# Patient Record
Sex: FEMALE | Race: WHITE | NOT HISPANIC OR LATINO | Employment: OTHER | URBAN - METROPOLITAN AREA
[De-identification: names, ages, dates, MRNs, and addresses within clinical notes are randomized per-mention and may not be internally consistent; named-entity substitution may affect disease eponyms.]

---

## 2018-03-29 ENCOUNTER — APPOINTMENT (OUTPATIENT)
Dept: GENERAL RADIOLOGY | Facility: HOSPITAL | Age: 83
End: 2018-03-29

## 2018-03-29 ENCOUNTER — HOSPITAL ENCOUNTER (INPATIENT)
Facility: HOSPITAL | Age: 83
LOS: 5 days | Discharge: NURSING, REHAB OR LONG-TERM CARE | End: 2018-04-03
Attending: EMERGENCY MEDICINE | Admitting: HOSPITALIST

## 2018-03-29 DIAGNOSIS — Z74.09 IMPAIRED FUNCTIONAL MOBILITY, BALANCE, GAIT, AND ENDURANCE: ICD-10-CM

## 2018-03-29 DIAGNOSIS — J18.9 PNEUMONIA OF LEFT LOWER LOBE DUE TO INFECTIOUS ORGANISM: Primary | ICD-10-CM

## 2018-03-29 DIAGNOSIS — E87.6 HYPOKALEMIA: ICD-10-CM

## 2018-03-29 DIAGNOSIS — J96.01 ACUTE RESPIRATORY FAILURE WITH HYPOXIA (HCC): ICD-10-CM

## 2018-03-29 PROBLEM — D64.9 NORMOCYTIC ANEMIA: Status: ACTIVE | Noted: 2018-03-29

## 2018-03-29 PROBLEM — K21.9 GERD (GASTROESOPHAGEAL REFLUX DISEASE): Status: ACTIVE | Noted: 2018-03-29

## 2018-03-29 PROBLEM — F03.90 DEMENTIA (HCC): Status: ACTIVE | Noted: 2018-03-29

## 2018-03-29 PROBLEM — I10 HYPERTENSION: Status: ACTIVE | Noted: 2018-03-29

## 2018-03-29 LAB
ALBUMIN SERPL-MCNC: 3.8 G/DL (ref 3.2–4.8)
ALBUMIN/GLOB SERPL: 1.4 G/DL (ref 1.5–2.5)
ALP SERPL-CCNC: 109 U/L (ref 25–100)
ALT SERPL W P-5'-P-CCNC: 22 U/L (ref 7–40)
ANION GAP SERPL CALCULATED.3IONS-SCNC: 8 MMOL/L (ref 3–11)
ARTERIAL PATENCY WRIST A: ABNORMAL
AST SERPL-CCNC: 42 U/L (ref 0–33)
ATMOSPHERIC PRESS: ABNORMAL MMHG
BACTERIA UR QL AUTO: ABNORMAL /HPF
BASE EXCESS BLDA CALC-SCNC: 8.1 MMOL/L (ref 0–2)
BASOPHILS # BLD AUTO: 0.02 10*3/MM3 (ref 0–0.2)
BASOPHILS NFR BLD AUTO: 0.3 % (ref 0–1)
BDY SITE: ABNORMAL
BILIRUB SERPL-MCNC: 0.3 MG/DL (ref 0.3–1.2)
BILIRUB UR QL STRIP: NEGATIVE
BNP SERPL-MCNC: 17 PG/ML (ref 0–100)
BUN BLD-MCNC: 13 MG/DL (ref 9–23)
BUN/CREAT SERPL: 16.3 (ref 7–25)
CALCIUM SPEC-SCNC: 8.9 MG/DL (ref 8.7–10.4)
CHLORIDE SERPL-SCNC: 97 MMOL/L (ref 99–109)
CLARITY UR: CLEAR
CO2 BLDA-SCNC: 34.3 MMOL/L (ref 22–33)
CO2 SERPL-SCNC: 32 MMOL/L (ref 20–31)
COHGB MFR BLD: 1.3 % (ref 0–2)
COLOR UR: YELLOW
CREAT BLD-MCNC: 0.8 MG/DL (ref 0.6–1.3)
DEPRECATED RDW RBC AUTO: 47.3 FL (ref 37–54)
EOSINOPHIL # BLD AUTO: 0.01 10*3/MM3 (ref 0–0.3)
EOSINOPHIL NFR BLD AUTO: 0.1 % (ref 0–3)
ERYTHROCYTE [DISTWIDTH] IN BLOOD BY AUTOMATED COUNT: 15.4 % (ref 11.3–14.5)
GFR SERPL CREATININE-BSD FRML MDRD: 68 ML/MIN/1.73
GLOBULIN UR ELPH-MCNC: 2.7 GM/DL
GLUCOSE BLD-MCNC: 118 MG/DL (ref 70–100)
GLUCOSE UR STRIP-MCNC: NEGATIVE MG/DL
HCO3 BLDA-SCNC: 32.9 MMOL/L (ref 20–26)
HCT VFR BLD AUTO: 32 % (ref 34.5–44)
HCT VFR BLD CALC: 28.4 %
HGB BLD-MCNC: 9.7 G/DL (ref 11.5–15.5)
HGB BLDA-MCNC: 9.3 G/DL (ref 14–18)
HGB UR QL STRIP.AUTO: NEGATIVE
HOLD SPECIMEN: NORMAL
HOLD SPECIMEN: NORMAL
HOROWITZ INDEX BLD+IHG-RTO: 28 %
HYALINE CASTS UR QL AUTO: ABNORMAL /LPF
IMM GRANULOCYTES # BLD: 0.01 10*3/MM3 (ref 0–0.03)
IMM GRANULOCYTES NFR BLD: 0.1 % (ref 0–0.6)
KETONES UR QL STRIP: NEGATIVE
LEUKOCYTE ESTERASE UR QL STRIP.AUTO: NEGATIVE
LYMPHOCYTES # BLD AUTO: 1.06 10*3/MM3 (ref 0.6–4.8)
LYMPHOCYTES NFR BLD AUTO: 15.1 % (ref 24–44)
MCH RBC QN AUTO: 25.3 PG (ref 27–31)
MCHC RBC AUTO-ENTMCNC: 30.3 G/DL (ref 32–36)
MCV RBC AUTO: 83.6 FL (ref 80–99)
METHGB BLD QL: 1.1 % (ref 0–1.5)
MODALITY: ABNORMAL
MONOCYTES # BLD AUTO: 0.49 10*3/MM3 (ref 0–1)
MONOCYTES NFR BLD AUTO: 7 % (ref 0–12)
NEUTROPHILS # BLD AUTO: 5.42 10*3/MM3 (ref 1.5–8.3)
NEUTROPHILS NFR BLD AUTO: 77.4 % (ref 41–71)
NITRITE UR QL STRIP: NEGATIVE
NRBC BLD MANUAL-RTO: 0 /100 WBC (ref 0–0)
OXYHGB MFR BLDV: 95.1 % (ref 94–99)
PCO2 BLDA: 46.8 MM HG (ref 35–45)
PH BLDA: 7.46 PH UNITS (ref 7.35–7.45)
PH UR STRIP.AUTO: 6 [PH] (ref 5–8)
PLATELET # BLD AUTO: 276 10*3/MM3 (ref 150–450)
PMV BLD AUTO: 9.1 FL (ref 6–12)
PO2 BLDA: 85.6 MM HG (ref 83–108)
POTASSIUM BLD-SCNC: 2.9 MMOL/L (ref 3.5–5.5)
PROT SERPL-MCNC: 6.5 G/DL (ref 5.7–8.2)
PROT UR QL STRIP: ABNORMAL
RBC # BLD AUTO: 3.83 10*6/MM3 (ref 3.89–5.14)
RBC # UR: ABNORMAL /HPF
REF LAB TEST METHOD: ABNORMAL
SODIUM BLD-SCNC: 137 MMOL/L (ref 132–146)
SP GR UR STRIP: 1.02 (ref 1–1.03)
SQUAMOUS #/AREA URNS HPF: ABNORMAL /HPF
TROPONIN I SERPL-MCNC: 0.05 NG/ML (ref 0–0.07)
UROBILINOGEN UR QL STRIP: ABNORMAL
WBC NRBC COR # BLD: 7.01 10*3/MM3 (ref 3.5–10.8)
WBC UR QL AUTO: ABNORMAL /HPF
WHOLE BLOOD HOLD SPECIMEN: NORMAL
WHOLE BLOOD HOLD SPECIMEN: NORMAL

## 2018-03-29 PROCEDURE — 84484 ASSAY OF TROPONIN QUANT: CPT

## 2018-03-29 PROCEDURE — 99223 1ST HOSP IP/OBS HIGH 75: CPT | Performed by: INTERNAL MEDICINE

## 2018-03-29 PROCEDURE — 36600 WITHDRAWAL OF ARTERIAL BLOOD: CPT | Performed by: EMERGENCY MEDICINE

## 2018-03-29 PROCEDURE — 25010000002 PIPERACILLIN SOD-TAZOBACTAM PER 1 G: Performed by: EMERGENCY MEDICINE

## 2018-03-29 PROCEDURE — 71045 X-RAY EXAM CHEST 1 VIEW: CPT

## 2018-03-29 PROCEDURE — 83880 ASSAY OF NATRIURETIC PEPTIDE: CPT | Performed by: EMERGENCY MEDICINE

## 2018-03-29 PROCEDURE — 81001 URINALYSIS AUTO W/SCOPE: CPT | Performed by: EMERGENCY MEDICINE

## 2018-03-29 PROCEDURE — 25010000003 POTASSIUM CHLORIDE 10 MEQ/100ML SOLUTION: Performed by: EMERGENCY MEDICINE

## 2018-03-29 PROCEDURE — 82805 BLOOD GASES W/O2 SATURATION: CPT | Performed by: EMERGENCY MEDICINE

## 2018-03-29 PROCEDURE — 85025 COMPLETE CBC W/AUTO DIFF WBC: CPT | Performed by: EMERGENCY MEDICINE

## 2018-03-29 PROCEDURE — 93005 ELECTROCARDIOGRAM TRACING: CPT | Performed by: EMERGENCY MEDICINE

## 2018-03-29 PROCEDURE — 25010000002 VANCOMYCIN 10 G RECONSTITUTED SOLUTION: Performed by: EMERGENCY MEDICINE

## 2018-03-29 PROCEDURE — 87040 BLOOD CULTURE FOR BACTERIA: CPT | Performed by: EMERGENCY MEDICINE

## 2018-03-29 PROCEDURE — 99285 EMERGENCY DEPT VISIT HI MDM: CPT

## 2018-03-29 PROCEDURE — 80053 COMPREHEN METABOLIC PANEL: CPT | Performed by: EMERGENCY MEDICINE

## 2018-03-29 PROCEDURE — P9612 CATHETERIZE FOR URINE SPEC: HCPCS

## 2018-03-29 RX ORDER — DONEPEZIL HYDROCHLORIDE 10 MG/1
10 TABLET, FILM COATED ORAL NIGHTLY
Status: DISCONTINUED | OUTPATIENT
Start: 2018-03-29 | End: 2018-04-03 | Stop reason: HOSPADM

## 2018-03-29 RX ORDER — SUCRALFATE 1 G/1
1 TABLET ORAL
COMMUNITY

## 2018-03-29 RX ORDER — TRAMADOL HYDROCHLORIDE 50 MG/1
50 TABLET ORAL 2 TIMES DAILY
Status: ON HOLD | COMMUNITY
End: 2018-05-18

## 2018-03-29 RX ORDER — DONEPEZIL HYDROCHLORIDE 10 MG/1
10 TABLET, FILM COATED ORAL NIGHTLY
COMMUNITY

## 2018-03-29 RX ORDER — ALPRAZOLAM 0.25 MG/1
0.25 TABLET ORAL 2 TIMES DAILY PRN
Status: ON HOLD | COMMUNITY
End: 2018-05-18

## 2018-03-29 RX ORDER — FLUOCINONIDE TOPICAL SOLUTION USP, 0.05% 0.5 MG/ML
SOLUTION TOPICAL 2 TIMES DAILY
COMMUNITY
End: 2018-04-03 | Stop reason: HOSPADM

## 2018-03-29 RX ORDER — HYDROCODONE BITARTRATE AND ACETAMINOPHEN 5; 325 MG/1; MG/1
1 TABLET ORAL EVERY 4 HOURS PRN
Status: DISCONTINUED | OUTPATIENT
Start: 2018-03-29 | End: 2018-04-01

## 2018-03-29 RX ORDER — POTASSIUM CHLORIDE 7.45 MG/ML
10 INJECTION INTRAVENOUS ONCE
Status: COMPLETED | OUTPATIENT
Start: 2018-03-29 | End: 2018-03-29

## 2018-03-29 RX ORDER — TRAMADOL HYDROCHLORIDE 50 MG/1
50 TABLET ORAL 2 TIMES DAILY
Status: DISCONTINUED | OUTPATIENT
Start: 2018-03-29 | End: 2018-04-03 | Stop reason: HOSPADM

## 2018-03-29 RX ORDER — ACETAMINOPHEN 325 MG/1
650 TABLET ORAL EVERY 6 HOURS PRN
COMMUNITY

## 2018-03-29 RX ORDER — BISACODYL 5 MG/1
5 TABLET, DELAYED RELEASE ORAL DAILY PRN
Status: DISCONTINUED | OUTPATIENT
Start: 2018-03-29 | End: 2018-04-03 | Stop reason: HOSPADM

## 2018-03-29 RX ORDER — HEPARIN SODIUM 5000 [USP'U]/ML
5000 INJECTION, SOLUTION INTRAVENOUS; SUBCUTANEOUS EVERY 8 HOURS SCHEDULED
Status: DISCONTINUED | OUTPATIENT
Start: 2018-03-29 | End: 2018-04-03 | Stop reason: HOSPADM

## 2018-03-29 RX ORDER — SUCRALFATE 1 G/1
1 TABLET ORAL
Status: DISCONTINUED | OUTPATIENT
Start: 2018-03-29 | End: 2018-04-03 | Stop reason: HOSPADM

## 2018-03-29 RX ORDER — VANCOMYCIN HYDROCHLORIDE 1 G/200ML
15 INJECTION, SOLUTION INTRAVENOUS EVERY 24 HOURS
Status: DISCONTINUED | OUTPATIENT
Start: 2018-03-30 | End: 2018-03-31

## 2018-03-29 RX ORDER — ONDANSETRON 2 MG/ML
4 INJECTION INTRAMUSCULAR; INTRAVENOUS EVERY 6 HOURS PRN
Status: DISCONTINUED | OUTPATIENT
Start: 2018-03-29 | End: 2018-04-03 | Stop reason: HOSPADM

## 2018-03-29 RX ORDER — LISINOPRIL 20 MG/1
20 TABLET ORAL DAILY
COMMUNITY
End: 2018-04-03 | Stop reason: HOSPADM

## 2018-03-29 RX ORDER — KETOCONAZOLE 20 MG/ML
SHAMPOO TOPICAL DAILY
COMMUNITY
End: 2018-04-03 | Stop reason: HOSPADM

## 2018-03-29 RX ORDER — SODIUM CHLORIDE 0.9 % (FLUSH) 0.9 %
1-10 SYRINGE (ML) INJECTION AS NEEDED
Status: DISCONTINUED | OUTPATIENT
Start: 2018-03-29 | End: 2018-04-03 | Stop reason: HOSPADM

## 2018-03-29 RX ORDER — MEMANTINE HYDROCHLORIDE 10 MG/1
10 TABLET ORAL EVERY 12 HOURS SCHEDULED
Status: DISCONTINUED | OUTPATIENT
Start: 2018-03-29 | End: 2018-04-03 | Stop reason: HOSPADM

## 2018-03-29 RX ORDER — PILOCARPINE HYDROCHLORIDE 5 MG/1
5 TABLET, FILM COATED ORAL 4 TIMES DAILY
Status: DISCONTINUED | OUTPATIENT
Start: 2018-03-29 | End: 2018-04-03 | Stop reason: HOSPADM

## 2018-03-29 RX ORDER — LORAZEPAM 2 MG/ML
0.5 INJECTION INTRAMUSCULAR EVERY 6 HOURS PRN
Status: DISCONTINUED | OUTPATIENT
Start: 2018-03-29 | End: 2018-04-02

## 2018-03-29 RX ORDER — ONDANSETRON 4 MG/1
4 TABLET, FILM COATED ORAL EVERY 6 HOURS PRN
Status: DISCONTINUED | OUTPATIENT
Start: 2018-03-29 | End: 2018-04-03 | Stop reason: HOSPADM

## 2018-03-29 RX ORDER — CLOPIDOGREL BISULFATE 75 MG/1
75 TABLET ORAL DAILY
Status: DISCONTINUED | OUTPATIENT
Start: 2018-03-30 | End: 2018-04-03 | Stop reason: HOSPADM

## 2018-03-29 RX ORDER — TRAZODONE HYDROCHLORIDE 50 MG/1
50 TABLET ORAL NIGHTLY
Status: DISCONTINUED | OUTPATIENT
Start: 2018-03-29 | End: 2018-04-01

## 2018-03-29 RX ORDER — PILOCARPINE HYDROCHLORIDE 5 MG/1
5 TABLET, FILM COATED ORAL 4 TIMES DAILY
Status: ON HOLD | COMMUNITY
End: 2018-05-16

## 2018-03-29 RX ORDER — SODIUM CHLORIDE 0.9 % (FLUSH) 0.9 %
10 SYRINGE (ML) INJECTION AS NEEDED
Status: DISCONTINUED | OUTPATIENT
Start: 2018-03-29 | End: 2018-04-03 | Stop reason: HOSPADM

## 2018-03-29 RX ORDER — AMLODIPINE BESYLATE 10 MG/1
10 TABLET ORAL NIGHTLY
COMMUNITY

## 2018-03-29 RX ORDER — CEFUROXIME AXETIL 500 MG/1
500 TABLET ORAL 2 TIMES DAILY
COMMUNITY
Start: 2018-03-28 | End: 2018-04-03 | Stop reason: HOSPADM

## 2018-03-29 RX ORDER — MEMANTINE HYDROCHLORIDE 28 MG/1
28 CAPSULE, EXTENDED RELEASE ORAL DAILY
COMMUNITY

## 2018-03-29 RX ORDER — SODIUM CHLORIDE 9 MG/ML
100 INJECTION, SOLUTION INTRAVENOUS CONTINUOUS
Status: DISCONTINUED | OUTPATIENT
Start: 2018-03-29 | End: 2018-04-01

## 2018-03-29 RX ORDER — OMEPRAZOLE 40 MG/1
40 CAPSULE, DELAYED RELEASE ORAL DAILY
COMMUNITY
End: 2018-05-14

## 2018-03-29 RX ORDER — TRAZODONE HYDROCHLORIDE 50 MG/1
50 TABLET ORAL NIGHTLY
COMMUNITY
End: 2018-05-14

## 2018-03-29 RX ORDER — ACETAMINOPHEN 325 MG/1
650 TABLET ORAL EVERY 4 HOURS PRN
Status: DISCONTINUED | OUTPATIENT
Start: 2018-03-29 | End: 2018-03-29 | Stop reason: SDUPTHER

## 2018-03-29 RX ORDER — ALPRAZOLAM 0.25 MG/1
0.25 TABLET ORAL 2 TIMES DAILY PRN
Status: DISCONTINUED | OUTPATIENT
Start: 2018-03-29 | End: 2018-04-03 | Stop reason: HOSPADM

## 2018-03-29 RX ORDER — CLOPIDOGREL BISULFATE 75 MG/1
75 TABLET ORAL DAILY
COMMUNITY

## 2018-03-29 RX ORDER — IPRATROPIUM BROMIDE AND ALBUTEROL SULFATE 2.5; .5 MG/3ML; MG/3ML
3 SOLUTION RESPIRATORY (INHALATION) EVERY 4 HOURS PRN
Status: DISCONTINUED | OUTPATIENT
Start: 2018-03-29 | End: 2018-04-03 | Stop reason: HOSPADM

## 2018-03-29 RX ORDER — PANTOPRAZOLE SODIUM 40 MG/1
40 TABLET, DELAYED RELEASE ORAL
Status: DISCONTINUED | OUTPATIENT
Start: 2018-03-30 | End: 2018-04-03 | Stop reason: HOSPADM

## 2018-03-29 RX ORDER — ACETAMINOPHEN 325 MG/1
650 TABLET ORAL EVERY 6 HOURS PRN
Status: DISCONTINUED | OUTPATIENT
Start: 2018-03-29 | End: 2018-04-03 | Stop reason: HOSPADM

## 2018-03-29 RX ORDER — POLYETHYLENE GLYCOL 3350 17 G/17G
17 POWDER, FOR SOLUTION ORAL DAILY PRN
COMMUNITY
End: 2020-02-05

## 2018-03-29 RX ORDER — CLONIDINE HYDROCHLORIDE 0.1 MG/1
0.1 TABLET ORAL 2 TIMES DAILY PRN
COMMUNITY
End: 2018-04-03 | Stop reason: HOSPADM

## 2018-03-29 RX ORDER — DOCUSATE SODIUM 100 MG/1
100 CAPSULE, LIQUID FILLED ORAL 2 TIMES DAILY PRN
Status: DISCONTINUED | OUTPATIENT
Start: 2018-03-29 | End: 2018-04-03 | Stop reason: HOSPADM

## 2018-03-29 RX ORDER — DOCUSATE SODIUM 100 MG/1
200 CAPSULE, LIQUID FILLED ORAL 2 TIMES DAILY
COMMUNITY

## 2018-03-29 RX ORDER — IPRATROPIUM BROMIDE AND ALBUTEROL SULFATE 2.5; .5 MG/3ML; MG/3ML
3 SOLUTION RESPIRATORY (INHALATION) EVERY 4 HOURS PRN
COMMUNITY
End: 2019-02-06

## 2018-03-29 RX ADMIN — TAZOBACTAM SODIUM AND PIPERACILLIN SODIUM 4.5 G: 500; 4 INJECTION, SOLUTION INTRAVENOUS at 17:22

## 2018-03-29 RX ADMIN — POTASSIUM CHLORIDE 10 MEQ: 7.46 INJECTION, SOLUTION INTRAVENOUS at 17:35

## 2018-03-29 RX ADMIN — VANCOMYCIN HYDROCHLORIDE 1250 MG: 10 INJECTION, POWDER, LYOPHILIZED, FOR SOLUTION INTRAVENOUS at 17:57

## 2018-03-30 LAB
ANION GAP SERPL CALCULATED.3IONS-SCNC: 14 MMOL/L (ref 3–11)
BUN BLD-MCNC: 9 MG/DL (ref 9–23)
BUN/CREAT SERPL: 18 (ref 7–25)
CALCIUM SPEC-SCNC: 8.8 MG/DL (ref 8.7–10.4)
CHLORIDE SERPL-SCNC: 98 MMOL/L (ref 99–109)
CO2 SERPL-SCNC: 30 MMOL/L (ref 20–31)
CREAT BLD-MCNC: 0.5 MG/DL (ref 0.6–1.3)
DEPRECATED RDW RBC AUTO: 45.9 FL (ref 37–54)
ERYTHROCYTE [DISTWIDTH] IN BLOOD BY AUTOMATED COUNT: 15.1 % (ref 11.3–14.5)
FERRITIN SERPL-MCNC: 18 NG/ML (ref 10–291)
FLUAV SUBTYP SPEC NAA+PROBE: NOT DETECTED
FLUBV RNA ISLT QL NAA+PROBE: NOT DETECTED
FOLATE SERPL-MCNC: 18.69 NG/ML (ref 3.2–20)
GFR SERPL CREATININE-BSD FRML MDRD: 117 ML/MIN/1.73
GLUCOSE BLD-MCNC: 92 MG/DL (ref 70–100)
HCT VFR BLD AUTO: 35.7 % (ref 34.5–44)
HGB BLD-MCNC: 10.6 G/DL (ref 11.5–15.5)
IRON 24H UR-MRATE: 22 MCG/DL (ref 50–175)
IRON SATN MFR SERPL: 6 % (ref 15–50)
MAGNESIUM SERPL-MCNC: 2 MG/DL (ref 1.3–2.7)
MAGNESIUM SERPL-MCNC: 2 MG/DL (ref 1.3–2.7)
MCH RBC QN AUTO: 24.9 PG (ref 27–31)
MCHC RBC AUTO-ENTMCNC: 29.7 G/DL (ref 32–36)
MCV RBC AUTO: 83.8 FL (ref 80–99)
PLATELET # BLD AUTO: 276 10*3/MM3 (ref 150–450)
PMV BLD AUTO: 9.2 FL (ref 6–12)
POTASSIUM BLD-SCNC: 2.8 MMOL/L (ref 3.5–5.5)
POTASSIUM BLD-SCNC: 3.7 MMOL/L (ref 3.5–5.5)
RBC # BLD AUTO: 4.26 10*6/MM3 (ref 3.89–5.14)
SODIUM BLD-SCNC: 142 MMOL/L (ref 132–146)
TIBC SERPL-MCNC: 375 MCG/DL (ref 250–450)
VIT B12 BLD-MCNC: 499 PG/ML (ref 211–911)
WBC NRBC COR # BLD: 6.23 10*3/MM3 (ref 3.5–10.8)

## 2018-03-30 PROCEDURE — 25010000002 VANCOMYCIN PER 500 MG

## 2018-03-30 PROCEDURE — 85027 COMPLETE CBC AUTOMATED: CPT | Performed by: INTERNAL MEDICINE

## 2018-03-30 PROCEDURE — 63710000001 ALPRAZOLAM 0.25 MG TABLET: Performed by: INTERNAL MEDICINE

## 2018-03-30 PROCEDURE — 83550 IRON BINDING TEST: CPT | Performed by: INTERNAL MEDICINE

## 2018-03-30 PROCEDURE — A9270 NON-COVERED ITEM OR SERVICE: HCPCS | Performed by: HOSPITALIST

## 2018-03-30 PROCEDURE — 82607 VITAMIN B-12: CPT | Performed by: INTERNAL MEDICINE

## 2018-03-30 PROCEDURE — 99233 SBSQ HOSP IP/OBS HIGH 50: CPT | Performed by: HOSPITALIST

## 2018-03-30 PROCEDURE — 25010000002 PIPERACILLIN SOD-TAZOBACTAM PER 1 G: Performed by: INTERNAL MEDICINE

## 2018-03-30 PROCEDURE — 82728 ASSAY OF FERRITIN: CPT | Performed by: INTERNAL MEDICINE

## 2018-03-30 PROCEDURE — 87502 INFLUENZA DNA AMP PROBE: CPT | Performed by: INTERNAL MEDICINE

## 2018-03-30 PROCEDURE — 63710000001 DONEPEZIL 10 MG TABLET: Performed by: INTERNAL MEDICINE

## 2018-03-30 PROCEDURE — 83735 ASSAY OF MAGNESIUM: CPT | Performed by: INTERNAL MEDICINE

## 2018-03-30 PROCEDURE — 63710000001 PANTOPRAZOLE 40 MG TABLET DELAYED-RELEASE: Performed by: INTERNAL MEDICINE

## 2018-03-30 PROCEDURE — 63710000001 AMLODIPINE 5 MG TABLET: Performed by: HOSPITALIST

## 2018-03-30 PROCEDURE — 94640 AIRWAY INHALATION TREATMENT: CPT

## 2018-03-30 PROCEDURE — 63710000001 CLOPIDOGREL 75 MG TABLET: Performed by: INTERNAL MEDICINE

## 2018-03-30 PROCEDURE — A9270 NON-COVERED ITEM OR SERVICE: HCPCS | Performed by: INTERNAL MEDICINE

## 2018-03-30 PROCEDURE — 25010000002 HEPARIN (PORCINE) PER 1000 UNITS: Performed by: INTERNAL MEDICINE

## 2018-03-30 PROCEDURE — 63710000001 MEMANTINE 10 MG TABLET: Performed by: INTERNAL MEDICINE

## 2018-03-30 PROCEDURE — 87899 AGENT NOS ASSAY W/OPTIC: CPT | Performed by: INTERNAL MEDICINE

## 2018-03-30 PROCEDURE — 63710000001 POTASSIUM CHLORIDE 10 MEQ CAPSULE CONTROLLED-RELEASE: Performed by: HOSPITALIST

## 2018-03-30 PROCEDURE — 94799 UNLISTED PULMONARY SVC/PX: CPT

## 2018-03-30 PROCEDURE — 82746 ASSAY OF FOLIC ACID SERUM: CPT | Performed by: INTERNAL MEDICINE

## 2018-03-30 PROCEDURE — 63710000001 TRAZODONE 50 MG TABLET: Performed by: INTERNAL MEDICINE

## 2018-03-30 PROCEDURE — 83540 ASSAY OF IRON: CPT | Performed by: INTERNAL MEDICINE

## 2018-03-30 PROCEDURE — 84132 ASSAY OF SERUM POTASSIUM: CPT | Performed by: HOSPITALIST

## 2018-03-30 PROCEDURE — 63710000001 SUCRALFATE 1 G TABLET: Performed by: INTERNAL MEDICINE

## 2018-03-30 PROCEDURE — 80048 BASIC METABOLIC PNL TOTAL CA: CPT | Performed by: INTERNAL MEDICINE

## 2018-03-30 PROCEDURE — 83735 ASSAY OF MAGNESIUM: CPT | Performed by: HOSPITALIST

## 2018-03-30 PROCEDURE — 87449 NOS EACH ORGANISM AG IA: CPT | Performed by: INTERNAL MEDICINE

## 2018-03-30 PROCEDURE — 63710000001 LISINOPRIL 10 MG TABLET: Performed by: HOSPITALIST

## 2018-03-30 PROCEDURE — 63710000001 PILOCARPINE 5 MG TABLET: Performed by: INTERNAL MEDICINE

## 2018-03-30 PROCEDURE — 63710000001 TRAMADOL 50 MG TABLET: Performed by: INTERNAL MEDICINE

## 2018-03-30 PROCEDURE — 94760 N-INVAS EAR/PLS OXIMETRY 1: CPT

## 2018-03-30 RX ORDER — IPRATROPIUM BROMIDE AND ALBUTEROL SULFATE 2.5; .5 MG/3ML; MG/3ML
3 SOLUTION RESPIRATORY (INHALATION)
Status: DISCONTINUED | OUTPATIENT
Start: 2018-03-30 | End: 2018-04-02

## 2018-03-30 RX ORDER — ISOSORBIDE MONONITRATE 30 MG/1
30 TABLET, EXTENDED RELEASE ORAL
Status: DISCONTINUED | OUTPATIENT
Start: 2018-03-30 | End: 2018-04-03 | Stop reason: HOSPADM

## 2018-03-30 RX ORDER — POTASSIUM CHLORIDE 7.45 MG/ML
10 INJECTION INTRAVENOUS
Status: DISCONTINUED | OUTPATIENT
Start: 2018-03-30 | End: 2018-04-03 | Stop reason: HOSPADM

## 2018-03-30 RX ORDER — FERROUS SULFATE 325(65) MG
325 TABLET ORAL
Status: DISCONTINUED | OUTPATIENT
Start: 2018-03-31 | End: 2018-04-03 | Stop reason: HOSPADM

## 2018-03-30 RX ORDER — LISINOPRIL 10 MG/1
10 TABLET ORAL
Status: DISCONTINUED | OUTPATIENT
Start: 2018-03-30 | End: 2018-04-01

## 2018-03-30 RX ORDER — ASCORBIC ACID 500 MG
500 TABLET ORAL DAILY
Status: DISCONTINUED | OUTPATIENT
Start: 2018-03-31 | End: 2018-04-03 | Stop reason: HOSPADM

## 2018-03-30 RX ORDER — POTASSIUM CHLORIDE 1.5 G/1.77G
40 POWDER, FOR SOLUTION ORAL AS NEEDED
Status: DISCONTINUED | OUTPATIENT
Start: 2018-03-30 | End: 2018-04-03 | Stop reason: HOSPADM

## 2018-03-30 RX ORDER — SACCHAROMYCES BOULARDII 250 MG
250 CAPSULE ORAL 2 TIMES DAILY
Status: DISCONTINUED | OUTPATIENT
Start: 2018-03-30 | End: 2018-04-03 | Stop reason: HOSPADM

## 2018-03-30 RX ORDER — POTASSIUM CHLORIDE 750 MG/1
40 CAPSULE, EXTENDED RELEASE ORAL AS NEEDED
Status: DISCONTINUED | OUTPATIENT
Start: 2018-03-30 | End: 2018-04-03 | Stop reason: HOSPADM

## 2018-03-30 RX ORDER — AMLODIPINE BESYLATE 5 MG/1
5 TABLET ORAL
Status: DISCONTINUED | OUTPATIENT
Start: 2018-03-30 | End: 2018-04-01

## 2018-03-30 RX ADMIN — VANCOMYCIN HYDROCHLORIDE 1000 MG: 1 INJECTION, SOLUTION INTRAVENOUS at 20:25

## 2018-03-30 RX ADMIN — TRAZODONE HYDROCHLORIDE 50 MG: 50 TABLET ORAL at 01:00

## 2018-03-30 RX ADMIN — SUCRALFATE 1 G: 1 TABLET ORAL at 18:15

## 2018-03-30 RX ADMIN — POTASSIUM CHLORIDE 40 MEQ: 750 CAPSULE, EXTENDED RELEASE ORAL at 18:19

## 2018-03-30 RX ADMIN — ALPRAZOLAM 0.25 MG: 0.25 TABLET ORAL at 05:51

## 2018-03-30 RX ADMIN — SODIUM CHLORIDE 100 ML/HR: 9 INJECTION, SOLUTION INTRAVENOUS at 01:00

## 2018-03-30 RX ADMIN — AMLODIPINE BESYLATE 5 MG: 5 TABLET ORAL at 10:34

## 2018-03-30 RX ADMIN — IPRATROPIUM BROMIDE AND ALBUTEROL SULFATE 3 ML: 2.5; .5 SOLUTION RESPIRATORY (INHALATION) at 19:02

## 2018-03-30 RX ADMIN — MEMANTINE 10 MG: 10 TABLET ORAL at 20:25

## 2018-03-30 RX ADMIN — SUCRALFATE 1 G: 1 TABLET ORAL at 12:48

## 2018-03-30 RX ADMIN — HEPARIN SODIUM 5000 UNITS: 5000 INJECTION, SOLUTION INTRAVENOUS; SUBCUTANEOUS at 15:15

## 2018-03-30 RX ADMIN — ISOSORBIDE MONONITRATE 30 MG: 30 TABLET, EXTENDED RELEASE ORAL at 20:25

## 2018-03-30 RX ADMIN — SUCRALFATE 1 G: 1 TABLET ORAL at 20:25

## 2018-03-30 RX ADMIN — MEMANTINE 10 MG: 10 TABLET ORAL at 08:19

## 2018-03-30 RX ADMIN — LISINOPRIL 10 MG: 10 TABLET ORAL at 08:19

## 2018-03-30 RX ADMIN — PILOCARPINE HYDROCHLORIDE 5 MG: 5 TABLET, FILM COATED ORAL at 12:48

## 2018-03-30 RX ADMIN — DONEPEZIL HYDROCHLORIDE 10 MG: 10 TABLET, FILM COATED ORAL at 00:59

## 2018-03-30 RX ADMIN — TAZOBACTAM SODIUM AND PIPERACILLIN SODIUM 3.38 G: 375; 3 INJECTION, SOLUTION INTRAVENOUS at 15:56

## 2018-03-30 RX ADMIN — PANTOPRAZOLE SODIUM 40 MG: 40 TABLET, DELAYED RELEASE ORAL at 05:51

## 2018-03-30 RX ADMIN — TRAZODONE HYDROCHLORIDE 50 MG: 50 TABLET ORAL at 20:25

## 2018-03-30 RX ADMIN — MEMANTINE 10 MG: 10 TABLET ORAL at 01:00

## 2018-03-30 RX ADMIN — HEPARIN SODIUM 5000 UNITS: 5000 INJECTION, SOLUTION INTRAVENOUS; SUBCUTANEOUS at 20:25

## 2018-03-30 RX ADMIN — PILOCARPINE HYDROCHLORIDE 5 MG: 5 TABLET, FILM COATED ORAL at 08:19

## 2018-03-30 RX ADMIN — TRAMADOL HYDROCHLORIDE 50 MG: 50 TABLET, COATED ORAL at 08:20

## 2018-03-30 RX ADMIN — CLOPIDOGREL BISULFATE 75 MG: 75 TABLET ORAL at 08:19

## 2018-03-30 RX ADMIN — HEPARIN SODIUM 5000 UNITS: 5000 INJECTION, SOLUTION INTRAVENOUS; SUBCUTANEOUS at 05:51

## 2018-03-30 RX ADMIN — POTASSIUM CHLORIDE 40 MEQ: 750 CAPSULE, EXTENDED RELEASE ORAL at 15:15

## 2018-03-30 RX ADMIN — TAZOBACTAM SODIUM AND PIPERACILLIN SODIUM 3.38 G: 375; 3 INJECTION, SOLUTION INTRAVENOUS at 01:00

## 2018-03-30 RX ADMIN — POTASSIUM CHLORIDE 40 MEQ: 750 CAPSULE, EXTENDED RELEASE ORAL at 10:29

## 2018-03-30 RX ADMIN — TRAMADOL HYDROCHLORIDE 50 MG: 50 TABLET, COATED ORAL at 01:00

## 2018-03-30 RX ADMIN — TAZOBACTAM SODIUM AND PIPERACILLIN SODIUM 3.38 G: 375; 3 INJECTION, SOLUTION INTRAVENOUS at 08:20

## 2018-03-30 RX ADMIN — POTASSIUM CHLORIDE 40 MEQ: 750 CAPSULE, EXTENDED RELEASE ORAL at 10:34

## 2018-03-30 RX ADMIN — SODIUM CHLORIDE 100 ML/HR: 9 INJECTION, SOLUTION INTRAVENOUS at 20:25

## 2018-03-30 RX ADMIN — PILOCARPINE HYDROCHLORIDE 5 MG: 5 TABLET, FILM COATED ORAL at 20:30

## 2018-03-30 RX ADMIN — SODIUM CHLORIDE 100 ML/HR: 9 INJECTION, SOLUTION INTRAVENOUS at 10:29

## 2018-03-30 RX ADMIN — DONEPEZIL HYDROCHLORIDE 10 MG: 10 TABLET, FILM COATED ORAL at 20:25

## 2018-03-30 RX ADMIN — PILOCARPINE HYDROCHLORIDE 5 MG: 5 TABLET, FILM COATED ORAL at 18:15

## 2018-03-30 RX ADMIN — PILOCARPINE HYDROCHLORIDE 5 MG: 5 TABLET, FILM COATED ORAL at 00:59

## 2018-03-30 RX ADMIN — TRAMADOL HYDROCHLORIDE 50 MG: 50 TABLET, COATED ORAL at 20:25

## 2018-03-30 RX ADMIN — IPRATROPIUM BROMIDE AND ALBUTEROL SULFATE 3 ML: 2.5; .5 SOLUTION RESPIRATORY (INHALATION) at 14:35

## 2018-03-30 RX ADMIN — SUCRALFATE 1 G: 1 TABLET ORAL at 08:19

## 2018-03-30 RX ADMIN — ALPRAZOLAM 0.25 MG: 0.25 TABLET ORAL at 18:15

## 2018-03-31 LAB
ANION GAP SERPL CALCULATED.3IONS-SCNC: 5 MMOL/L (ref 3–11)
BUN BLD-MCNC: 8 MG/DL (ref 9–23)
BUN/CREAT SERPL: 13.3 (ref 7–25)
CALCIUM SPEC-SCNC: 8.6 MG/DL (ref 8.7–10.4)
CHLORIDE SERPL-SCNC: 104 MMOL/L (ref 99–109)
CO2 SERPL-SCNC: 32 MMOL/L (ref 20–31)
CREAT BLD-MCNC: 0.6 MG/DL (ref 0.6–1.3)
DEPRECATED RDW RBC AUTO: 46.7 FL (ref 37–54)
ERYTHROCYTE [DISTWIDTH] IN BLOOD BY AUTOMATED COUNT: 15 % (ref 11.3–14.5)
GFR SERPL CREATININE-BSD FRML MDRD: 95 ML/MIN/1.73
GLUCOSE BLD-MCNC: 91 MG/DL (ref 70–100)
HCT VFR BLD AUTO: 31.3 % (ref 34.5–44)
HGB BLD-MCNC: 9.4 G/DL (ref 11.5–15.5)
INR PPP: 0.97 (ref 0.91–1.09)
MCH RBC QN AUTO: 25.4 PG (ref 27–31)
MCHC RBC AUTO-ENTMCNC: 30 G/DL (ref 32–36)
MCV RBC AUTO: 84.6 FL (ref 80–99)
PHOSPHATE SERPL-MCNC: 2.8 MG/DL (ref 2.4–5.1)
PLATELET # BLD AUTO: 270 10*3/MM3 (ref 150–450)
PMV BLD AUTO: 8.9 FL (ref 6–12)
POTASSIUM BLD-SCNC: 3.4 MMOL/L (ref 3.5–5.5)
POTASSIUM BLD-SCNC: 4.2 MMOL/L (ref 3.5–5.5)
PROTHROMBIN TIME: 10.2 SECONDS (ref 9.6–11.5)
RBC # BLD AUTO: 3.7 10*6/MM3 (ref 3.89–5.14)
SODIUM BLD-SCNC: 141 MMOL/L (ref 132–146)
VANCOMYCIN TROUGH SERPL-MCNC: 6.6 MCG/ML (ref 10–20)
WBC NRBC COR # BLD: 6.88 10*3/MM3 (ref 3.5–10.8)

## 2018-03-31 PROCEDURE — 84100 ASSAY OF PHOSPHORUS: CPT | Performed by: HOSPITALIST

## 2018-03-31 PROCEDURE — 84132 ASSAY OF SERUM POTASSIUM: CPT | Performed by: HOSPITALIST

## 2018-03-31 PROCEDURE — 80202 ASSAY OF VANCOMYCIN: CPT

## 2018-03-31 PROCEDURE — 80048 BASIC METABOLIC PNL TOTAL CA: CPT | Performed by: HOSPITALIST

## 2018-03-31 PROCEDURE — 25010000002 VANCOMYCIN PER 500 MG

## 2018-03-31 PROCEDURE — 94640 AIRWAY INHALATION TREATMENT: CPT

## 2018-03-31 PROCEDURE — 25010000002 HEPARIN (PORCINE) PER 1000 UNITS: Performed by: INTERNAL MEDICINE

## 2018-03-31 PROCEDURE — 99233 SBSQ HOSP IP/OBS HIGH 50: CPT | Performed by: NURSE PRACTITIONER

## 2018-03-31 PROCEDURE — 94799 UNLISTED PULMONARY SVC/PX: CPT

## 2018-03-31 PROCEDURE — 85027 COMPLETE CBC AUTOMATED: CPT | Performed by: HOSPITALIST

## 2018-03-31 PROCEDURE — 97610 LOW FREQUENCY NON-THERMAL US: CPT

## 2018-03-31 PROCEDURE — 97162 PT EVAL MOD COMPLEX 30 MIN: CPT

## 2018-03-31 PROCEDURE — 25010000002 PIPERACILLIN SOD-TAZOBACTAM PER 1 G: Performed by: INTERNAL MEDICINE

## 2018-03-31 PROCEDURE — 94760 N-INVAS EAR/PLS OXIMETRY 1: CPT

## 2018-03-31 PROCEDURE — 85610 PROTHROMBIN TIME: CPT | Performed by: HOSPITALIST

## 2018-03-31 RX ORDER — VANCOMYCIN HYDROCHLORIDE 1 G/200ML
15 INJECTION, SOLUTION INTRAVENOUS
Status: DISCONTINUED | OUTPATIENT
Start: 2018-04-01 | End: 2018-04-02

## 2018-03-31 RX ADMIN — IPRATROPIUM BROMIDE AND ALBUTEROL SULFATE 3 ML: 2.5; .5 SOLUTION RESPIRATORY (INHALATION) at 11:50

## 2018-03-31 RX ADMIN — HEPARIN SODIUM 5000 UNITS: 5000 INJECTION, SOLUTION INTRAVENOUS; SUBCUTANEOUS at 22:19

## 2018-03-31 RX ADMIN — PILOCARPINE HYDROCHLORIDE 5 MG: 5 TABLET, FILM COATED ORAL at 22:18

## 2018-03-31 RX ADMIN — HEPARIN SODIUM 5000 UNITS: 5000 INJECTION, SOLUTION INTRAVENOUS; SUBCUTANEOUS at 14:48

## 2018-03-31 RX ADMIN — TAZOBACTAM SODIUM AND PIPERACILLIN SODIUM 3.38 G: 375; 3 INJECTION, SOLUTION INTRAVENOUS at 23:18

## 2018-03-31 RX ADMIN — DONEPEZIL HYDROCHLORIDE 10 MG: 10 TABLET, FILM COATED ORAL at 22:18

## 2018-03-31 RX ADMIN — TRAMADOL HYDROCHLORIDE 50 MG: 50 TABLET, COATED ORAL at 22:19

## 2018-03-31 RX ADMIN — MEMANTINE 10 MG: 10 TABLET ORAL at 22:19

## 2018-03-31 RX ADMIN — POTASSIUM CHLORIDE 40 MEQ: 750 CAPSULE, EXTENDED RELEASE ORAL at 08:18

## 2018-03-31 RX ADMIN — SODIUM CHLORIDE 100 ML/HR: 9 INJECTION, SOLUTION INTRAVENOUS at 22:57

## 2018-03-31 RX ADMIN — CLOPIDOGREL BISULFATE 75 MG: 75 TABLET ORAL at 08:18

## 2018-03-31 RX ADMIN — Medication 250 MG: at 08:18

## 2018-03-31 RX ADMIN — PILOCARPINE HYDROCHLORIDE 5 MG: 5 TABLET, FILM COATED ORAL at 11:34

## 2018-03-31 RX ADMIN — Medication 250 MG: at 22:17

## 2018-03-31 RX ADMIN — SUCRALFATE 1 G: 1 TABLET ORAL at 18:28

## 2018-03-31 RX ADMIN — SODIUM CHLORIDE 100 ML/HR: 9 INJECTION, SOLUTION INTRAVENOUS at 05:56

## 2018-03-31 RX ADMIN — TRAZODONE HYDROCHLORIDE 50 MG: 50 TABLET ORAL at 22:20

## 2018-03-31 RX ADMIN — FERROUS SULFATE TAB 325 MG (65 MG ELEMENTAL FE) 325 MG: 325 (65 FE) TAB at 08:18

## 2018-03-31 RX ADMIN — PILOCARPINE HYDROCHLORIDE 5 MG: 5 TABLET, FILM COATED ORAL at 08:18

## 2018-03-31 RX ADMIN — IPRATROPIUM BROMIDE AND ALBUTEROL SULFATE 3 ML: 2.5; .5 SOLUTION RESPIRATORY (INHALATION) at 15:45

## 2018-03-31 RX ADMIN — Medication 250 MG: at 01:05

## 2018-03-31 RX ADMIN — POTASSIUM CHLORIDE 40 MEQ: 750 CAPSULE, EXTENDED RELEASE ORAL at 12:16

## 2018-03-31 RX ADMIN — IPRATROPIUM BROMIDE AND ALBUTEROL SULFATE 3 ML: 2.5; .5 SOLUTION RESPIRATORY (INHALATION) at 19:25

## 2018-03-31 RX ADMIN — ISOSORBIDE MONONITRATE 30 MG: 30 TABLET, EXTENDED RELEASE ORAL at 11:34

## 2018-03-31 RX ADMIN — SUCRALFATE 1 G: 1 TABLET ORAL at 22:17

## 2018-03-31 RX ADMIN — HEPARIN SODIUM 5000 UNITS: 5000 INJECTION, SOLUTION INTRAVENOUS; SUBCUTANEOUS at 05:55

## 2018-03-31 RX ADMIN — ALPRAZOLAM 0.25 MG: 0.25 TABLET ORAL at 08:45

## 2018-03-31 RX ADMIN — TAZOBACTAM SODIUM AND PIPERACILLIN SODIUM 3.38 G: 375; 3 INJECTION, SOLUTION INTRAVENOUS at 08:18

## 2018-03-31 RX ADMIN — AMLODIPINE BESYLATE 5 MG: 5 TABLET ORAL at 08:18

## 2018-03-31 RX ADMIN — SUCRALFATE 1 G: 1 TABLET ORAL at 11:34

## 2018-03-31 RX ADMIN — TAZOBACTAM SODIUM AND PIPERACILLIN SODIUM 3.38 G: 375; 3 INJECTION, SOLUTION INTRAVENOUS at 01:05

## 2018-03-31 RX ADMIN — LISINOPRIL 10 MG: 10 TABLET ORAL at 08:18

## 2018-03-31 RX ADMIN — ACETAMINOPHEN 650 MG: 325 TABLET ORAL at 08:45

## 2018-03-31 RX ADMIN — IPRATROPIUM BROMIDE AND ALBUTEROL SULFATE 3 ML: 2.5; .5 SOLUTION RESPIRATORY (INHALATION) at 06:57

## 2018-03-31 RX ADMIN — SUCRALFATE 1 G: 1 TABLET ORAL at 05:55

## 2018-03-31 RX ADMIN — MEMANTINE 10 MG: 10 TABLET ORAL at 08:18

## 2018-03-31 RX ADMIN — PANTOPRAZOLE SODIUM 40 MG: 40 TABLET, DELAYED RELEASE ORAL at 05:55

## 2018-03-31 RX ADMIN — TAZOBACTAM SODIUM AND PIPERACILLIN SODIUM 3.38 G: 375; 3 INJECTION, SOLUTION INTRAVENOUS at 16:33

## 2018-03-31 RX ADMIN — TRAMADOL HYDROCHLORIDE 50 MG: 50 TABLET, COATED ORAL at 08:18

## 2018-03-31 RX ADMIN — OXYCODONE HYDROCHLORIDE AND ACETAMINOPHEN 500 MG: 500 TABLET ORAL at 08:17

## 2018-03-31 RX ADMIN — VANCOMYCIN HYDROCHLORIDE 1000 MG: 1 INJECTION, SOLUTION INTRAVENOUS at 18:29

## 2018-04-01 LAB
ANION GAP SERPL CALCULATED.3IONS-SCNC: 5 MMOL/L (ref 3–11)
BUN BLD-MCNC: 9 MG/DL (ref 9–23)
BUN/CREAT SERPL: 15 (ref 7–25)
CALCIUM SPEC-SCNC: 8.4 MG/DL (ref 8.7–10.4)
CHLORIDE SERPL-SCNC: 104 MMOL/L (ref 99–109)
CO2 SERPL-SCNC: 29 MMOL/L (ref 20–31)
CREAT BLD-MCNC: 0.6 MG/DL (ref 0.6–1.3)
DEPRECATED RDW RBC AUTO: 46.4 FL (ref 37–54)
ERYTHROCYTE [DISTWIDTH] IN BLOOD BY AUTOMATED COUNT: 15.1 % (ref 11.3–14.5)
GFR SERPL CREATININE-BSD FRML MDRD: 95 ML/MIN/1.73
GLUCOSE BLD-MCNC: 89 MG/DL (ref 70–100)
HCT VFR BLD AUTO: 28.9 % (ref 34.5–44)
HGB BLD-MCNC: 8.8 G/DL (ref 11.5–15.5)
MCH RBC QN AUTO: 25.6 PG (ref 27–31)
MCHC RBC AUTO-ENTMCNC: 30.4 G/DL (ref 32–36)
MCV RBC AUTO: 84 FL (ref 80–99)
PLATELET # BLD AUTO: 265 10*3/MM3 (ref 150–450)
PMV BLD AUTO: 9.1 FL (ref 6–12)
POTASSIUM BLD-SCNC: 4.4 MMOL/L (ref 3.5–5.5)
RBC # BLD AUTO: 3.44 10*6/MM3 (ref 3.89–5.14)
SODIUM BLD-SCNC: 138 MMOL/L (ref 132–146)
WBC NRBC COR # BLD: 7.54 10*3/MM3 (ref 3.5–10.8)

## 2018-04-01 PROCEDURE — 94640 AIRWAY INHALATION TREATMENT: CPT

## 2018-04-01 PROCEDURE — 94799 UNLISTED PULMONARY SVC/PX: CPT

## 2018-04-01 PROCEDURE — 97610 LOW FREQUENCY NON-THERMAL US: CPT

## 2018-04-01 PROCEDURE — 25010000002 VANCOMYCIN PER 500 MG: Performed by: HOSPITALIST

## 2018-04-01 PROCEDURE — 94760 N-INVAS EAR/PLS OXIMETRY 1: CPT

## 2018-04-01 PROCEDURE — 97110 THERAPEUTIC EXERCISES: CPT

## 2018-04-01 PROCEDURE — 80048 BASIC METABOLIC PNL TOTAL CA: CPT | Performed by: NURSE PRACTITIONER

## 2018-04-01 PROCEDURE — 99233 SBSQ HOSP IP/OBS HIGH 50: CPT | Performed by: HOSPITALIST

## 2018-04-01 PROCEDURE — 85027 COMPLETE CBC AUTOMATED: CPT | Performed by: NURSE PRACTITIONER

## 2018-04-01 PROCEDURE — 25010000002 PIPERACILLIN SOD-TAZOBACTAM PER 1 G: Performed by: INTERNAL MEDICINE

## 2018-04-01 PROCEDURE — 97163 PT EVAL HIGH COMPLEX 45 MIN: CPT

## 2018-04-01 PROCEDURE — 25010000002 HEPARIN (PORCINE) PER 1000 UNITS: Performed by: INTERNAL MEDICINE

## 2018-04-01 RX ORDER — AMLODIPINE BESYLATE 5 MG/1
10 TABLET ORAL EVERY EVENING
Status: DISCONTINUED | OUTPATIENT
Start: 2018-04-01 | End: 2018-04-03 | Stop reason: HOSPADM

## 2018-04-01 RX ORDER — AMLODIPINE BESYLATE 5 MG/1
5 TABLET ORAL ONCE
Status: DISCONTINUED | OUTPATIENT
Start: 2018-04-01 | End: 2018-04-01 | Stop reason: SDUPTHER

## 2018-04-01 RX ORDER — LISINOPRIL 10 MG/1
10 TABLET ORAL EVERY 12 HOURS SCHEDULED
Status: DISCONTINUED | OUTPATIENT
Start: 2018-04-01 | End: 2018-04-03 | Stop reason: HOSPADM

## 2018-04-01 RX ORDER — ALPRAZOLAM 0.25 MG/1
0.25 TABLET ORAL NIGHTLY
Status: DISCONTINUED | OUTPATIENT
Start: 2018-04-01 | End: 2018-04-03 | Stop reason: HOSPADM

## 2018-04-01 RX ORDER — TRAZODONE HYDROCHLORIDE 50 MG/1
25 TABLET ORAL NIGHTLY
Status: DISCONTINUED | OUTPATIENT
Start: 2018-04-01 | End: 2018-04-03 | Stop reason: HOSPADM

## 2018-04-01 RX ORDER — AMLODIPINE BESYLATE 5 MG/1
5 TABLET ORAL ONCE
Status: COMPLETED | OUTPATIENT
Start: 2018-04-01 | End: 2018-04-01

## 2018-04-01 RX ORDER — AMLODIPINE BESYLATE 5 MG/1
10 TABLET ORAL
Status: DISCONTINUED | OUTPATIENT
Start: 2018-04-02 | End: 2018-04-01

## 2018-04-01 RX ORDER — TERAZOSIN 1 MG/1
1 CAPSULE ORAL EVERY 12 HOURS SCHEDULED
Status: DISCONTINUED | OUTPATIENT
Start: 2018-04-01 | End: 2018-04-02

## 2018-04-01 RX ADMIN — TAZOBACTAM SODIUM AND PIPERACILLIN SODIUM 3.38 G: 375; 3 INJECTION, SOLUTION INTRAVENOUS at 14:45

## 2018-04-01 RX ADMIN — AMLODIPINE BESYLATE 5 MG: 5 TABLET ORAL at 07:59

## 2018-04-01 RX ADMIN — MEMANTINE 10 MG: 10 TABLET ORAL at 07:58

## 2018-04-01 RX ADMIN — LISINOPRIL 10 MG: 10 TABLET ORAL at 22:20

## 2018-04-01 RX ADMIN — Medication 5 MG: at 22:24

## 2018-04-01 RX ADMIN — SUCRALFATE 1 G: 1 TABLET ORAL at 06:44

## 2018-04-01 RX ADMIN — OXYCODONE HYDROCHLORIDE AND ACETAMINOPHEN 500 MG: 500 TABLET ORAL at 07:58

## 2018-04-01 RX ADMIN — AMLODIPINE BESYLATE 10 MG: 5 TABLET ORAL at 16:17

## 2018-04-01 RX ADMIN — HEPARIN SODIUM 5000 UNITS: 5000 INJECTION, SOLUTION INTRAVENOUS; SUBCUTANEOUS at 14:44

## 2018-04-01 RX ADMIN — VANCOMYCIN HYDROCHLORIDE 1000 MG: 1 INJECTION, SOLUTION INTRAVENOUS at 11:45

## 2018-04-01 RX ADMIN — PANTOPRAZOLE SODIUM 40 MG: 40 TABLET, DELAYED RELEASE ORAL at 06:44

## 2018-04-01 RX ADMIN — ALPRAZOLAM 0.25 MG: 0.25 TABLET ORAL at 22:24

## 2018-04-01 RX ADMIN — TRAZODONE HYDROCHLORIDE 25 MG: 50 TABLET ORAL at 22:29

## 2018-04-01 RX ADMIN — PILOCARPINE HYDROCHLORIDE 5 MG: 5 TABLET, FILM COATED ORAL at 07:58

## 2018-04-01 RX ADMIN — FERROUS SULFATE TAB 325 MG (65 MG ELEMENTAL FE) 325 MG: 325 (65 FE) TAB at 07:59

## 2018-04-01 RX ADMIN — Medication 250 MG: at 07:59

## 2018-04-01 RX ADMIN — DONEPEZIL HYDROCHLORIDE 10 MG: 10 TABLET, FILM COATED ORAL at 22:23

## 2018-04-01 RX ADMIN — MEMANTINE 10 MG: 10 TABLET ORAL at 22:20

## 2018-04-01 RX ADMIN — SODIUM CHLORIDE 100 ML/HR: 9 INJECTION, SOLUTION INTRAVENOUS at 11:48

## 2018-04-01 RX ADMIN — Medication 250 MG: at 22:20

## 2018-04-01 RX ADMIN — ISOSORBIDE MONONITRATE 30 MG: 30 TABLET, EXTENDED RELEASE ORAL at 11:54

## 2018-04-01 RX ADMIN — ALPRAZOLAM 0.25 MG: 0.25 TABLET ORAL at 09:07

## 2018-04-01 RX ADMIN — PILOCARPINE HYDROCHLORIDE 5 MG: 5 TABLET, FILM COATED ORAL at 16:18

## 2018-04-01 RX ADMIN — IPRATROPIUM BROMIDE AND ALBUTEROL SULFATE 3 ML: 2.5; .5 SOLUTION RESPIRATORY (INHALATION) at 11:58

## 2018-04-01 RX ADMIN — SUCRALFATE 1 G: 1 TABLET ORAL at 22:22

## 2018-04-01 RX ADMIN — IPRATROPIUM BROMIDE AND ALBUTEROL SULFATE 3 ML: 2.5; .5 SOLUTION RESPIRATORY (INHALATION) at 15:51

## 2018-04-01 RX ADMIN — HEPARIN SODIUM 5000 UNITS: 5000 INJECTION, SOLUTION INTRAVENOUS; SUBCUTANEOUS at 06:44

## 2018-04-01 RX ADMIN — PILOCARPINE HYDROCHLORIDE 5 MG: 5 TABLET, FILM COATED ORAL at 22:23

## 2018-04-01 RX ADMIN — IPRATROPIUM BROMIDE AND ALBUTEROL SULFATE 3 ML: 2.5; .5 SOLUTION RESPIRATORY (INHALATION) at 19:36

## 2018-04-01 RX ADMIN — SUCRALFATE 1 G: 1 TABLET ORAL at 16:17

## 2018-04-01 RX ADMIN — TRAMADOL HYDROCHLORIDE 50 MG: 50 TABLET, COATED ORAL at 07:59

## 2018-04-01 RX ADMIN — IPRATROPIUM BROMIDE AND ALBUTEROL SULFATE 3 ML: 2.5; .5 SOLUTION RESPIRATORY (INHALATION) at 08:11

## 2018-04-01 RX ADMIN — TAZOBACTAM SODIUM AND PIPERACILLIN SODIUM 3.38 G: 375; 3 INJECTION, SOLUTION INTRAVENOUS at 23:55

## 2018-04-01 RX ADMIN — LISINOPRIL 10 MG: 10 TABLET ORAL at 07:59

## 2018-04-01 RX ADMIN — SUCRALFATE 1 G: 1 TABLET ORAL at 11:46

## 2018-04-01 RX ADMIN — CLOPIDOGREL BISULFATE 75 MG: 75 TABLET ORAL at 08:00

## 2018-04-01 RX ADMIN — HEPARIN SODIUM 5000 UNITS: 5000 INJECTION, SOLUTION INTRAVENOUS; SUBCUTANEOUS at 22:19

## 2018-04-01 RX ADMIN — TRAMADOL HYDROCHLORIDE 50 MG: 50 TABLET, COATED ORAL at 22:24

## 2018-04-01 RX ADMIN — TERAZOSIN HYDROCHLORIDE 1 MG: 1 CAPSULE ORAL at 22:25

## 2018-04-01 RX ADMIN — PILOCARPINE HYDROCHLORIDE 5 MG: 5 TABLET, FILM COATED ORAL at 11:46

## 2018-04-01 RX ADMIN — TAZOBACTAM SODIUM AND PIPERACILLIN SODIUM 3.38 G: 375; 3 INJECTION, SOLUTION INTRAVENOUS at 07:58

## 2018-04-01 RX ADMIN — AMLODIPINE BESYLATE 5 MG: 5 TABLET ORAL at 08:13

## 2018-04-01 NOTE — THERAPY WOUND CARE TREATMENT
Acute Care - Wound/Debridement Treatment Note  [facility]     Patient Name: Poonam Mata  : 1931  MRN: [id number]  Today's Date: 2018  Onset of Illness/Injury or Date of Surgery: 18   Date of Referral to PT: 18   Referring Physician: UMAIR Briscoe       Admit Date: 3/29/2018    Visit Dx:    ICD-10-CM ICD-9-CM   1. Pneumonia of left lower lobe due to infectious organism J18.1 486   2. Acute respiratory failure with hypoxia J96.01 518.81   3. Hypokalemia E87.6 276.8   4. Impaired functional mobility, balance, gait, and endurance Z74.09 V49.89       Patient Active Problem List   Diagnosis   • Pneumonia of left lower lobe due to infectious organism   • Hypokalemia   • Dementia   • Hypertension   • GERD (gastroesophageal reflux disease)   • Normocytic anemia               WOUND DEBRIDEMENT               Therapy Treatment    Therapy Treatment / Health Promotion    Treatment Time/Intention  Discipline: physical therapist (18 1330 : Rolf Mclean PT)  Document Type: therapy note (daily note), wound care (18 1330 : Rolf Mclean PT)  Subjective Information: complains of, pain (180 : Rolf Mclean PT)  Mode of Treatment: physical therapy (18 1330 : Rolf Mclean PT)  Patient/Family Observations: daughter present (18 : Vanessa Barraza PT)  Therapy Frequency (PT Clinical Impression): daily (18 : Vanessa Barraza PT)  Patient Effort: good (18 : Vanessa Barraza PT)  Existing Precautions/Restrictions: fall (18 : Vanessa Barraza PT)  Coping  Observed Emotional State: calm, cooperative (18 : Vanessa Barraza PT)  Verbalized Emotional State: acceptance (18 : Vanessa Barraza PT)  Plan of Care Review  Plan of Care Reviewed With: patient (180 : Rolf Mclean PT)    Vitals/Pain/Safety  Vital Signs  Pre Systolic BP Rehab: 179 (18 : Vanessa Barraza PT)  Pre Treatment  "Diastolic BP: 79 (04/01/18 0920 : Vanessa Barraza PT)  Post Systolic BP Rehab: 199 (RN aware) (04/01/18 0920 : Vanessa Barraza PT)  Post Treatment Diastolic BP: 91 (04/01/18 0920 : Vanessa Barraza PT)  Pain Scale: Word Pre/Post-Treatment  Pain: Word Scale, Pretreatment: 2 - mild pain (generalized pain \"from arthritis\") (04/01/18 1330 : Rolf Mclean PT)  Pain: Word Scale, Post-Treatment: 2 - mild pain (04/01/18 1330 : Rolf Mclean PT)  Safety Issues, Functional Mobility  Impairments Affecting Function (Mobility): balance, strength, endurance/activity tolerance (04/01/18 0920 : Vanessa Barraza PT)  Positioning and Restraints  Pre-Treatment Position: in bed (04/01/18 1330 : Rolf Mclean PT)  Post Treatment Position: bed (04/01/18 1330 : Rolf Mclean PT)  In Bed: supine, call light within reach, with family/caregiver (04/01/18 1330 : Rolf Mclean PT)    Mobility,ADL,Motor, Modality  Bed Mobility Assessment/Treatment  Bed Mobility Assessment/Treatment: rolling right, supine-sit, sit-supine, scooting/bridging (04/01/18 0920 : Vanessa Barraza PT)  Rolling Right [place] (Bed Mobility): maximum assist (25% patient effort), verbal cues (04/01/18 0920 : Vanessa Barraza PT)  Scooting/Bridging [place] (Bed Mobility): dependent (less than 25% patient effort), 2 person assist (04/01/18 0920 : Vanessa Barraza PT)  Supine-Sit [place] (Bed Mobility): maximum assist (25% patient effort), 2 person assist (04/01/18 0920 : Vanessa Barraza PT)  Sit-Supine [place] (Bed Mobility): maximum assist (25% patient effort), 2 person assist (04/01/18 0920 : Vanessa Barraza PT)  Bed Mobility, Safety Issues: decreased use of arms for pushing/pulling, decreased use of legs for bridging/pushing, impaired trunk control for bed mobility (04/01/18 0920 : Vanessa Barraza, PT)  Assistive Device (Bed Mobility): bed rails, draw sheet, head of bed elevated (04/01/18 0920 : Vanessa Barraza, PT)  Comment (Bed " Mobility): cues for sequencing (04/01/18 0920 : Vanessa Barraza PT)  Transfer Assessment/Treatment  Comment (Transfers): not tested (04/01/18 0920 : Vanessa Barraza PT)  Gait/Stairs Assessment/Training  [place] Level (Gait): not tested (04/01/18 0920 : Vanessa Barraza PT)        Balance  Balance: static sitting balance (04/01/18 0920 : Vanessa Barraza PT)  Static Sitting Balance  Level of [place] (Unsupported Sitting, Static Balance): moderate assist, 50 to 74% patient effort (04/01/18 0920 : Vanessa Barraza PT)  Sitting Position (Unsupported Sitting, Static Balance): sitting on edge of bed (04/01/18 0920 : Vanessa Barraza PT)  Time Able to Maintain Position (Unsupported Sitting, Static Balance): 4 to 5 minutes (04/01/18 0920 : Vanessa Barraza PT)  Comment (Unsupported Sitting, Static Balance): challenging surface d/t air mattress with bolsters on edges, PT requested that pt be transfered to lift room (04/01/18 0920 : Vanessa Barraza PT)        ROM/MMT  General ROM  GENERAL ROM COMMENTS: BLE AROM limited 10% knee extension (04/01/18 0920 : Vanessa Barraza PT)  General Assessment (Manual Muscle Testing)  General Manual Muscle Testing (MMT) Assessment: lower extremity strength deficits identified (04/01/18 0920 : Vanessa Barraza PT)  Comment, General Manual Muscle Testing (MMT) Assessment: BLE generally 3-/5 (04/01/18 0920 : Vanessa Barraza PT)       Sensory, Edema, Orthotics  Sensory Assessment/Intervention  Sensory General Assessment: no sensation deficits identified (04/01/18 0920 : Vanessa Barraza PT)       Cognition, Communication, Swallow  Cognitive Assessment/Intervention- PT/OT  Orientation Status (Cognition): oriented x 3 (04/01/18 0920 : Vanessa Barraza PT)  Follows Commands (Cognition): WFL, repetition of directions required (04/01/18 0920 : Vanessa Barraza PT)  Safety Deficit (Cognitive): insight into deficits/self awareness, awareness of need for assistance (04/01/18 0920 : Vanessa Barraza,  PT)    Outcome Summary  Outcome Summary/Treatment Plan (PT)  Anticipated Discharge Disposition (PT): skilled nursing facility (SNF) (04/01/18 0920 : Vanessa Barraza, PT)          PT Rehab Goals     Row Name 04/01/18 0920             Bed Mobility Goal 1 (PT)    Activity/Assistive Device (Bed Mobility Goal 1, PT) sit to supine/supine to sit  -SR      [place] Level/Cues Needed (Bed Mobility Goal 1, PT) moderate assist (50-74% patient effort)  -SR      Time Frame (Bed Mobility Goal 1, PT) long term goal (LTG);by discharge  -SR      Progress/Outcomes (Bed Mobility Goal 1, PT) goal ongoing  -SR         Transfer Goal 1 (PT)    Activity/Assistive Device (Transfer Goal 1, PT) sit-to-stand/stand-to-sit;bed-to-chair/chair-to-bed  -SR      [place] Level/Cues Needed (Transfer Goal 1, PT) moderate assist (50-74% patient effort)  -SR      Time Frame (Transfer Goal 1, PT) long term goal (LTG);by discharge  -SR      Progress/Outcome (Transfer Goal 1, PT) goal ongoing  -SR        User Key  (r) = Recorded By, (t) = Taken By, (c) = Cosigned By    Initials Name Provider Type    SR Vanessa Barraza, PT Physical Therapist          Physical Therapy Education     Title: PT OT SLP Therapies (Active)     Topic: Physical Therapy (Active)     Point: Mobility training (Active)    Learning Progress Summary     Learner Status Readiness Method Response Comment Documented by    Patient Active Acceptance E NR PT encourages pt to cont with BLE ther ex to improve strength and prevent DVT. SR 04/01/18 1209    Family Active Acceptance E NR PT encourages pt to cont with BLE ther ex to improve strength and prevent DVT. SR 04/01/18 1209          Point: Home exercise program (Active)    Learning Progress Summary     Learner Status Readiness Method Response Comment Documented by    Patient Active Acceptance E NR PT encourages pt to cont with BLE ther ex to improve strength and prevent DVT. SR 04/01/18 1209    Family Active Acceptance E NR PT encourages  pt to cont with BLE ther ex to improve strength and prevent DVT. SR 04/01/18 1209          Point: Body mechanics (Active)    Learning Progress Summary     Learner Status Readiness Method Response Comment Documented by    Patient Active Acceptance E NR PT encourages pt to cont with BLE ther ex to improve strength and prevent DVT. SR 04/01/18 1209    Family Active Acceptance E NR PT encourages pt to cont with BLE ther ex to improve strength and prevent DVT. SR 04/01/18 1209          Point: Precautions (Active)    Learning Progress Summary     Learner Status Readiness Method Response Comment Documented by    Patient Active Acceptance E NR PT encourages pt to cont with BLE ther ex to improve strength and prevent DVT. SR 04/01/18 1209    Family Active Acceptance E NR PT encourages pt to cont with BLE ther ex to improve strength and prevent DVT. SR 04/01/18 1209                      User Key     Initials Effective Dates Name Provider Type Discipline     06/19/15 -  Vanessa Barraza, PT Physical Therapist PT                   PT ASSESSMENT (last 72 hours)      Physical Therapy Evaluation     Row Name 04/01/18 0920 03/31/18 1000       PT Evaluation Time/Intention    Subjective Information complains of;weakness;fatigue  -SR complains of;weakness;fatigue  -    Document Type evaluation  -SR evaluation;therapy note (daily note);wound care  -MF    Mode of Treatment physical therapy  -SR physical therapy  -MF    Patient Effort good  -SR  --    Symptoms Noted During/After Treatment fatigue;significant change in vital signs  -SR  --    Row Name 04/01/18 0920 03/31/18 1000       General Information    Patient Profile Reviewed? yes  -SR yes  -MF    Onset of Illness/Injury or Date of Surgery 03/29/18  -SR 03/29/18  -    Referring Physician UMAIR Briscoe  -SR Dr Askew  -    Patient Observations cooperative;agree to therapy  -SR  --    Patient/Family Observations daughter present  -SR  --    General Observations of Patient Pt  is supine in bed, [place] cath, tele, IV.  -SR  --    Prior Level of Function dependent:;transfer;bed mobility;w/c or scooter   daughter states that pt was able to transfer herself to   -SR  --    Equipment Currently Used at Home wheelchair   two months ago, but is now dependent  -SR  --    Pertinent History of Current Functional Problem Pt admitted with cough, lethary, fever past 3-4 days.   -SR Pt presents with weakness and limited mobility, now with POA DTPI  -    Existing Precautions/Restrictions fall  -SR  --    Risks Reviewed patient and family:;LOB;increased discomfort;change in vital signs;lines disloged  -SR patient:;increased discomfort  -MF    Benefits Reviewed patient:;improve function;increase independence;increase strength;increase balance;increase knowledge;decrease risk of DVT  -SR patient:;improve skin integrity  -    Barriers to Rehab medically complex;previous functional deficit   pt recently lost  (last weekend)  -SR medically complex;previous functional deficit;physical barrier  -    Row Name 04/01/18 0920          Relationship/Environment    Lives With facility resident  -SR     Family Caregiver if Needed child(maría), adult  -SR     Row Name 04/01/18 0920          Resource/Environmental Concerns    Current Living Arrangements residential facility  -SR     Row Name 04/01/18 0920          Cognitive Assessment/Intervention- PT/OT    Orientation Status (Cognition) oriented x 3  -SR     Follows Commands (Cognition) WFL;repetition of directions required  -SR     Safety Deficit (Cognitive) insight into deficits/self awareness;awareness of need for assistance  -SR     Row Name 04/01/18 0920          Safety Issues, Functional Mobility    Impairments Affecting Function (Mobility) balance;strength;endurance/activity tolerance  -SR     Row Name 04/01/18 0920          Bed Mobility Assessment/Treatment    Bed Mobility Assessment/Treatment rolling right;supine-sit;sit-supine;scooting/bridging   -SR     Rolling Right [place] (Bed Mobility) maximum assist (25% patient effort);verbal cues  -SR     Scooting/Bridging [place] (Bed Mobility) dependent (less than 25% patient effort);2 person assist  -SR     Supine-Sit [place] (Bed Mobility) maximum assist (25% patient effort);2 person assist  -SR     Sit-Supine [place] (Bed Mobility) maximum assist (25% patient effort);2 person assist  -SR     Bed Mobility, Safety Issues decreased use of arms for pushing/pulling;decreased use of legs for bridging/pushing;impaired trunk control for bed mobility  -SR     Assistive Device (Bed Mobility) bed rails;draw sheet;head of bed elevated  -SR     Comment (Bed Mobility) cues for sequencing  -SR     Row Name 04/01/18 0920          Transfer Assessment/Treatment    Comment (Transfers) not tested  -SR     Row Name 04/01/18 0920          Gait/Stairs Assessment/Training    [place] Level (Gait) not tested  -SR     Row Name 04/01/18 0920          General ROM    GENERAL ROM COMMENTS BLE AROM limited 10% knee extension  -SR     Row Name 04/01/18 0920          General Assessment (Manual Muscle Testing)    General Manual Muscle Testing (MMT) Assessment lower extremity strength deficits identified  -SR     Comment, General Manual Muscle Testing (MMT) Assessment BLE generally 3-/5  -SR     Row Name 04/01/18 0920          Motor Assessment/Intervention    Additional Documentation Balance (Group)  -SR     Row Name 04/01/18 0920          Balance    Balance static sitting balance  -SR     Row Name 04/01/18 0920          Static Sitting Balance    Level of [place] (Unsupported Sitting, Static Balance) moderate assist, 50 to 74% patient effort  -SR     Sitting Position (Unsupported Sitting, Static Balance) sitting on edge of bed  -SR     Time Able to Maintain Position (Unsupported Sitting, Static Balance) 4 to 5 minutes  -SR     Comment (Unsupported Sitting, Static Balance) challenging surface d/t air mattress with  bolsters on edges, PT requested that pt be transfered to lift room  -SR     Row Name 04/01/18 0920          Sensory Assessment/Intervention    Sensory General Assessment no sensation deficits identified  -SR     Row Name 04/01/18 0920 03/31/18 1000       Pain Assessment    Additional Documentation Pain Scale: Word Pre/Post-Treatment (Group)  -SR Pain Scale: FACES Pre/Post-Treatment (Group)  -MF    Row Name 04/01/18 0920          Pain Scale: Word Pre/Post-Treatment    Pain: Word Scale, Pretreatment 0 - no pain  -SR     Pain: Word Scale, Post-Treatment 0 - no pain  -SR     Row Name 03/31/18 1000          Pain Scale: FACES Pre/Post-Treatment    Pain: FACES Scale, Pretreatment 0-->no hurt  -MF     Pain: FACES Scale, Post-Treatment 0-->no hurt  -MF     Row Name             [REMOVED] Wound 03/29/18 2200 medial    Wound - Properties Group Date first assessed: 03/29/18  -AH Time first assessed: 2200  -AH Present On Admission : yes  -AH Orientation: medial  -AH Stage, Pressure Injury: Stage 1  -AH Resolution Date: 03/30/18  -AS Resolution Time: 1045  -AS    Row Name             Wound 03/29/18 2200 Right distal other (see notes) other (see comments)    Wound - Properties Group Date first assessed: 03/29/18  -AH Time first assessed: 2200  -AH Side: Right  -AH Orientation: distal  -AH Location: other (see notes)  -AH, 2nd toe  Type: other (see comments)  -AH, callous     Row Name 03/31/18 1000          Wound 03/30/18 1045 Left upper;posterior;medial thigh pressure injury    Wound - Properties Group Date first assessed: 03/30/18  -AS Time first assessed: 1045  -AS Present On Admission : yes;picture taken  -AS Side: Left  -AS Orientation: upper;posterior;medial  -AS Location: thigh  -AS Type: pressure injury  -AS Stage, Pressure Injury: deep tissue injury  -AS    Dressing Appearance open to air  -     Base maroon/purple  -     Periwound intact;dry;redness;blanchable  -MF     Wound length (cm) 0.9 cm  -MF     Wound width (cm)  9.5 cm  -MF     Wound depth (cm) 0 cm  -MF     Drainage Amount none  -MF     Care, Wound ultrasound therapy, non contact low frequency   6 min  -MF     Row Name 04/01/18 0920          Coping    Observed Emotional State calm;cooperative  -SR     Verbalized Emotional State acceptance  -SR     Row Name 04/01/18 0920 03/31/18 1000       Plan of Care Review    Plan of Care Reviewed With patient;daughter  -SR patient  -MF    Row Name 04/01/18 0920 03/31/18 1000       Physical Therapy Clinical Impression    Date of Referral to PT 03/31/18  -SR 03/30/18  -MF    PT Diagnosis (PT Clinical Impression) impaired functional mobility  -SR DTPI to L gluteal cleft  -MF    Patient/Family Goals Statement (PT Clinical Impression) improve independence with tranfers  -SR wound healing  -MF    Criteria for Skilled Interventions Met (PT Clinical Impression) yes;treatment indicated  -SR yes;treatment indicated  -MF    Rehab Potential (PT Clinical Summary) good, to achieve stated therapy goals  -SR  --    Care Plan Review (PT) evaluation/treatment results reviewed;care plan/treatment goals reviewed;risks/benefits reviewed;current/potential barriers reviewed;patient/other agree to care plan  -SR evaluation/treatment results reviewed;care plan/treatment goals reviewed;risks/benefits reviewed;current/potential barriers reviewed;patient/other agree to care plan  -MF    Care Plan Review, Other Participant (PT Clinical Impression) daughter  -SR  --    Row Name 04/01/18 0920          Vital Signs    Pre Systolic BP Rehab 179  -SR     Pre Treatment Diastolic BP 79  -SR     Post Systolic BP Rehab 199   RN aware  -SR     Post Treatment Diastolic BP 91  -SR     Row Name 04/01/18 0920 03/31/18 1000       Physical Therapy Goals    Bed Mobility Goal Selection (PT) bed mobility, PT goal 1  -SR  --    Transfer Goal Selection (PT) transfer, PT goal 1  -SR  --    Balance Goal Selection (PT) balance, PT goal 1  -SR  --    Wound Care Goal Selection (PT)  --  wound care, PT goal 1  -MF    Additional Documentation Balance Goal Selection (PT) (Row)  -SR Wound Care Goal Selection (PT) (Row)  -    Row Name 04/01/18 0920          Bed Mobility Goal 1 (PT)    Activity/Assistive Device (Bed Mobility Goal 1, PT) sit to supine/supine to sit  -SR     [place] Level/Cues Needed (Bed Mobility Goal 1, PT) moderate assist (50-74% patient effort)  -SR     Time Frame (Bed Mobility Goal 1, PT) long term goal (LTG);by discharge  -SR     Progress/Outcomes (Bed Mobility Goal 1, PT) goal ongoing  -SR     Row Name 04/01/18 0920          Transfer Goal 1 (PT)    Activity/Assistive Device (Transfer Goal 1, PT) sit-to-stand/stand-to-sit;bed-to-chair/chair-to-bed  -SR     [place] Level/Cues Needed (Transfer Goal 1, PT) moderate assist (50-74% patient effort)  -SR     Time Frame (Transfer Goal 1, PT) long term goal (LTG);by discharge  -SR     Progress/Outcome (Transfer Goal 1, PT) goal ongoing  -SR     Row Name 04/01/18 0920          Balance Goal 1 (PT)    Activity/Assistive Device (Balance Goal 1, PT) sitting, static  -SR     [place] Level/Cues Needed (Balance Goal 1, PT) minimum assist (75% or more patient effort)  -SR     Time Frame (Balance Goal 1, PT) short term goal (STG);5 days  -SR     Progress/Outcomes (Balance Goal 1, PT) goal ongoing  -SR     Row Name 03/31/18 1000          Wound Care Goal 1 (PT)    Wound Care Goal 1 (PT) Decrease size of wound by 50% as evidence of wound healing / improved skin integrity.   -     Row Name 04/01/18 0920 03/31/18 1000       Positioning and Restraints    Pre-Treatment Position in bed  -SR in bed  -MF    Post Treatment Position bed  -SR bed  -MF    In Bed notified nsg;side lying left;call light within reach;encouraged to call for assist;with family/caregiver;SCD pump applied;heels elevated  -SR supine;call light within reach;with family/caregiver  -      User Key  (r) = Recorded By, (t) = Taken By, (c) = Cosigned By    Initials Name  Provider Type     Rolf Mclean, PT Physical Therapist    SR Vanessa Barraza, PT Physical Therapist     Lucita Orozco, RN Registered Nurse    AS Tanner Buckley RN Registered Nurse            PT Recommendation and Plan  Anticipated Discharge Disposition (PT): skilled nursing facility (SNF)  Planned Therapy Interventions (PT Eval): balance training, bed mobility training, home exercise program, transfer training, strengthening, postural re-education  Therapy Frequency (PT Clinical Impression): daily               Outcome Summary: DTPI appears lighter today with no purple discoloration noted. PT will cont with mist therapy daily for 2-3 more days then decrease freq of wound cont to progress well.   Plan of Care Reviewed With: patient          Outcome Measures     Row Name 04/01/18 0920             How much help from another person do you currently need...    Turning from your back to your side while in flat bed without using bedrails? 2  -SR      Moving from lying on back to sitting on the side of a flat bed without bedrails? 1  -SR      Moving to and from a bed to a chair (including a wheelchair)? 1  -SR      Standing up from a chair using your arms (e.g., wheelchair, bedside chair)? 1  -SR      Climbing 3-5 steps with a railing? 1  -SR      To walk in hospital room? 1  -SR      AM-PAC 6 Clicks Score 7  -SR         Functional Assessment    Outcome Measure Options AM-PAC 6 Clicks Basic Mobility (PT)  -SR        User Key  (r) = Recorded By, (t) = Taken By, (c) = Cosigned By    Initials Name Provider Type     Vanessa Barraza, PT Physical Therapist              Time Calculation        PT Charges     Row Name 04/01/18 1330 04/01/18 1213          Time Calculation    Start Time 1330  - 0920  -SR     PT Received On  -- 04/01/18  -SR     PT Goal Re-Cert Due Date 04/11/18  -MF 04/11/18  -SR        Time Calculation- PT    Total Timed Code Minutes- PT 25 minute(s)  -MF 10 minute(s)  -SR       User Key  (r) = Recorded By,  (t) = Taken By, (c) = Cosigned By    Initials Name Provider Type     Rolf Mclean, PT Physical Therapist    SR Vanessa Barraza, PT Physical Therapist             Therapy Charges for Today     Code Description Service Date Service Provider Modifiers Qty    84929803464 HC PT EVAL MOD COMPLEXITY 4 3/31/2018 Rolf Mclean, PT GP 1    65893690067 HC PT NLFU MIST 3/31/2018 Rolf Mclean, PT GP 1    55072144852 HC PT NLFU MIST 4/1/2018 Rolf Mclean, PT GP 1            PT G-Codes  Outcome Measure Options: AM-PAC 6 Clicks Basic Mobility (PT)        Rolf Mclean, PT  4/1/2018

## 2018-04-01 NOTE — PLAN OF CARE
Problem: Patient Care Overview  Goal: Plan of Care Review  Outcome: Ongoing (interventions implemented as appropriate)   04/01/18 6027   Coping/Psychosocial   Plan of Care Reviewed With patient   OTHER   Outcome Summary DTPI appears lighter today with no purple discoloration noted. PT will cont with mist therapy daily for 2-3 more days then decrease freq of wound cont to progress well.

## 2018-04-01 NOTE — PROGRESS NOTES
[facility] Medicine Services  PROGRESS NOTE    Patient Name: Poonam Mata  : 1931  MRN: [id number]    Date of Admission: 3/29/2018  Length of Stay: 3  Primary Care Physician: Leti Munoz MD    Subjective   Subjective     CC:  Follow up fatigue and shortness of breath    HPI:  Seems flat and weak.  Daughter in room today.  Daughter asking to Schedule Xanax.  Blood pressure issues most of day today    Review of Systems  Gen- No fevers, chills  CV- No chest pain, palpitations  Resp- + cough, +soa  GI- No N/V/D, abd pain    Otherwise ROS is negative except as mentioned in the HPI.    Objective   Objective     Vital Signs:   Temp:  [98.2 °F (36.8 °C)-98.8 °F (37.1 °C)] 98.8 °F (37.1 °C)  Heart Rate:  [69-88] 86  Resp:  [16-18] 18  BP: (153-221)/() 204/89        Physical Exam:  Constitutional: No acute distress, looks weak and tired  HENT: NCAT, mucous membranes moist  Respiratory:  Poor inspiratory effort, clear, no wheezes  Cardiovascular: RRR, no murmurs, rubs, or gallops, palpable pedal pulses bilaterally  Gastrointestinal: Positive bowel sounds, soft, nontender, nondistended  Musculoskeletal: No bilateral ankle edema  Psychiatric: Appropriate affect, cooperative  Neurologic: Oriented x 3, strength symmetric in all extremities, Cranial Nerves grossly intact to confrontation, speech clear  Skin: No rashes    Results Reviewed:  I have personally reviewed current lab, radiology, and data and agree.      Results from last 7 days  Lab Units 18  0343 03/31/18  0555 18  0620   WBC 10*3/mm3 7.54 6.88 6.23   HEMOGLOBIN g/dL 8.8* 9.4* 10.6*   HEMATOCRIT % 28.9* 31.3* 35.7   PLATELETS 10*3/mm3 265 270 276   INR   --  0.97  --        Results from last 7 days  Lab Units 18  0343 18  1642 18  0555  18  0620 18  1426   SODIUM mmol/L 138  --  141  --  142 137   POTASSIUM mmol/L 4.4 4.2 3.4*  < > 2.8* 2.9*   CHLORIDE mmol/L 104  --  104   --  98* 97*   CO2 mmol/L 29.0  --  32.0*  --  30.0 32.0*   BUN mg/dL 9  --  8*  --  9 13   CREATININE mg/dL 0.60  --  0.60  --  0.50* 0.80   GLUCOSE mg/dL 89  --  91  --  92 118*   CALCIUM mg/dL 8.4*  --  8.6*  --  8.8 8.9   ALT (SGPT) U/L  --   --   --   --   --  22   AST (SGOT) U/L  --   --   --   --   --  42*   < > = values in this interval not displayed.  Estimated Creatinine Clearance: 41.8 mL/min (by C-G formula based on SCr of 0.6 mg/dL).  BNP   Date Value Ref Range Status   03/29/2018 17.0 0.0 - 100.0 pg/mL Final     Comment:     Results may be falsely decreased if patient taking Biotin.     pH, Arterial   Date Value Ref Range Status   03/29/2018 7.456 (H) 7.350 - 7.450 pH units Final       Microbiology Results Abnormal     Procedure Component Value - Date/Time    Blood Culture - Blood, [[id number]]  (Normal) Collected:  03/29/18 1708    Lab Status:  Preliminary result Specimen:  Blood from Arm, Left Updated:  03/31/18 1746     Blood Culture No growth at 2 days    Blood Culture - Blood, [[id number]]  (Normal) Collected:  03/29/18 1645    Lab Status:  Preliminary result Specimen:  Blood from Arm, Right Updated:  03/31/18 1746     Blood Culture No growth at 2 days    Influenza A & B, RT PCR - Swab, Nasopharynx [[id number]]  (Normal) Collected:  03/30/18 0706    Lab Status:  Final result Specimen:  Swab from Nasopharynx Updated:  03/30/18 0803     Influenza A PCR Not Detected     Influenza B PCR Not Detected          Imaging Results (last 24 hours)     ** No results found for the last 24 hours. **             I have reviewed the medications.    Assessment/Plan   Assessment / Plan     Hospital Problem List     Pneumonia of left lower lobe due to infectious organism    Hypokalemia    Dementia    Hypertension    GERD (gastroesophageal reflux disease)    Normocytic anemia             Brief Hospital Course to date:  Poonam Mata is a 87 y.o. female w/ PMH of HTN, dementia, GERD who presents with LLL PNA/HCAP  (resides in NH).      Assessment & Plan:    LLL PNA  - at risk for PNA due to immobility and poor inspiratory capacity  - Zosyn/Vanc  - encouraged incentive spirometer exercises    Hypertensive Crisis  - reportedly gets clonidine as needed at NH  - will give medications with emphasis on permissive hypertension based on discussion in room yesterday per Dr. Askew and family (daughter did not seem to like NH use of clonidine).    Hypokalemia  - supplemental potassium  - check magnesium  - monitor    Iron Deficiency Anemia  - oral iron     Out patient sleep study recommended.     Start terazosin today  Daughter asking to put Norvasc at night, which I did today  Daughter asking to schedule Xanax at night, which I did today     DVT Prophylaxis:  [facility]    CODE STATUS: Full Code    Disposition: I expect the patient to be discharged to long term care facility, D.      Electronically signed by Tanner Askew MD, 04/01/18, 2:21 PM.

## 2018-04-01 NOTE — THERAPY EVALUATION
Acute Care - Physical Therapy Initial Evaluation  [facility]     Patient Name: Poonam Mata  : 1931  MRN: [id number]  Today's Date: 2018   Onset of Illness/Injury or Date of Surgery: 18  Date of Referral to PT: 18  Referring Physician: UMAIR Briscoe      Admit Date: 3/29/2018    Visit Dx:     ICD-10-CM ICD-9-CM   1. Pneumonia of left lower lobe due to infectious organism J18.1 486   2. Acute respiratory failure with hypoxia J96.01 518.81   3. Hypokalemia E87.6 276.8   4. Impaired functional mobility, balance, gait, and endurance Z74.09 V49.89     Patient Active Problem List   Diagnosis   • Pneumonia of left lower lobe due to infectious organism   • Hypokalemia   • Dementia   • Hypertension   • GERD (gastroesophageal reflux disease)   • Normocytic anemia     Past Medical History:   Diagnosis Date   • Anxiety    • Arthropathy    • Constipation    • Dementia    • Essential hypertension    • GERD (gastroesophageal reflux disease)    • Hyperlipidemia    • Insomnia    • Spinal stenosis    • Syncope    • URI (upper respiratory infection)      Past Surgical History:   Procedure Laterality Date   • BLADDER SUSPENSION     • CHOLECYSTECTOMY     • HIP SURGERY     • HYSTERECTOMY     • TONSILLECTOMY          PT ASSESSMENT (last 72 hours)      Physical Therapy Evaluation     Row Name 18 0920 18 1000       PT Evaluation Time/Intention    Subjective Information complains of;weakness;fatigue  -SR complains of;weakness;fatigue  -MF    Document Type evaluation  -SR evaluation;therapy note (daily note);wound care  -MF    Mode of Treatment physical therapy  -SR physical therapy  -MF    Patient Effort good  -SR  --    Symptoms Noted During/After Treatment fatigue;significant change in vital signs  -SR  --    Row Name 18 0920 18 1000       General Information    Patient Profile Reviewed? yes  -SR yes  -MF    Onset of Illness/Injury or Date of Surgery 18  -SR 18  -MF     Referring Physician UMAIR Briscoe  - Dr Askew  -    Patient Observations cooperative;agree to therapy  -SR  --    Patient/Family Observations daughter present  -SR  --    General Observations of Patient Pt is supine in bed, [place] cath, tele, IV.  -SR  --    Prior Level of Function dependent:;transfer;bed mobility;w/c or scooter   daughter states that pt was able to transfer herself to   -SR  --    Equipment Currently Used at Home wheelchair   two months ago, but is now dependent  -SR  --    Pertinent History of Current Functional Problem Pt admitted with cough, lethary, fever past 3-4 days.   -SR Pt presents with weakness and limited mobility, now with POA DTPI  -    Existing Precautions/Restrictions fall  -SR  --    Risks Reviewed patient and family:;LOB;increased discomfort;change in vital signs;lines disloged  -SR patient:;increased discomfort  -    Benefits Reviewed patient:;improve function;increase independence;increase strength;increase balance;increase knowledge;decrease risk of DVT  -SR patient:;improve skin integrity  -    Barriers to Rehab medically complex;previous functional deficit   pt recently lost  (last weekend)  -SR medically complex;previous functional deficit;physical barrier  -    Row Name 04/01/18 0920          Relationship/Environment    Lives With facility resident  -SR     Family Caregiver if Needed child(maría), adult  -SR     Row Name 04/01/18 0920          Resource/Environmental Concerns    Current Living Arrangements residential facility  -SR     Row Name 04/01/18 0920          Cognitive Assessment/Intervention- PT/OT    Orientation Status (Cognition) oriented x 3  -SR     Follows Commands (Cognition) WFL;repetition of directions required  -SR     Safety Deficit (Cognitive) insight into deficits/self awareness;awareness of need for assistance  -SR     Row Name 04/01/18 0920          Safety Issues, Functional Mobility    Impairments Affecting Function (Mobility)  balance;strength;endurance/activity tolerance  -SR     Row Name 04/01/18 0920          Bed Mobility Assessment/Treatment    Bed Mobility Assessment/Treatment rolling right;supine-sit;sit-supine;scooting/bridging  -SR     Rolling Right [place] (Bed Mobility) maximum assist (25% patient effort);verbal cues  -SR     Scooting/Bridging [place] (Bed Mobility) dependent (less than 25% patient effort);2 person assist  -SR     Supine-Sit [place] (Bed Mobility) maximum assist (25% patient effort);2 person assist  -SR     Sit-Supine [place] (Bed Mobility) maximum assist (25% patient effort);2 person assist  -SR     Bed Mobility, Safety Issues decreased use of arms for pushing/pulling;decreased use of legs for bridging/pushing;impaired trunk control for bed mobility  -SR     Assistive Device (Bed Mobility) bed rails;draw sheet;head of bed elevated  -SR     Comment (Bed Mobility) cues for sequencing  -SR     Row Name 04/01/18 0920          Transfer Assessment/Treatment    Comment (Transfers) not tested  -SR     Row Name 04/01/18 0920          Gait/Stairs Assessment/Training    [place] Level (Gait) not tested  -SR     Row Name 04/01/18 0920          General ROM    GENERAL ROM COMMENTS BLE AROM limited 10% knee extension  -SR     Row Name 04/01/18 0920          General Assessment (Manual Muscle Testing)    General Manual Muscle Testing (MMT) Assessment lower extremity strength deficits identified  -SR     Comment, General Manual Muscle Testing (MMT) Assessment BLE generally 3-/5  -SR     Row Name 04/01/18 0920          Motor Assessment/Intervention    Additional Documentation Balance (Group)  -SR     Row Name 04/01/18 0920          Balance    Balance static sitting balance  -SR     Row Name 04/01/18 0920          Static Sitting Balance    Level of [place] (Unsupported Sitting, Static Balance) moderate assist, 50 to 74% patient effort  -SR     Sitting Position (Unsupported Sitting, Static Balance)  sitting on edge of bed  -SR     Time Able to Maintain Position (Unsupported Sitting, Static Balance) 4 to 5 minutes  -SR     Comment (Unsupported Sitting, Static Balance) challenging surface d/t air mattress with bolsters on edges, PT requested that pt be transfered to lift room  -SR     Row Name 04/01/18 0920          Sensory Assessment/Intervention    Sensory General Assessment no sensation deficits identified  -SR     Row Name 04/01/18 0920 03/31/18 1000       Pain Assessment    Additional Documentation Pain Scale: Word Pre/Post-Treatment (Group)  -SR Pain Scale: FACES Pre/Post-Treatment (Group)  -MF    Row Name 04/01/18 0920          Pain Scale: Word Pre/Post-Treatment    Pain: Word Scale, Pretreatment 0 - no pain  -SR     Pain: Word Scale, Post-Treatment 0 - no pain  -SR     Row Name 03/31/18 1000          Pain Scale: FACES Pre/Post-Treatment    Pain: FACES Scale, Pretreatment 0-->no hurt  -MF     Pain: FACES Scale, Post-Treatment 0-->no hurt  -MF     Row Name             [REMOVED] Wound 03/29/18 2200 medial    Wound - Properties Group Date first assessed: 03/29/18  -AH Time first assessed: 2200  -AH Present On Admission : yes  -AH Orientation: medial  -AH Stage, Pressure Injury: Stage 1  -AH Resolution Date: 03/30/18  -AS Resolution Time: 1045  -AS    Row Name             Wound 03/29/18 2200 Right distal other (see notes) other (see comments)    Wound - Properties Group Date first assessed: 03/29/18  -AH Time first assessed: 2200  -AH Side: Right  -AH Orientation: distal  -AH Location: other (see notes)  -AH, 2nd toe  Type: other (see comments)  -AH, callous     Row Name 03/31/18 1000          Wound 03/30/18 1045 Left upper;posterior;medial thigh pressure injury    Wound - Properties Group Date first assessed: 03/30/18  -AS Time first assessed: 1045  -AS Present On Admission : yes;picture taken  -AS Side: Left  -AS Orientation: upper;posterior;medial  -AS Location: thigh  -AS Type: pressure injury  -AS Stage,  Pressure Injury: deep tissue injury  -AS    Dressing Appearance open to air  -     Base maroon/purple  -MF     Periwound intact;dry;redness;blanchable  -MF     Wound length (cm) 0.9 cm  -MF     Wound width (cm) 9.5 cm  -MF     Wound depth (cm) 0 cm  -MF     Drainage Amount none  -MF     Care, Wound ultrasound therapy, non contact low frequency   6 min  -MF     Row Name 04/01/18 0920          Coping    Observed Emotional State calm;cooperative  -SR     Verbalized Emotional State acceptance  -SR     Row Name 04/01/18 0920 03/31/18 1000       Plan of Care Review    Plan of Care Reviewed With patient;daughter  -SR patient  -MF    Row Name 04/01/18 0920 03/31/18 1000       Physical Therapy Clinical Impression    Date of Referral to PT 03/31/18  -SR 03/30/18  -MF    PT Diagnosis (PT Clinical Impression) impaired functional mobility  -SR DTPI to L gluteal cleft  -    Patient/Family Goals Statement (PT Clinical Impression) improve independence with tranfers  -SR wound healing  -MF    Criteria for Skilled Interventions Met (PT Clinical Impression) yes;treatment indicated  -SR yes;treatment indicated  -MF    Rehab Potential (PT Clinical Summary) good, to achieve stated therapy goals  -SR  --    Care Plan Review (PT) evaluation/treatment results reviewed;care plan/treatment goals reviewed;risks/benefits reviewed;current/potential barriers reviewed;patient/other agree to care plan  -SR evaluation/treatment results reviewed;care plan/treatment goals reviewed;risks/benefits reviewed;current/potential barriers reviewed;patient/other agree to care plan  -    Care Plan Review, Other Participant (PT Clinical Impression) daughter  -SR  --    Row Name 04/01/18 0920          Vital Signs    Pre Systolic BP Rehab 179  -SR     Pre Treatment Diastolic BP 79  -SR     Post Systolic BP Rehab 199   RN aware  -SR     Post Treatment Diastolic BP 91  -SR     Row Name 04/01/18 0920 03/31/18 1000       Physical Therapy Goals    Bed Mobility  Goal Selection (PT) bed mobility, PT goal 1  -SR  --    Transfer Goal Selection (PT) transfer, PT goal 1  -SR  --    Balance Goal Selection (PT) balance, PT goal 1  -SR  --    Wound Care Goal Selection (PT)  -- wound care, PT goal 1  -MF    Additional Documentation Balance Goal Selection (PT) (Row)  -SR Wound Care Goal Selection (PT) (Row)  -    Row Name 04/01/18 0920          Bed Mobility Goal 1 (PT)    Activity/Assistive Device (Bed Mobility Goal 1, PT) sit to supine/supine to sit  -SR     [place] Level/Cues Needed (Bed Mobility Goal 1, PT) moderate assist (50-74% patient effort)  -SR     Time Frame (Bed Mobility Goal 1, PT) long term goal (LTG);by discharge  -SR     Progress/Outcomes (Bed Mobility Goal 1, PT) goal ongoing  -SR     Row Name 04/01/18 0920          Transfer Goal 1 (PT)    Activity/Assistive Device (Transfer Goal 1, PT) sit-to-stand/stand-to-sit;bed-to-chair/chair-to-bed  -SR     [place] Level/Cues Needed (Transfer Goal 1, PT) moderate assist (50-74% patient effort)  -SR     Time Frame (Transfer Goal 1, PT) long term goal (LTG);by discharge  -SR     Progress/Outcome (Transfer Goal 1, PT) goal ongoing  -SR     Row Name 04/01/18 0920          Balance Goal 1 (PT)    Activity/Assistive Device (Balance Goal 1, PT) sitting, static  -SR     [place] Level/Cues Needed (Balance Goal 1, PT) minimum assist (75% or more patient effort)  -SR     Time Frame (Balance Goal 1, PT) short term goal (STG);5 days  -SR     Progress/Outcomes (Balance Goal 1, PT) goal ongoing  -     Row Name 03/31/18 1000          Wound Care Goal 1 (PT)    Wound Care Goal 1 (PT) Decrease size of wound by 50% as evidence of wound healing / improved skin integrity.   -     Row Name 04/01/18 0920 03/31/18 1000       Positioning and Restraints    Pre-Treatment Position in bed  -SR in bed  -MF    Post Treatment Position bed  -SR bed  -MF    In Bed notified nsg;side lying left;call light within reach;encouraged to call for  assist;with family/caregiver;SCD pump applied;heels elevated  -SR supine;call light within reach;with family/caregiver  -      User Key  (r) = Recorded By, (t) = Taken By, (c) = Cosigned By    Initials Name Provider Type    LISETH Mclean, PT Physical Therapist    SR Vanessa Barraza, PT Physical Therapist     Lucita Orozco, RN Registered Nurse    AS Tanner Buckley, RN Registered Nurse          Physical Therapy Education     Title: PT OT SLP Therapies (Active)     Topic: Physical Therapy (Active)     Point: Mobility training (Active)    Learning Progress Summary     Learner Status Readiness Method Response Comment Documented by    Patient Active Acceptance E NR PT encourages pt to cont with BLE ther ex to improve strength and prevent DVT.  04/01/18 1209    Family Active Acceptance E NR PT encourages pt to cont with BLE ther ex to improve strength and prevent DVT.  04/01/18 1209          Point: Home exercise program (Active)    Learning Progress Summary     Learner Status Readiness Method Response Comment Documented by    Patient Active Acceptance E NR PT encourages pt to cont with BLE ther ex to improve strength and prevent DVT.  04/01/18 1209    Family Active Acceptance E NR PT encourages pt to cont with BLE ther ex to improve strength and prevent DVT.  04/01/18 1209          Point: Body mechanics (Active)    Learning Progress Summary     Learner Status Readiness Method Response Comment Documented by    Patient Active Acceptance E NR PT encourages pt to cont with BLE ther ex to improve strength and prevent DVT.  04/01/18 1209    Family Active Acceptance E NR PT encourages pt to cont with BLE ther ex to improve strength and prevent DVT.  04/01/18 1209          Point: Precautions (Active)    Learning Progress Summary     Learner Status Readiness Method Response Comment Documented by    Patient Active Acceptance E NR PT encourages pt to cont with BLE ther ex to improve strength and prevent DVT.   04/01/18 1209    Family Active Acceptance E NR PT encourages pt to cont with BLE ther ex to improve strength and prevent DVT. SR 04/01/18 1209                      User Key     Initials Effective Dates Name Provider Type Discipline    SR 06/19/15 -  Vanessa Barraza, PT Physical Therapist PT                PT Recommendation and Plan  Anticipated Discharge Disposition (PT): skilled nursing facility (SNF)  Planned Therapy Interventions (PT Eval): balance training, bed mobility training, home exercise program, transfer training, strengthening, postural re-education  Therapy Frequency (PT Clinical Impression): daily  Outcome Summary/Treatment Plan (PT)  Anticipated Discharge Disposition (PT): skilled nursing facility (SNF)  Plan of Care Reviewed With: patient, daughter  Progress: no change  Outcome Summary: PT initial eval completed. Pt is somewhat hypertensive pre and post PT eval, RN aware. PT requested pt be transfered to lift room d/t pt's level of weakness and difficult transfers from air mattress. Pt requires max A and dependent A with all bed mobility and static sitting balance. She is unable to attempt standing d/t weakness. Cont with IPPT to improve strength and independence with transfers, consider dc to SNF.          Outcome Measures     Row Name 04/01/18 0920             How much help from another person do you currently need...    Turning from your back to your side while in flat bed without using bedrails? 2  -SR      Moving from lying on back to sitting on the side of a flat bed without bedrails? 1  -SR      Moving to and from a bed to a chair (including a wheelchair)? 1  -SR      Standing up from a chair using your arms (e.g., wheelchair, bedside chair)? 1  -SR      Climbing 3-5 steps with a railing? 1  -SR      To walk in hospital room? 1  -SR      AM-PAC 6 Clicks Score 7  -SR         Functional Assessment    Outcome Measure Options AM-PAC 6 Clicks Basic Mobility (PT)  -SR        User Key  (r) = Recorded  By, (t) = Taken By, (c) = Cosigned By    Initials Name Provider Type    SR Vanessa Barraza, PT Physical Therapist           Time Calculation:         PT Charges     Row Name 04/01/18 1213             Time Calculation    Start Time 0920  -SR      PT Received On 04/01/18  -SR      PT Goal Re-Cert Due Date 04/11/18  -SR         Time Calculation- PT    Total Timed Code Minutes- PT 10 minute(s)  -SR        User Key  (r) = Recorded By, (t) = Taken By, (c) = Cosigned By    Initials Name Provider Type    SR Vanessa Barraza, PT Physical Therapist          Therapy Charges for Today     Code Description Service Date Service Provider Modifiers Qty    78822738457 HC PT EVAL HIGH COMPLEXITY 3 4/1/2018 Vanessa Barraza, PT GP 1    34178089900 HC PT THER PROC EA 15 MIN 4/1/2018 Vanessa Barraza, PT GP 1    54096638560 HC PT THER SUPP EA 15 MIN 4/1/2018 Vanessa Barraza, PT GP 4          PT G-Codes  Outcome Measure Options: AM-PAC 6 Clicks Basic Mobility (PT)      Vanessa Barraza PT  4/1/2018

## 2018-04-01 NOTE — PLAN OF CARE
Problem: Patient Care Overview  Goal: Plan of Care Review  Outcome: Ongoing (interventions implemented as appropriate)   04/01/18 1210   Coping/Psychosocial   Plan of Care Reviewed With patient;daughter   Plan of Care Review   Progress no change   OTHER   Outcome Summary PT initial eval completed. Pt is somewhat hypertensive pre and post PT eval, RN aware. PT requested pt be transfered to lift room d/t pt's level of weakness and difficult transfers from air mattress. Pt requires max A and dependent A with all bed mobility and static sitting balance. She is unable to attempt standing d/t weakness. Cont with IPPT to improve strength and independence with transfers, consider dc to SNF.

## 2018-04-01 NOTE — PLAN OF CARE
Problem: Patient Care Overview  Goal: Plan of Care Review  Outcome: Ongoing (interventions implemented as appropriate)   04/01/18 0556   Coping/Psychosocial   Plan of Care Reviewed With patient   Plan of Care Review   Progress improving   OTHER   Outcome Summary Pt. slept most of the night, VSS, no c/o nausea, c/o knee pain treated see MAR, turned pt. as well as used wedge, changed pure wick, DTI is now dark red sign of improvement, no c/o SOB, changed IV, Abx provided, and family is hoping to get pt. up to chair today.        Problem: Pneumonia (Adult)  Goal: Signs and Symptoms of Listed Potential Problems Will be Absent, Minimized or Managed (Pneumonia)  Outcome: Ongoing (interventions implemented as appropriate)   04/01/18 0556   Goal/Outcome Evaluation   Problems Assessed (Pneumonia) all   Problems Present (Pneumonia) fluid/electrolyte imbalance;respiratory compromise       Problem: Fall Risk (Adult)  Goal: Absence of Fall  Outcome: Ongoing (interventions implemented as appropriate)   04/01/18 0556   Fall Risk (Adult)   Absence of Fall making progress toward outcome       Problem: Skin Injury Risk (Adult)  Goal: Skin Health and Integrity  Outcome: Ongoing (interventions implemented as appropriate)   04/01/18 0556   Skin Injury Risk (Adult)   Skin Health and Integrity making progress toward outcome

## 2018-04-02 LAB
BACTERIA FLD CULT: NORMAL
L PNEUMO1 AG UR QL IA: NEGATIVE
Lab: NORMAL
ORGANISM ID: NORMAL
S PNEUM AG SPEC QL LA: NEGATIVE
SPECIMEN SOURCE: NORMAL
VANCOMYCIN TROUGH SERPL-MCNC: 10.9 MCG/ML (ref 10–20)

## 2018-04-02 PROCEDURE — 99233 SBSQ HOSP IP/OBS HIGH 50: CPT | Performed by: HOSPITALIST

## 2018-04-02 PROCEDURE — 97110 THERAPEUTIC EXERCISES: CPT

## 2018-04-02 PROCEDURE — 94799 UNLISTED PULMONARY SVC/PX: CPT

## 2018-04-02 PROCEDURE — 25010000002 HEPARIN (PORCINE) PER 1000 UNITS: Performed by: INTERNAL MEDICINE

## 2018-04-02 PROCEDURE — 94760 N-INVAS EAR/PLS OXIMETRY 1: CPT

## 2018-04-02 PROCEDURE — 25010000002 VANCOMYCIN PER 500 MG: Performed by: HOSPITALIST

## 2018-04-02 PROCEDURE — 25010000002 PIPERACILLIN SOD-TAZOBACTAM PER 1 G: Performed by: INTERNAL MEDICINE

## 2018-04-02 PROCEDURE — 80202 ASSAY OF VANCOMYCIN: CPT

## 2018-04-02 RX ORDER — CEFUROXIME AXETIL 250 MG/1
500 TABLET ORAL EVERY 12 HOURS SCHEDULED
Status: DISCONTINUED | OUTPATIENT
Start: 2018-04-02 | End: 2018-04-03 | Stop reason: HOSPADM

## 2018-04-02 RX ORDER — DOXYCYCLINE 100 MG/1
100 CAPSULE ORAL EVERY 12 HOURS SCHEDULED
Status: DISCONTINUED | OUTPATIENT
Start: 2018-04-02 | End: 2018-04-03 | Stop reason: HOSPADM

## 2018-04-02 RX ORDER — TERAZOSIN 2 MG/1
2 CAPSULE ORAL EVERY 12 HOURS SCHEDULED
Status: DISCONTINUED | OUTPATIENT
Start: 2018-04-03 | End: 2018-04-03 | Stop reason: HOSPADM

## 2018-04-02 RX ORDER — DOXYCYCLINE 100 MG/1
100 CAPSULE ORAL EVERY 12 HOURS SCHEDULED
Status: DISCONTINUED | OUTPATIENT
Start: 2018-04-02 | End: 2018-04-02

## 2018-04-02 RX ADMIN — TAZOBACTAM SODIUM AND PIPERACILLIN SODIUM 3.38 G: 375; 3 INJECTION, SOLUTION INTRAVENOUS at 08:07

## 2018-04-02 RX ADMIN — CLOPIDOGREL BISULFATE 75 MG: 75 TABLET ORAL at 08:46

## 2018-04-02 RX ADMIN — AMLODIPINE BESYLATE 10 MG: 5 TABLET ORAL at 18:14

## 2018-04-02 RX ADMIN — VANCOMYCIN HYDROCHLORIDE 1000 MG: 1 INJECTION, SOLUTION INTRAVENOUS at 09:44

## 2018-04-02 RX ADMIN — ALPRAZOLAM 0.25 MG: 0.25 TABLET ORAL at 20:04

## 2018-04-02 RX ADMIN — DOXYCYCLINE 100 MG: 100 CAPSULE ORAL at 20:04

## 2018-04-02 RX ADMIN — PILOCARPINE HYDROCHLORIDE 5 MG: 5 TABLET, FILM COATED ORAL at 20:03

## 2018-04-02 RX ADMIN — LISINOPRIL 10 MG: 10 TABLET ORAL at 20:04

## 2018-04-02 RX ADMIN — PILOCARPINE HYDROCHLORIDE 5 MG: 5 TABLET, FILM COATED ORAL at 08:47

## 2018-04-02 RX ADMIN — OXYCODONE HYDROCHLORIDE AND ACETAMINOPHEN 500 MG: 500 TABLET ORAL at 08:53

## 2018-04-02 RX ADMIN — PANTOPRAZOLE SODIUM 40 MG: 40 TABLET, DELAYED RELEASE ORAL at 05:22

## 2018-04-02 RX ADMIN — ACETAMINOPHEN 650 MG: 325 TABLET ORAL at 14:48

## 2018-04-02 RX ADMIN — TERAZOSIN HYDROCHLORIDE 1 MG: 1 CAPSULE ORAL at 08:51

## 2018-04-02 RX ADMIN — MEMANTINE 10 MG: 10 TABLET ORAL at 08:50

## 2018-04-02 RX ADMIN — IPRATROPIUM BROMIDE AND ALBUTEROL SULFATE 3 ML: 2.5; .5 SOLUTION RESPIRATORY (INHALATION) at 07:39

## 2018-04-02 RX ADMIN — SUCRALFATE 1 G: 1 TABLET ORAL at 07:09

## 2018-04-02 RX ADMIN — SUCRALFATE 1 G: 1 TABLET ORAL at 20:03

## 2018-04-02 RX ADMIN — TERAZOSIN HYDROCHLORIDE 1 MG: 1 CAPSULE ORAL at 20:04

## 2018-04-02 RX ADMIN — PILOCARPINE HYDROCHLORIDE 5 MG: 5 TABLET, FILM COATED ORAL at 18:14

## 2018-04-02 RX ADMIN — TRAZODONE HYDROCHLORIDE 25 MG: 50 TABLET ORAL at 20:04

## 2018-04-02 RX ADMIN — SUCRALFATE 1 G: 1 TABLET ORAL at 14:09

## 2018-04-02 RX ADMIN — IPRATROPIUM BROMIDE AND ALBUTEROL SULFATE 3 ML: 2.5; .5 SOLUTION RESPIRATORY (INHALATION) at 12:57

## 2018-04-02 RX ADMIN — FERROUS SULFATE TAB 325 MG (65 MG ELEMENTAL FE) 325 MG: 325 (65 FE) TAB at 08:53

## 2018-04-02 RX ADMIN — TRAMADOL HYDROCHLORIDE 50 MG: 50 TABLET, COATED ORAL at 20:04

## 2018-04-02 RX ADMIN — IPRATROPIUM BROMIDE AND ALBUTEROL SULFATE 3 ML: 2.5; .5 SOLUTION RESPIRATORY (INHALATION) at 16:17

## 2018-04-02 RX ADMIN — CEFUROXIME AXETIL 500 MG: 250 TABLET ORAL at 20:03

## 2018-04-02 RX ADMIN — HEPARIN SODIUM 5000 UNITS: 5000 INJECTION, SOLUTION INTRAVENOUS; SUBCUTANEOUS at 05:22

## 2018-04-02 RX ADMIN — MEMANTINE 10 MG: 10 TABLET ORAL at 20:04

## 2018-04-02 RX ADMIN — LISINOPRIL 10 MG: 10 TABLET ORAL at 08:51

## 2018-04-02 RX ADMIN — Medication 250 MG: at 20:03

## 2018-04-02 RX ADMIN — TRAMADOL HYDROCHLORIDE 50 MG: 50 TABLET, COATED ORAL at 08:48

## 2018-04-02 RX ADMIN — HEPARIN SODIUM 5000 UNITS: 5000 INJECTION, SOLUTION INTRAVENOUS; SUBCUTANEOUS at 14:09

## 2018-04-02 RX ADMIN — ISOSORBIDE MONONITRATE 30 MG: 30 TABLET, EXTENDED RELEASE ORAL at 14:09

## 2018-04-02 RX ADMIN — PILOCARPINE HYDROCHLORIDE 5 MG: 5 TABLET, FILM COATED ORAL at 14:09

## 2018-04-02 RX ADMIN — ACETAMINOPHEN 650 MG: 325 TABLET ORAL at 23:46

## 2018-04-02 RX ADMIN — Medication 250 MG: at 08:53

## 2018-04-02 RX ADMIN — DONEPEZIL HYDROCHLORIDE 10 MG: 10 TABLET, FILM COATED ORAL at 20:03

## 2018-04-02 RX ADMIN — SUCRALFATE 1 G: 1 TABLET ORAL at 18:14

## 2018-04-02 RX ADMIN — Medication 5 MG: at 20:04

## 2018-04-02 RX ADMIN — HEPARIN SODIUM 5000 UNITS: 5000 INJECTION, SOLUTION INTRAVENOUS; SUBCUTANEOUS at 20:04

## 2018-04-02 NOTE — PLAN OF CARE
Problem: Patient Care Overview  Goal: Plan of Care Review  Outcome: Ongoing (interventions implemented as appropriate)   04/02/18 0600   Coping/Psychosocial   Plan of Care Reviewed With patient   Plan of Care Review   Progress improving   OTHER   Outcome Summary Pt. slept most of the night, BP checked q1hr see chart, no c/o pain/nausea, & Abx given.

## 2018-04-02 NOTE — DISCHARGE PLACEMENT REQUEST
"Cinthia Mata (87 y.o. Female)     To [facility]  From [facility] [phone]        Date of Birth Social Security Number Address Home Phone MRN    01/01/1931  [id number] [facility] [address] [phone] [id number]    Roman Catholic Marital Status          Jehovah's witness        Admission Date Admission Type Admitting Provider Attending Provider Department, Room/Bed    3/29/18 Emergency Tanner Askew MD Schnell, Aaron, MD [address], S[address]/1    Discharge Date Discharge Disposition Discharge Destination                       Attending Provider:  Tanner Askew MD    Allergies:  Sulfa Antibiotics, Cortisone, Ativan [Lorazepam]    Isolation:  None   Infection:  None   Code Status:  FULL    Ht:  149.9 cm (59\")   Wt:  65.3 kg (143 lb 14.4 oz)    Admission Cmt:  None   Principal Problem:  None                Active Insurance as of 3/29/2018     Primary Coverage     Payor Plan Insurance Group Employer/Plan Group    MEDICARE MEDICARE A & B      Payor Plan Address Payor Plan Phone Number Effective From Effective To    [address] [phone] 12/1/1995     [place] [address]       Subscriber Name Subscriber Birth Date Member ID       CINTHIA MATA 1/1/1931 [id number]           Secondary Coverage     Payor Plan Insurance Group Employer/Plan Group    AARP MED SUPP AARP HEALTH CARE OPTIONS      Payor Plan Address Payor Plan Phone Number Effective From Effective To    [facility] [phone] 1/1/2018     [address]       [place] [address]       Subscriber Name Subscriber Birth Date Member ID       CINTHIA MATA 1/1/1931 [id number]                 Emergency Contacts      (Rel.) Home Phone Work Phone Mobile Phone    Vilma Slade (Power of ) [phone] -- --               History & Physical      Zayra Tobias MD at 3/29/2018  8:03 PM              [facility] Medicine Services  HISTORY AND PHYSICAL    Patient Name: " Poonam Mata  : 1931  MRN: [id number]  Primary Care Physician: Leti Munoz MD    Subjective   Subjective     Chief Complaint:  SOA, cough    HPI:  Poonam Mata is a 87 y.o. female with PMH of HTN, dementia, OA and GERD who presents with complaints of nonproductive cough, lethargy and subjective fever for the last 3-4 days.  Pt was seen by her PCP earlier this week and started on Ceftin but has failed to improve despite three days of ABX.  Pt's daughter reports that pt's  was just here for similar symptoms and actually passed away this past weekend.      Review of Systems   General- as above  HEENT- no blurry vision, no nasal congestion, no hearing loss, no sore throat, no dysphagia, no oral ulcers, no dental caries, no bleeding gums  CVS- no chest pain, no palpitations  Pulm- as above  GI- no diarrhea, no constipation, no BRBPR, no nausea, no vomiting  MSK- no joint pain, no swelling, no erythema  - no dysuria, no hematuria  Neuro- no headaches, no focal motor deficits  Psych- no SI/ HI, no depression/anxiety  Otherwise 10-system ROS reviewed and is negative except as mentioned in the HPI.    Personal History     Past Medical History:   Diagnosis Date   • Anxiety    • Arthropathy    • Constipation    • Dementia    • Essential hypertension    • GERD (gastroesophageal reflux disease)    • Hyperlipidemia    • Insomnia    • Spinal stenosis    • Syncope    • URI (upper respiratory infection)        Past Surgical History:   Procedure Laterality Date   • BLADDER SUSPENSION     • CHOLECYSTECTOMY     • HIP SURGERY     • HYSTERECTOMY     • TONSILLECTOMY         Family History: Reviewed and noncontributory.     Social History:  reports that she has never smoked. She does not have any smokeless tobacco history on file. She reports that she does not drink alcohol or use drugs.  Social History     Social History Narrative   • No narrative on file       Medications:  No medication comments  found.    Allergies   Allergen Reactions   • Ativan [Lorazepam] Unknown (See Comments)     UNKNOWN     • Cortisone Unknown (See Comments)     UNKNOWN     • Sulfa Antibiotics Unknown (See Comments)     UNKNOWN         Objective   Objective     Vital Signs:   Temp:  [98.8 °F (37.1 °C)] 98.8 °F (37.1 °C)  Heart Rate:  [70-79] 70  Resp:  [16] 16  BP: (117-163)/(55-74) 121/62        Physical Exam   Constitutional: No acute distress, lethargic and does not open eyes during my exam but does answer questions appropriately.  Lets her daughter do most of the talking.   Eyes: PERRLA, sclerae anicteric, no conjunctival injection  HENT: NCAT, mucous membranes moist  Neck: Supple, no thyromegaly, no lymphadenopathy, trachea midline  Respiratory: diffuse rhonchi left side, right side clear  Cardiovascular: RRR, no murmurs, rubs, or gallops, palpable pedal pulses bilaterally  Gastrointestinal: Positive bowel sounds, soft, nontender, nondistended  Musculoskeletal: bilateral nonpitting edema, RLE with more edema than LLE due to ankle injury a few months ago, no clubbing or cyanosis to extremities  Psychiatric: flat affect, cooperative  Neurologic: Oriented x 3, strength symmetric in all extremities, Cranial Nerves grossly intact to confrontation, speech clear  Skin: No rashes    Results Reviewed:  I have personally reviewed current lab, radiology, and data and agree.      Results from last 7 days  Lab Units 03/29/18  1426   WBC 10*3/mm3 7.01   HEMOGLOBIN g/dL 9.7*   HEMATOCRIT % 32.0*   PLATELETS 10*3/mm3 276       Results from last 7 days  Lab Units 03/29/18  1426   SODIUM mmol/L 137   POTASSIUM mmol/L 2.9*   CHLORIDE mmol/L 97*   CO2 mmol/L 32.0*   BUN mg/dL 13   CREATININE mg/dL 0.80   GLUCOSE mg/dL 118*   CALCIUM mg/dL 8.9   ALT (SGPT) U/L 22   AST (SGOT) U/L 42*     Estimated Creatinine Clearance: 42.3 mL/min (by C-G formula based on SCr of 0.8 mg/dL).  Brief Urine Lab Results  (Last result in the past 365 days)      Color    Clarity   Blood   Leuk Est   Nitrite   Protein   CREAT   Urine HCG        03/29/18 1629 Yellow Clear Negative Negative Negative 30 mg/dL (1+)(A)             BNP   Date Value Ref Range Status   03/29/2018 17.0 0.0 - 100.0 pg/mL Final     Comment:     Results may be falsely decreased if patient taking Biotin.     pH, Arterial   Date Value Ref Range Status   03/29/2018 7.456 (H) 7.350 - 7.450 pH units Final     Imaging Results (last 24 hours)     Procedure Component Value Units Date/Time    XR Chest 1 View [[id number]] Collected:  03/29/18 1545     Updated:  03/29/18 1600    Narrative:          EXAMINATION: XR CHEST 1 VW - 03/29/2018     INDICATION: Shortness of air.      COMPARISON: None.     FINDINGS:   1. There is mild inhomogeneous airspace opacity left lower lobe  retrocardiac region, new from 2015.     2. Remainder of the chest is negative for active disease and reveals  only mild diffuse fibrosis. There is dextroscoliosis of the dorsal  spine.           Impression:          Chronic senile changes diffusely with interstitial scarring.     Mild nonconsolidated airspace multidirectional opacities left lower  lobe, new from previous studies and likely indicative of an inflammatory  process or significant atelectasis.     DICTATED:     03/29/2018  EDITED/ls :     03/29/2018      This report was finalized on 3/29/2018 3:58 PM by Dr. Rajat Butts MD.                Assessment/Plan   Assessment / Plan     Hospital Problem List     Pneumonia of left lower lobe due to infectious organism    Hypokalemia    Dementia    Hypertension    GERD (gastroesophageal reflux disease)    Normocytic anemia            Assessment & Plan:  Ms. Mata is an 88 yo WF w/ PMH of HTN, dementia, GERD who presents with LLL PNA/HCAP (resides in NH ).  Pt also found to be hypokalemic on admission.     Plan:  --continue Vanc/Zosyn (received doses in ED ). Send urine strep and Legionella Ags. Blood Cx PENDING.  Will also send flu pcr.    --replace  K+, check Mg2+ in am as well as repeat BMP  --hold antihypertensives for now.  IVFs for 24 hours.    --send anemia workup, transfuse PRN HgB<7  --continue formulary equivalent of PPI, continue carafate      DVT prophylaxis:ARACELI    CODE STATUS:  Full Code  Admission Status:  I believe this patient meets INPATIENT status due to the need for care which can only be reasonably provided in an hospital setting such as aggressive/expedited ancillary services and/or consultation services, the necessity for IV medications, close physician monitoring and/or the possible need for procedures.  In such, I feel patient’s risk for adverse outcomes and need for care warrant INPATIENT evaluation and predict the patient’s care encounter to likely last beyond 2 midnights.      Electronically signed by Zayra Tobias MD, 03/29/18, 8:04 PM.        Electronically signed by Zayra Tobias MD at 3/29/2018  8:13 PM       Hospital Medications (active)       Dose Frequency Start End     Vancomycin trough level prior to 0600 dose 4/2 . Please do not administer dose until level is evaluated for potential dose adjustments  Once 4/2/2018     Sig - Route: 1 (One) Time. - Does not apply    acetaminophen (TYLENOL) tablet 650 mg 650 mg Every 6 Hours PRN 3/29/2018     Sig - Route: Take 2 tablets by mouth Every 6 (Six) Hours As Needed for Mild Pain . - Oral    ALPRAZolam (XANAX) tablet 0.25 mg 0.25 mg 2 Times Daily PRN 3/29/2018     Sig - Route: Take 1 tablet by mouth 2 (Two) Times a Day As Needed for Anxiety. - Oral    ALPRAZolam (XANAX) tablet 0.25 mg 0.25 mg Nightly 4/1/2018 4/11/2018    Sig - Route: Take 1 tablet by mouth Every Night. - Oral    Notes to Pharmacy: Daughter requested scheduled at night    amLODIPine (NORVASC) tablet 10 mg 10 mg Every Evening 4/1/2018     Sig - Route: Take 2 tablets by mouth Every Evening. - Oral    bisacodyl (DULCOLAX) EC tablet 5 mg 5 mg Daily PRN 3/29/2018     Sig - Route: Take 1 tablet by mouth Daily  As Needed for Constipation. - Oral    clopidogrel (PLAVIX) tablet 75 mg 75 mg Daily 3/30/2018     Sig - Route: Take 1 tablet by mouth Daily. - Oral    docusate sodium (COLACE) capsule 100 mg 100 mg 2 Times Daily PRN 3/29/2018     Sig - Route: Take 1 capsule by mouth 2 (Two) Times a Day As Needed for Constipation. - Oral    donepezil (ARICEPT) tablet 10 mg 10 mg Nightly 3/29/2018     Sig - Route: Take 1 tablet by mouth Every Night. - Oral    ferrous sulfate tablet 325 mg 325 mg Daily With Breakfast 3/31/2018     Sig - Route: Take 1 tablet by mouth Daily With Breakfast. - Oral    heparin (porcine) 5000 UNIT/ML injection 5,000 Units 5,000 Units Every 8 Hours Scheduled 3/29/2018     Sig - Route: Inject 1 mL under the skin Every 8 (Eight) Hours. - Subcutaneous    ipratropium-albuterol (DUO-NEB) nebulizer solution 3 mL 3 mL Every 4 Hours PRN 3/29/2018     Sig - Route: Take 3 mL by nebulization Every 4 (Four) Hours As Needed for Wheezing. - Nebulization    ipratropium-albuterol (DUO-NEB) nebulizer solution 3 mL 3 mL 4 Times Daily - RT 3/30/2018     Sig - Route: Take 3 mL by nebulization 4 (Four) Times a Day. - Nebulization    isosorbide mononitrate (IMDUR) 24 hr tablet 30 mg 30 mg Every 24 Hours Scheduled 3/30/2018     Sig - Route: Take 1 tablet by mouth Daily. - Oral    lisinopril (PRINIVIL,ZESTRIL) tablet 10 mg 10 mg Every 12 Hours Scheduled 4/1/2018     Sig - Route: Take 1 tablet by mouth Every 12 (Twelve) Hours. - Oral    LORazepam (ATIVAN) injection 0.5 mg 0.5 mg Every 6 Hours PRN 3/29/2018 4/8/2018    Sig - Route: Infuse 0.25 mL into a venous catheter Every 6 (Six) Hours As Needed for Anxiety. - Intravenous    melatonin sublingual tablet 5 mg 5 mg Nightly 4/1/2018     Sig - Route: Place 1 tablet under the tongue Every Night. - Sublingual    memantine (NAMENDA) tablet 10 mg 10 mg Every 12 Hours Scheduled 3/29/2018     Sig - Route: Take 1 tablet by mouth Every 12 (Twelve) Hours. - Oral    ondansetron (ZOFRAN) injection  "4 mg 4 mg Every 6 Hours PRN 3/29/2018     Sig - Route: Infuse 2 mL into a venous catheter Every 6 (Six) Hours As Needed for Nausea or Vomiting. - Intravenous    Linked Group 1:  \"Or\" Linked Group Details        ondansetron (ZOFRAN) tablet 4 mg 4 mg Every 6 Hours PRN 3/29/2018     Sig - Route: Take 1 tablet by mouth Every 6 (Six) Hours As Needed for Nausea or Vomiting. - Oral    Linked Group 1:  \"Or\" Linked Group Details        pantoprazole (PROTONIX) EC tablet 40 mg 40 mg Every Early Morning 3/30/2018     Sig - Route: Take 1 tablet by mouth Every Morning. - Oral    Pharmacy to dose vancomycin  Continuous PRN 3/30/2018 4/5/2018    Sig - Route: Continuous As Needed for Consult. - Does not apply    pilocarpine (SALAGEN) tablet 5 mg 5 mg 4 Times Daily 3/29/2018     Sig - Route: Take 1 tablet by mouth 4 (Four) Times a Day. - Oral    piperacillin-tazobactam (ZOSYN) 3.375 g in iso-osmotic dextrose 50 ml (premix) 3.375 g Every 8 Hours 3/30/2018 4/5/2018    Sig - Route: Infuse 50 mL into a venous catheter Every 8 (Eight) Hours. - Intravenous    polyethylene glycol 3350 powder (packet) 17 g Daily PRN 3/29/2018     Sig - Route: Take 17 g by mouth Daily As Needed for Constipation. - Oral    potassium chloride (KLOR-CON) packet 40 mEq 40 mEq As Needed 3/30/2018     Sig - Route: Take 40 mEq by mouth As Needed (potassium replacement, see admin instructions). - Oral    Linked Group 2:  \"Or\" Linked Group Details        potassium chloride (MICRO-K) CR capsule 40 mEq 40 mEq As Needed 3/30/2018     Sig - Route: Take 4 capsules by mouth As Needed (potassium replacement.  see admin instructions). - Oral    Linked Group 2:  \"Or\" Linked Group Details        potassium chloride 10 mEq in 100 mL IVPB 10 mEq Every 1 Hour PRN 3/30/2018     Sig - Route: Infuse 100 mL into a venous catheter Every 1 (One) Hour As Needed (potassium protocol PERIPHERAL - see admin instructions). - Intravenous    Linked Group 2:  \"Or\" Linked Group Details        " saccharomyces boulardii (FLORASTOR) capsule 250 mg 250 mg 2 Times Daily 3/30/2018     Sig - Route: Take 1 capsule by mouth 2 (Two) Times a Day. - Oral    sodium chloride 0.9 % flush 1-10 mL 1-10 mL As Needed 3/29/2018     Sig - Route: Infuse 1-10 mL into a venous catheter As Needed for Line Care. - Intravenous    sodium chloride 0.9 % flush 10 mL 10 mL As Needed 3/29/2018     Sig - Route: Infuse 10 mL into a venous catheter As Needed for Line Care. - Intravenous    Cosign for Ordering: Accepted by Robbi Valadez MD on 3/29/2018  8:07 PM    sucralfate (CARAFATE) tablet 1 g 1 g 4 Times Daily Before Meals & Nightly 3/29/2018     Sig - Route: Take 1 tablet by mouth 4 (Four) Times a Day Before Meals & at Bedtime. - Oral    terazosin (HYTRIN) capsule 1 mg 1 mg Every 12 Hours Scheduled 4/1/2018     Sig - Route: Take 1 capsule by mouth Every 12 (Twelve) Hours. - Oral    traMADol (ULTRAM) tablet 50 mg 50 mg 2 Times Daily 3/29/2018     Sig - Route: Take 1 tablet by mouth 2 (Two) Times a Day. - Oral    traZODone (DESYREL) tablet 25 mg 25 mg Nightly 4/1/2018     Sig - Route: Take 0.5 tablets by mouth Every Night. - Oral    vancomycin (VANCOCIN) in iso-osmotic dextrose IVPB 1 g (premix) 200 mL 15 mg/kg × 67.1 kg Every 18 Hours 4/1/2018 4/6/2018    Sig - Route: Infuse 200 mL into a venous catheter Every 18 (Eighteen) Hours. - Intravenous    vitamin C (ASCORBIC ACID) tablet 500 mg 500 mg Daily 3/31/2018     Sig - Route: Take 1 tablet by mouth Daily. - Oral    HYDROcodone-acetaminophen (NORCO) 5-325 MG per tablet 1 tablet (Discontinued) 1 tablet Every 4 Hours PRN 3/29/2018 4/1/2018    Sig - Route: Take 1 tablet by mouth Every 4 (Four) Hours As Needed for Moderate Pain . - Oral    sodium chloride 0.9 % infusion (Discontinued) 100 mL/hr Continuous 3/29/2018 4/1/2018    Sig - Route: Infuse 100 mL/hr into a venous catheter Continuous. - Intravenous    traZODone (DESYREL) tablet 50 mg (Discontinued) 50 mg Nightly 3/29/2018 4/1/2018     Sig - Route: Take 1 tablet by mouth Every Night. - Oral             Physician Progress Notes (last 24 hours) (Notes from 2018 11:08 AM through 2018 11:08 AM)      Tanner Askew MD at 2018  2:21 PM              [facility] Medicine Services  PROGRESS NOTE    Patient Name: Poonma Mata  : 1931  MRN: [id number]    Date of Admission: 3/29/2018  Length of Stay: 3  Primary Care Physician: Leti Munoz MD    Subjective   Subjective     CC:  Follow up fatigue and shortness of breath    HPI:  Seems flat and weak.  Daughter in room today.  Daughter asking to Schedule Xanax.  Blood pressure issues most of day today    Review of Systems  Gen- No fevers, chills  CV- No chest pain, palpitations  Resp- + cough, +soa  GI- No N/V/D, abd pain    Otherwise ROS is negative except as mentioned in the HPI.    Objective   Objective     Vital Signs:   Temp:  [98.2 °F (36.8 °C)-98.8 °F (37.1 °C)] 98.8 °F (37.1 °C)  Heart Rate:  [69-88] 86  Resp:  [16-18] 18  BP: (153-221)/() 204/89        Physical Exam:  Constitutional: No acute distress, looks weak and tired  HENT: NCAT, mucous membranes moist  Respiratory:  Poor inspiratory effort, clear, no wheezes  Cardiovascular: RRR, no murmurs, rubs, or gallops, palpable pedal pulses bilaterally  Gastrointestinal: Positive bowel sounds, soft, nontender, nondistended  Musculoskeletal: No bilateral ankle edema  Psychiatric: Appropriate affect, cooperative  Neurologic: Oriented x 3, strength symmetric in all extremities, Cranial Nerves grossly intact to confrontation, speech clear  Skin: No rashes    Results Reviewed:  I have personally reviewed current lab, radiology, and data and agree.      Results from last 7 days  Lab Units 18  0343 18  0555 18  0620   WBC 10*3/mm3 7.54 6.88 6.23   HEMOGLOBIN g/dL 8.8* 9.4* 10.6*   HEMATOCRIT % 28.9* 31.3* 35.7   PLATELETS 10*3/mm3 265 270 276   INR   --  0.97  --        Results from  last 7 days  Lab Units 04/01/18  0343 03/31/18  1642 03/31/18  0555  03/30/18  0620 03/29/18  1426   SODIUM mmol/L 138  --  141  --  142 137   POTASSIUM mmol/L 4.4 4.2 3.4*  < > 2.8* 2.9*   CHLORIDE mmol/L 104  --  104  --  98* 97*   CO2 mmol/L 29.0  --  32.0*  --  30.0 32.0*   BUN mg/dL 9  --  8*  --  9 13   CREATININE mg/dL 0.60  --  0.60  --  0.50* 0.80   GLUCOSE mg/dL 89  --  91  --  92 118*   CALCIUM mg/dL 8.4*  --  8.6*  --  8.8 8.9   ALT (SGPT) U/L  --   --   --   --   --  22   AST (SGOT) U/L  --   --   --   --   --  42*   < > = values in this interval not displayed.  Estimated Creatinine Clearance: 41.8 mL/min (by C-G formula based on SCr of 0.6 mg/dL).  BNP   Date Value Ref Range Status   03/29/2018 17.0 0.0 - 100.0 pg/mL Final     Comment:     Results may be falsely decreased if patient taking Biotin.     pH, Arterial   Date Value Ref Range Status   03/29/2018 7.456 (H) 7.350 - 7.450 pH units Final       Microbiology Results Abnormal     Procedure Component Value - Date/Time    Blood Culture - Blood, [[id number]]  (Normal) Collected:  03/29/18 1708    Lab Status:  Preliminary result Specimen:  Blood from Arm, Left Updated:  03/31/18 1746     Blood Culture No growth at 2 days    Blood Culture - Blood, [[id number]]  (Normal) Collected:  03/29/18 1645    Lab Status:  Preliminary result Specimen:  Blood from Arm, Right Updated:  03/31/18 1746     Blood Culture No growth at 2 days    Influenza A & B, RT PCR - Swab, Nasopharynx [[id number]]  (Normal) Collected:  03/30/18 0706    Lab Status:  Final result Specimen:  Swab from Nasopharynx Updated:  03/30/18 0803     Influenza A PCR Not Detected     Influenza B PCR Not Detected          Imaging Results (last 24 hours)     ** No results found for the last 24 hours. **             I have reviewed the medications.    Assessment/Plan   Assessment / Plan     Hospital Problem List     Pneumonia of left lower lobe due to infectious organism    Hypokalemia    Dementia     Hypertension    GERD (gastroesophageal reflux disease)    Normocytic anemia             Brief Hospital Course to date:  Poonam Mata is a 87 y.o. female w/ PMH of HTN, dementia, GERD who presents with LLL PNA/HCAP (resides in NH).      Assessment & Plan:    LLL PNA  - at risk for PNA due to immobility and poor inspiratory capacity  - Zosyn/Vanc  - encouraged incentive spirometer exercises    Hypertensive Crisis  - reportedly gets clonidine as needed at NH  - will give medications with emphasis on permissive hypertension based on discussion in room yesterday per Dr. Askew and family (daughter did not seem to like NH use of clonidine).    Hypokalemia  - supplemental potassium  - check magnesium  - monitor    Iron Deficiency Anemia  - oral iron     Out patient sleep study recommended.     Start terazosin today  Daughter asking to put Norvasc at night, which I did today  Daughter asking to schedule Xanax at night, which I did today     DVT Prophylaxis:  [facility]    CODE STATUS: Full Code    Disposition: I expect the patient to be discharged to long term care facility, D.      Electronically signed by Tanner Askew MD, 04/01/18, 2:21 PM.      Electronically signed by Tanner Askew MD at 4/1/2018  6:22 PM          Physical Therapy Notes (last 24 hours) (Notes from 4/1/2018 11:08 AM through 4/2/2018 11:08 AM)      Vanessa Barraza, PT at 4/1/2018 12:13 PM  Version 1 of 1         Problem: Patient Care Overview  Goal: Plan of Care Review  Outcome: Ongoing (interventions implemented as appropriate)   04/01/18 1210   Coping/Psychosocial   Plan of Care Reviewed With patient;daughter   Plan of Care Review   Progress no change   OTHER   Outcome Summary PT initial eval completed. Pt is somewhat hypertensive pre and post PT eval, RN aware. PT requested pt be transfered to lift room d/t pt's level of weakness and difficult transfers from air mattress. Pt requires max A and dependent A with all bed mobility and static sitting  balance. She is unable to attempt standing d/t weakness. Cont with IPPT to improve strength and independence with transfers, consider dc to SNF.           Electronically signed by Vanessa Barraza PT at 2018 12:13 PM     Vanessa Barraza PT at 2018 12:14 PM  Version 1 of 1         Acute Care - Physical Therapy Initial Evaluation   [place]     Patient Name: Poonam Mata  : 1931  MRN: [id number]  Today's Date: 2018   Onset of Illness/Injury or Date of Surgery: 18  Date of Referral to PT: 18  Referring Physician: UMAIR Briscoe      Admit Date: 3/29/2018    Visit Dx:     ICD-10-CM ICD-9-CM   1. Pneumonia of left lower lobe due to infectious organism J18.1 486   2. Acute respiratory failure with hypoxia J96.01 518.81   3. Hypokalemia E87.6 276.8   4. Impaired functional mobility, balance, gait, and endurance Z74.09 V49.89     Patient Active Problem List   Diagnosis   • Pneumonia of left lower lobe due to infectious organism   • Hypokalemia   • Dementia   • Hypertension   • GERD (gastroesophageal reflux disease)   • Normocytic anemia     Past Medical History:   Diagnosis Date   • Anxiety    • Arthropathy    • Constipation    • Dementia    • Essential hypertension    • GERD (gastroesophageal reflux disease)    • Hyperlipidemia    • Insomnia    • Spinal stenosis    • Syncope    • URI (upper respiratory infection)      Past Surgical History:   Procedure Laterality Date   • BLADDER SUSPENSION     • CHOLECYSTECTOMY     • HIP SURGERY     • HYSTERECTOMY     • TONSILLECTOMY          PT ASSESSMENT (last 72 hours)      Physical Therapy Evaluation     Row Name 18 0920 18 1000       PT Evaluation Time/Intention    Subjective Information complains of;weakness;fatigue  -SR complains of;weakness;fatigue  -MF    Document Type evaluation  -SR evaluation;therapy note (daily note);wound care  -MF    Mode of Treatment physical therapy  -SR physical therapy  -MF    Patient Effort good  -SR   --    Symptoms Noted During/After Treatment fatigue;significant change in vital signs  -SR  --    Row Name 04/01/18 0920 03/31/18 1000       General Information    Patient Profile Reviewed? yes  -SR yes  -MF    Onset of Illness/Injury or Date of Surgery 03/29/18  -SR 03/29/18  -MF    Referring Physician UMAIR Briscoe  -SR Dr Askew  -    Patient Observations cooperative;agree to therapy  -SR  --    Patient/Family Observations daughter present  -SR  --    General Observations of Patient Pt is supine in bed, [place] cath, tele, IV.  -SR  --    Prior Level of Function dependent:;transfer;bed mobility;w/c or scooter   daughter states that pt was able to transfer herself to   -SR  --    Equipment Currently Used at Home wheelchair   two months ago, but is now dependent  -SR  --    Pertinent History of Current Functional Problem Pt admitted with cough, lethary, fever past 3-4 days.   -SR Pt presents with weakness and limited mobility, now with POA DTPI  -MF    Existing Precautions/Restrictions fall  -SR  --    Risks Reviewed patient and family:;LOB;increased discomfort;change in vital signs;lines disloged  -SR patient:;increased discomfort  -MF    Benefits Reviewed patient:;improve function;increase independence;increase strength;increase balance;increase knowledge;decrease risk of DVT  -SR patient:;improve skin integrity  -MF    Barriers to Rehab medically complex;previous functional deficit   pt recently lost  (last weekend)  -SR medically complex;previous functional deficit;physical barrier  -MF    Row Name 04/01/18 0920          Relationship/Environment    Lives With facility resident  -SR     Family Caregiver if Needed child(maría), adult  -SR     Row Name 04/01/18 0920          Resource/Environmental Concerns    Current Living Arrangements residential facility  -SR     Row Name 04/01/18 0920          Cognitive Assessment/Intervention- PT/OT    Orientation Status (Cognition) oriented x 3  -SR     Follows  Commands (Cognition) WFL;repetition of directions required  -SR     Safety Deficit (Cognitive) insight into deficits/self awareness;awareness of need for assistance  -SR     Row Name 04/01/18 0920          Safety Issues, Functional Mobility    Impairments Affecting Function (Mobility) balance;strength;endurance/activity tolerance  -SR     Row Name 04/01/18 0920          Bed Mobility Assessment/Treatment    Bed Mobility Assessment/Treatment rolling right;supine-sit;sit-supine;scooting/bridging  -SR     Rolling Right [place] (Bed Mobility) maximum assist (25% patient effort);verbal cues  -SR     Scooting/Bridging [place] (Bed Mobility) dependent (less than 25% patient effort);2 person assist  -SR     Supine-Sit [place] (Bed Mobility) maximum assist (25% patient effort);2 person assist  -SR     Sit-Supine [place] (Bed Mobility) maximum assist (25% patient effort);2 person assist  -SR     Bed Mobility, Safety Issues decreased use of arms for pushing/pulling;decreased use of legs for bridging/pushing;impaired trunk control for bed mobility  -SR     Assistive Device (Bed Mobility) bed rails;draw sheet;head of bed elevated  -SR     Comment (Bed Mobility) cues for sequencing  -SR     Row Name 04/01/18 0920          Transfer Assessment/Treatment    Comment (Transfers) not tested  -SR     Row Name 04/01/18 0920          Gait/Stairs Assessment/Training    [place] Level (Gait) not tested  -SR     Row Name 04/01/18 0920          General ROM    GENERAL ROM COMMENTS BLE AROM limited 10% knee extension  -SR     Row Name 04/01/18 0920          General Assessment (Manual Muscle Testing)    General Manual Muscle Testing (MMT) Assessment lower extremity strength deficits identified  -SR     Comment, General Manual Muscle Testing (MMT) Assessment BLE generally 3-/5  -SR     Row Name 04/01/18 0920          Motor Assessment/Intervention    Additional Documentation Balance (Group)  -SR     Row Name 04/01/18 0920           Balance    Balance static sitting balance  -SR     Row Name 04/01/18 0920          Static Sitting Balance    Level of [place] (Unsupported Sitting, Static Balance) moderate assist, 50 to 74% patient effort  -SR     Sitting Position (Unsupported Sitting, Static Balance) sitting on edge of bed  -SR     Time Able to Maintain Position (Unsupported Sitting, Static Balance) 4 to 5 minutes  -SR     Comment (Unsupported Sitting, Static Balance) challenging surface d/t air mattress with bolsters on edges, PT requested that pt be transfered to lift room  -SR     Row Name 04/01/18 0920          Sensory Assessment/Intervention    Sensory General Assessment no sensation deficits identified  -SR     Row Name 04/01/18 0920 03/31/18 1000       Pain Assessment    Additional Documentation Pain Scale: Word Pre/Post-Treatment (Group)  -SR Pain Scale: FACES Pre/Post-Treatment (Group)  -MF    Row Name 04/01/18 0920          Pain Scale: Word Pre/Post-Treatment    Pain: Word Scale, Pretreatment 0 - no pain  -SR     Pain: Word Scale, Post-Treatment 0 - no pain  -SR     Row Name 03/31/18 1000          Pain Scale: FACES Pre/Post-Treatment    Pain: FACES Scale, Pretreatment 0-->no hurt  -MF     Pain: FACES Scale, Post-Treatment 0-->no hurt  -MF     Row Name             [REMOVED] Wound 03/29/18 2200 medial    Wound - Properties Group Date first assessed: 03/29/18  -AH Time first assessed: 2200  -AH Present On Admission : yes  -AH Orientation: medial  -AH Stage, Pressure Injury: Stage 1  -AH Resolution Date: 03/30/18  -AS Resolution Time: 1045  -AS    Row Name             Wound 03/29/18 2200 Right distal other (see notes) other (see comments)    Wound - Properties Group Date first assessed: 03/29/18  -AH Time first assessed: 2200  -AH Side: Right  -AH Orientation: distal  -AH Location: other (see notes)  -AH, 2nd toe  Type: other (see comments)  -AH, callous     Row Name 03/31/18 1000          Wound 03/30/18 1045 Left  upper;posterior;medial thigh pressure injury    Wound - Properties Group Date first assessed: 03/30/18  -AS Time first assessed: 1045  -AS Present On Admission : yes;picture taken  -AS Side: Left  -AS Orientation: upper;posterior;medial  -AS Location: thigh  -AS Type: pressure injury  -AS Stage, Pressure Injury: deep tissue injury  -AS    Dressing Appearance open to air  -MF     Base maroon/purple  -MF     Periwound intact;dry;redness;blanchable  -MF     Wound length (cm) 0.9 cm  -MF     Wound width (cm) 9.5 cm  -MF     Wound depth (cm) 0 cm  -MF     Drainage Amount none  -MF     Care, Wound ultrasound therapy, non contact low frequency   6 min  -MF     Row Name 04/01/18 0920          Coping    Observed Emotional State calm;cooperative  -SR     Verbalized Emotional State acceptance  -SR     Row Name 04/01/18 0920 03/31/18 1000       Plan of Care Review    Plan of Care Reviewed With patient;daughter  -SR patient  -MF    Row Name 04/01/18 0920 03/31/18 1000       Physical Therapy Clinical Impression    Date of Referral to PT 03/31/18  -SR 03/30/18  -MF    PT Diagnosis (PT Clinical Impression) impaired functional mobility  -SR DTPI to L gluteal cleft  -MF    Patient/Family Goals Statement (PT Clinical Impression) improve independence with tranfers  -SR wound healing  -MF    Criteria for Skilled Interventions Met (PT Clinical Impression) yes;treatment indicated  -SR yes;treatment indicated  -MF    Rehab Potential (PT Clinical Summary) good, to achieve stated therapy goals  -SR  --    Care Plan Review (PT) evaluation/treatment results reviewed;care plan/treatment goals reviewed;risks/benefits reviewed;current/potential barriers reviewed;patient/other agree to care plan  -SR evaluation/treatment results reviewed;care plan/treatment goals reviewed;risks/benefits reviewed;current/potential barriers reviewed;patient/other agree to care plan  -MF    Care Plan Review, Other Participant (PT Clinical Impression) daughter  -SR   --    Row Name 04/01/18 0920          Vital Signs    Pre Systolic BP Rehab 179  -SR     Pre Treatment Diastolic BP 79  -SR     Post Systolic BP Rehab 199   RN aware  -SR     Post Treatment Diastolic BP 91  -SR     Row Name 04/01/18 0920 03/31/18 1000       Physical Therapy Goals    Bed Mobility Goal Selection (PT) bed mobility, PT goal 1  -SR  --    Transfer Goal Selection (PT) transfer, PT goal 1  -SR  --    Balance Goal Selection (PT) balance, PT goal 1  -SR  --    Wound Care Goal Selection (PT)  -- wound care, PT goal 1  -    Additional Documentation Balance Goal Selection (PT) (Row)  -SR Wound Care Goal Selection (PT) (Row)  -    Row Name 04/01/18 0920          Bed Mobility Goal 1 (PT)    Activity/Assistive Device (Bed Mobility Goal 1, PT) sit to supine/supine to sit  -SR     [place] Level/Cues Needed (Bed Mobility Goal 1, PT) moderate assist (50-74% patient effort)  -SR     Time Frame (Bed Mobility Goal 1, PT) long term goal (LTG);by discharge  -SR     Progress/Outcomes (Bed Mobility Goal 1, PT) goal ongoing  -SR     Row Name 04/01/18 0920          Transfer Goal 1 (PT)    Activity/Assistive Device (Transfer Goal 1, PT) sit-to-stand/stand-to-sit;bed-to-chair/chair-to-bed  -SR     [place] Level/Cues Needed (Transfer Goal 1, PT) moderate assist (50-74% patient effort)  -SR     Time Frame (Transfer Goal 1, PT) long term goal (LTG);by discharge  -SR     Progress/Outcome (Transfer Goal 1, PT) goal ongoing  -SR     Row Name 04/01/18 0920          Balance Goal 1 (PT)    Activity/Assistive Device (Balance Goal 1, PT) sitting, static  -SR     [place] Level/Cues Needed (Balance Goal 1, PT) minimum assist (75% or more patient effort)  -SR     Time Frame (Balance Goal 1, PT) short term goal (STG);5 days  -SR     Progress/Outcomes (Balance Goal 1, PT) goal ongoing  -SR     Row Name 03/31/18 1000          Wound Care Goal 1 (PT)    Wound Care Goal 1 (PT) Decrease size of wound by 50% as evidence of wound  healing / improved skin integrity.   -     Row Name 04/01/18 0920 03/31/18 1000       Positioning and Restraints    Pre-Treatment Position in bed  -SR in bed  -    Post Treatment Position bed  -SR bed  -MF    In Bed notified nsg;side lying left;call light within reach;encouraged to call for assist;with family/caregiver;SCD pump applied;heels elevated  -SR supine;call light within reach;with family/caregiver  -      User Key  (r) = Recorded By, (t) = Taken By, (c) = Cosigned By    Initials Name Provider Type    MF Rolf Mclean, PT Physical Therapist    SR Vanessa Barraza, PT Physical Therapist     Lucita Orozco, RN Registered Nurse    AS Tanner Buckley RN Registered Nurse          Physical Therapy Education     Title: PT OT SLP Therapies (Active)     Topic: Physical Therapy (Active)     Point: Mobility training (Active)    Learning Progress Summary     Learner Status Readiness Method Response Comment Documented by    Patient Active Acceptance E NR PT encourages pt to cont with BLE ther ex to improve strength and prevent DVT. SR 04/01/18 1209    Family Active Acceptance E NR PT encourages pt to cont with BLE ther ex to improve strength and prevent DVT.  04/01/18 1209          Point: Home exercise program (Active)    Learning Progress Summary     Learner Status Readiness Method Response Comment Documented by    Patient Active Acceptance E NR PT encourages pt to cont with BLE ther ex to improve strength and prevent DVT. SR 04/01/18 1209    Family Active Acceptance E NR PT encourages pt to cont with BLE ther ex to improve strength and prevent DVT.  04/01/18 1209          Point: Body mechanics (Active)    Learning Progress Summary     Learner Status Readiness Method Response Comment Documented by    Patient Active Acceptance E NR PT encourages pt to cont with BLE ther ex to improve strength and prevent DVT. SR 04/01/18 1209    Family Active Acceptance E NR PT encourages pt to cont with BLE ther ex to improve  strength and prevent DVT.  04/01/18 1209          Point: Precautions (Active)    Learning Progress Summary     Learner Status Readiness Method Response Comment Documented by    Patient Active Acceptance E NR PT encourages pt to cont with BLE ther ex to improve strength and prevent DVT.  04/01/18 1209    Family Active Acceptance E NR PT encourages pt to cont with BLE ther ex to improve strength and prevent DVT.  04/01/18 1209                      User Key     Initials Effective Dates Name Provider Type Discipline     06/19/15 -  Vanessa Barraza, PT Physical Therapist PT                PT Recommendation and Plan  Anticipated Discharge Disposition (PT): skilled nursing facility (SNF)  Planned Therapy Interventions (PT Eval): balance training, bed mobility training, home exercise program, transfer training, strengthening, postural re-education  Therapy Frequency (PT Clinical Impression): daily  Outcome Summary/Treatment Plan (PT)  Anticipated Discharge Disposition (PT): skilled nursing facility (SNF)  Plan of Care Reviewed With: patient, daughter  Progress: no change  Outcome Summary: PT initial eval completed. Pt is somewhat hypertensive pre and post PT eval, RN aware. PT requested pt be transfered to lift room d/t pt's level of weakness and difficult transfers from air mattress. Pt requires max A and dependent A with all bed mobility and static sitting balance. She is unable to attempt standing d/t weakness. Cont with IPPT to improve strength and independence with transfers, consider dc to SNF.          Outcome Measures     Row Name 04/01/18 0949             How much help from another person do you currently need...    Turning from your back to your side while in flat bed without using bedrails? 2  -SR      Moving from lying on back to sitting on the side of a flat bed without bedrails? 1  -SR      Moving to and from a bed to a chair (including a wheelchair)? 1  -SR      Standing up from a chair using your arms  (e.g., wheelchair, bedside chair)? 1  -SR      Climbing 3-5 steps with a railing? 1  -SR      To walk in hospital room? 1  -SR      AM-PAC 6 Clicks Score 7  -SR         Functional Assessment    Outcome Measure Options AM-PAC 6 Clicks Basic Mobility (PT)  -SR        User Key  (r) = Recorded By, (t) = Taken By, (c) = Cosigned By    Initials Name Provider Type    SR Vanessa Barraza PT Physical Therapist           Time Calculation:         PT Charges     Row Name 04/01/18 1213             Time Calculation    Start Time 0920  -SR      PT Received On 04/01/18  -SR      PT Goal Re-Cert Due Date 04/11/18  -SR         Time Calculation- PT    Total Timed Code Minutes- PT 10 minute(s)  -SR        User Key  (r) = Recorded By, (t) = Taken By, (c) = Cosigned By    Initials Name Provider Type    SR Vanessa Barraza PT Physical Therapist          Therapy Charges for Today     Code Description Service Date Service Provider Modifiers Qty    33993365476 HC PT EVAL HIGH COMPLEXITY 3 4/1/2018 Vanessa Barraza PT GP 1    95093992003 HC PT THER PROC EA 15 MIN 4/1/2018 Vanesas Barraza PT GP 1    12192477918 HC PT THER SUPP EA 15 MIN 4/1/2018 Vanessa Barraza, PT GP 4          PT G-Codes  Outcome Measure Options: AM-PAC 6 Clicks Basic Mobility (PT)      Vanessa Barraza PT  4/1/2018         Electronically signed by Vanessa Barraza PT at 4/1/2018 12:15 PM     Rolf Mclean, PT at 4/1/2018  4:04 PM  Version 1 of 1         Problem: Patient Care Overview  Goal: Plan of Care Review  Outcome: Ongoing (interventions implemented as appropriate)   04/01/18 1330   Coping/Psychosocial   Plan of Care Reviewed With patient   OTHER   Outcome Summary DTPI appears lighter today with no purple discoloration noted. PT will cont with mist therapy daily for 2-3 more days then decrease freq of wound cont to progress well.            Electronically signed by Rolf Mclean PT at 4/1/2018  4:04 PM     Rolf Mclean, PT at 4/1/2018  4:10 PM  Version 1 of 1          Acute Care - Wound/Debridement Treatment Note  [facility]     Patient Name: Poonam Mata  : 1931  MRN: [id number]  Today's Date: 2018  Onset of Illness/Injury or Date of Surgery: 18   Date of Referral to PT: 18   Referring Physician: UMAIR Briscoe       Admit Date: 3/29/2018    Visit Dx:    ICD-10-CM ICD-9-CM   1. Pneumonia of left lower lobe due to infectious organism J18.1 486   2. Acute respiratory failure with hypoxia J96.01 518.81   3. Hypokalemia E87.6 276.8   4. Impaired functional mobility, balance, gait, and endurance Z74.09 V49.89       Patient Active Problem List   Diagnosis   • Pneumonia of left lower lobe due to infectious organism   • Hypokalemia   • Dementia   • Hypertension   • GERD (gastroesophageal reflux disease)   • Normocytic anemia               WOUND DEBRIDEMENT               Therapy Treatment    Therapy Treatment / Health Promotion    Treatment Time/Intention  Discipline: physical therapist (18 1330 : Rolf Mclean PT)  Document Type: therapy note (daily note), wound care (18 1330 : Rolf Mclean PT)  Subjective Information: complains of, pain (180 : Rolf Mclean PT)  Mode of Treatment: physical therapy (18 1330 : Rolf Mclean PT)  Patient/Family Observations: daughter present (18 : Vanessa Barraza PT)  Therapy Frequency (PT Clinical Impression): daily (18 : Vanessa Barraza PT)  Patient Effort: good (18 : Vanessa Barraza PT)  Existing Precautions/Restrictions: fall (18 : Vanessa Barraza PT)  Coping  Observed Emotional State: calm, cooperative (18 : Vanessa Barraza PT)  Verbalized Emotional State: acceptance (18 : Vanessa Barraza PT)  Plan of Care Review  Plan of Care Reviewed With: patient (180 : Rolf Mclean PT)    Vitals/Pain/Safety  Vital Signs  Pre Systolic BP Rehab: 179 (18 : Vanessa Barraza, PT)  Pre  "Treatment Diastolic BP: 79 (04/01/18 0920 : Vanessa Barraza PT)  Post Systolic BP Rehab: 199 (RN aware) (04/01/18 0920 : Vanessa Barraza PT)  Post Treatment Diastolic BP: 91 (04/01/18 0920 : Vanessa Barraza PT)  Pain Scale: Word Pre/Post-Treatment  Pain: Word Scale, Pretreatment: 2 - mild pain (generalized pain \"from arthritis\") (04/01/18 1330 : Rolf Mclean PT)  Pain: Word Scale, Post-Treatment: 2 - mild pain (04/01/18 1330 : Rolf Mclean PT)  Safety Issues, Functional Mobility  Impairments Affecting Function (Mobility): balance, strength, endurance/activity tolerance (04/01/18 0920 : Vanessa Barraza PT)  Positioning and Restraints  Pre-Treatment Position: in bed (04/01/18 1330 : Rolf Mclean PT)  Post Treatment Position: bed (04/01/18 1330 : Rolf Mclean PT)  In Bed: supine, call light within reach, with family/caregiver (04/01/18 1330 : Rolf Mclean PT)    Mobility,ADL,Motor, Modality  Bed Mobility Assessment/Treatment  Bed Mobility Assessment/Treatment: rolling right, supine-sit, sit-supine, scooting/bridging (04/01/18 0920 : Vanessa Barraza PT)  Rolling Right [place] (Bed Mobility): maximum assist (25% patient effort), verbal cues (04/01/18 0920 : Vanessa Barraza PT)  Scooting/Bridging [place] (Bed Mobility): dependent (less than 25% patient effort), 2 person assist (04/01/18 0920 : Vanessa Barraza PT)  Supine-Sit [place] (Bed Mobility): maximum assist (25% patient effort), 2 person assist (04/01/18 0920 : Vanessa Barraza PT)  Sit-Supine [place] (Bed Mobility): maximum assist (25% patient effort), 2 person assist (04/01/18 0920 : Vanessa Barraza PT)  Bed Mobility, Safety Issues: decreased use of arms for pushing/pulling, decreased use of legs for bridging/pushing, impaired trunk control for bed mobility (04/01/18 0920 : Vanessa Barraza, PT)  Assistive Device (Bed Mobility): bed rails, draw sheet, head of bed elevated (04/01/18 0920 : Vanessa Barraza, PT)  Comment " (Bed Mobility): cues for sequencing (04/01/18 0920 : Vanessa Barraza PT)  Transfer Assessment/Treatment  Comment (Transfers): not tested (04/01/18 0920 : Vanessa Barraza PT)  Gait/Stairs Assessment/Training  [place] Level (Gait): not tested (04/01/18 0920 : Vanessa Barraza PT)        Balance  Balance: static sitting balance (04/01/18 0920 : Vanessa Barraza PT)  Static Sitting Balance  Level of [place] (Unsupported Sitting, Static Balance): moderate assist, 50 to 74% patient effort (04/01/18 0920 : Vanessa Barraza PT)  Sitting Position (Unsupported Sitting, Static Balance): sitting on edge of bed (04/01/18 0920 : Vanessa Barraza PT)  Time Able to Maintain Position (Unsupported Sitting, Static Balance): 4 to 5 minutes (04/01/18 0920 : Vanessa Barraza PT)  Comment (Unsupported Sitting, Static Balance): challenging surface d/t air mattress with bolsters on edges, PT requested that pt be transfered to lift room (04/01/18 0920 : Vanessa Barraza PT)        ROM/MMT  General ROM  GENERAL ROM COMMENTS: BLE AROM limited 10% knee extension (04/01/18 0920 : Vanessa Barraza PT)  General Assessment (Manual Muscle Testing)  General Manual Muscle Testing (MMT) Assessment: lower extremity strength deficits identified (04/01/18 0920 : Vanessa Barraza PT)  Comment, General Manual Muscle Testing (MMT) Assessment: BLE generally 3-/5 (04/01/18 0920 : Vanessa Barraza PT)       Sensory, Edema, Orthotics  Sensory Assessment/Intervention  Sensory General Assessment: no sensation deficits identified (04/01/18 0920 : Vanessa Barraza PT)       Cognition, Communication, Swallow  Cognitive Assessment/Intervention- PT/OT  Orientation Status (Cognition): oriented x 3 (04/01/18 0920 : Vanessa Barraza PT)  Follows Commands (Cognition): WFL, repetition of directions required (04/01/18 0920 : Vanessa Barraza PT)  Safety Deficit (Cognitive): insight into deficits/self awareness, awareness of need for assistance (04/01/18 0920 : Vanessa Barraza,  PT)    Outcome Summary  Outcome Summary/Treatment Plan (PT)  Anticipated Discharge Disposition (PT): skilled nursing facility (SNF) (04/01/18 0920 : Vanessa Barraza, PT)          PT Rehab Goals     Row Name 04/01/18 0920             Bed Mobility Goal 1 (PT)    Activity/Assistive Device (Bed Mobility Goal 1, PT) sit to supine/supine to sit  -SR      [place] Level/Cues Needed (Bed Mobility Goal 1, PT) moderate assist (50-74% patient effort)  -SR      Time Frame (Bed Mobility Goal 1, PT) long term goal (LTG);by discharge  -SR      Progress/Outcomes (Bed Mobility Goal 1, PT) goal ongoing  -SR         Transfer Goal 1 (PT)    Activity/Assistive Device (Transfer Goal 1, PT) sit-to-stand/stand-to-sit;bed-to-chair/chair-to-bed  -SR      [place] Level/Cues Needed (Transfer Goal 1, PT) moderate assist (50-74% patient effort)  -SR      Time Frame (Transfer Goal 1, PT) long term goal (LTG);by discharge  -SR      Progress/Outcome (Transfer Goal 1, PT) goal ongoing  -SR        User Key  (r) = Recorded By, (t) = Taken By, (c) = Cosigned By    Initials Name Provider Type    SR Vanessa Barraza, PT Physical Therapist          Physical Therapy Education     Title: PT OT SLP Therapies (Active)     Topic: Physical Therapy (Active)     Point: Mobility training (Active)    Learning Progress Summary     Learner Status Readiness Method Response Comment Documented by    Patient Active Acceptance E NR PT encourages pt to cont with BLE ther ex to improve strength and prevent DVT. SR 04/01/18 1209    Family Active Acceptance E NR PT encourages pt to cont with BLE ther ex to improve strength and prevent DVT. SR 04/01/18 1209          Point: Home exercise program (Active)    Learning Progress Summary     Learner Status Readiness Method Response Comment Documented by    Patient Active Acceptance E NR PT encourages pt to cont with BLE ther ex to improve strength and prevent DVT. SR 04/01/18 1209    Family Active Acceptance E NR PT encourages  pt to cont with BLE ther ex to improve strength and prevent DVT. SR 04/01/18 1209          Point: Body mechanics (Active)    Learning Progress Summary     Learner Status Readiness Method Response Comment Documented by    Patient Active Acceptance E NR PT encourages pt to cont with BLE ther ex to improve strength and prevent DVT. SR 04/01/18 1209    Family Active Acceptance E NR PT encourages pt to cont with BLE ther ex to improve strength and prevent DVT. SR 04/01/18 1209          Point: Precautions (Active)    Learning Progress Summary     Learner Status Readiness Method Response Comment Documented by    Patient Active Acceptance E NR PT encourages pt to cont with BLE ther ex to improve strength and prevent DVT. SR 04/01/18 1209    Family Active Acceptance E NR PT encourages pt to cont with BLE ther ex to improve strength and prevent DVT. SR 04/01/18 1209                      User Key     Initials Effective Dates Name Provider Type Discipline     06/19/15 -  Vanessa Barraza, PT Physical Therapist PT                   PT ASSESSMENT (last 72 hours)      Physical Therapy Evaluation     Row Name 04/01/18 0920 03/31/18 1000       PT Evaluation Time/Intention    Subjective Information complains of;weakness;fatigue  -SR complains of;weakness;fatigue  -    Document Type evaluation  -SR evaluation;therapy note (daily note);wound care  -MF    Mode of Treatment physical therapy  -SR physical therapy  -MF    Patient Effort good  -SR  --    Symptoms Noted During/After Treatment fatigue;significant change in vital signs  -SR  --    Row Name 04/01/18 0920 03/31/18 1000       General Information    Patient Profile Reviewed? yes  -SR yes  -MF    Onset of Illness/Injury or Date of Surgery 03/29/18  -SR 03/29/18  -    Referring Physician UMAIR Briscoe  -SR Dr Askew  -    Patient Observations cooperative;agree to therapy  -SR  --    Patient/Family Observations daughter present  -SR  --    General Observations of Patient Pt  is supine in bed, [place] cath, tele, IV.  -SR  --    Prior Level of Function dependent:;transfer;bed mobility;w/c or scooter   daughter states that pt was able to transfer herself to   -SR  --    Equipment Currently Used at Home wheelchair   two months ago, but is now dependent  -SR  --    Pertinent History of Current Functional Problem Pt admitted with cough, lethary, fever past 3-4 days.   -SR Pt presents with weakness and limited mobility, now with POA DTPI  -    Existing Precautions/Restrictions fall  -SR  --    Risks Reviewed patient and family:;LOB;increased discomfort;change in vital signs;lines disloged  -SR patient:;increased discomfort  -MF    Benefits Reviewed patient:;improve function;increase independence;increase strength;increase balance;increase knowledge;decrease risk of DVT  -SR patient:;improve skin integrity  -    Barriers to Rehab medically complex;previous functional deficit   pt recently lost  (last weekend)  -SR medically complex;previous functional deficit;physical barrier  -    Row Name 04/01/18 0920          Relationship/Environment    Lives With facility resident  -SR     Family Caregiver if Needed child(maría), adult  -SR     Row Name 04/01/18 0920          Resource/Environmental Concerns    Current Living Arrangements residential facility  -SR     Row Name 04/01/18 0920          Cognitive Assessment/Intervention- PT/OT    Orientation Status (Cognition) oriented x 3  -SR     Follows Commands (Cognition) WFL;repetition of directions required  -SR     Safety Deficit (Cognitive) insight into deficits/self awareness;awareness of need for assistance  -SR     Row Name 04/01/18 0920          Safety Issues, Functional Mobility    Impairments Affecting Function (Mobility) balance;strength;endurance/activity tolerance  -SR     Row Name 04/01/18 0920          Bed Mobility Assessment/Treatment    Bed Mobility Assessment/Treatment rolling right;supine-sit;sit-supine;scooting/bridging   -SR     Rolling Right [place] (Bed Mobility) maximum assist (25% patient effort);verbal cues  -SR     Scooting/Bridging [place] (Bed Mobility) dependent (less than 25% patient effort);2 person assist  -SR     Supine-Sit [place] (Bed Mobility) maximum assist (25% patient effort);2 person assist  -SR     Sit-Supine [place] (Bed Mobility) maximum assist (25% patient effort);2 person assist  -SR     Bed Mobility, Safety Issues decreased use of arms for pushing/pulling;decreased use of legs for bridging/pushing;impaired trunk control for bed mobility  -SR     Assistive Device (Bed Mobility) bed rails;draw sheet;head of bed elevated  -SR     Comment (Bed Mobility) cues for sequencing  -SR     Row Name 04/01/18 0920          Transfer Assessment/Treatment    Comment (Transfers) not tested  -SR     Row Name 04/01/18 0920          Gait/Stairs Assessment/Training    [place] Level (Gait) not tested  -SR     Row Name 04/01/18 0920          General ROM    GENERAL ROM COMMENTS BLE AROM limited 10% knee extension  -SR     Row Name 04/01/18 0920          General Assessment (Manual Muscle Testing)    General Manual Muscle Testing (MMT) Assessment lower extremity strength deficits identified  -SR     Comment, General Manual Muscle Testing (MMT) Assessment BLE generally 3-/5  -SR     Row Name 04/01/18 0920          Motor Assessment/Intervention    Additional Documentation Balance (Group)  -SR     Row Name 04/01/18 0920          Balance    Balance static sitting balance  -SR     Row Name 04/01/18 0920          Static Sitting Balance    Level of [place] (Unsupported Sitting, Static Balance) moderate assist, 50 to 74% patient effort  -SR     Sitting Position (Unsupported Sitting, Static Balance) sitting on edge of bed  -SR     Time Able to Maintain Position (Unsupported Sitting, Static Balance) 4 to 5 minutes  -SR     Comment (Unsupported Sitting, Static Balance) challenging surface d/t air mattress with  bolsters on edges, PT requested that pt be transfered to lift room  -SR     Row Name 04/01/18 0920          Sensory Assessment/Intervention    Sensory General Assessment no sensation deficits identified  -SR     Row Name 04/01/18 0920 03/31/18 1000       Pain Assessment    Additional Documentation Pain Scale: Word Pre/Post-Treatment (Group)  -SR Pain Scale: FACES Pre/Post-Treatment (Group)  -MF    Row Name 04/01/18 0920          Pain Scale: Word Pre/Post-Treatment    Pain: Word Scale, Pretreatment 0 - no pain  -SR     Pain: Word Scale, Post-Treatment 0 - no pain  -SR     Row Name 03/31/18 1000          Pain Scale: FACES Pre/Post-Treatment    Pain: FACES Scale, Pretreatment 0-->no hurt  -MF     Pain: FACES Scale, Post-Treatment 0-->no hurt  -MF     Row Name             [REMOVED] Wound 03/29/18 2200 medial    Wound - Properties Group Date first assessed: 03/29/18  -AH Time first assessed: 2200  -AH Present On Admission : yes  -AH Orientation: medial  -AH Stage, Pressure Injury: Stage 1  -AH Resolution Date: 03/30/18  -AS Resolution Time: 1045  -AS    Row Name             Wound 03/29/18 2200 Right distal other (see notes) other (see comments)    Wound - Properties Group Date first assessed: 03/29/18  -AH Time first assessed: 2200  -AH Side: Right  -AH Orientation: distal  -AH Location: other (see notes)  -AH, 2nd toe  Type: other (see comments)  -AH, callous     Row Name 03/31/18 1000          Wound 03/30/18 1045 Left upper;posterior;medial thigh pressure injury    Wound - Properties Group Date first assessed: 03/30/18  -AS Time first assessed: 1045  -AS Present On Admission : yes;picture taken  -AS Side: Left  -AS Orientation: upper;posterior;medial  -AS Location: thigh  -AS Type: pressure injury  -AS Stage, Pressure Injury: deep tissue injury  -AS    Dressing Appearance open to air  -     Base maroon/purple  -     Periwound intact;dry;redness;blanchable  -MF     Wound length (cm) 0.9 cm  -MF     Wound width (cm)  9.5 cm  -MF     Wound depth (cm) 0 cm  -MF     Drainage Amount none  -MF     Care, Wound ultrasound therapy, non contact low frequency   6 min  -MF     Row Name 04/01/18 0920          Coping    Observed Emotional State calm;cooperative  -SR     Verbalized Emotional State acceptance  -SR     Row Name 04/01/18 0920 03/31/18 1000       Plan of Care Review    Plan of Care Reviewed With patient;daughter  -SR patient  -MF    Row Name 04/01/18 0920 03/31/18 1000       Physical Therapy Clinical Impression    Date of Referral to PT 03/31/18  -SR 03/30/18  -MF    PT Diagnosis (PT Clinical Impression) impaired functional mobility  -SR DTPI to L gluteal cleft  -MF    Patient/Family Goals Statement (PT Clinical Impression) improve independence with tranfers  -SR wound healing  -MF    Criteria for Skilled Interventions Met (PT Clinical Impression) yes;treatment indicated  -SR yes;treatment indicated  -MF    Rehab Potential (PT Clinical Summary) good, to achieve stated therapy goals  -SR  --    Care Plan Review (PT) evaluation/treatment results reviewed;care plan/treatment goals reviewed;risks/benefits reviewed;current/potential barriers reviewed;patient/other agree to care plan  -SR evaluation/treatment results reviewed;care plan/treatment goals reviewed;risks/benefits reviewed;current/potential barriers reviewed;patient/other agree to care plan  -MF    Care Plan Review, Other Participant (PT Clinical Impression) daughter  -SR  --    Row Name 04/01/18 0920          Vital Signs    Pre Systolic BP Rehab 179  -SR     Pre Treatment Diastolic BP 79  -SR     Post Systolic BP Rehab 199   RN aware  -SR     Post Treatment Diastolic BP 91  -SR     Row Name 04/01/18 0920 03/31/18 1000       Physical Therapy Goals    Bed Mobility Goal Selection (PT) bed mobility, PT goal 1  -SR  --    Transfer Goal Selection (PT) transfer, PT goal 1  -SR  --    Balance Goal Selection (PT) balance, PT goal 1  -SR  --    Wound Care Goal Selection (PT)  --  wound care, PT goal 1  -MF    Additional Documentation Balance Goal Selection (PT) (Row)  -SR Wound Care Goal Selection (PT) (Row)  -    Row Name 04/01/18 0920          Bed Mobility Goal 1 (PT)    Activity/Assistive Device (Bed Mobility Goal 1, PT) sit to supine/supine to sit  -SR     [place] Level/Cues Needed (Bed Mobility Goal 1, PT) moderate assist (50-74% patient effort)  -SR     Time Frame (Bed Mobility Goal 1, PT) long term goal (LTG);by discharge  -SR     Progress/Outcomes (Bed Mobility Goal 1, PT) goal ongoing  -SR     Row Name 04/01/18 0920          Transfer Goal 1 (PT)    Activity/Assistive Device (Transfer Goal 1, PT) sit-to-stand/stand-to-sit;bed-to-chair/chair-to-bed  -SR     [place] Level/Cues Needed (Transfer Goal 1, PT) moderate assist (50-74% patient effort)  -SR     Time Frame (Transfer Goal 1, PT) long term goal (LTG);by discharge  -SR     Progress/Outcome (Transfer Goal 1, PT) goal ongoing  -SR     Row Name 04/01/18 0920          Balance Goal 1 (PT)    Activity/Assistive Device (Balance Goal 1, PT) sitting, static  -SR     [place] Level/Cues Needed (Balance Goal 1, PT) minimum assist (75% or more patient effort)  -SR     Time Frame (Balance Goal 1, PT) short term goal (STG);5 days  -SR     Progress/Outcomes (Balance Goal 1, PT) goal ongoing  -SR     Row Name 03/31/18 1000          Wound Care Goal 1 (PT)    Wound Care Goal 1 (PT) Decrease size of wound by 50% as evidence of wound healing / improved skin integrity.   -     Row Name 04/01/18 0920 03/31/18 1000       Positioning and Restraints    Pre-Treatment Position in bed  -SR in bed  -MF    Post Treatment Position bed  -SR bed  -MF    In Bed notified nsg;side lying left;call light within reach;encouraged to call for assist;with family/caregiver;SCD pump applied;heels elevated  -SR supine;call light within reach;with family/caregiver  -      User Key  (r) = Recorded By, (t) = Taken By, (c) = Cosigned By    Initials Name  Provider Type     Rolf Mclean, PT Physical Therapist    SR Vanessa Barraza, PT Physical Therapist     Lucita Orozco, RN Registered Nurse    AS Tanner Buckley RN Registered Nurse            PT Recommendation and Plan  Anticipated Discharge Disposition (PT): skilled nursing facility (SNF)  Planned Therapy Interventions (PT Eval): balance training, bed mobility training, home exercise program, transfer training, strengthening, postural re-education  Therapy Frequency (PT Clinical Impression): daily               Outcome Summary: DTPI appears lighter today with no purple discoloration noted. PT will cont with mist therapy daily for 2-3 more days then decrease freq of wound cont to progress well.   Plan of Care Reviewed With: patient          Outcome Measures     Row Name 04/01/18 0920             How much help from another person do you currently need...    Turning from your back to your side while in flat bed without using bedrails? 2  -SR      Moving from lying on back to sitting on the side of a flat bed without bedrails? 1  -SR      Moving to and from a bed to a chair (including a wheelchair)? 1  -SR      Standing up from a chair using your arms (e.g., wheelchair, bedside chair)? 1  -SR      Climbing 3-5 steps with a railing? 1  -SR      To walk in hospital room? 1  -SR      AM-PAC 6 Clicks Score 7  -SR         Functional Assessment    Outcome Measure Options AM-PAC 6 Clicks Basic Mobility (PT)  -SR        User Key  (r) = Recorded By, (t) = Taken By, (c) = Cosigned By    Initials Name Provider Type     Vanessa Barraza, PT Physical Therapist              Time Calculation        PT Charges     Row Name 04/01/18 1330 04/01/18 1213          Time Calculation    Start Time 1330  - 0920  -SR     PT Received On  -- 04/01/18  -SR     PT Goal Re-Cert Due Date 04/11/18  -MF 04/11/18  -SR        Time Calculation- PT    Total Timed Code Minutes- PT 25 minute(s)  -MF 10 minute(s)  -SR       User Key  (r) = Recorded By,  (t) = Taken By, (c) = Cosigned By    Initials Name Provider Type     Rolf Mclean, PT Physical Therapist    SR Vanessa Barraza, PT Physical Therapist             Therapy Charges for Today     Code Description Service Date Service Provider Modifiers Qty    62857472753 HC PT EVAL MOD COMPLEXITY 4 3/31/2018 Rolf Mclean, PT GP 1    36969653635 HC PT NLFU MIST 3/31/2018 Rolf Mclean, PT GP 1    60371338767 HC PT NLFU MIST 4/1/2018 Rolf Mclean, PT GP 1            PT G-Codes  Outcome Measure Options: AM-PAC 6 Clicks Basic Mobility (PT)        Rolf Mclean, PT  4/1/2018          Electronically signed by Rolf Mclean PT at 4/1/2018  4:10 PM       Occupational Therapy Notes (last 24 hours) (Notes from 4/1/2018 11:08 AM through 4/2/2018 11:08 AM)     No notes of this type exist for this encounter.

## 2018-04-02 NOTE — THERAPY TREATMENT NOTE
Acute Care - Physical Therapy Treatment Note  [facility]     Patient Name: Poonam Mata  : 1931  MRN: [id number]  Today's Date: 2018  Onset of Illness/Injury or Date of Surgery: 18  Date of Referral to PT: 18  Referring Physician: UMAIR Briscoe    Admit Date: 3/29/2018    Visit Dx:    ICD-10-CM ICD-9-CM   1. Pneumonia of left lower lobe due to infectious organism J18.1 486   2. Acute respiratory failure with hypoxia J96.01 518.81   3. Hypokalemia E87.6 276.8   4. Impaired functional mobility, balance, gait, and endurance Z74.09 V49.89     Patient Active Problem List   Diagnosis   • Pneumonia of left lower lobe due to infectious organism   • Hypokalemia   • Dementia   • Hypertension   • GERD (gastroesophageal reflux disease)   • Normocytic anemia       Therapy Treatment    Therapy Treatment / Health Promotion    Treatment Time/Intention  Discipline: physical therapy assistant (18 1040 : Lachelle Tristan PTA)  Document Type: therapy note (daily note) (18 1040 : Lachelle Tristan PTA)  Subjective Information: complains of, weakness, fatigue, pain (18 1040 : Lachelle Tristan PTA)  Mode of Treatment: physical therapy (18 1040 : Lachelle Tristan PTA)  Patient Effort: good (18 1040 : Lachelle Tristan PTA)  Plan of Care Review  Plan of Care Reviewed With: patient (18 1113 : Lachelle Tristan PTA)    Vitals/Pain/Safety  Pain Scale: FACES Pre/Post-Treatment  Pain: FACES Scale, Pretreatment: 4-->hurts little more (18 1040 : Lachelle Tristan PTA)  Pain: FACES Scale, Post-Treatment: 2-->hurts little bit (left knee pain greater than right) (18 1040 : Lachelle Tristan PTA)  Positioning and Restraints  Pre-Treatment Position: in bed (18 1040 : Lachelle Tristan PTA)  Post Treatment Position: bed (18 1040 : Lachelle Tristan PTA)  In Bed: supine, call light within reach, encouraged to call for assist, exit alarm on,  with family/caregiver, with other staff, waffle boots/both (04/02/18 1040 : Lachelle Tristan PTA)    Mobility,ADL,Motor, Modality  Bed Mobility Assessment/Treatment  Supine-Sit [place] (Bed Mobility): verbal cues, maximum assist (25% patient effort), 2 person assist (04/02/18 1040 : Lachelle Tristan PTA)  Sit-Supine [place] (Bed Mobility): verbal cues, maximum assist (25% patient effort), 2 person assist (04/02/18 1040 : Lachelle Tristan PTA)  Bed Mobility, Safety Issues: decreased use of arms for pushing/pulling, decreased use of legs for bridging/pushing (04/02/18 1040 : Lachelle Tristan PTA)  Assistive Device (Bed Mobility): draw sheet, head of bed elevated (04/02/18 1040 : Lachelle Tristan PTA)  Comment (Bed Mobility): verbal cues for sequencing, patient reports pain in left knee with limited ROM (04/02/18 1040 : Lachelle Tristan PTA)  Gait/Stairs Assessment/Training  Gait/Stairs Assessment/Training: other (see comments) (patient is non-ambulatory at baseline) (04/02/18 1040 : Lachelle Tristan PTA)        Static Sitting Balance  Level of [place] (Unsupported Sitting, Static Balance): minimal assist, 75% patient effort (progressed to CGA) (04/02/18 1040 : Lachelle Tristan PTA)  Sitting Position (Unsupported Sitting, Static Balance): sitting on edge of bed (04/02/18 1040 : Lachelle Tristan PTA)  Time Able to Maintain Position (Unsupported Sitting, Static Balance): 4 to 5 minutes (04/02/18 1040 : Lachelle Tristan PTA)  Comment (Unsupported Sitting, Static Balance): patient progressed to CGA at EOB depsite air mattress increasing difficulty. PT requested lift room for transfer to recliner (04/02/18 1040 : Lachelle Tristan PTA)        ROM/MMT             Sensory, Edema, Orthotics          Cognition, Communication, Swallow  Cognitive Assessment/Intervention- PT/OT  Affect/Mental Status (Cognitive): WNL (04/02/18 1040 : Lachelle Tristan PTA)  Orientation Status  (Cognition): oriented to, person, place (04/02/18 1040 : Lachelle Tristan PTA)  Safety Deficit (Cognitive): mild deficit (04/02/18 1040 : Lachelle Tristan PTA)  Personal Safety Interventions: fall prevention program maintained, gait belt, nonskid shoes/slippers when out of bed, other (see comments) (exit alarm) (04/02/18 1040 : Lachelle Tristan PTA)    Outcome Summary               PT Rehab Goals     Row Name 04/01/18 0920             Bed Mobility Goal 1 (PT)    Activity/Assistive Device (Bed Mobility Goal 1, PT) sit to supine/supine to sit  -SR      [place] Level/Cues Needed (Bed Mobility Goal 1, PT) moderate assist (50-74% patient effort)  -SR      Time Frame (Bed Mobility Goal 1, PT) long term goal (LTG);by discharge  -SR      Progress/Outcomes (Bed Mobility Goal 1, PT) goal ongoing  -SR         Transfer Goal 1 (PT)    Activity/Assistive Device (Transfer Goal 1, PT) sit-to-stand/stand-to-sit;bed-to-chair/chair-to-bed  -SR      [place] Level/Cues Needed (Transfer Goal 1, PT) moderate assist (50-74% patient effort)  -SR      Time Frame (Transfer Goal 1, PT) long term goal (LTG);by discharge  -SR      Progress/Outcome (Transfer Goal 1, PT) goal ongoing  -SR        User Key  (r) = Recorded By, (t) = Taken By, (c) = Cosigned By    Initials Name Provider Type    SR Vanessa Barraza, PT Physical Therapist          Physical Therapy Education     Title: PT OT SLP Therapies (Active)     Topic: Physical Therapy (Active)     Point: Mobility training (Active)    Learning Progress Summary     Learner Status Readiness Method Response Comment Documented by    Patient Active Acceptance E NR  AS 04/02/18 1113     Active Acceptance E NR PT encourages pt to cont with BLE ther ex to improve strength and prevent DVT. SR 04/01/18 1209    Family Active Acceptance E NR PT encourages pt to cont with BLE ther ex to improve strength and prevent DVT. SR 04/01/18 1209          Point: Home exercise program (Active)    Learning  Progress Summary     Learner Status Readiness Method Response Comment Documented by    Patient Active Acceptance E NR  AS 04/02/18 1113     Active Acceptance E NR PT encourages pt to cont with BLE ther ex to improve strength and prevent DVT. SR 04/01/18 1209    Family Active Acceptance E NR PT encourages pt to cont with BLE ther ex to improve strength and prevent DVT. SR 04/01/18 1209          Point: Body mechanics (Active)    Learning Progress Summary     Learner Status Readiness Method Response Comment Documented by    Patient Active Acceptance E NR  AS 04/02/18 1113     Active Acceptance E NR PT encourages pt to cont with BLE ther ex to improve strength and prevent DVT. SR 04/01/18 1209    Family Active Acceptance E NR PT encourages pt to cont with BLE ther ex to improve strength and prevent DVT. SR 04/01/18 1209          Point: Precautions (Active)    Learning Progress Summary     Learner Status Readiness Method Response Comment Documented by    Patient Active Acceptance E NR  AS 04/02/18 1113     Active Acceptance E NR PT encourages pt to cont with BLE ther ex to improve strength and prevent DVT. SR 04/01/18 1209    Family Active Acceptance E NR PT encourages pt to cont with BLE ther ex to improve strength and prevent DVT. SR 04/01/18 1209                      User Key     Initials Effective Dates Name Provider Type Discipline    SR 06/19/15 -  Vanessa Barraza, PT Physical Therapist PT    AS 06/22/15 -  Lachelle Tristan, PTA Physical Therapy Assistant PT                    PT Recommendation and Plan     Plan of Care Reviewed With: patient  Progress: improving  Outcome Summary: sitting balance improved with tactile and verbal cueing at EOB, increased left knee pain with ROM. Patient is non-ambulatory at baseline per son-in law, uses W/C for mobility.          Outcome Measures     Row Name 04/02/18 1040 04/01/18 0920          How much help from another person do you currently need...    Turning from your back to  your side while in flat bed without using bedrails? 2  -AS 2  -SR     Moving from lying on back to sitting on the side of a flat bed without bedrails? 2  -AS 1  -SR     Moving to and from a bed to a chair (including a wheelchair)? 1  -AS 1  -SR     Standing up from a chair using your arms (e.g., wheelchair, bedside chair)? 1  -AS 1  -SR     Climbing 3-5 steps with a railing? 1  -AS 1  -SR     To walk in hospital room? 1  -AS 1  -SR     AM-PAC 6 Clicks Score 8  -AS 7  -SR        Functional Assessment    Outcome Measure Options AM-PAC 6 Clicks Basic Mobility (PT)  -AS AM-PAC 6 Clicks Basic Mobility (PT)  -SR       User Key  (r) = Recorded By, (t) = Taken By, (c) = Cosigned By    Initials Name Provider Type    SR Vanessa Barraza, PT Physical Therapist    AS Lachelle Tristan PTA Physical Therapy Assistant           Time Calculation:         PT Charges     Row Name 04/02/18 1115             Time Calculation    Start Time 1040  -AS      PT Received On 04/02/18  -AS      PT Goal Re-Cert Due Date 04/11/18  -AS         Time Calculation- PT    Total Timed Code Minutes- PT 23 minute(s)  -AS        User Key  (r) = Recorded By, (t) = Taken By, (c) = Cosigned By    Initials Name Provider Type    AS Lachelle Tristan PTA Physical Therapy Assistant          Therapy Charges for Today     Code Description Service Date Service Provider Modifiers Qty    22896185859 HC PT THER PROC EA 15 MIN 4/2/2018 Lachelle Tristan PTA GP 2    03361906744 HC PT THER SUPP EA 15 MIN 4/2/2018 Lachelle Tristan PTA GP 2          PT G-Codes  Outcome Measure Options: AM-PAC 6 Clicks Basic Mobility (PT)    Lachelle Tristan PTA  4/2/2018

## 2018-04-02 NOTE — PROGRESS NOTES
Pharmacy Consult-Vancomycin Dosing    Poonam Mata is a  87 y.o. female w/ PMH of HTN, dementia, GERD who was admitted 3/29/18 with LLL PNA/HCAP (resides in NH). Pharmacy was consulted to dose and monitor vancomycin therapy as a component of her treatment.    Indication: LLL mPNA  Consulting Provider: Dr. Tobias  ID Consult: no    Goal Trough: 15-20 mcg/ml    Current Antimicrobial Therapy  Anti-Infectives       Ordered     Dose/Rate Route Frequency Start Stop    03/31/18 1852  vancomycin (VANCOCIN) in iso-osmotic dextrose IVPB 1 g (premix) 200 mL     Ordering Provider:  Tanner Askew MD    15 mg/kg × 67.1 kg  over 60 Minutes Intravenous Every 18 Hours 04/01/18 1200 04/06/18 1759    03/29/18 2143  Pharmacy to dose vancomycin     Ordering Provider:  Zayra Tobias MD     Does not apply Continuous PRN 03/30/18 0000 04/05/18 2359    03/29/18 2143  piperacillin-tazobactam (ZOSYN) 3.375 g in iso-osmotic dextrose 50 ml (premix)     Ordering Provider:  Zayra Tobias MD    3.375 g  over 4 Hours Intravenous Every 8 Hours 03/30/18 0000 04/05/18 2359    03/29/18 1647  vancomycin 1250 mg/250 mL 0.9% NS IVPB ([facility])     Ordering Provider:  Robbi Valadez MD    20 mg/kg × 67.1 kg Intravenous Once 03/29/18 1800 03/29/18 1930    03/29/18 1647  piperacillin-tazobactam (ZOSYN) 4.5 g in iso-osmotic dextrose 100 mL IVPB (premix)     Ordering Provider:  Robbi Valadez MD    4.5 g Intravenous Once 03/29/18 1800 03/29/18 1756        Allergies  Allergies as of 03/29/2018 - Reviewed 03/29/2018   Allergen Reaction Noted   • Sulfa antibiotics Rash 03/29/2018   • Cortisone Other (See Comments) 03/29/2018   • Ativan [lorazepam] Anxiety 03/29/2018     Labs      Results from last 7 days     Lab Units 04/01/18  0343 03/31/18  0555 03/30/18  0620   BUN mg/dL 9 8* 9   CREATININE mg/dL 0.60 0.60 0.50*       Results from last 7 days     Lab Units 04/01/18  0343 03/31/18  0555 03/30/18  0620   WBC 10*3/mm3 7.54 6.88 6.23  "    Evaluation of Dosing     Ht - 149.9 cm (59\")  Wt - 65.3 kg (143 lb 14.4 oz)    Estimated Creatinine Clearance: 41.8 mL/min (by C-G formula based on SCr of 0.6 mg/dL).    Intake & Output (last 3 days)         03/30 0701 - 03/31 0700 03/31 0701 - 04/01 0700 04/01 0701 - 04/02 0700 04/02 0701 - 04/03 0700    P.O. 120 390      I.V. (mL/kg) 950 (14.5) 600 (9.2) 900 (13.8)     IV Piggyback 300 350      Total Intake(mL/kg) 1370 (21) 1340 (20.5) 900 (13.8)     Urine (mL/kg/hr) 1300 (0.8) 500 (0.3) 2700 (1.7)     Stool 0 (0)  0 (0)     Total Output 1935 851 0606      Net +70 +840 -1800              Unmeasured Urine Occurrence 7 x 3 x 4 x     Unmeasured Stool Occurrence 2 x  1 x           Microbiology and Radiology  Microbiology Results (last 10 days)       Procedure Component Value - Date/Time    Influenza A & B, RT PCR - Swab, Nasopharynx [[id number]]  (Normal) Collected:  03/30/18 0706    Lab Status:  Final result Specimen:  Swab from Nasopharynx Updated:  03/30/18 0803     Influenza A PCR Not Detected     Influenza B PCR Not Detected    Blood Culture - Blood, [[id number]]  (Normal) Collected:  03/29/18 1708    Lab Status:  Preliminary result Specimen:  Blood from Arm, Left Updated:  04/01/18 1746     Blood Culture No growth at 3 days    Blood Culture - Blood, [[id number]]  (Normal) Collected:  03/29/18 1645    Lab Status:  Preliminary result Specimen:  Blood from Arm, Right Updated:  04/01/18 1746     Blood Culture No growth at 3 days          Evaluation of Level    Lab Results   Component Value Date    [facility] 10.90 04/02/2018       Assessment/Plan:     Patient improving.  Per MD note, patient makes poor inspiratory effort but lungs sound clear, no wheezes.  Renal function is stable.  Vancomycin trough below goal, but it is possible that further accumulation will occur with this dosing schedule.  Will continue 1 gm IV q18h and possibly recheck trough level in 72 hours.  Pharmacy Service will continue to follow the " patient's clinical progress and monitor for evidence of vancomycin-induced adverse effects.    Angelia Howard, PharmD, BCPS

## 2018-04-02 NOTE — PLAN OF CARE
Problem: Patient Care Overview  Goal: Individualization and Mutuality  Outcome: Ongoing (interventions implemented as appropriate)    Goal: Discharge Needs Assessment  Outcome: Ongoing (interventions implemented as appropriate)   03/30/18 1338   Discharge Needs Assessment   Readmission Within the Last 30 Days no previous admission in last 30 days   Concerns to be Addressed discharge planning   Patient/Family Anticipates Transition to long term care facility   Patient/Family Anticipated Services at Transition other (see comments)  ([facility])   Transportation Anticipated family or friend will provide;other (see comments)  (Daughter states that depending on how much she improves, either she will take her by car or she may need an ambulance. Dtr works for emergency services in [facility], so she will be able to easily arrange an ambulance if needed.)   Anticipated Changes Related to Illness none   Equipment Needed After Discharge none   Outpatient/Agency/Support Group Needs skilled nursing facility   Current Discharge Risk chronically ill;physical impairment   Disability   Equipment Currently Used at Home wheelchair;hospital bed     Goal: Interprofessional Rounds/Family Conf  Outcome: Ongoing (interventions implemented as appropriate)   04/02/18 0986   Interdisciplinary Rounds/Family Conf   Participants nursing

## 2018-04-02 NOTE — PROGRESS NOTES
Continued Stay Note  [facility]     Patient Name: Poonam Mata  MRN: [id number]  Today's Date: 4/2/2018    Admit Date: 3/29/2018          Discharge Plan     Row Name 04/02/18 1109       Plan    Plan discharge plan    Plan Comments Spoke with Chayo(admission coordinator at [facility]-[phone]) to see pt able to return to [facility] when medically ready for discharge. Updated clinical information faxed to [facility] at [phone] per request.  CM will wait to receive a call back on bed.  Spoke with son in law on nsg unit. and daughter will arrange transportation either with [facility] or private vehicle. CM will cont to follow.              Discharge Codes    No documentation.       Expected Discharge Date and Time     Expected Discharge Date Expected Discharge Time    Apr 3, 2018             Digna Paz RN

## 2018-04-02 NOTE — PROGRESS NOTES
Continued Stay Note  [facility]     Patient Name: Poonam Mata  MRN: [id number]  Today's Date: 4/2/2018    Admit Date: 3/29/2018          Discharge Plan     Row Name 04/02/18 1404       Plan    Plan discharge plan    Patient/Family in Agreement with Plan yes    Plan Comments Plan is to [facility] tomorrow. Spoke with Chayo and confirmed that they can accept pt tomorrow with peripheral IV and IV antibiotics. Confirmed with Chayo that pt can stay in her LT bed and receive PT per patient/daughter request.  Pt daughter Vilma works with [employer] and will arrange transportation. CM will follow up tomorrow.    Row Name 04/02/18 1115       Plan    Final Discharge Disposition Code 04 - [facility] care facility    Row Name 04/02/18 1109       Plan    Plan discharge plan    Plan Comments Spoke with Chayo(admission coordinator at [facility]-[phone]) to see pt able to return to [facility] when medically ready for discharge. Updated clinical information faxed to [facility] at [phone] per request.  CM will wait to receive a call back on bed.  Spoke with son in law on nsg unit. and daughter will arrange transportation either with [facility] or private vehicle. CM will cont to follow.              Discharge Codes    No documentation.       Expected Discharge Date and Time     Expected Discharge Date Expected Discharge Time    Apr 3, 2018             Digna Paz RN

## 2018-04-02 NOTE — PLAN OF CARE
Problem: Patient Care Overview  Goal: Plan of Care Review  Outcome: Ongoing (interventions implemented as appropriate)   04/02/18 1113   Coping/Psychosocial   Plan of Care Reviewed With patient   Plan of Care Review   Progress improving   OTHER   Outcome Summary sitting balance improved with tactile and verbal cueing at EOB, increased left knee pain with ROM. Patient is non-ambulatory at baseline per son-in law, uses W/C for mobility.

## 2018-04-03 VITALS
OXYGEN SATURATION: 96 % | SYSTOLIC BLOOD PRESSURE: 133 MMHG | RESPIRATION RATE: 18 BRPM | TEMPERATURE: 98.4 F | HEIGHT: 59 IN | DIASTOLIC BLOOD PRESSURE: 58 MMHG | BODY MASS INDEX: 29.01 KG/M2 | HEART RATE: 84 BPM | WEIGHT: 143.9 LBS

## 2018-04-03 LAB
BACTERIA SPEC AEROBE CULT: NORMAL
BACTERIA SPEC AEROBE CULT: NORMAL

## 2018-04-03 PROCEDURE — 99239 HOSP IP/OBS DSCHRG MGMT >30: CPT | Performed by: HOSPITALIST

## 2018-04-03 PROCEDURE — 25010000002 HEPARIN (PORCINE) PER 1000 UNITS: Performed by: INTERNAL MEDICINE

## 2018-04-03 RX ORDER — DOXYCYCLINE 100 MG/1
100 CAPSULE ORAL EVERY 12 HOURS SCHEDULED
Qty: 4 CAPSULE | Refills: 0 | Status: SHIPPED | OUTPATIENT
Start: 2018-04-03 | End: 2018-04-05

## 2018-04-03 RX ORDER — ASCORBIC ACID 500 MG
500 TABLET ORAL DAILY
Qty: 30 TABLET | Refills: 0 | Status: SHIPPED | OUTPATIENT
Start: 2018-04-04 | End: 2018-05-04

## 2018-04-03 RX ORDER — FERROUS SULFATE 325(65) MG
325 TABLET ORAL
Qty: 30 TABLET | Refills: 0 | Status: SHIPPED | OUTPATIENT
Start: 2018-04-04 | End: 2018-05-04

## 2018-04-03 RX ORDER — CEFUROXIME AXETIL 500 MG/1
500 TABLET ORAL EVERY 12 HOURS SCHEDULED
Qty: 4 TABLET | Refills: 0 | Status: SHIPPED | OUTPATIENT
Start: 2018-04-03 | End: 2018-04-05

## 2018-04-03 RX ORDER — TERAZOSIN 2 MG/1
2 CAPSULE ORAL EVERY 12 HOURS SCHEDULED
Qty: 60 CAPSULE | Refills: 0 | Status: SHIPPED | OUTPATIENT
Start: 2018-04-03 | End: 2018-05-03

## 2018-04-03 RX ORDER — SACCHAROMYCES BOULARDII 250 MG
250 CAPSULE ORAL 2 TIMES DAILY
Qty: 60 CAPSULE | Refills: 0 | Status: SHIPPED | OUTPATIENT
Start: 2018-04-03 | End: 2018-05-03

## 2018-04-03 RX ORDER — LISINOPRIL 10 MG/1
10 TABLET ORAL EVERY 12 HOURS SCHEDULED
Qty: 60 TABLET | Refills: 0 | Status: SHIPPED | OUTPATIENT
Start: 2018-04-03 | End: 2018-05-03

## 2018-04-03 RX ADMIN — LISINOPRIL 10 MG: 10 TABLET ORAL at 09:27

## 2018-04-03 RX ADMIN — FERROUS SULFATE TAB 325 MG (65 MG ELEMENTAL FE) 325 MG: 325 (65 FE) TAB at 09:25

## 2018-04-03 RX ADMIN — OXYCODONE HYDROCHLORIDE AND ACETAMINOPHEN 500 MG: 500 TABLET ORAL at 09:25

## 2018-04-03 RX ADMIN — PANTOPRAZOLE SODIUM 40 MG: 40 TABLET, DELAYED RELEASE ORAL at 06:12

## 2018-04-03 RX ADMIN — CLOPIDOGREL BISULFATE 75 MG: 75 TABLET ORAL at 09:26

## 2018-04-03 RX ADMIN — HEPARIN SODIUM 5000 UNITS: 5000 INJECTION, SOLUTION INTRAVENOUS; SUBCUTANEOUS at 06:12

## 2018-04-03 RX ADMIN — Medication 250 MG: at 09:27

## 2018-04-03 RX ADMIN — CEFUROXIME AXETIL 500 MG: 250 TABLET ORAL at 09:25

## 2018-04-03 RX ADMIN — PILOCARPINE HYDROCHLORIDE 5 MG: 5 TABLET, FILM COATED ORAL at 09:25

## 2018-04-03 RX ADMIN — DOXYCYCLINE 100 MG: 100 CAPSULE ORAL at 09:25

## 2018-04-03 RX ADMIN — MEMANTINE 10 MG: 10 TABLET ORAL at 09:27

## 2018-04-03 RX ADMIN — TRAMADOL HYDROCHLORIDE 50 MG: 50 TABLET, COATED ORAL at 09:27

## 2018-04-03 RX ADMIN — SUCRALFATE 1 G: 1 TABLET ORAL at 06:12

## 2018-04-03 RX ADMIN — TERAZOSIN HYDROCHLORIDE ANHYDROUS 2 MG: 2 CAPSULE ORAL at 09:27

## 2018-04-03 NOTE — DISCHARGE PLACEMENT REQUEST
"Cinthia Mata (87 y.o. Female)     To [facility]  From [facility] at [facility]() [phone]    Date of Birth Social Security Number Address Home Phone MRN    01/01/1931  [id number] [phone] [id number]    Congregational Marital Status          [employer]        Admission Date Admission Type Admitting Provider Attending Provider Department, Room/Bed    3/29/18 Emergency Tanner Askew MD Schnell, Aaron, MD [address], [address]    Discharge Date Discharge Disposition Discharge Destination         Skilled Nursing Facility (DC - External)              Attending Provider:  Tanner Askew MD    Allergies:  Sulfa Antibiotics, Cortisone, Ativan [Lorazepam]    Isolation:  None   Infection:  None   Code Status:  FULL    Ht:  149.9 cm (59\")   Wt:  65.3 kg (143 lb 14.4 oz)    Admission Cmt:  None   Principal Problem:  None                Active Insurance as of 3/29/2018     Primary Coverage     Payor Plan Insurance Group Employer/Plan Group    MEDICARE MEDICARE A & B      Payor Plan Address Payor Plan Phone Number Effective From Effective To    [address] [phone] 12/1/1995     [place] [address]       Subscriber Name Subscriber Birth Date Member ID       CINTHIA MATA 1/1/1931 [id number]           Secondary Coverage     Payor Plan Insurance Group Employer/Plan Group    AARP MED SUPP AAR HEALTH CARE OPTIONS      Payor Plan Address Payor Plan Phone Number Effective From Effective To    [facility] [phone] 1/1/2018     [address]       [place] [address]       Subscriber Name Subscriber Birth Date Member ID       CINTHIA MATA 1/1/1931 [id number]                 Emergency Contacts      (Rel.) Home Phone Work Phone Mobile Phone    Vilma Slade (Power of ) [phone] -- --               Discharge Summary      Tanner Askew MD at 4/3/2018 11:06 AM              [facility]  DISCHARGE " SUMMARY    Patient Name: Poonam Mata  : 1931  MRN: [id number]    Date of Admission: 3/29/2018  Date of Discharge:  4/3/2018 (Tuesday)  Primary Care Physician: Leti Munoz MD    Consults     No orders found from 2018 to 3/30/2018.        Hospital Course     Presenting Problem:   Pneumonia of left lower lobe due to infectious organism [J18.1]    Active Hospital Problems (** Indicates Principal Problem)    Diagnosis Date Noted   • Pneumonia of left lower lobe due to infectious organism [J18.1] 2018   • Hypokalemia [E87.6] 2018   • Dementia [F03.90] 2018   • Hypertension [I10] 2018   • GERD (gastroesophageal reflux disease) [K21.9] 2018   • Normocytic anemia [D64.9] 2018      Resolved Hospital Problems    Diagnosis Date Noted Date Resolved   No resolved problems to display.      Generalized Weakness  Poor Inspiratory Capacity / Hypoventilation  Anxiety  Immobility  Iron Def Anemia  Musculoskeletal Pain  Hypertensive Crisis    Hospital Course:  Poonam Mata is a 87 y.o. female with fatigue and shortness of breath.  She was treated in the hospital with IV abx for presumed pneumonia.  She had questionable CXR.  No leukocytosis and no fevers.  She has very poor inspiratory capacity and is essentially immobile.  She also received Ceftin prior to admission here based on admission records.  She isn't walking and doesn't ventilate her lungs well.  She was found to have a deep tissue pressure injury present on admission due to lack of mobility.  She also doesn't seem to move much due to generalized musculoskeletal pains.  She may benefit from outpatient sleep study and should probably not take clonidine PRN for blood pressure control due to potential for rebound hypertension.        Day of Discharge     HPI:  Not eating well.  Not drinking well.  Hypothymic affect.  Not walking.  Having musculoskeletal pains    Review of Systems  Gen- No fevers, chills  CV-  No chest pain, palpitations  Resp- No cough, dyspnea  GI- No N/V/D, abd pain    Otherwise ROS is negative except as mentioned in the HPI.    Vital Signs:   Temp:  [98.4 °F (36.9 °C)] 98.4 °F (36.9 °C)  Heart Rate:  [84-92] 84  Resp:  [18] 18  BP: (133-154)/(53-76) 133/58     Physical Exam:  Gen:  NAD  HEENT:  No JVD, dry tongue  CVS:  RRR, s1 and s2  Lungs:  Clear  Abdomen:  Soft, bs+, nt, nd  Ext:  + pitting edema    Pertinent  and/or Most Recent Results       Results from last 7 days  Lab Units 04/01/18  0343 03/31/18  1642 03/31/18  0555 03/30/18  2208 03/30/18  0620 03/29/18  1426   WBC 10*3/mm3 7.54  --  6.88  --  6.23 7.01   HEMOGLOBIN g/dL 8.8*  --  9.4*  --  10.6* 9.7*   HEMATOCRIT % 28.9*  --  31.3*  --  35.7 32.0*   PLATELETS 10*3/mm3 265  --  270  --  276 276   SODIUM mmol/L 138  --  141  --  142 137   POTASSIUM mmol/L 4.4 4.2 3.4* 3.7 2.8* 2.9*   CHLORIDE mmol/L 104  --  104  --  98* 97*   CO2 mmol/L 29.0  --  32.0*  --  30.0 32.0*   BUN mg/dL 9  --  8*  --  9 13   CREATININE mg/dL 0.60  --  0.60  --  0.50* 0.80   GLUCOSE mg/dL 89  --  91  --  92 118*   CALCIUM mg/dL 8.4*  --  8.6*  --  8.8 8.9             Invalid input(s): TG, LDLCALC, LDLREALC    Results from last 7 days  Lab Units 03/29/18  1426   BNP pg/mL 17.0     Brief Urine Lab Results  (Last result in the past 365 days)      Color   Clarity   Blood   Leuk Est   Nitrite   Protein   CREAT   Urine HCG        03/29/18 1629 Yellow Clear Negative Negative Negative 30 mg/dL (1+)(A)               Microbiology Results Abnormal     Procedure Component Value - Date/Time    Blood Culture - Blood, [[id number]]  (Normal) Collected:  03/29/18 1708    Lab Status:  Preliminary result Specimen:  Blood from Arm, Left Updated:  04/02/18 1746     Blood Culture No growth at 4 days    Blood Culture - Blood, [[id number]]  (Normal) Collected:  03/29/18 1645    Lab Status:  Preliminary result Specimen:  Blood from Arm, Right Updated:  04/02/18 1746     Blood Culture No  growth at 4 days    S. Pneumo Ag Urine or CSF - Urine, Urine, Clean Catch [[id number]] Collected:  03/30/18 0735    Lab Status:  Final result Specimen:  Urine from Urine, Clean Catch Updated:  04/02/18 1512     Specimen Source Urine     STREP PNEUMONIAE ANTIGEN Negative     Body Fluid Culture, Sterile Not Indicated     Organism ID Not indicated.     Please note Comment     Comment: College of American Pathologists standards require a culture to be  performed on CSF specimens submitted for bacterial antigen testing.  (CAP [id number].70187) Urine specimens will not be cultured.       Narrative:       Performed at:  [address] [place] [address]  [id number]  : Louie Helton MD, Phone:  [phone]    Legionella Antigen, Urine - Urine, Urine, Clean Catch [[id number]] Collected:  03/30/18 0735    Lab Status:  Final result Specimen:  Urine from Urine, Clean Catch Updated:  04/02/18 1512     L. pneumophila Serogp 1 Ur Ag Negative     Comment: Presumptive negative for L. pneumophila serogroup 1 antigen in urine,  suggesting no recent or current infection. Legionnaires' disease  cannot be ruled out since other serogroups and species may also cause  disease.       Narrative:       Performed at:  [address] [place] [address]  [id number]  : Louie Helton MD, Phone:  [phone]    Influenza A & B, RT PCR - Swab, Nasopharynx [[id number]]  (Normal) Collected:  03/30/18 0706    Lab Status:  Final result Specimen:  Swab from Nasopharynx Updated:  03/30/18 0803     Influenza A PCR Not Detected     Influenza B PCR Not Detected          Imaging Results (all)     Procedure Component Value Units Date/Time    XR Chest 1 View [[id number]] Collected:  03/29/18 1545     Updated:  03/29/18 1600    Narrative:          EXAMINATION: XR CHEST 1 VW - 03/29/2018     INDICATION: Shortness of air.      COMPARISON: None.     FINDINGS:   1. There is mild inhomogeneous airspace  opacity left lower lobe  retrocardiac region, new from 2015.     2. Remainder of the chest is negative for active disease and reveals  only mild diffuse fibrosis. There is dextroscoliosis of the dorsal  spine.           Impression:          Chronic senile changes diffusely with interstitial scarring.     Mild nonconsolidated airspace multidirectional opacities left lower  lobe, new from previous studies and likely indicative of an inflammatory  process or significant atelectasis.     DICTATED:     03/29/2018  EDITED/ls :     03/29/2018      This report was finalized on 3/29/2018 3:58 PM by Dr. Rajat Butts MD.                 Order Current Status    Blood Culture - Blood, Preliminary result    Blood Culture - Blood, Preliminary result        Discharge Details      Poonam Mata   Home Medication Instructions ELMA:[id number]    Printed on:04/03/18 1109   Medication Information                      acetaminophen (TYLENOL) 325 MG tablet  Take 650 mg by mouth Every 6 (Six) Hours As Needed for Mild Pain .             ALPRAZolam (XANAX) 0.25 MG tablet  Take 0.25 mg by mouth 2 (Two) Times a Day As Needed for Anxiety.             amLODIPine (NORVASC) 10 MG tablet  Take 10 mg by mouth Every Night.             cefuroxime (CEFTIN) 500 MG tablet  Take 1 tablet by mouth Every 12 (Twelve) Hours for 4 doses.             clopidogrel (PLAVIX) 75 MG tablet  Take 75 mg by mouth Daily.             docusate sodium (COLACE) 100 MG capsule  Take 100 mg by mouth 2 (Two) Times a Day.             donepezil (ARICEPT) 10 MG tablet  Take 10 mg by mouth Every Night.             doxycycline (MONODOX) 100 MG capsule  Take 1 capsule by mouth Every 12 (Twelve) Hours for 4 doses.             ferrous sulfate 325 (65 FE) MG tablet  Take 1 tablet by mouth Daily With Breakfast for 30 days.             ipratropium-albuterol (DUO-NEB) 0.5-2.5 mg/mL nebulizer  Take 3 mL by nebulization Every 4 (Four) Hours As Needed for Wheezing.              linaclotide (LINZESS) 290 MCG capsule capsule  Take 290 mcg by mouth Every Morning Before Breakfast.             lisinopril (PRINIVIL,ZESTRIL) 10 MG tablet  Take 1 tablet by mouth Every 12 (Twelve) Hours for 30 days.             memantine (NAMENDA XR) 28 MG capsule sustained-release 24 hr extended release capsule  Take 28 mg by mouth Daily.             Multiple Vitamins-Minerals (MULTIVITAMIN PO)  Take 1 tablet by mouth Daily.             omeprazole (priLOSEC) 40 MG capsule  Take 40 mg by mouth Daily.             pilocarpine (SALAGEN) 5 MG tablet  Take 5 mg by mouth 4 (Four) Times a Day.             polyethylene glycol (MIRALAX) packet  Take 17 g by mouth Daily As Needed.             saccharomyces boulardii (FLORASTOR) 250 MG capsule  Take 1 capsule by mouth 2 (Two) Times a Day for 30 days.             sucralfate (CARAFATE) 1 g tablet  Take 1 g by mouth 4 (Four) Times a Day.             terazosin (HYTRIN) 2 MG capsule  Take 1 capsule by mouth Every 12 (Twelve) Hours for 30 days.             traMADol (ULTRAM) 50 MG tablet  Take 50 mg by mouth 2 (Two) Times a Day.             traZODone (DESYREL) 50 MG tablet  Take 50 mg by mouth Every Night.             vitamin C (VITAMIN C) 500 MG tablet  Take 1 tablet by mouth Daily for 30 days.               Discharge Disposition:  Skilled Nursing Facility (DC - External)    Discharge Diet:  As tolerated    Discharge Activity:  As tolerated    Special Instructions:  Increase oral intake of fluid.  Increase oral intake of food.  Do breathing exercises as currently poor insp capacity.    No future appointments.    Additional Instructions for the Follow-ups that You Need to Schedule     Discharge Follow-up with PCP    As directed      Follow Up Details:  Primary Care Doctor - Leti Munoz - 1 week             She will get a few more days of oral antibiotics to complete a full course of abx.  Please realize she had ceftin prior to admission too per admission records.    She would  benefit from an outpatient sleep study.    I recommend discontinuing using clonidine as an as needed medication as it can cause severe rebound hypertension.    She has poor oral intake and is not drinking much fluid.  I advised her and her daughter to take incentive spirometer with her and do breathing exercises after discharge.    Time Spent on Discharge:  40 minutes    Electronically signed by Tanner Askew MD, 04/03/18, 11:06 AM.      Electronically signed by Tanner Askew MD at 4/3/2018 11:32 AM

## 2018-04-03 NOTE — DISCHARGE SUMMARY
[facility] Medicine Services  DISCHARGE SUMMARY    Patient Name: Poonam Mata  : 1931  MRN: [id number]    Date of Admission: 3/29/2018  Date of Discharge:  4/3/2018 (Tuesday)  Primary Care Physician: Leti Munoz MD    Consults     No orders found from 2018 to 3/30/2018.        Hospital Course     Presenting Problem:   Pneumonia of left lower lobe due to infectious organism [J18.1]    Active Hospital Problems (** Indicates Principal Problem)    Diagnosis Date Noted   • Pneumonia of left lower lobe due to infectious organism [J18.1] 2018   • Hypokalemia [E87.6] 2018   • Dementia [F03.90] 2018   • Hypertension [I10] 2018   • GERD (gastroesophageal reflux disease) [K21.9] 2018   • Normocytic anemia [D64.9] 2018      Resolved Hospital Problems    Diagnosis Date Noted Date Resolved   No resolved problems to display.      Generalized Weakness  Poor Inspiratory Capacity / Hypoventilation  Anxiety  Immobility  Iron Def Anemia  Musculoskeletal Pain  Hypertensive Crisis    Hospital Course:  Poonam Mata is a 87 y.o. female with fatigue and shortness of breath.  She was treated in the hospital with IV abx for presumed pneumonia.  She had questionable CXR.  No leukocytosis and no fevers.  She has very poor inspiratory capacity and is essentially immobile.  She also received Ceftin prior to admission here based on admission records.  She isn't walking and doesn't ventilate her lungs well.  She was found to have a deep tissue pressure injury present on admission due to lack of mobility.  She also doesn't seem to move much due to generalized musculoskeletal pains.  She may benefit from outpatient sleep study and should probably not take clonidine PRN for blood pressure control due to potential for rebound hypertension.        Day of Discharge     HPI:  Not eating well.  Not drinking well.  Hypothymic affect.  Not walking.  Having  musculoskeletal pains    Review of Systems  Gen- No fevers, chills  CV- No chest pain, palpitations  Resp- No cough, dyspnea  GI- No N/V/D, abd pain    Otherwise ROS is negative except as mentioned in the HPI.    Vital Signs:   Temp:  [98.4 °F (36.9 °C)] 98.4 °F (36.9 °C)  Heart Rate:  [84-92] 84  Resp:  [18] 18  BP: (133-154)/(53-76) 133/58     Physical Exam:  Gen:  NAD  HEENT:  No JVD, dry tongue  CVS:  RRR, s1 and s2  Lungs:  Clear  Abdomen:  Soft, bs+, nt, nd  Ext:  + pitting edema    Pertinent  and/or Most Recent Results       Results from last 7 days  Lab Units 04/01/18  0343 03/31/18  1642 03/31/18  0555 03/30/18  2208 03/30/18  0620 03/29/18  1426   WBC 10*3/mm3 7.54  --  6.88  --  6.23 7.01   HEMOGLOBIN g/dL 8.8*  --  9.4*  --  10.6* 9.7*   HEMATOCRIT % 28.9*  --  31.3*  --  35.7 32.0*   PLATELETS 10*3/mm3 265  --  270  --  276 276   SODIUM mmol/L 138  --  141  --  142 137   POTASSIUM mmol/L 4.4 4.2 3.4* 3.7 2.8* 2.9*   CHLORIDE mmol/L 104  --  104  --  98* 97*   CO2 mmol/L 29.0  --  32.0*  --  30.0 32.0*   BUN mg/dL 9  --  8*  --  9 13   CREATININE mg/dL 0.60  --  0.60  --  0.50* 0.80   GLUCOSE mg/dL 89  --  91  --  92 118*   CALCIUM mg/dL 8.4*  --  8.6*  --  8.8 8.9             Invalid input(s): TG, LDLCALC, LDLREALC    Results from last 7 days  Lab Units 03/29/18  1426   BNP pg/mL 17.0     Brief Urine Lab Results  (Last result in the past 365 days)      Color   Clarity   Blood   Leuk Est   Nitrite   Protein   CREAT   Urine HCG        03/29/18 1629 Yellow Clear Negative Negative Negative 30 mg/dL (1+)(A)               Microbiology Results Abnormal     Procedure Component Value - Date/Time    Blood Culture - Blood, [[id number]]  (Normal) Collected:  03/29/18 1708    Lab Status:  Preliminary result Specimen:  Blood from Arm, Left Updated:  04/02/18 1746     Blood Culture No growth at 4 days    Blood Culture - Blood, [[id number]]  (Normal) Collected:  03/29/18 1645    Lab Status:  Preliminary result Specimen:   Blood from Arm, Right Updated:  04/02/18 1746     Blood Culture No growth at 4 days    S. Pneumo Ag Urine or CSF - Urine, Urine, Clean Catch [[id number]] Collected:  03/30/18 0735    Lab Status:  Final result Specimen:  Urine from Urine, Clean Catch Updated:  04/02/18 1512     Specimen Source Urine     STREP PNEUMONIAE ANTIGEN Negative     Body Fluid Culture, Sterile Not Indicated     Organism ID Not indicated.     Please note Comment     Comment: College of American Pathologists standards require a culture to be  performed on CSF specimens submitted for bacterial antigen testing.  (CAP CELESTE.72372) Urine specimens will not be cultured.       Narrative:       Performed at:  [address] [place] [address]  [id number]  : Louie Helton MD, Phone:  [phone]    Legionella Antigen, Urine - Urine, Urine, Clean Catch [[id number]] Collected:  03/30/18 0735    Lab Status:  Final result Specimen:  Urine from Urine, Clean Catch Updated:  04/02/18 1512     L. pneumophila Serogp 1 Ur Ag Negative     Comment: Presumptive negative for L. pneumophila serogroup 1 antigen in urine,  suggesting no recent or current infection. Legionnaires' disease  cannot be ruled out since other serogroups and species may also cause  disease.       Narrative:       Performed at:  [address] [place] [address]  [id number]  : Louie Helton MD, Phone:  [phone]    Influenza A & B, RT PCR - Swab, Nasopharynx [[id number]]  (Normal) Collected:  03/30/18 0706    Lab Status:  Final result Specimen:  Swab from Nasopharynx Updated:  03/30/18 0803     Influenza A PCR Not Detected     Influenza B PCR Not Detected          Imaging Results (all)     Procedure Component Value Units Date/Time    XR Chest 1 View [[id number]] Collected:  03/29/18 1545     Updated:  03/29/18 1600    Narrative:          EXAMINATION: XR CHEST 1 VW - 03/29/2018     INDICATION: Shortness of air.       COMPARISON: None.     FINDINGS:   1. There is mild inhomogeneous airspace opacity left lower lobe  retrocardiac region, new from 2015.     2. Remainder of the chest is negative for active disease and reveals  only mild diffuse fibrosis. There is dextroscoliosis of the dorsal  spine.           Impression:          Chronic senile changes diffusely with interstitial scarring.     Mild nonconsolidated airspace multidirectional opacities left lower  lobe, new from previous studies and likely indicative of an inflammatory  process or significant atelectasis.     DICTATED:     03/29/2018  EDITED/ls :     03/29/2018      This report was finalized on 3/29/2018 3:58 PM by Dr. Rajat Butts MD.                 Order Current Status    Blood Culture - Blood, Preliminary result    Blood Culture - Blood, Preliminary result        Discharge Details      Poonam Mata   Home Medication Instructions ELMA:[id number]    Printed on:04/03/18 1106   Medication Information                      acetaminophen (TYLENOL) 325 MG tablet  Take 650 mg by mouth Every 6 (Six) Hours As Needed for Mild Pain .             ALPRAZolam (XANAX) 0.25 MG tablet  Take 0.25 mg by mouth 2 (Two) Times a Day As Needed for Anxiety.             amLODIPine (NORVASC) 10 MG tablet  Take 10 mg by mouth Every Night.             cefuroxime (CEFTIN) 500 MG tablet  Take 1 tablet by mouth Every 12 (Twelve) Hours for 4 doses.             clopidogrel (PLAVIX) 75 MG tablet  Take 75 mg by mouth Daily.             docusate sodium (COLACE) 100 MG capsule  Take 100 mg by mouth 2 (Two) Times a Day.             donepezil (ARICEPT) 10 MG tablet  Take 10 mg by mouth Every Night.             doxycycline (MONODOX) 100 MG capsule  Take 1 capsule by mouth Every 12 (Twelve) Hours for 4 doses.             ferrous sulfate 325 (65 FE) MG tablet  Take 1 tablet by mouth Daily With Breakfast for 30 days.             ipratropium-albuterol (DUO-NEB) 0.5-2.5 mg/mL nebulizer  Take 3 mL by  nebulization Every 4 (Four) Hours As Needed for Wheezing.             linaclotide (LINZESS) 290 MCG capsule capsule  Take 290 mcg by mouth Every Morning Before Breakfast.             lisinopril (PRINIVIL,ZESTRIL) 10 MG tablet  Take 1 tablet by mouth Every 12 (Twelve) Hours for 30 days.             memantine (NAMENDA XR) 28 MG capsule sustained-release 24 hr extended release capsule  Take 28 mg by mouth Daily.             Multiple Vitamins-Minerals (MULTIVITAMIN PO)  Take 1 tablet by mouth Daily.             omeprazole (priLOSEC) 40 MG capsule  Take 40 mg by mouth Daily.             pilocarpine (SALAGEN) 5 MG tablet  Take 5 mg by mouth 4 (Four) Times a Day.             polyethylene glycol (MIRALAX) packet  Take 17 g by mouth Daily As Needed.             saccharomyces boulardii (FLORASTOR) 250 MG capsule  Take 1 capsule by mouth 2 (Two) Times a Day for 30 days.             sucralfate (CARAFATE) 1 g tablet  Take 1 g by mouth 4 (Four) Times a Day.             terazosin (HYTRIN) 2 MG capsule  Take 1 capsule by mouth Every 12 (Twelve) Hours for 30 days.             traMADol (ULTRAM) 50 MG tablet  Take 50 mg by mouth 2 (Two) Times a Day.             traZODone (DESYREL) 50 MG tablet  Take 50 mg by mouth Every Night.             vitamin C (VITAMIN C) 500 MG tablet  Take 1 tablet by mouth Daily for 30 days.               Discharge Disposition:  Skilled Nursing Facility (DC - External)    Discharge Diet:  As tolerated    Discharge Activity:  As tolerated    Special Instructions:  Increase oral intake of fluid.  Increase oral intake of food.  Do breathing exercises as currently poor insp capacity.    No future appointments.    Additional Instructions for the Follow-ups that You Need to Schedule     Discharge Follow-up with PCP    As directed      Follow Up Details:  Primary Care Doctor - Leti Munoz - 1 week             She will get a few more days of oral antibiotics to complete a full course of abx.  Please realize she had  ceftin prior to admission too per admission records.    She would benefit from an outpatient sleep study.    I recommend discontinuing using clonidine as an as needed medication as it can cause severe rebound hypertension.    She has poor oral intake and is not drinking much fluid.  I advised her and her daughter to take incentive spirometer with her and do breathing exercises after discharge.    Time Spent on Discharge:  40 minutes    Electronically signed by Tanner Askew MD, 04/03/18, 11:06 AM.

## 2018-04-03 NOTE — PROGRESS NOTES
[facility] Medicine Services  PROGRESS NOTE    Patient Name: Poonam Mata  : 1931  MRN: [id number]    Date of Admission: 3/29/2018  Length of Stay: 4  Primary Care Physician: Leti Munoz MD    Subjective   Subjective     CC:  Follow up fatigue and shortness of breath    HPI:  Discussed discharge today.  Daughter a little nervous.  No chest pain.  No shortness of breath.  Saw son in law earlier, daughter later - several visits to room today.    Review of Systems  Gen- No fevers, chills  CV- No chest pain, palpitations  Resp- + cough, +soa  GI- No N/V/D, abd pain    Otherwise ROS is negative except as mentioned in the HPI.    Objective   Objective     Vital Signs:   Temp:  [97.9 °F (36.6 °C)] 97.9 °F (36.6 °C)  Heart Rate:  [70-92] 92  Resp:  [16-18] 18  BP: (107-167)/() 154/69        Physical Exam:  Constitutional: No acute distress, looks weak and tired  HENT: NCAT, mucous membranes moist  Respiratory:  Poor inspiratory effort, clear, no wheezes  Cardiovascular: RRR, no murmurs, rubs, or gallops, palpable pedal pulses bilaterally  Gastrointestinal: Positive bowel sounds, soft, nontender, nondistended  Musculoskeletal: No bilateral ankle edema  Psychiatric: Appropriate affect, cooperative  Neurologic: Oriented x 3, strength symmetric in all extremities, Cranial Nerves grossly intact to confrontation, speech clear  Skin: No rashes    Results Reviewed:  I have personally reviewed current lab, radiology, and data and agree.      Results from last 7 days  Lab Units 18  0343 03/31/18  0555 18  0620   WBC 10*3/mm3 7.54 6.88 6.23   HEMOGLOBIN g/dL 8.8* 9.4* 10.6*   HEMATOCRIT % 28.9* 31.3* 35.7   PLATELETS 10*3/mm3 265 270 276   INR   --  0.97  --        Results from last 7 days  Lab Units 18  0343 18  1642 18  0555  18  0620 18  1426   SODIUM mmol/L 138  --  141  --  142 137   POTASSIUM mmol/L 4.4 4.2 3.4*  < > 2.8* 2.9*    CHLORIDE mmol/L 104  --  104  --  98* 97*   CO2 mmol/L 29.0  --  32.0*  --  30.0 32.0*   BUN mg/dL 9  --  8*  --  9 13   CREATININE mg/dL 0.60  --  0.60  --  0.50* 0.80   GLUCOSE mg/dL 89  --  91  --  92 118*   CALCIUM mg/dL 8.4*  --  8.6*  --  8.8 8.9   ALT (SGPT) U/L  --   --   --   --   --  22   AST (SGOT) U/L  --   --   --   --   --  42*   < > = values in this interval not displayed.  Estimated Creatinine Clearance: 41.8 mL/min (by C-G formula based on SCr of 0.6 mg/dL).  No results found for: BNP  No results found for: PHART    Microbiology Results Abnormal     Procedure Component Value - Date/Time    Blood Culture - Blood, [[id number]]  (Normal) Collected:  03/29/18 1708    Lab Status:  Preliminary result Specimen:  Blood from Arm, Left Updated:  04/02/18 1746     Blood Culture No growth at 4 days    Blood Culture - Blood, [[id number]]  (Normal) Collected:  03/29/18 1645    Lab Status:  Preliminary result Specimen:  Blood from Arm, Right Updated:  04/02/18 1746     Blood Culture No growth at 4 days    S. Pneumo Ag Urine or CSF - Urine, Urine, Clean Catch [[id number]] Collected:  03/30/18 0735    Lab Status:  Final result Specimen:  Urine from Urine, Clean Catch Updated:  04/02/18 1512     Specimen Source Urine     STREP PNEUMONIAE ANTIGEN Negative     Body Fluid Culture, Sterile Not Indicated     Organism ID Not indicated.     Please note Comment     Comment: College of American Pathologists standards require a culture to be  performed on CSF specimens submitted for bacterial antigen testing.  (CAP CELESTE.77522) Urine specimens will not be cultured.       Narrative:       Performed at:  [address] [place] [address]  [id number]  : Louie Helton MD, Phone:  [phone]    Legionella Antigen, Urine - Urine, Urine, Clean Catch [[id number]] Collected:  03/30/18 0735    Lab Status:  Final result Specimen:  Urine from Urine, Clean Catch Updated:  04/02/18 1512     L. pneumophila  Serogp 1 Ur Ag Negative     Comment: Presumptive negative for L. pneumophila serogroup 1 antigen in urine,  suggesting no recent or current infection. Legionnaires' disease  cannot be ruled out since other serogroups and species may also cause  disease.       Narrative:       Performed at:  [address] [facility], [place], [place]  [id number]  : Louie Helton MD, Phone:  [phone]    Influenza A & B, RT PCR - Swab, Nasopharynx [[id number]]  (Normal) Collected:  03/30/18 0706    Lab Status:  Final result Specimen:  Swab from Nasopharynx Updated:  03/30/18 0803     Influenza A PCR Not Detected     Influenza B PCR Not Detected          Imaging Results (last 24 hours)     ** No results found for the last 24 hours. **             I have reviewed the medications.    Assessment/Plan   Assessment / Plan     Hospital Problem List     Pneumonia of left lower lobe due to infectious organism    Hypokalemia    Dementia    Hypertension    GERD (gastroesophageal reflux disease)    Normocytic anemia             Brief Hospital Course to date:  Poonam Mata is a 87 y.o. female w/ PMH of HTN, dementia, GERD who presents with LLL PNA/HCAP (resides in NH).      Assessment & Plan:    LLL PNA  - at risk for PNA due to immobility and poor inspiratory capacity  - encouraged incentive spirometer exercises  - de-escalate abx to oral today and observe    Hypertensive Crisis  - reportedly gets clonidine as needed at NH  - will give medications with emphasis on permissive hypertension based on discussion in room yesterday per Dr. Askew and family (daughter did not seem to like NH use of clonidine).    Hypokalemia  - supplemental potassium  - check magnesium  - monitor    Iron Deficiency Anemia  - oral iron     Out patient sleep study recommended.     Started terazosin yesterday  Daughter asking to put Norvasc at night, which I did today  Daughter asking to schedule Xanax at night, which I did and patient  says she slept well     DVT Prophylaxis:  [facility]    CODE STATUS: Full Code    Disposition: I expect the patient to be discharged to long term care facility, tomorrow      Electronically signed by Tanner Askew MD, 04/02/18, 8:05 PM.

## 2018-04-03 NOTE — PROGRESS NOTES
Continued Stay Note  [facility]     Patient Name: Poonam Mata  MRN: [id number]  Today's Date: 4/3/2018    Admit Date: 3/29/2018          Discharge Plan     Row Name 04/03/18 1109       Plan    Plan discharge plan    Patient/Family in Agreement with Plan yes    Plan Comments Per provider note, pt medically ready for discharge today. Spoke with Chayo at [facility]( [phone]) and they can accept pt today. Daughter has arranged transportation through [employer] as she works for the company. CM will fax discharge summary to [facility] at [phone] when available. Nurse can call report to [facility] at [phone] and send a copy of discharge summary with pt along with any scripts.     Final Discharge Disposition Code 04 - intermediate care facility              Discharge Codes    No documentation.       Expected Discharge Date and Time     Expected Discharge Date Expected Discharge Time    Apr 3, 2018             Digna Paz RN

## 2018-04-03 NOTE — PROGRESS NOTES
Continued Stay Note  [facility]     Patient Name: Poonam Mata  MRN: [id number]  Today's Date: 4/3/2018    Admit Date: 3/29/2018          Discharge Plan     Row Name 04/03/18 1142       Plan    Plan discharge summary    Patient/Family in Agreement with Plan yes    Plan Comments Discharge summary faxed to [facility] at [phone].    Final Discharge Disposition Code  - [facility]    Row Name 04/03/18 1109       Plan    Plan discharge plan    Patient/Family in Agreement with Plan yes    Plan Comments Per provider note, pt medically ready for discharge today. Spoke with Chayo at [facility]( [phone]) and they can accept pt today. Daughter has arranged transportation through [employer] [facility] as she works for the company. CM will fax discharge summary to [facility] at [phone] when available. Nurse can call report to [facility] at [phone] and send a copy of discharge summary with pt along with any scripts.     Final Discharge Disposition Code [address] [place] [address]              Discharge Codes    No documentation.       Expected Discharge Date and Time     Expected Discharge Date Expected Discharge Time    Apr 3, 2018             Digna Paz RN

## 2018-04-03 NOTE — PLAN OF CARE
Problem: Patient Care Overview  Goal: Plan of Care Review  Outcome: Ongoing (interventions implemented as appropriate)   04/03/18 0438   Coping/Psychosocial   Plan of Care Reviewed With patient   Plan of Care Review   Progress no change   OTHER   Outcome Summary VSS. Pt c/o arthritic pain in L hip at beginning of shift. PRN pain meds given, APRN ordered K pad. Pt has been resting comforttably throughout night. Pt D/C to [facility] in late AM. Will continue to monitor.        Problem: Pneumonia (Adult)  Goal: Signs and Symptoms of Listed Potential Problems Will be Absent, Minimized or Managed (Pneumonia)  Outcome: Ongoing (interventions implemented as appropriate)   04/03/18 0438   Goal/Outcome Evaluation   Problems Assessed (Pneumonia) all   Problems Present (Pneumonia) none       Problem: Fall Risk (Adult)  Goal: Identify Related Risk Factors and Signs and Symptoms  Outcome: Ongoing (interventions implemented as appropriate)   03/31/18 0412   Fall Risk (Adult)   Related Risk Factors (Fall Risk) age-related changes;gait/mobility problems;confusion/agitation;sleep pattern alteration;environment unfamiliar   Signs and Symptoms (Fall Risk) presence of risk factors     Goal: Absence of Fall  Outcome: Ongoing (interventions implemented as appropriate)   04/03/18 0438   Fall Risk (Adult)   Absence of Fall making progress toward outcome       Problem: Skin Injury Risk (Adult)  Goal: Identify Related Risk Factors and Signs and Symptoms  Outcome: Ongoing (interventions implemented as appropriate)   03/31/18 0412   Skin Injury Risk (Adult)   Related Risk Factors (Skin Injury Risk) advanced age;mobility impaired;moisture

## 2018-05-14 ENCOUNTER — APPOINTMENT (OUTPATIENT)
Dept: GENERAL RADIOLOGY | Facility: HOSPITAL | Age: 83
End: 2018-05-14

## 2018-05-14 ENCOUNTER — HOSPITAL ENCOUNTER (INPATIENT)
Facility: HOSPITAL | Age: 83
LOS: 4 days | Discharge: NURSING, REHAB OR LONG-TERM CARE | End: 2018-05-18
Attending: EMERGENCY MEDICINE | Admitting: FAMILY MEDICINE

## 2018-05-14 ENCOUNTER — APPOINTMENT (OUTPATIENT)
Dept: CARDIOLOGY | Facility: HOSPITAL | Age: 83
End: 2018-05-14

## 2018-05-14 DIAGNOSIS — R50.9 FEVER OF UNKNOWN ORIGIN: ICD-10-CM

## 2018-05-14 DIAGNOSIS — A41.9 SEPSIS, DUE TO UNSPECIFIED ORGANISM: Primary | ICD-10-CM

## 2018-05-14 DIAGNOSIS — R41.0 CONFUSION: ICD-10-CM

## 2018-05-14 DIAGNOSIS — R50.9 ACUTE FEBRILE ILLNESS: ICD-10-CM

## 2018-05-14 DIAGNOSIS — R09.02 HYPOXEMIA: ICD-10-CM

## 2018-05-14 PROBLEM — R74.8 ELEVATED ALKALINE PHOSPHATASE LEVEL: Status: ACTIVE | Noted: 2018-05-14

## 2018-05-14 PROBLEM — R55 VASOVAGAL EPISODE: Status: ACTIVE | Noted: 2018-05-14

## 2018-05-14 PROBLEM — I35.8 AORTIC HEART MURMUR: Status: ACTIVE | Noted: 2018-05-14

## 2018-05-14 PROBLEM — E87.6 HYPOKALEMIA: Status: ACTIVE | Noted: 2018-05-14

## 2018-05-14 PROBLEM — J96.01 ACUTE HYPOXEMIC RESPIRATORY FAILURE (HCC): Status: ACTIVE | Noted: 2018-05-14

## 2018-05-14 PROBLEM — K59.00 CONSTIPATION: Status: ACTIVE | Noted: 2018-05-14

## 2018-05-14 LAB
ALBUMIN SERPL-MCNC: 4 G/DL (ref 3.2–4.8)
ALBUMIN/GLOB SERPL: 1.4 G/DL (ref 1.5–2.5)
ALP SERPL-CCNC: 142 U/L (ref 25–100)
ALT SERPL W P-5'-P-CCNC: 20 U/L (ref 7–40)
ANION GAP SERPL CALCULATED.3IONS-SCNC: 7 MMOL/L (ref 3–11)
AST SERPL-CCNC: 35 U/L (ref 0–33)
BASOPHILS # BLD AUTO: 0.02 10*3/MM3 (ref 0–0.2)
BASOPHILS NFR BLD AUTO: 0.5 % (ref 0–1)
BH CV ECHO MEAS - AI DEC SLOPE: 98.8 CM/SEC^2
BH CV ECHO MEAS - AI MAX PG: 15.1 MMHG
BH CV ECHO MEAS - AI MAX VEL: 194.1 CM/SEC
BH CV ECHO MEAS - AI P1/2T: 575.2 MSEC
BH CV ECHO MEAS - AO ROOT AREA (BSA CORRECTED): 1.7
BH CV ECHO MEAS - AO ROOT AREA: 5.7 CM^2
BH CV ECHO MEAS - AO ROOT DIAM: 2.7 CM
BH CV ECHO MEAS - BSA(HAYCOCK): 1.6 M^2
BH CV ECHO MEAS - BSA: 1.6 M^2
BH CV ECHO MEAS - BZI_BMI: 28.3 KILOGRAMS/M^2
BH CV ECHO MEAS - BZI_METRIC_HEIGHT: 149.9 CM
BH CV ECHO MEAS - BZI_METRIC_WEIGHT: 63.5 KG
BH CV ECHO MEAS - CONTRAST EF (2CH): 70 ML/M^2
BH CV ECHO MEAS - CONTRAST EF 4CH: 67.3 ML/M^2
BH CV ECHO MEAS - EDV(CUBED): 97.4 ML
BH CV ECHO MEAS - EDV(MOD-SP2): 70 ML
BH CV ECHO MEAS - EDV(MOD-SP4): 55 ML
BH CV ECHO MEAS - EDV(TEICH): 97.4 ML
BH CV ECHO MEAS - EF(CUBED): 65.8 %
BH CV ECHO MEAS - EF(MOD-SP2): 70 %
BH CV ECHO MEAS - EF(MOD-SP4): 67.3 %
BH CV ECHO MEAS - EF(TEICH): 57.3 %
BH CV ECHO MEAS - ESV(CUBED): 33.4 ML
BH CV ECHO MEAS - ESV(MOD-SP2): 21 ML
BH CV ECHO MEAS - ESV(MOD-SP4): 18 ML
BH CV ECHO MEAS - ESV(TEICH): 41.6 ML
BH CV ECHO MEAS - FS: 30 %
BH CV ECHO MEAS - IVS/LVPW: 1.1
BH CV ECHO MEAS - IVSD: 0.9 CM
BH CV ECHO MEAS - LA DIMENSION: 3.3 CM
BH CV ECHO MEAS - LA/AO: 1.2
BH CV ECHO MEAS - LAT PEAK E' VEL: 6.4 CM/SEC
BH CV ECHO MEAS - LV DIASTOLIC VOL/BSA (35-75): 34.7 ML/M^2
BH CV ECHO MEAS - LV MASS(C)D: 132.8 GRAMS
BH CV ECHO MEAS - LV MASS(C)DI: 83.8 GRAMS/M^2
BH CV ECHO MEAS - LV SYSTOLIC VOL/BSA (12-30): 11.4 ML/M^2
BH CV ECHO MEAS - LVIDD: 4.6 CM
BH CV ECHO MEAS - LVIDS: 3.2 CM
BH CV ECHO MEAS - LVLD AP2: 7.8 CM
BH CV ECHO MEAS - LVLD AP4: 6.9 CM
BH CV ECHO MEAS - LVLS AP2: 5.3 CM
BH CV ECHO MEAS - LVLS AP4: 5.2 CM
BH CV ECHO MEAS - LVPWD: 0.9 CM
BH CV ECHO MEAS - MED PEAK E' VEL: 6.9 CM/SEC
BH CV ECHO MEAS - MV A MAX VEL: 91.3 CM/SEC
BH CV ECHO MEAS - MV E MAX VEL: 70.1 CM/SEC
BH CV ECHO MEAS - MV E/A: 0.77
BH CV ECHO MEAS - PA ACC SLOPE: 489 CM/SEC^2
BH CV ECHO MEAS - PA ACC TIME: 0.15 SEC
BH CV ECHO MEAS - PA PR(ACCEL): 10.9 MMHG
BH CV ECHO MEAS - RAP SYSTOLE: 8 MMHG
BH CV ECHO MEAS - RVSP: 24 MMHG
BH CV ECHO MEAS - SI(CUBED): 40.4 ML/M^2
BH CV ECHO MEAS - SI(MOD-SP2): 30.9 ML/M^2
BH CV ECHO MEAS - SI(MOD-SP4): 23.3 ML/M^2
BH CV ECHO MEAS - SI(TEICH): 35.2 ML/M^2
BH CV ECHO MEAS - SV(CUBED): 64.1 ML
BH CV ECHO MEAS - SV(MOD-SP2): 49 ML
BH CV ECHO MEAS - SV(MOD-SP4): 37 ML
BH CV ECHO MEAS - SV(TEICH): 55.8 ML
BH CV ECHO MEAS - TAPSE (>1.6): 1.9 CM2
BH CV ECHO MEAS - TR MAX V: 16 MMHG
BH CV ECHO MEAS - TR MAX VEL: 202 CM/SEC
BH CV ECHO MEASUREMENTS AVERAGE E/E' RATIO: 10.54
BH CV VAS BP RIGHT ARM: NORMAL MMHG
BH CV XLRA - RV BASE: 3.1 CM
BH CV XLRA - RV LENGTH: 6.8 CM
BH CV XLRA - RV MID: 2.5 CM
BH CV XLRA - TDI S': 13.3 CM/SEC
BILIRUB SERPL-MCNC: 0.3 MG/DL (ref 0.3–1.2)
BILIRUB UR QL STRIP: NEGATIVE
BNP SERPL-MCNC: 66 PG/ML (ref 0–100)
BUN BLD-MCNC: 11 MG/DL (ref 9–23)
BUN/CREAT SERPL: 18.3 (ref 7–25)
CALCIUM SPEC-SCNC: 8.8 MG/DL (ref 8.7–10.4)
CHLORIDE SERPL-SCNC: 105 MMOL/L (ref 99–109)
CLARITY UR: CLEAR
CO2 SERPL-SCNC: 30 MMOL/L (ref 20–31)
COLOR UR: YELLOW
CREAT BLD-MCNC: 0.6 MG/DL (ref 0.6–1.3)
D-LACTATE SERPL-SCNC: 0.8 MMOL/L (ref 0.5–2)
DEPRECATED RDW RBC AUTO: 52.7 FL (ref 37–54)
EOSINOPHIL # BLD AUTO: 0.04 10*3/MM3 (ref 0–0.3)
EOSINOPHIL NFR BLD AUTO: 1 % (ref 0–3)
ERYTHROCYTE [DISTWIDTH] IN BLOOD BY AUTOMATED COUNT: 16.7 % (ref 11.3–14.5)
FLUAV SUBTYP SPEC NAA+PROBE: NOT DETECTED
FLUBV RNA ISLT QL NAA+PROBE: NOT DETECTED
GFR SERPL CREATININE-BSD FRML MDRD: 95 ML/MIN/1.73
GGT SERPL-CCNC: 25 U/L (ref 0–37)
GLOBULIN UR ELPH-MCNC: 2.8 GM/DL
GLUCOSE BLD-MCNC: 90 MG/DL (ref 70–100)
GLUCOSE UR STRIP-MCNC: NEGATIVE MG/DL
HCT VFR BLD AUTO: 37.5 % (ref 34.5–44)
HGB BLD-MCNC: 11.6 G/DL (ref 11.5–15.5)
HGB UR QL STRIP.AUTO: NEGATIVE
HOLD SPECIMEN: NORMAL
HOLD SPECIMEN: NORMAL
IMM GRANULOCYTES # BLD: 0.01 10*3/MM3 (ref 0–0.03)
IMM GRANULOCYTES NFR BLD: 0.3 % (ref 0–0.6)
KETONES UR QL STRIP: NEGATIVE
LEUKOCYTE ESTERASE UR QL STRIP.AUTO: NEGATIVE
LIPASE SERPL-CCNC: 27 U/L (ref 6–51)
LV EF 2D ECHO EST: 60 %
LYMPHOCYTES # BLD AUTO: 1.26 10*3/MM3 (ref 0.6–4.8)
LYMPHOCYTES NFR BLD AUTO: 31.7 % (ref 24–44)
MAXIMAL PREDICTED HEART RATE: 133 BPM
MCH RBC QN AUTO: 26.7 PG (ref 27–31)
MCHC RBC AUTO-ENTMCNC: 30.9 G/DL (ref 32–36)
MCV RBC AUTO: 86.4 FL (ref 80–99)
MONOCYTES # BLD AUTO: 0.33 10*3/MM3 (ref 0–1)
MONOCYTES NFR BLD AUTO: 8.3 % (ref 0–12)
NEUTROPHILS # BLD AUTO: 2.33 10*3/MM3 (ref 1.5–8.3)
NEUTROPHILS NFR BLD AUTO: 58.5 % (ref 41–71)
NITRITE UR QL STRIP: NEGATIVE
PH UR STRIP.AUTO: 8 [PH] (ref 5–8)
PLATELET # BLD AUTO: 252 10*3/MM3 (ref 150–450)
PMV BLD AUTO: 8.8 FL (ref 6–12)
POTASSIUM BLD-SCNC: 3.1 MMOL/L (ref 3.5–5.5)
PROT SERPL-MCNC: 6.8 G/DL (ref 5.7–8.2)
PROT UR QL STRIP: NEGATIVE
RBC # BLD AUTO: 4.34 10*6/MM3 (ref 3.89–5.14)
SODIUM BLD-SCNC: 142 MMOL/L (ref 132–146)
SP GR UR STRIP: 1.01 (ref 1–1.03)
STRESS TARGET HR: 113 BPM
TROPONIN I SERPL-MCNC: 0.02 NG/ML (ref 0–0.07)
TROPONIN I SERPL-MCNC: 0.02 NG/ML (ref 0–0.07)
UROBILINOGEN UR QL STRIP: NORMAL
WBC NRBC COR # BLD: 3.98 10*3/MM3 (ref 3.5–10.8)
WHOLE BLOOD HOLD SPECIMEN: NORMAL
WHOLE BLOOD HOLD SPECIMEN: NORMAL

## 2018-05-14 PROCEDURE — 99285 EMERGENCY DEPT VISIT HI MDM: CPT

## 2018-05-14 PROCEDURE — 83690 ASSAY OF LIPASE: CPT | Performed by: EMERGENCY MEDICINE

## 2018-05-14 PROCEDURE — 83605 ASSAY OF LACTIC ACID: CPT | Performed by: EMERGENCY MEDICINE

## 2018-05-14 PROCEDURE — 82977 ASSAY OF GGT: CPT | Performed by: NURSE PRACTITIONER

## 2018-05-14 PROCEDURE — 94760 N-INVAS EAR/PLS OXIMETRY 1: CPT

## 2018-05-14 PROCEDURE — 94799 UNLISTED PULMONARY SVC/PX: CPT

## 2018-05-14 PROCEDURE — 87633 RESP VIRUS 12-25 TARGETS: CPT | Performed by: NURSE PRACTITIONER

## 2018-05-14 PROCEDURE — 93306 TTE W/DOPPLER COMPLETE: CPT

## 2018-05-14 PROCEDURE — 71045 X-RAY EXAM CHEST 1 VIEW: CPT

## 2018-05-14 PROCEDURE — 87502 INFLUENZA DNA AMP PROBE: CPT | Performed by: EMERGENCY MEDICINE

## 2018-05-14 PROCEDURE — 99223 1ST HOSP IP/OBS HIGH 75: CPT | Performed by: FAMILY MEDICINE

## 2018-05-14 PROCEDURE — 25010000002 PIPERACILLIN SOD-TAZOBACTAM PER 1 G: Performed by: NURSE PRACTITIONER

## 2018-05-14 PROCEDURE — 25010000002 VANCOMYCIN 10 G RECONSTITUTED SOLUTION: Performed by: EMERGENCY MEDICINE

## 2018-05-14 PROCEDURE — 25010000002 ENOXAPARIN PER 10 MG

## 2018-05-14 PROCEDURE — 87040 BLOOD CULTURE FOR BACTERIA: CPT | Performed by: EMERGENCY MEDICINE

## 2018-05-14 PROCEDURE — 87899 AGENT NOS ASSAY W/OPTIC: CPT | Performed by: FAMILY MEDICINE

## 2018-05-14 PROCEDURE — 85025 COMPLETE CBC W/AUTO DIFF WBC: CPT | Performed by: EMERGENCY MEDICINE

## 2018-05-14 PROCEDURE — P9612 CATHETERIZE FOR URINE SPEC: HCPCS

## 2018-05-14 PROCEDURE — 83880 ASSAY OF NATRIURETIC PEPTIDE: CPT | Performed by: EMERGENCY MEDICINE

## 2018-05-14 PROCEDURE — 93306 TTE W/DOPPLER COMPLETE: CPT | Performed by: INTERNAL MEDICINE

## 2018-05-14 PROCEDURE — 93005 ELECTROCARDIOGRAM TRACING: CPT | Performed by: EMERGENCY MEDICINE

## 2018-05-14 PROCEDURE — 94640 AIRWAY INHALATION TREATMENT: CPT

## 2018-05-14 PROCEDURE — 80053 COMPREHEN METABOLIC PANEL: CPT | Performed by: EMERGENCY MEDICINE

## 2018-05-14 PROCEDURE — 25010000002 PIPERACILLIN SOD-TAZOBACTAM PER 1 G: Performed by: EMERGENCY MEDICINE

## 2018-05-14 PROCEDURE — 81003 URINALYSIS AUTO W/O SCOPE: CPT | Performed by: EMERGENCY MEDICINE

## 2018-05-14 PROCEDURE — 87449 NOS EACH ORGANISM AG IA: CPT | Performed by: FAMILY MEDICINE

## 2018-05-14 PROCEDURE — 84484 ASSAY OF TROPONIN QUANT: CPT

## 2018-05-14 RX ORDER — AMLODIPINE BESYLATE 10 MG/1
10 TABLET ORAL NIGHTLY
Status: DISCONTINUED | OUTPATIENT
Start: 2018-05-14 | End: 2018-05-18 | Stop reason: HOSPADM

## 2018-05-14 RX ORDER — MAGNESIUM SULFATE HEPTAHYDRATE 40 MG/ML
4 INJECTION, SOLUTION INTRAVENOUS AS NEEDED
Status: DISCONTINUED | OUTPATIENT
Start: 2018-05-14 | End: 2018-05-18 | Stop reason: HOSPADM

## 2018-05-14 RX ORDER — MAGNESIUM SULFATE HEPTAHYDRATE 40 MG/ML
2 INJECTION, SOLUTION INTRAVENOUS AS NEEDED
Status: DISCONTINUED | OUTPATIENT
Start: 2018-05-14 | End: 2018-05-18 | Stop reason: HOSPADM

## 2018-05-14 RX ORDER — MAGNESIUM CARB/ALUMINUM HYDROX 105-160MG
296 TABLET,CHEWABLE ORAL ONCE
Status: ON HOLD | COMMUNITY
Start: 2018-05-14 | End: 2018-05-15

## 2018-05-14 RX ORDER — SODIUM CHLORIDE 0.9 % (FLUSH) 0.9 %
10 SYRINGE (ML) INJECTION AS NEEDED
Status: DISCONTINUED | OUTPATIENT
Start: 2018-05-14 | End: 2018-05-18 | Stop reason: HOSPADM

## 2018-05-14 RX ORDER — MELOXICAM 7.5 MG/1
7.5 TABLET ORAL NIGHTLY
COMMUNITY

## 2018-05-14 RX ORDER — BISACODYL 10 MG
10 SUPPOSITORY, RECTAL RECTAL DAILY
Status: DISCONTINUED | OUTPATIENT
Start: 2018-05-15 | End: 2018-05-18 | Stop reason: HOSPADM

## 2018-05-14 RX ORDER — SODIUM CHLORIDE 0.9 % (FLUSH) 0.9 %
1-10 SYRINGE (ML) INJECTION AS NEEDED
Status: DISCONTINUED | OUTPATIENT
Start: 2018-05-14 | End: 2018-05-18 | Stop reason: HOSPADM

## 2018-05-14 RX ORDER — DONEPEZIL HYDROCHLORIDE 10 MG/1
10 TABLET, FILM COATED ORAL NIGHTLY
Status: DISCONTINUED | OUTPATIENT
Start: 2018-05-14 | End: 2018-05-18 | Stop reason: HOSPADM

## 2018-05-14 RX ORDER — IBUPROFEN 200 MG
200 TABLET ORAL EVERY 6 HOURS PRN
COMMUNITY
End: 2018-05-18 | Stop reason: HOSPADM

## 2018-05-14 RX ORDER — ACETAMINOPHEN 325 MG/1
650 TABLET ORAL EVERY 4 HOURS PRN
Status: DISCONTINUED | OUTPATIENT
Start: 2018-05-14 | End: 2018-05-18 | Stop reason: HOSPADM

## 2018-05-14 RX ORDER — KETOCONAZOLE 20 MG/ML
1 SHAMPOO TOPICAL 2 TIMES WEEKLY
COMMUNITY
End: 2018-05-18 | Stop reason: HOSPADM

## 2018-05-14 RX ORDER — SACCHAROMYCES BOULARDII 250 MG
250 CAPSULE ORAL 2 TIMES DAILY
COMMUNITY
End: 2020-02-05

## 2018-05-14 RX ORDER — IPRATROPIUM BROMIDE AND ALBUTEROL SULFATE 2.5; .5 MG/3ML; MG/3ML
3 SOLUTION RESPIRATORY (INHALATION) EVERY 4 HOURS PRN
Status: DISCONTINUED | OUTPATIENT
Start: 2018-05-14 | End: 2018-05-15

## 2018-05-14 RX ORDER — LORATADINE 10 MG/1
10 TABLET ORAL DAILY
COMMUNITY
End: 2018-05-18 | Stop reason: HOSPADM

## 2018-05-14 RX ORDER — VANCOMYCIN HYDROCHLORIDE 1 G/200ML
1000 INJECTION, SOLUTION INTRAVENOUS
Status: DISCONTINUED | OUTPATIENT
Start: 2018-05-15 | End: 2018-05-17

## 2018-05-14 RX ORDER — CLOPIDOGREL BISULFATE 75 MG/1
75 TABLET ORAL DAILY
Status: DISCONTINUED | OUTPATIENT
Start: 2018-05-15 | End: 2018-05-18 | Stop reason: HOSPADM

## 2018-05-14 RX ORDER — POTASSIUM CHLORIDE 750 MG/1
10 TABLET, EXTENDED RELEASE ORAL 2 TIMES DAILY
COMMUNITY
End: 2018-05-18 | Stop reason: HOSPADM

## 2018-05-14 RX ORDER — MULTIPLE VITAMINS W/ MINERALS TAB 9MG-400MCG
1 TAB ORAL DAILY
Status: DISCONTINUED | OUTPATIENT
Start: 2018-05-15 | End: 2018-05-18 | Stop reason: HOSPADM

## 2018-05-14 RX ORDER — MIRTAZAPINE 15 MG/1
30 TABLET, FILM COATED ORAL NIGHTLY
Status: DISCONTINUED | OUTPATIENT
Start: 2018-05-14 | End: 2018-05-18 | Stop reason: HOSPADM

## 2018-05-14 RX ORDER — IPRATROPIUM BROMIDE AND ALBUTEROL SULFATE 2.5; .5 MG/3ML; MG/3ML
3 SOLUTION RESPIRATORY (INHALATION)
Status: DISCONTINUED | OUTPATIENT
Start: 2018-05-14 | End: 2018-05-15 | Stop reason: SDUPTHER

## 2018-05-14 RX ORDER — SENNA AND DOCUSATE SODIUM 50; 8.6 MG/1; MG/1
2 TABLET, FILM COATED ORAL 2 TIMES DAILY
Status: DISCONTINUED | OUTPATIENT
Start: 2018-05-14 | End: 2018-05-18 | Stop reason: HOSPADM

## 2018-05-14 RX ORDER — TERAZOSIN 2 MG/1
2 CAPSULE ORAL NIGHTLY
Status: DISCONTINUED | OUTPATIENT
Start: 2018-05-14 | End: 2018-05-18 | Stop reason: HOSPADM

## 2018-05-14 RX ORDER — BISACODYL 10 MG
10 SUPPOSITORY, RECTAL RECTAL AS NEEDED
COMMUNITY
End: 2019-02-13 | Stop reason: HOSPADM

## 2018-05-14 RX ORDER — PANTOPRAZOLE SODIUM 40 MG/1
40 TABLET, DELAYED RELEASE ORAL DAILY
Status: DISCONTINUED | OUTPATIENT
Start: 2018-05-15 | End: 2018-05-18 | Stop reason: HOSPADM

## 2018-05-14 RX ORDER — BISACODYL 5 MG/1
5 TABLET, DELAYED RELEASE ORAL DAILY PRN
COMMUNITY

## 2018-05-14 RX ORDER — ASCORBIC ACID 500 MG
500 TABLET ORAL DAILY
COMMUNITY
End: 2020-02-05

## 2018-05-14 RX ORDER — POTASSIUM CHLORIDE 750 MG/1
40 CAPSULE, EXTENDED RELEASE ORAL AS NEEDED
Status: DISCONTINUED | OUTPATIENT
Start: 2018-05-14 | End: 2018-05-18 | Stop reason: HOSPADM

## 2018-05-14 RX ORDER — FERROUS SULFATE 325(65) MG
325 TABLET ORAL
COMMUNITY

## 2018-05-14 RX ORDER — LISINOPRIL 10 MG/1
10 TABLET ORAL DAILY
Status: ON HOLD | COMMUNITY
End: 2018-05-16

## 2018-05-14 RX ORDER — POTASSIUM CHLORIDE 1.5 G/1.77G
40 POWDER, FOR SOLUTION ORAL AS NEEDED
Status: DISCONTINUED | OUTPATIENT
Start: 2018-05-14 | End: 2018-05-18 | Stop reason: HOSPADM

## 2018-05-14 RX ORDER — TRAZODONE HYDROCHLORIDE 50 MG/1
12.5 TABLET ORAL NIGHTLY
COMMUNITY
End: 2019-02-06

## 2018-05-14 RX ORDER — ISOSORBIDE MONONITRATE 30 MG/1
30 TABLET, EXTENDED RELEASE ORAL DAILY
COMMUNITY

## 2018-05-14 RX ORDER — ACETAMINOPHEN 500 MG
1000 TABLET ORAL ONCE
Status: COMPLETED | OUTPATIENT
Start: 2018-05-14 | End: 2018-05-14

## 2018-05-14 RX ORDER — FERROUS SULFATE 325(65) MG
325 TABLET ORAL
Status: DISCONTINUED | OUTPATIENT
Start: 2018-05-15 | End: 2018-05-18 | Stop reason: HOSPADM

## 2018-05-14 RX ORDER — ISOSORBIDE MONONITRATE 30 MG/1
30 TABLET, EXTENDED RELEASE ORAL DAILY
Status: DISCONTINUED | OUTPATIENT
Start: 2018-05-15 | End: 2018-05-18 | Stop reason: HOSPADM

## 2018-05-14 RX ORDER — TERAZOSIN 2 MG/1
2 CAPSULE ORAL NIGHTLY
COMMUNITY

## 2018-05-14 RX ORDER — MEMANTINE HYDROCHLORIDE 10 MG/1
10 TABLET ORAL EVERY 12 HOURS SCHEDULED
Status: DISCONTINUED | OUTPATIENT
Start: 2018-05-14 | End: 2018-05-18 | Stop reason: HOSPADM

## 2018-05-14 RX ORDER — MIRTAZAPINE 30 MG/1
30 TABLET, FILM COATED ORAL NIGHTLY
COMMUNITY
End: 2019-02-06

## 2018-05-14 RX ORDER — PANTOPRAZOLE SODIUM 40 MG/1
40 TABLET, DELAYED RELEASE ORAL DAILY
COMMUNITY
End: 2019-02-13 | Stop reason: HOSPADM

## 2018-05-14 RX ORDER — CLONIDINE HYDROCHLORIDE 0.1 MG/1
0.1 TABLET ORAL 2 TIMES DAILY
COMMUNITY
End: 2018-05-18 | Stop reason: HOSPADM

## 2018-05-14 RX ORDER — ALPRAZOLAM 0.25 MG/1
0.25 TABLET ORAL 2 TIMES DAILY PRN
Status: DISCONTINUED | OUTPATIENT
Start: 2018-05-14 | End: 2018-05-18 | Stop reason: HOSPADM

## 2018-05-14 RX ORDER — SACCHAROMYCES BOULARDII 250 MG
250 CAPSULE ORAL 2 TIMES DAILY
Status: DISCONTINUED | OUTPATIENT
Start: 2018-05-14 | End: 2018-05-18 | Stop reason: HOSPADM

## 2018-05-14 RX ORDER — ONDANSETRON 2 MG/ML
4 INJECTION INTRAMUSCULAR; INTRAVENOUS EVERY 6 HOURS PRN
Status: DISCONTINUED | OUTPATIENT
Start: 2018-05-14 | End: 2018-05-18 | Stop reason: HOSPADM

## 2018-05-14 RX ORDER — CHOLECALCIFEROL (VITAMIN D3) 125 MCG
5 CAPSULE ORAL NIGHTLY
COMMUNITY
End: 2019-02-06

## 2018-05-14 RX ADMIN — VANCOMYCIN HYDROCHLORIDE 1250 MG: 10 INJECTION, POWDER, LYOPHILIZED, FOR SOLUTION INTRAVENOUS at 12:18

## 2018-05-14 RX ADMIN — NITROGLYCERIN 1 INCH: 20 OINTMENT TOPICAL at 11:41

## 2018-05-14 RX ADMIN — IPRATROPIUM BROMIDE AND ALBUTEROL SULFATE 3 ML: 2.5; .5 SOLUTION RESPIRATORY (INHALATION) at 19:48

## 2018-05-14 RX ADMIN — TAZOBACTAM SODIUM AND PIPERACILLIN SODIUM 4.5 G: 500; 4 INJECTION, SOLUTION INTRAVENOUS at 22:02

## 2018-05-14 RX ADMIN — IPRATROPIUM BROMIDE AND ALBUTEROL SULFATE 3 ML: 2.5; .5 SOLUTION RESPIRATORY (INHALATION) at 15:33

## 2018-05-14 RX ADMIN — ENOXAPARIN SODIUM 30 MG: 30 INJECTION SUBCUTANEOUS at 22:02

## 2018-05-14 RX ADMIN — Medication 2 TABLET: at 22:13

## 2018-05-14 RX ADMIN — TAZOBACTAM SODIUM AND PIPERACILLIN SODIUM 4.5 G: 500; 4 INJECTION, SOLUTION INTRAVENOUS at 11:41

## 2018-05-14 RX ADMIN — ACETAMINOPHEN 650 MG: 325 TABLET, FILM COATED ORAL at 22:13

## 2018-05-14 RX ADMIN — ACETAMINOPHEN 1000 MG: 500 TABLET, FILM COATED ORAL at 11:40

## 2018-05-15 ENCOUNTER — APPOINTMENT (OUTPATIENT)
Dept: GENERAL RADIOLOGY | Facility: HOSPITAL | Age: 83
End: 2018-05-15

## 2018-05-15 LAB
ALBUMIN SERPL-MCNC: 3.3 G/DL (ref 3.2–4.8)
ALBUMIN/GLOB SERPL: 1.4 G/DL (ref 1.5–2.5)
ALP SERPL-CCNC: 111 U/L (ref 25–100)
ALT SERPL W P-5'-P-CCNC: 17 U/L (ref 7–40)
ANION GAP SERPL CALCULATED.3IONS-SCNC: 7 MMOL/L (ref 3–11)
AST SERPL-CCNC: 26 U/L (ref 0–33)
BASOPHILS # BLD AUTO: 0.01 10*3/MM3 (ref 0–0.2)
BASOPHILS NFR BLD AUTO: 0.3 % (ref 0–1)
BILIRUB SERPL-MCNC: 0.2 MG/DL (ref 0.3–1.2)
BUN BLD-MCNC: 13 MG/DL (ref 9–23)
BUN/CREAT SERPL: 18.6 (ref 7–25)
CALCIUM SPEC-SCNC: 8.1 MG/DL (ref 8.7–10.4)
CHLORIDE SERPL-SCNC: 106 MMOL/L (ref 99–109)
CO2 SERPL-SCNC: 30 MMOL/L (ref 20–31)
CREAT BLD-MCNC: 0.7 MG/DL (ref 0.6–1.3)
DEPRECATED RDW RBC AUTO: 53.2 FL (ref 37–54)
EOSINOPHIL # BLD AUTO: 0.18 10*3/MM3 (ref 0–0.3)
EOSINOPHIL NFR BLD AUTO: 4.6 % (ref 0–3)
ERYTHROCYTE [DISTWIDTH] IN BLOOD BY AUTOMATED COUNT: 16.6 % (ref 11.3–14.5)
GFR SERPL CREATININE-BSD FRML MDRD: 79 ML/MIN/1.73
GLOBULIN UR ELPH-MCNC: 2.3 GM/DL
GLUCOSE BLD-MCNC: 89 MG/DL (ref 70–100)
HCT VFR BLD AUTO: 33.3 % (ref 34.5–44)
HGB BLD-MCNC: 10.2 G/DL (ref 11.5–15.5)
IMM GRANULOCYTES # BLD: 0 10*3/MM3 (ref 0–0.03)
IMM GRANULOCYTES NFR BLD: 0 % (ref 0–0.6)
LYMPHOCYTES # BLD AUTO: 1.64 10*3/MM3 (ref 0.6–4.8)
LYMPHOCYTES NFR BLD AUTO: 41.7 % (ref 24–44)
MCH RBC QN AUTO: 26.5 PG (ref 27–31)
MCHC RBC AUTO-ENTMCNC: 30.6 G/DL (ref 32–36)
MCV RBC AUTO: 86.5 FL (ref 80–99)
MONOCYTES # BLD AUTO: 0.5 10*3/MM3 (ref 0–1)
MONOCYTES NFR BLD AUTO: 12.7 % (ref 0–12)
NEUTROPHILS # BLD AUTO: 1.6 10*3/MM3 (ref 1.5–8.3)
NEUTROPHILS NFR BLD AUTO: 40.7 % (ref 41–71)
PLATELET # BLD AUTO: 229 10*3/MM3 (ref 150–450)
PMV BLD AUTO: 8.7 FL (ref 6–12)
POTASSIUM BLD-SCNC: 2.9 MMOL/L (ref 3.5–5.5)
POTASSIUM BLD-SCNC: 4.3 MMOL/L (ref 3.5–5.5)
PROT SERPL-MCNC: 5.6 G/DL (ref 5.7–8.2)
RBC # BLD AUTO: 3.85 10*6/MM3 (ref 3.89–5.14)
SODIUM BLD-SCNC: 143 MMOL/L (ref 132–146)
WBC NRBC COR # BLD: 3.93 10*3/MM3 (ref 3.5–10.8)

## 2018-05-15 PROCEDURE — 94799 UNLISTED PULMONARY SVC/PX: CPT

## 2018-05-15 PROCEDURE — 80053 COMPREHEN METABOLIC PANEL: CPT | Performed by: NURSE PRACTITIONER

## 2018-05-15 PROCEDURE — 25010000002 VANCOMYCIN PER 500 MG

## 2018-05-15 PROCEDURE — 25010000002 PIPERACILLIN SOD-TAZOBACTAM PER 1 G: Performed by: NURSE PRACTITIONER

## 2018-05-15 PROCEDURE — 94640 AIRWAY INHALATION TREATMENT: CPT

## 2018-05-15 PROCEDURE — 99233 SBSQ HOSP IP/OBS HIGH 50: CPT | Performed by: FAMILY MEDICINE

## 2018-05-15 PROCEDURE — 71045 X-RAY EXAM CHEST 1 VIEW: CPT

## 2018-05-15 PROCEDURE — 25010000002 ENOXAPARIN PER 10 MG: Performed by: FAMILY MEDICINE

## 2018-05-15 PROCEDURE — 85025 COMPLETE CBC W/AUTO DIFF WBC: CPT | Performed by: NURSE PRACTITIONER

## 2018-05-15 PROCEDURE — 84132 ASSAY OF SERUM POTASSIUM: CPT | Performed by: FAMILY MEDICINE

## 2018-05-15 RX ORDER — CETIRIZINE HYDROCHLORIDE, PSEUDOEPHEDRINE HYDROCHLORIDE 5; 120 MG/1; MG/1
1 TABLET, FILM COATED, EXTENDED RELEASE ORAL 2 TIMES DAILY
Status: DISCONTINUED | OUTPATIENT
Start: 2018-05-15 | End: 2018-05-18 | Stop reason: HOSPADM

## 2018-05-15 RX ORDER — IPRATROPIUM BROMIDE AND ALBUTEROL SULFATE 2.5; .5 MG/3ML; MG/3ML
3 SOLUTION RESPIRATORY (INHALATION)
Status: DISCONTINUED | OUTPATIENT
Start: 2018-05-15 | End: 2018-05-18 | Stop reason: HOSPADM

## 2018-05-15 RX ADMIN — VANCOMYCIN HYDROCHLORIDE 1000 MG: 1 INJECTION, SOLUTION INTRAVENOUS at 05:00

## 2018-05-15 RX ADMIN — CLOPIDOGREL BISULFATE 75 MG: 75 TABLET ORAL at 09:54

## 2018-05-15 RX ADMIN — MULTIPLE VITAMINS W/ MINERALS TAB 1 TABLET: TAB ORAL at 09:52

## 2018-05-15 RX ADMIN — AMLODIPINE BESYLATE 10 MG: 10 TABLET ORAL at 21:43

## 2018-05-15 RX ADMIN — MIRTAZAPINE 30 MG: 15 TABLET, FILM COATED ORAL at 21:43

## 2018-05-15 RX ADMIN — TERAZOSIN HYDROCHLORIDE ANHYDROUS 2 MG: 2 CAPSULE ORAL at 21:43

## 2018-05-15 RX ADMIN — MEMANTINE 10 MG: 10 TABLET ORAL at 09:53

## 2018-05-15 RX ADMIN — TAZOBACTAM SODIUM AND PIPERACILLIN SODIUM 4.5 G: 500; 4 INJECTION, SOLUTION INTRAVENOUS at 05:00

## 2018-05-15 RX ADMIN — DONEPEZIL HYDROCHLORIDE 10 MG: 10 TABLET, FILM COATED ORAL at 00:41

## 2018-05-15 RX ADMIN — BISACODYL 10 MG: 10 SUPPOSITORY RECTAL at 09:54

## 2018-05-15 RX ADMIN — Medication 2 TABLET: at 21:44

## 2018-05-15 RX ADMIN — AMLODIPINE BESYLATE 10 MG: 10 TABLET ORAL at 00:41

## 2018-05-15 RX ADMIN — Medication 250 MG: at 09:54

## 2018-05-15 RX ADMIN — ISOSORBIDE MONONITRATE 30 MG: 30 TABLET, EXTENDED RELEASE ORAL at 09:53

## 2018-05-15 RX ADMIN — TERAZOSIN HYDROCHLORIDE ANHYDROUS 2 MG: 2 CAPSULE ORAL at 00:40

## 2018-05-15 RX ADMIN — TAZOBACTAM SODIUM AND PIPERACILLIN SODIUM 4.5 G: 500; 4 INJECTION, SOLUTION INTRAVENOUS at 13:21

## 2018-05-15 RX ADMIN — POTASSIUM CHLORIDE 40 MEQ: 750 CAPSULE, EXTENDED RELEASE ORAL at 18:32

## 2018-05-15 RX ADMIN — Medication 2 TABLET: at 09:54

## 2018-05-15 RX ADMIN — MEMANTINE 10 MG: 10 TABLET ORAL at 00:41

## 2018-05-15 RX ADMIN — MEMANTINE 10 MG: 10 TABLET ORAL at 21:44

## 2018-05-15 RX ADMIN — Medication 250 MG: at 21:43

## 2018-05-15 RX ADMIN — POTASSIUM CHLORIDE 40 MEQ: 750 CAPSULE, EXTENDED RELEASE ORAL at 09:54

## 2018-05-15 RX ADMIN — MIRTAZAPINE 30 MG: 15 TABLET, FILM COATED ORAL at 00:48

## 2018-05-15 RX ADMIN — IPRATROPIUM BROMIDE AND ALBUTEROL SULFATE 3 ML: 2.5; .5 SOLUTION RESPIRATORY (INHALATION) at 07:52

## 2018-05-15 RX ADMIN — PANTOPRAZOLE SODIUM 40 MG: 40 TABLET, DELAYED RELEASE ORAL at 05:04

## 2018-05-15 RX ADMIN — CETIRIZINE HYDROCHLORIDE AND PSEUDOEPHEDRINE HYDROCHLORIDE 1 TABLET: 5; 120 TABLET, FILM COATED, EXTENDED RELEASE ORAL at 18:32

## 2018-05-15 RX ADMIN — TAZOBACTAM SODIUM AND PIPERACILLIN SODIUM 4.5 G: 500; 4 INJECTION, SOLUTION INTRAVENOUS at 21:42

## 2018-05-15 RX ADMIN — IPRATROPIUM BROMIDE AND ALBUTEROL SULFATE 3 ML: 2.5; .5 SOLUTION RESPIRATORY (INHALATION) at 18:58

## 2018-05-15 RX ADMIN — IPRATROPIUM BROMIDE AND ALBUTEROL SULFATE 3 ML: 2.5; .5 SOLUTION RESPIRATORY (INHALATION) at 12:54

## 2018-05-15 RX ADMIN — FERROUS SULFATE TAB 325 MG (65 MG ELEMENTAL FE) 325 MG: 325 (65 FE) TAB at 09:53

## 2018-05-15 RX ADMIN — Medication 250 MG: at 00:40

## 2018-05-15 RX ADMIN — ENOXAPARIN SODIUM 40 MG: 40 INJECTION SUBCUTANEOUS at 21:43

## 2018-05-15 RX ADMIN — POTASSIUM CHLORIDE 40 MEQ: 750 CAPSULE, EXTENDED RELEASE ORAL at 13:21

## 2018-05-15 RX ADMIN — DONEPEZIL HYDROCHLORIDE 10 MG: 10 TABLET, FILM COATED ORAL at 21:43

## 2018-05-16 LAB
ANION GAP SERPL CALCULATED.3IONS-SCNC: 7 MMOL/L (ref 3–11)
BACTERIA FLD CULT: NORMAL
BUN BLD-MCNC: 11 MG/DL (ref 9–23)
BUN/CREAT SERPL: 18.3 (ref 7–25)
CALCIUM SPEC-SCNC: 8.4 MG/DL (ref 8.7–10.4)
CHLORIDE SERPL-SCNC: 106 MMOL/L (ref 99–109)
CO2 SERPL-SCNC: 27 MMOL/L (ref 20–31)
CREAT BLD-MCNC: 0.6 MG/DL (ref 0.6–1.3)
GFR SERPL CREATININE-BSD FRML MDRD: 95 ML/MIN/1.73
GLUCOSE BLD-MCNC: 143 MG/DL (ref 70–100)
L PNEUMO1 AG UR QL IA: NEGATIVE
Lab: NORMAL
ORGANISM ID: NORMAL
POTASSIUM BLD-SCNC: 3.8 MMOL/L (ref 3.5–5.5)
S PNEUM AG SPEC QL LA: NEGATIVE
SODIUM BLD-SCNC: 140 MMOL/L (ref 132–146)
SPECIMEN SOURCE: NORMAL
VANCOMYCIN TROUGH SERPL-MCNC: 10.2 MCG/ML (ref 10–20)

## 2018-05-16 PROCEDURE — 25010000002 ENOXAPARIN PER 10 MG: Performed by: FAMILY MEDICINE

## 2018-05-16 PROCEDURE — 80202 ASSAY OF VANCOMYCIN: CPT

## 2018-05-16 PROCEDURE — 94640 AIRWAY INHALATION TREATMENT: CPT

## 2018-05-16 PROCEDURE — 25010000002 VANCOMYCIN PER 500 MG

## 2018-05-16 PROCEDURE — 94799 UNLISTED PULMONARY SVC/PX: CPT

## 2018-05-16 PROCEDURE — 80048 BASIC METABOLIC PNL TOTAL CA: CPT

## 2018-05-16 PROCEDURE — 99232 SBSQ HOSP IP/OBS MODERATE 35: CPT | Performed by: FAMILY MEDICINE

## 2018-05-16 PROCEDURE — 25010000002 PIPERACILLIN SOD-TAZOBACTAM PER 1 G: Performed by: NURSE PRACTITIONER

## 2018-05-16 RX ORDER — MELOXICAM 7.5 MG/1
7.5 TABLET ORAL NIGHTLY
Status: DISCONTINUED | OUTPATIENT
Start: 2018-05-16 | End: 2018-05-18 | Stop reason: HOSPADM

## 2018-05-16 RX ORDER — TRAMADOL HYDROCHLORIDE 50 MG/1
50 TABLET ORAL 2 TIMES DAILY
Status: DISCONTINUED | OUTPATIENT
Start: 2018-05-16 | End: 2018-05-18 | Stop reason: HOSPADM

## 2018-05-16 RX ORDER — GABAPENTIN 100 MG/1
100 CAPSULE ORAL 3 TIMES DAILY
Status: DISCONTINUED | OUTPATIENT
Start: 2018-05-16 | End: 2018-05-18 | Stop reason: HOSPADM

## 2018-05-16 RX ORDER — ACETAMINOPHEN 325 MG/1
650 TABLET ORAL EVERY 6 HOURS PRN
Status: DISCONTINUED | OUTPATIENT
Start: 2018-05-16 | End: 2018-05-18 | Stop reason: HOSPADM

## 2018-05-16 RX ORDER — SUCRALFATE 1 G/1
1 TABLET ORAL 4 TIMES DAILY
Status: DISCONTINUED | OUTPATIENT
Start: 2018-05-16 | End: 2018-05-18 | Stop reason: HOSPADM

## 2018-05-16 RX ORDER — TRAZODONE HYDROCHLORIDE 50 MG/1
25 TABLET ORAL NIGHTLY
Status: DISCONTINUED | OUTPATIENT
Start: 2018-05-16 | End: 2018-05-18 | Stop reason: HOSPADM

## 2018-05-16 RX ADMIN — MEMANTINE 10 MG: 10 TABLET ORAL at 22:00

## 2018-05-16 RX ADMIN — BISACODYL 10 MG: 10 SUPPOSITORY RECTAL at 08:59

## 2018-05-16 RX ADMIN — TRAZODONE HYDROCHLORIDE 25 MG: 50 TABLET, FILM COATED ORAL at 22:00

## 2018-05-16 RX ADMIN — ISOSORBIDE MONONITRATE 30 MG: 30 TABLET, EXTENDED RELEASE ORAL at 08:59

## 2018-05-16 RX ADMIN — Medication 250 MG: at 09:00

## 2018-05-16 RX ADMIN — MEMANTINE 10 MG: 10 TABLET ORAL at 09:00

## 2018-05-16 RX ADMIN — GABAPENTIN 100 MG: 100 CAPSULE ORAL at 11:53

## 2018-05-16 RX ADMIN — IPRATROPIUM BROMIDE AND ALBUTEROL SULFATE 3 ML: 2.5; .5 SOLUTION RESPIRATORY (INHALATION) at 19:36

## 2018-05-16 RX ADMIN — MIRTAZAPINE 30 MG: 15 TABLET, FILM COATED ORAL at 22:00

## 2018-05-16 RX ADMIN — IPRATROPIUM BROMIDE AND ALBUTEROL SULFATE 3 ML: 2.5; .5 SOLUTION RESPIRATORY (INHALATION) at 13:21

## 2018-05-16 RX ADMIN — VANCOMYCIN HYDROCHLORIDE 1000 MG: 1 INJECTION, SOLUTION INTRAVENOUS at 03:45

## 2018-05-16 RX ADMIN — VANCOMYCIN HYDROCHLORIDE 1000 MG: 1 INJECTION, SOLUTION INTRAVENOUS at 19:42

## 2018-05-16 RX ADMIN — TAZOBACTAM SODIUM AND PIPERACILLIN SODIUM 4.5 G: 500; 4 INJECTION, SOLUTION INTRAVENOUS at 03:45

## 2018-05-16 RX ADMIN — TERAZOSIN HYDROCHLORIDE ANHYDROUS 2 MG: 2 CAPSULE ORAL at 22:00

## 2018-05-16 RX ADMIN — ENOXAPARIN SODIUM 40 MG: 40 INJECTION SUBCUTANEOUS at 22:00

## 2018-05-16 RX ADMIN — PANTOPRAZOLE SODIUM 40 MG: 40 TABLET, DELAYED RELEASE ORAL at 04:58

## 2018-05-16 RX ADMIN — MELOXICAM 7.5 MG: 7.5 TABLET ORAL at 22:00

## 2018-05-16 RX ADMIN — CETIRIZINE HYDROCHLORIDE AND PSEUDOEPHEDRINE HYDROCHLORIDE 1 TABLET: 5; 120 TABLET, FILM COATED, EXTENDED RELEASE ORAL at 22:00

## 2018-05-16 RX ADMIN — CLOPIDOGREL BISULFATE 75 MG: 75 TABLET ORAL at 09:00

## 2018-05-16 RX ADMIN — TRAMADOL HYDROCHLORIDE 50 MG: 50 TABLET, COATED ORAL at 11:54

## 2018-05-16 RX ADMIN — Medication 2 TABLET: at 22:00

## 2018-05-16 RX ADMIN — SUCRALFATE 1 G: 1 TABLET ORAL at 18:23

## 2018-05-16 RX ADMIN — Medication 2 TABLET: at 08:59

## 2018-05-16 RX ADMIN — IPRATROPIUM BROMIDE AND ALBUTEROL SULFATE 3 ML: 2.5; .5 SOLUTION RESPIRATORY (INHALATION) at 00:00

## 2018-05-16 RX ADMIN — GABAPENTIN 100 MG: 100 CAPSULE ORAL at 22:00

## 2018-05-16 RX ADMIN — GABAPENTIN 100 MG: 100 CAPSULE ORAL at 16:56

## 2018-05-16 RX ADMIN — IPRATROPIUM BROMIDE AND ALBUTEROL SULFATE 3 ML: 2.5; .5 SOLUTION RESPIRATORY (INHALATION) at 07:16

## 2018-05-16 RX ADMIN — TAZOBACTAM SODIUM AND PIPERACILLIN SODIUM 4.5 G: 500; 4 INJECTION, SOLUTION INTRAVENOUS at 11:53

## 2018-05-16 RX ADMIN — SUCRALFATE 1 G: 1 TABLET ORAL at 22:00

## 2018-05-16 RX ADMIN — MULTIPLE VITAMINS W/ MINERALS TAB 1 TABLET: TAB ORAL at 08:59

## 2018-05-16 RX ADMIN — TAZOBACTAM SODIUM AND PIPERACILLIN SODIUM 4.5 G: 500; 4 INJECTION, SOLUTION INTRAVENOUS at 22:00

## 2018-05-16 RX ADMIN — CETIRIZINE HYDROCHLORIDE AND PSEUDOEPHEDRINE HYDROCHLORIDE 1 TABLET: 5; 120 TABLET, FILM COATED, EXTENDED RELEASE ORAL at 08:59

## 2018-05-16 RX ADMIN — SUCRALFATE 1 G: 1 TABLET ORAL at 11:54

## 2018-05-16 RX ADMIN — FERROUS SULFATE TAB 325 MG (65 MG ELEMENTAL FE) 325 MG: 325 (65 FE) TAB at 09:00

## 2018-05-16 RX ADMIN — TRAMADOL HYDROCHLORIDE 50 MG: 50 TABLET, COATED ORAL at 22:00

## 2018-05-16 RX ADMIN — AMLODIPINE BESYLATE 10 MG: 10 TABLET ORAL at 22:00

## 2018-05-16 RX ADMIN — DONEPEZIL HYDROCHLORIDE 10 MG: 10 TABLET, FILM COATED ORAL at 22:00

## 2018-05-17 LAB
FLUAV RNA SPEC QL NAA+PROBE: NEGATIVE
FLUBV RNA SPEC QL NAA+PROBE: NEGATIVE
HADV DNA SPEC QL NAA+PROBE: NEGATIVE
HMPV RNA SPEC QL NAA+PROBE: NEGATIVE
HPIV1 RNA SPEC QL NAA+PROBE: NEGATIVE
HPIV2 SPEC QL CULT: NEGATIVE
HPIV3 SPEC QL CULT: POSITIVE
RHINOVIRUS RNA SPEC QL NAA+PROBE: NEGATIVE
RSV A: NEGATIVE
RSV B RNA SPEC QL NAA+PROBE: NEGATIVE

## 2018-05-17 PROCEDURE — 25010000002 ENOXAPARIN PER 10 MG: Performed by: FAMILY MEDICINE

## 2018-05-17 PROCEDURE — 99232 SBSQ HOSP IP/OBS MODERATE 35: CPT | Performed by: FAMILY MEDICINE

## 2018-05-17 PROCEDURE — 94799 UNLISTED PULMONARY SVC/PX: CPT

## 2018-05-17 PROCEDURE — 94760 N-INVAS EAR/PLS OXIMETRY 1: CPT

## 2018-05-17 PROCEDURE — 94640 AIRWAY INHALATION TREATMENT: CPT

## 2018-05-17 PROCEDURE — 25010000002 PIPERACILLIN SOD-TAZOBACTAM PER 1 G: Performed by: NURSE PRACTITIONER

## 2018-05-17 RX ORDER — AMOXICILLIN AND CLAVULANATE POTASSIUM 875; 125 MG/1; MG/1
1 TABLET, FILM COATED ORAL EVERY 12 HOURS SCHEDULED
Status: DISCONTINUED | OUTPATIENT
Start: 2018-05-17 | End: 2018-05-18 | Stop reason: HOSPADM

## 2018-05-17 RX ADMIN — SUCRALFATE 1 G: 1 TABLET ORAL at 08:49

## 2018-05-17 RX ADMIN — AMOXICILLIN AND CLAVULANATE POTASSIUM 1 TABLET: 875; 125 TABLET, FILM COATED ORAL at 20:49

## 2018-05-17 RX ADMIN — CLOPIDOGREL BISULFATE 75 MG: 75 TABLET ORAL at 08:49

## 2018-05-17 RX ADMIN — Medication 2 TABLET: at 08:49

## 2018-05-17 RX ADMIN — GABAPENTIN 100 MG: 100 CAPSULE ORAL at 16:40

## 2018-05-17 RX ADMIN — POLYETHYLENE GLYCOL (3350) 17 G: 17 POWDER, FOR SOLUTION ORAL at 08:49

## 2018-05-17 RX ADMIN — DONEPEZIL HYDROCHLORIDE 10 MG: 10 TABLET, FILM COATED ORAL at 20:49

## 2018-05-17 RX ADMIN — MULTIPLE VITAMINS W/ MINERALS TAB 1 TABLET: TAB ORAL at 08:49

## 2018-05-17 RX ADMIN — TRAMADOL HYDROCHLORIDE 50 MG: 50 TABLET, COATED ORAL at 08:49

## 2018-05-17 RX ADMIN — TRAZODONE HYDROCHLORIDE 25 MG: 50 TABLET, FILM COATED ORAL at 20:48

## 2018-05-17 RX ADMIN — MELOXICAM 7.5 MG: 7.5 TABLET ORAL at 20:49

## 2018-05-17 RX ADMIN — IPRATROPIUM BROMIDE AND ALBUTEROL SULFATE 3 ML: 2.5; .5 SOLUTION RESPIRATORY (INHALATION) at 06:36

## 2018-05-17 RX ADMIN — FERROUS SULFATE TAB 325 MG (65 MG ELEMENTAL FE) 325 MG: 325 (65 FE) TAB at 08:49

## 2018-05-17 RX ADMIN — CETIRIZINE HYDROCHLORIDE AND PSEUDOEPHEDRINE HYDROCHLORIDE 1 TABLET: 5; 120 TABLET, FILM COATED, EXTENDED RELEASE ORAL at 20:49

## 2018-05-17 RX ADMIN — ENOXAPARIN SODIUM 40 MG: 40 INJECTION SUBCUTANEOUS at 20:49

## 2018-05-17 RX ADMIN — SUCRALFATE 1 G: 1 TABLET ORAL at 16:40

## 2018-05-17 RX ADMIN — AMOXICILLIN AND CLAVULANATE POTASSIUM 1 TABLET: 875; 125 TABLET, FILM COATED ORAL at 14:17

## 2018-05-17 RX ADMIN — SUCRALFATE 1 G: 1 TABLET ORAL at 20:49

## 2018-05-17 RX ADMIN — ISOSORBIDE MONONITRATE 30 MG: 30 TABLET, EXTENDED RELEASE ORAL at 08:49

## 2018-05-17 RX ADMIN — TAZOBACTAM SODIUM AND PIPERACILLIN SODIUM 4.5 G: 500; 4 INJECTION, SOLUTION INTRAVENOUS at 05:00

## 2018-05-17 RX ADMIN — CETIRIZINE HYDROCHLORIDE AND PSEUDOEPHEDRINE HYDROCHLORIDE 1 TABLET: 5; 120 TABLET, FILM COATED, EXTENDED RELEASE ORAL at 08:49

## 2018-05-17 RX ADMIN — AMLODIPINE BESYLATE 10 MG: 10 TABLET ORAL at 20:49

## 2018-05-17 RX ADMIN — Medication 250 MG: at 20:48

## 2018-05-17 RX ADMIN — Medication 250 MG: at 08:49

## 2018-05-17 RX ADMIN — IPRATROPIUM BROMIDE AND ALBUTEROL SULFATE 3 ML: 2.5; .5 SOLUTION RESPIRATORY (INHALATION) at 13:03

## 2018-05-17 RX ADMIN — GABAPENTIN 100 MG: 100 CAPSULE ORAL at 20:49

## 2018-05-17 RX ADMIN — IPRATROPIUM BROMIDE AND ALBUTEROL SULFATE 3 ML: 2.5; .5 SOLUTION RESPIRATORY (INHALATION) at 00:00

## 2018-05-17 RX ADMIN — TERAZOSIN HYDROCHLORIDE ANHYDROUS 2 MG: 2 CAPSULE ORAL at 20:49

## 2018-05-17 RX ADMIN — MEMANTINE 10 MG: 10 TABLET ORAL at 20:49

## 2018-05-17 RX ADMIN — TRAMADOL HYDROCHLORIDE 50 MG: 50 TABLET, COATED ORAL at 20:49

## 2018-05-17 RX ADMIN — PANTOPRAZOLE SODIUM 40 MG: 40 TABLET, DELAYED RELEASE ORAL at 05:00

## 2018-05-17 RX ADMIN — MIRTAZAPINE 30 MG: 15 TABLET, FILM COATED ORAL at 20:49

## 2018-05-17 RX ADMIN — Medication 2 TABLET: at 20:49

## 2018-05-17 RX ADMIN — IPRATROPIUM BROMIDE AND ALBUTEROL SULFATE 3 ML: 2.5; .5 SOLUTION RESPIRATORY (INHALATION) at 19:14

## 2018-05-17 RX ADMIN — MEMANTINE 10 MG: 10 TABLET ORAL at 08:49

## 2018-05-17 RX ADMIN — GABAPENTIN 100 MG: 100 CAPSULE ORAL at 08:49

## 2018-05-18 VITALS
HEART RATE: 94 BPM | WEIGHT: 151.6 LBS | TEMPERATURE: 98.3 F | BODY MASS INDEX: 30.56 KG/M2 | OXYGEN SATURATION: 94 % | DIASTOLIC BLOOD PRESSURE: 79 MMHG | RESPIRATION RATE: 16 BRPM | HEIGHT: 59 IN | SYSTOLIC BLOOD PRESSURE: 156 MMHG

## 2018-05-18 PROCEDURE — 90670 PCV13 VACCINE IM: CPT

## 2018-05-18 PROCEDURE — 94640 AIRWAY INHALATION TREATMENT: CPT

## 2018-05-18 PROCEDURE — 94799 UNLISTED PULMONARY SVC/PX: CPT

## 2018-05-18 PROCEDURE — G0009 ADMIN PNEUMOCOCCAL VACCINE: HCPCS

## 2018-05-18 PROCEDURE — 25010000002 PNEUMOCOCCAL CONJ. 13-VALENT SUSPENSION

## 2018-05-18 PROCEDURE — 99239 HOSP IP/OBS DSCHRG MGMT >30: CPT | Performed by: NURSE PRACTITIONER

## 2018-05-18 RX ORDER — TRAMADOL HYDROCHLORIDE 50 MG/1
50 TABLET ORAL 2 TIMES DAILY
Qty: 6 TABLET | Refills: 0 | Status: SHIPPED | OUTPATIENT
Start: 2018-05-18

## 2018-05-18 RX ORDER — ALPRAZOLAM 0.25 MG/1
0.25 TABLET ORAL 2 TIMES DAILY PRN
Qty: 6 TABLET | Refills: 0 | Status: ON HOLD | OUTPATIENT
Start: 2018-05-18 | End: 2018-06-07 | Stop reason: SDUPTHER

## 2018-05-18 RX ORDER — GABAPENTIN 100 MG/1
100 CAPSULE ORAL 3 TIMES DAILY
Qty: 9 CAPSULE | Refills: 0 | Status: ON HOLD | OUTPATIENT
Start: 2018-05-18 | End: 2018-06-07

## 2018-05-18 RX ORDER — AMOXICILLIN AND CLAVULANATE POTASSIUM 875; 125 MG/1; MG/1
1 TABLET, FILM COATED ORAL EVERY 12 HOURS SCHEDULED
Qty: 11 TABLET | Refills: 0
Start: 2018-05-18 | End: 2018-05-24

## 2018-05-18 RX ADMIN — SUCRALFATE 1 G: 1 TABLET ORAL at 16:46

## 2018-05-18 RX ADMIN — Medication 2 TABLET: at 10:53

## 2018-05-18 RX ADMIN — CLOPIDOGREL BISULFATE 75 MG: 75 TABLET ORAL at 09:35

## 2018-05-18 RX ADMIN — Medication 250 MG: at 09:35

## 2018-05-18 RX ADMIN — MEMANTINE 10 MG: 10 TABLET ORAL at 09:34

## 2018-05-18 RX ADMIN — CETIRIZINE HYDROCHLORIDE AND PSEUDOEPHEDRINE HYDROCHLORIDE 1 TABLET: 5; 120 TABLET, FILM COATED, EXTENDED RELEASE ORAL at 10:53

## 2018-05-18 RX ADMIN — PANTOPRAZOLE SODIUM 40 MG: 40 TABLET, DELAYED RELEASE ORAL at 06:03

## 2018-05-18 RX ADMIN — SUCRALFATE 1 G: 1 TABLET ORAL at 09:35

## 2018-05-18 RX ADMIN — AMOXICILLIN AND CLAVULANATE POTASSIUM 1 TABLET: 875; 125 TABLET, FILM COATED ORAL at 09:35

## 2018-05-18 RX ADMIN — TRAMADOL HYDROCHLORIDE 50 MG: 50 TABLET, COATED ORAL at 09:35

## 2018-05-18 RX ADMIN — SUCRALFATE 1 G: 1 TABLET ORAL at 12:16

## 2018-05-18 RX ADMIN — IPRATROPIUM BROMIDE AND ALBUTEROL SULFATE 3 ML: 2.5; .5 SOLUTION RESPIRATORY (INHALATION) at 12:23

## 2018-05-18 RX ADMIN — ISOSORBIDE MONONITRATE 30 MG: 30 TABLET, EXTENDED RELEASE ORAL at 09:34

## 2018-05-18 RX ADMIN — FERROUS SULFATE TAB 325 MG (65 MG ELEMENTAL FE) 325 MG: 325 (65 FE) TAB at 09:35

## 2018-05-18 RX ADMIN — POLYETHYLENE GLYCOL (3350) 17 G: 17 POWDER, FOR SOLUTION ORAL at 09:34

## 2018-05-18 RX ADMIN — GABAPENTIN 100 MG: 100 CAPSULE ORAL at 16:46

## 2018-05-18 RX ADMIN — IPRATROPIUM BROMIDE AND ALBUTEROL SULFATE 3 ML: 2.5; .5 SOLUTION RESPIRATORY (INHALATION) at 07:03

## 2018-05-18 RX ADMIN — PNEUMOCOCCAL 13-VALENT CONJUGATE VACCINE 0.5 ML: 2.2; 2.2; 2.2; 2.2; 2.2; 4.4; 2.2; 2.2; 2.2; 2.2; 2.2; 2.2; 2.2 INJECTION, SUSPENSION INTRAMUSCULAR at 14:42

## 2018-05-18 RX ADMIN — GABAPENTIN 100 MG: 100 CAPSULE ORAL at 09:35

## 2018-05-18 RX ADMIN — MULTIPLE VITAMINS W/ MINERALS TAB 1 TABLET: TAB ORAL at 09:34

## 2018-05-19 LAB
BACTERIA SPEC AEROBE CULT: NORMAL
BACTERIA SPEC AEROBE CULT: NORMAL

## 2018-06-03 ENCOUNTER — APPOINTMENT (OUTPATIENT)
Dept: GENERAL RADIOLOGY | Facility: HOSPITAL | Age: 83
End: 2018-06-03

## 2018-06-03 ENCOUNTER — HOSPITAL ENCOUNTER (INPATIENT)
Facility: HOSPITAL | Age: 83
LOS: 4 days | Discharge: NURSING, REHAB OR LONG-TERM CARE | End: 2018-06-07
Attending: EMERGENCY MEDICINE | Admitting: HOSPITALIST

## 2018-06-03 ENCOUNTER — APPOINTMENT (OUTPATIENT)
Dept: CT IMAGING | Facility: HOSPITAL | Age: 83
End: 2018-06-03

## 2018-06-03 DIAGNOSIS — R50.9 FEBRILE ILLNESS, ACUTE: ICD-10-CM

## 2018-06-03 DIAGNOSIS — G93.40 ENCEPHALOPATHY ACUTE: ICD-10-CM

## 2018-06-03 DIAGNOSIS — R13.10 DYSPHAGIA, UNSPECIFIED TYPE: ICD-10-CM

## 2018-06-03 DIAGNOSIS — Z74.09 IMPAIRED FUNCTIONAL MOBILITY, BALANCE, GAIT, AND ENDURANCE: ICD-10-CM

## 2018-06-03 DIAGNOSIS — Z74.09 IMPAIRED MOBILITY AND ADLS: ICD-10-CM

## 2018-06-03 DIAGNOSIS — A41.9 SEPSIS, DUE TO UNSPECIFIED ORGANISM: ICD-10-CM

## 2018-06-03 DIAGNOSIS — Z78.9 IMPAIRED MOBILITY AND ADLS: ICD-10-CM

## 2018-06-03 DIAGNOSIS — J96.01 ACUTE RESPIRATORY FAILURE WITH HYPOXIA (HCC): Primary | ICD-10-CM

## 2018-06-03 PROBLEM — M25.512 ACUTE PAIN OF LEFT SHOULDER: Status: ACTIVE | Noted: 2018-06-03

## 2018-06-03 PROBLEM — J18.9 HCAP (HEALTHCARE-ASSOCIATED PNEUMONIA): Status: ACTIVE | Noted: 2018-06-03

## 2018-06-03 PROBLEM — M79.642 LEFT HAND PAIN: Status: ACTIVE | Noted: 2018-06-03

## 2018-06-03 PROBLEM — M48.00 SPINAL STENOSIS: Chronic | Status: ACTIVE | Noted: 2018-06-03

## 2018-06-03 PROBLEM — E87.6 HYPOKALEMIA: Status: RESOLVED | Noted: 2018-05-14 | Resolved: 2018-06-03

## 2018-06-03 LAB
ALBUMIN SERPL-MCNC: 3.6 G/DL (ref 3.2–4.8)
ALBUMIN/GLOB SERPL: 1.2 G/DL (ref 1.5–2.5)
ALP SERPL-CCNC: 132 U/L (ref 25–100)
ALT SERPL W P-5'-P-CCNC: 15 U/L (ref 7–40)
ANION GAP SERPL CALCULATED.3IONS-SCNC: 8 MMOL/L (ref 3–11)
AST SERPL-CCNC: 24 U/L (ref 0–33)
BASOPHILS # BLD AUTO: 0.03 10*3/MM3 (ref 0–0.2)
BASOPHILS NFR BLD AUTO: 0.3 % (ref 0–1)
BILIRUB SERPL-MCNC: 0.4 MG/DL (ref 0.3–1.2)
BILIRUB UR QL STRIP: NEGATIVE
BNP SERPL-MCNC: 7 PG/ML (ref 0–100)
BUN BLD-MCNC: 14 MG/DL (ref 9–23)
BUN/CREAT SERPL: 20 (ref 7–25)
CALCIUM SPEC-SCNC: 8.4 MG/DL (ref 8.7–10.4)
CHLORIDE SERPL-SCNC: 104 MMOL/L (ref 99–109)
CLARITY UR: CLEAR
CO2 SERPL-SCNC: 27 MMOL/L (ref 20–31)
COLOR UR: YELLOW
CREAT BLD-MCNC: 0.7 MG/DL (ref 0.6–1.3)
CRP SERPL-MCNC: 4.18 MG/DL (ref 0–1)
D-LACTATE SERPL-SCNC: 1 MMOL/L (ref 0.5–2)
DEPRECATED RDW RBC AUTO: 51.4 FL (ref 37–54)
EOSINOPHIL # BLD AUTO: 0.11 10*3/MM3 (ref 0–0.3)
EOSINOPHIL NFR BLD AUTO: 1 % (ref 0–3)
ERYTHROCYTE [DISTWIDTH] IN BLOOD BY AUTOMATED COUNT: 16 % (ref 11.3–14.5)
ERYTHROCYTE [SEDIMENTATION RATE] IN BLOOD: 34 MM/HR (ref 0–30)
GFR SERPL CREATININE-BSD FRML MDRD: 79 ML/MIN/1.73
GLOBULIN UR ELPH-MCNC: 2.9 GM/DL
GLUCOSE BLD-MCNC: 116 MG/DL (ref 70–100)
GLUCOSE UR STRIP-MCNC: NEGATIVE MG/DL
HCT VFR BLD AUTO: 37.5 % (ref 34.5–44)
HGB BLD-MCNC: 11.7 G/DL (ref 11.5–15.5)
HGB UR QL STRIP.AUTO: NEGATIVE
HOLD SPECIMEN: NORMAL
IMM GRANULOCYTES # BLD: 0.05 10*3/MM3 (ref 0–0.03)
IMM GRANULOCYTES NFR BLD: 0.5 % (ref 0–0.6)
KETONES UR QL STRIP: NEGATIVE
LEUKOCYTE ESTERASE UR QL STRIP.AUTO: NEGATIVE
LYMPHOCYTES # BLD AUTO: 1.18 10*3/MM3 (ref 0.6–4.8)
LYMPHOCYTES NFR BLD AUTO: 11.1 % (ref 24–44)
MCH RBC QN AUTO: 27 PG (ref 27–31)
MCHC RBC AUTO-ENTMCNC: 31.2 G/DL (ref 32–36)
MCV RBC AUTO: 86.4 FL (ref 80–99)
MONOCYTES # BLD AUTO: 1.23 10*3/MM3 (ref 0–1)
MONOCYTES NFR BLD AUTO: 11.5 % (ref 0–12)
NEUTROPHILS # BLD AUTO: 8.11 10*3/MM3 (ref 1.5–8.3)
NEUTROPHILS NFR BLD AUTO: 76.1 % (ref 41–71)
NITRITE UR QL STRIP: NEGATIVE
PH UR STRIP.AUTO: 6.5 [PH] (ref 5–8)
PLATELET # BLD AUTO: 264 10*3/MM3 (ref 150–450)
PMV BLD AUTO: 9.1 FL (ref 6–12)
POTASSIUM BLD-SCNC: 3.8 MMOL/L (ref 3.5–5.5)
PROCALCITONIN SERPL-MCNC: 0.13 NG/ML
PROT SERPL-MCNC: 6.5 G/DL (ref 5.7–8.2)
PROT UR QL STRIP: NEGATIVE
RBC # BLD AUTO: 4.34 10*6/MM3 (ref 3.89–5.14)
SODIUM BLD-SCNC: 139 MMOL/L (ref 132–146)
SP GR UR STRIP: 1.02 (ref 1–1.03)
TROPONIN I SERPL-MCNC: 0 NG/ML (ref 0–0.07)
TROPONIN I SERPL-MCNC: 0.01 NG/ML (ref 0–0.07)
UROBILINOGEN UR QL STRIP: NORMAL
WBC NRBC COR # BLD: 10.66 10*3/MM3 (ref 3.5–10.8)
WHOLE BLOOD HOLD SPECIMEN: NORMAL
WHOLE BLOOD HOLD SPECIMEN: NORMAL

## 2018-06-03 PROCEDURE — 85025 COMPLETE CBC W/AUTO DIFF WBC: CPT | Performed by: EMERGENCY MEDICINE

## 2018-06-03 PROCEDURE — 73100 X-RAY EXAM OF WRIST: CPT

## 2018-06-03 PROCEDURE — 86140 C-REACTIVE PROTEIN: CPT | Performed by: FAMILY MEDICINE

## 2018-06-03 PROCEDURE — 71275 CT ANGIOGRAPHY CHEST: CPT

## 2018-06-03 PROCEDURE — 83605 ASSAY OF LACTIC ACID: CPT | Performed by: EMERGENCY MEDICINE

## 2018-06-03 PROCEDURE — 0 IOPAMIDOL PER 1 ML: Performed by: FAMILY MEDICINE

## 2018-06-03 PROCEDURE — 93005 ELECTROCARDIOGRAM TRACING: CPT | Performed by: EMERGENCY MEDICINE

## 2018-06-03 PROCEDURE — 85652 RBC SED RATE AUTOMATED: CPT | Performed by: FAMILY MEDICINE

## 2018-06-03 PROCEDURE — 87633 RESP VIRUS 12-25 TARGETS: CPT | Performed by: EMERGENCY MEDICINE

## 2018-06-03 PROCEDURE — 80053 COMPREHEN METABOLIC PANEL: CPT | Performed by: EMERGENCY MEDICINE

## 2018-06-03 PROCEDURE — 25010000002 PIPERACILLIN SOD-TAZOBACTAM PER 1 G: Performed by: FAMILY MEDICINE

## 2018-06-03 PROCEDURE — 36415 COLL VENOUS BLD VENIPUNCTURE: CPT

## 2018-06-03 PROCEDURE — 25010000002 VANCOMYCIN: Performed by: EMERGENCY MEDICINE

## 2018-06-03 PROCEDURE — 73130 X-RAY EXAM OF HAND: CPT

## 2018-06-03 PROCEDURE — 99223 1ST HOSP IP/OBS HIGH 75: CPT | Performed by: FAMILY MEDICINE

## 2018-06-03 PROCEDURE — 84484 ASSAY OF TROPONIN QUANT: CPT

## 2018-06-03 PROCEDURE — 25010000002 PIPERACILLIN SOD-TAZOBACTAM PER 1 G: Performed by: EMERGENCY MEDICINE

## 2018-06-03 PROCEDURE — 81003 URINALYSIS AUTO W/O SCOPE: CPT | Performed by: EMERGENCY MEDICINE

## 2018-06-03 PROCEDURE — 83880 ASSAY OF NATRIURETIC PEPTIDE: CPT | Performed by: EMERGENCY MEDICINE

## 2018-06-03 PROCEDURE — 71045 X-RAY EXAM CHEST 1 VIEW: CPT

## 2018-06-03 PROCEDURE — 87040 BLOOD CULTURE FOR BACTERIA: CPT | Performed by: EMERGENCY MEDICINE

## 2018-06-03 PROCEDURE — 99285 EMERGENCY DEPT VISIT HI MDM: CPT

## 2018-06-03 PROCEDURE — 73030 X-RAY EXAM OF SHOULDER: CPT

## 2018-06-03 PROCEDURE — 25010000002 HEPARIN (PORCINE) PER 1000 UNITS: Performed by: FAMILY MEDICINE

## 2018-06-03 PROCEDURE — P9612 CATHETERIZE FOR URINE SPEC: HCPCS

## 2018-06-03 PROCEDURE — 84145 PROCALCITONIN (PCT): CPT | Performed by: EMERGENCY MEDICINE

## 2018-06-03 RX ORDER — MIRTAZAPINE 15 MG/1
30 TABLET, FILM COATED ORAL NIGHTLY
Status: DISCONTINUED | OUTPATIENT
Start: 2018-06-03 | End: 2018-06-07 | Stop reason: HOSPADM

## 2018-06-03 RX ORDER — BISACODYL 5 MG/1
5 TABLET, DELAYED RELEASE ORAL DAILY PRN
Status: DISCONTINUED | OUTPATIENT
Start: 2018-06-03 | End: 2018-06-07 | Stop reason: HOSPADM

## 2018-06-03 RX ORDER — SODIUM CHLORIDE 0.9 % (FLUSH) 0.9 %
10 SYRINGE (ML) INJECTION AS NEEDED
Status: DISCONTINUED | OUTPATIENT
Start: 2018-06-03 | End: 2018-06-07 | Stop reason: HOSPADM

## 2018-06-03 RX ORDER — HEPARIN SODIUM 5000 [USP'U]/ML
5000 INJECTION, SOLUTION INTRAVENOUS; SUBCUTANEOUS EVERY 12 HOURS SCHEDULED
Status: DISCONTINUED | OUTPATIENT
Start: 2018-06-03 | End: 2018-06-07 | Stop reason: HOSPADM

## 2018-06-03 RX ORDER — SUCRALFATE 1 G/1
1 TABLET ORAL 4 TIMES DAILY
Status: DISCONTINUED | OUTPATIENT
Start: 2018-06-03 | End: 2018-06-07 | Stop reason: HOSPADM

## 2018-06-03 RX ORDER — SACCHAROMYCES BOULARDII 250 MG
250 CAPSULE ORAL 2 TIMES DAILY
Status: DISCONTINUED | OUTPATIENT
Start: 2018-06-03 | End: 2018-06-07 | Stop reason: HOSPADM

## 2018-06-03 RX ORDER — VANCOMYCIN HYDROCHLORIDE 1 G/200ML
15 INJECTION, SOLUTION INTRAVENOUS EVERY 24 HOURS
Status: DISCONTINUED | OUTPATIENT
Start: 2018-06-04 | End: 2018-06-04

## 2018-06-03 RX ORDER — ISOSORBIDE MONONITRATE 30 MG/1
30 TABLET, EXTENDED RELEASE ORAL DAILY
Status: DISCONTINUED | OUTPATIENT
Start: 2018-06-03 | End: 2018-06-07 | Stop reason: HOSPADM

## 2018-06-03 RX ORDER — POTASSIUM CHLORIDE 1.5 G/1.77G
40 POWDER, FOR SOLUTION ORAL AS NEEDED
Status: DISCONTINUED | OUTPATIENT
Start: 2018-06-03 | End: 2018-06-07 | Stop reason: HOSPADM

## 2018-06-03 RX ORDER — FERROUS SULFATE 325(65) MG
325 TABLET ORAL
Status: DISCONTINUED | OUTPATIENT
Start: 2018-06-04 | End: 2018-06-07 | Stop reason: HOSPADM

## 2018-06-03 RX ORDER — ACETAMINOPHEN 325 MG/1
650 TABLET ORAL ONCE
Status: COMPLETED | OUTPATIENT
Start: 2018-06-03 | End: 2018-06-03

## 2018-06-03 RX ORDER — ACETAMINOPHEN 325 MG/1
650 TABLET ORAL EVERY 6 HOURS PRN
Status: DISCONTINUED | OUTPATIENT
Start: 2018-06-03 | End: 2018-06-07 | Stop reason: HOSPADM

## 2018-06-03 RX ORDER — ASCORBIC ACID 500 MG
500 TABLET ORAL DAILY
Status: DISCONTINUED | OUTPATIENT
Start: 2018-06-04 | End: 2018-06-07 | Stop reason: HOSPADM

## 2018-06-03 RX ORDER — TRAMADOL HYDROCHLORIDE 50 MG/1
50 TABLET ORAL 2 TIMES DAILY
Status: DISCONTINUED | OUTPATIENT
Start: 2018-06-03 | End: 2018-06-07 | Stop reason: HOSPADM

## 2018-06-03 RX ORDER — POTASSIUM CHLORIDE 7.45 MG/ML
10 INJECTION INTRAVENOUS
Status: DISCONTINUED | OUTPATIENT
Start: 2018-06-03 | End: 2018-06-07 | Stop reason: HOSPADM

## 2018-06-03 RX ORDER — MULTIPLE VITAMINS W/ MINERALS TAB 9MG-400MCG
1 TAB ORAL DAILY
Status: DISCONTINUED | OUTPATIENT
Start: 2018-06-04 | End: 2018-06-07 | Stop reason: HOSPADM

## 2018-06-03 RX ORDER — IPRATROPIUM BROMIDE AND ALBUTEROL SULFATE 2.5; .5 MG/3ML; MG/3ML
3 SOLUTION RESPIRATORY (INHALATION) EVERY 4 HOURS PRN
Status: DISCONTINUED | OUTPATIENT
Start: 2018-06-03 | End: 2018-06-07 | Stop reason: HOSPADM

## 2018-06-03 RX ORDER — ALPRAZOLAM 0.25 MG/1
0.25 TABLET ORAL 2 TIMES DAILY PRN
Status: DISCONTINUED | OUTPATIENT
Start: 2018-06-03 | End: 2018-06-07 | Stop reason: HOSPADM

## 2018-06-03 RX ORDER — TRAZODONE HYDROCHLORIDE 50 MG/1
12.5 TABLET ORAL NIGHTLY
Status: DISCONTINUED | OUTPATIENT
Start: 2018-06-03 | End: 2018-06-07 | Stop reason: HOSPADM

## 2018-06-03 RX ORDER — TERAZOSIN 2 MG/1
2 CAPSULE ORAL NIGHTLY
Status: DISCONTINUED | OUTPATIENT
Start: 2018-06-03 | End: 2018-06-07 | Stop reason: HOSPADM

## 2018-06-03 RX ORDER — DONEPEZIL HYDROCHLORIDE 10 MG/1
10 TABLET, FILM COATED ORAL NIGHTLY
Status: DISCONTINUED | OUTPATIENT
Start: 2018-06-03 | End: 2018-06-07 | Stop reason: HOSPADM

## 2018-06-03 RX ORDER — BISACODYL 10 MG
10 SUPPOSITORY, RECTAL RECTAL DAILY
Status: DISCONTINUED | OUTPATIENT
Start: 2018-06-03 | End: 2018-06-05

## 2018-06-03 RX ORDER — DOCUSATE SODIUM 100 MG/1
100 CAPSULE, LIQUID FILLED ORAL 2 TIMES DAILY
Status: DISCONTINUED | OUTPATIENT
Start: 2018-06-03 | End: 2018-06-07 | Stop reason: HOSPADM

## 2018-06-03 RX ORDER — GABAPENTIN 100 MG/1
100 CAPSULE ORAL 3 TIMES DAILY
Status: DISCONTINUED | OUTPATIENT
Start: 2018-06-03 | End: 2018-06-05

## 2018-06-03 RX ORDER — POTASSIUM CHLORIDE 750 MG/1
40 CAPSULE, EXTENDED RELEASE ORAL AS NEEDED
Status: DISCONTINUED | OUTPATIENT
Start: 2018-06-03 | End: 2018-06-07 | Stop reason: HOSPADM

## 2018-06-03 RX ORDER — MELOXICAM 7.5 MG/1
7.5 TABLET ORAL NIGHTLY
Status: DISCONTINUED | OUTPATIENT
Start: 2018-06-03 | End: 2018-06-07 | Stop reason: HOSPADM

## 2018-06-03 RX ORDER — AMLODIPINE BESYLATE 10 MG/1
10 TABLET ORAL NIGHTLY
Status: DISCONTINUED | OUTPATIENT
Start: 2018-06-03 | End: 2018-06-07 | Stop reason: HOSPADM

## 2018-06-03 RX ORDER — SODIUM CHLORIDE 0.9 % (FLUSH) 0.9 %
1-10 SYRINGE (ML) INJECTION AS NEEDED
Status: DISCONTINUED | OUTPATIENT
Start: 2018-06-03 | End: 2018-06-07 | Stop reason: HOSPADM

## 2018-06-03 RX ORDER — CLOPIDOGREL BISULFATE 75 MG/1
75 TABLET ORAL DAILY
Status: DISCONTINUED | OUTPATIENT
Start: 2018-06-03 | End: 2018-06-07 | Stop reason: HOSPADM

## 2018-06-03 RX ORDER — MEMANTINE HYDROCHLORIDE 10 MG/1
10 TABLET ORAL EVERY 12 HOURS SCHEDULED
Status: DISCONTINUED | OUTPATIENT
Start: 2018-06-03 | End: 2018-06-07 | Stop reason: HOSPADM

## 2018-06-03 RX ORDER — PANTOPRAZOLE SODIUM 40 MG/1
40 TABLET, DELAYED RELEASE ORAL DAILY
Status: DISCONTINUED | OUTPATIENT
Start: 2018-06-03 | End: 2018-06-07 | Stop reason: HOSPADM

## 2018-06-03 RX ORDER — CALCIUM CARBONATE 200(500)MG
2 TABLET,CHEWABLE ORAL 2 TIMES DAILY PRN
Status: DISCONTINUED | OUTPATIENT
Start: 2018-06-03 | End: 2018-06-07 | Stop reason: HOSPADM

## 2018-06-03 RX ADMIN — MELOXICAM 7.5 MG: 7.5 TABLET ORAL at 21:28

## 2018-06-03 RX ADMIN — ALPRAZOLAM 0.25 MG: 0.25 TABLET ORAL at 21:28

## 2018-06-03 RX ADMIN — TRAMADOL HYDROCHLORIDE 50 MG: 50 TABLET, COATED ORAL at 20:38

## 2018-06-03 RX ADMIN — TRAZODONE HYDROCHLORIDE 12.5 MG: 50 TABLET ORAL at 20:37

## 2018-06-03 RX ADMIN — TERAZOSIN HYDROCHLORIDE ANHYDROUS 2 MG: 2 CAPSULE ORAL at 20:36

## 2018-06-03 RX ADMIN — ACETAMINOPHEN 650 MG: 325 TABLET ORAL at 21:28

## 2018-06-03 RX ADMIN — GABAPENTIN 100 MG: 100 CAPSULE ORAL at 20:37

## 2018-06-03 RX ADMIN — TAZOBACTAM SODIUM AND PIPERACILLIN SODIUM 4.5 G: 500; 4 INJECTION, SOLUTION INTRAVENOUS at 20:39

## 2018-06-03 RX ADMIN — Medication 250 MG: at 20:38

## 2018-06-03 RX ADMIN — CLOPIDOGREL BISULFATE 75 MG: 75 TABLET ORAL at 20:37

## 2018-06-03 RX ADMIN — BISACODYL 10 MG: 10 SUPPOSITORY RECTAL at 20:38

## 2018-06-03 RX ADMIN — MEMANTINE 10 MG: 10 TABLET ORAL at 20:38

## 2018-06-03 RX ADMIN — Medication 5 MG: at 20:37

## 2018-06-03 RX ADMIN — MIRTAZAPINE 30 MG: 15 TABLET, FILM COATED ORAL at 20:36

## 2018-06-03 RX ADMIN — ACETAMINOPHEN 650 MG: 325 TABLET ORAL at 13:30

## 2018-06-03 RX ADMIN — DONEPEZIL HYDROCHLORIDE 10 MG: 10 TABLET, FILM COATED ORAL at 20:37

## 2018-06-03 RX ADMIN — VANCOMYCIN HYDROCHLORIDE 1500 MG: 10 INJECTION, POWDER, LYOPHILIZED, FOR SOLUTION INTRAVENOUS at 14:30

## 2018-06-03 RX ADMIN — TAZOBACTAM SODIUM AND PIPERACILLIN SODIUM 4.5 G: 500; 4 INJECTION, SOLUTION INTRAVENOUS at 14:33

## 2018-06-03 RX ADMIN — AMLODIPINE BESYLATE 10 MG: 10 TABLET ORAL at 20:37

## 2018-06-03 RX ADMIN — DOCUSATE SODIUM 100 MG: 100 CAPSULE, LIQUID FILLED ORAL at 20:38

## 2018-06-03 RX ADMIN — ISOSORBIDE MONONITRATE 30 MG: 30 TABLET, EXTENDED RELEASE ORAL at 20:36

## 2018-06-03 RX ADMIN — PANTOPRAZOLE SODIUM 40 MG: 40 TABLET, DELAYED RELEASE ORAL at 20:37

## 2018-06-03 RX ADMIN — SUCRALFATE 1 G: 1 TABLET ORAL at 20:36

## 2018-06-03 RX ADMIN — HEPARIN SODIUM 5000 UNITS: 5000 INJECTION, SOLUTION INTRAVENOUS; SUBCUTANEOUS at 20:38

## 2018-06-03 RX ADMIN — IOPAMIDOL 65 ML: 755 INJECTION, SOLUTION INTRAVENOUS at 16:04

## 2018-06-03 NOTE — ED PROVIDER NOTES
"Subjective   Poonam Mata is a 87 y.o. female who presents to the ED with c/o SoA. Ms. Mata was brought to the ED by EMS after her O2 sat showed that she was at 70% on room air. Additionally, per EMS she has gradually been becoming more confused. Ms. Mata also complains of a fever of 101.5, and pain in her shoulders with movement. Her daughter states that she began noticing that her mother seemed more SoA than baseline yesterday. There are no other acute complaints at this time. She was seen in the hospital about 3 weeks ago for low O2 sat.         History provided by:  Patient  Shortness of Breath   Severity:  Unable to specify  Onset quality:  Sudden  Duration:  1 day  Timing:  Constant  Progression:  Worsening  Chronicity:  New  Relieved by:  None tried  Worsened by:  Nothing  Ineffective treatments:  None tried  Associated symptoms: fever        Review of Systems   Constitutional: Positive for fever. Negative for chills.   Respiratory: Positive for shortness of breath.    Musculoskeletal:        Shoulder pain   Psychiatric/Behavioral: Positive for confusion.   All other systems reviewed and are negative.      Past Medical History:   Diagnosis Date   • Anxiety    • Arthropathy    • Constipation    • Dementia    • Essential hypertension    • GERD (gastroesophageal reflux disease)    • Hyperlipidemia    • Insomnia    • Spinal stenosis    • Syncope    • URI (upper respiratory infection)        Allergies   Allergen Reactions   • Sulfa Antibiotics Rash     UNKNOWN     • Cortisone Other (See Comments)     Elevated BP   • Ativan [Lorazepam] Anxiety     \"opposite effect\"       Past Surgical History:   Procedure Laterality Date   • BLADDER SUSPENSION     • CHOLECYSTECTOMY     • HIP SURGERY     • HYSTERECTOMY     • TONSILLECTOMY         History reviewed. No pertinent family history.    Social History     Social History   • Marital status:      Social History Main Topics   • Smoking status: Never Smoker "   • Alcohol use No   • Drug use: No   • Sexual activity: Defer     Other Topics Concern   • Not on file     Social History Narrative    Lives in [facility], has not walked for several months         Objective   Physical Exam   Constitutional: She appears well-developed and well-nourished. No distress.   She is able to answer questions.   HENT:   Head: Normocephalic and atraumatic.   Nose: Nose normal.   Eyes: Conjunctivae are normal. No scleral icterus.   Neck: Normal range of motion. Neck supple.   Cardiovascular: Regular rhythm, normal heart sounds and intact distal pulses.  Tachycardia present.    No murmur heard.  Pulmonary/Chest: Effort normal and breath sounds normal. No respiratory distress. She has no wheezes. She has no rhonchi. She has no rales.   Abdominal: Soft. Bowel sounds are normal. There is no tenderness.   Musculoskeletal: Normal range of motion. She exhibits no edema.   Neurological: She is alert.   Skin: Skin is warm.   Psychiatric: She has a normal mood and affect. Her behavior is normal.   Nursing note and vitals reviewed.      Procedures         ED Course  ED Course as of Jun 03 2131   Sun Jun 03, 2018   1513 CT voice clip indicates new right lower lobe atelectasis versus infiltrate.  She is receiving antibiotics per the sepsis protocol.  This is been a recurring thing with no definite findings.  I have reviewed the chest x-ray and agree that it's not a definite pneumonia.  We'll obtain CT scan of her chest to further evaluate and hopefully firm up the diagnosis  [DT]   1517 Dr. Valadez is at the bedside re evaluating the patient.  [DT-2]   1530 I spoke with Mrs. Mata and her daughter about plan for CT scan and admission.  Her daughter tells me that she has a history of polymyalgia rheumatica and had been on steroids in the past however that they were stopped due to concerns over causing high blood pressure.  She now listed as an allergy for that reason  [DT]   1541 Dr. Valadez discussed  with Dr. El, hospitalist, who agrees to admit the patient.  [DT-2]      ED Course User Index  [DT] Robbi Valadez MD  [DT-2] Krish Cruz                     MDM  Number of Diagnoses or Management Options  Acute respiratory failure with hypoxia: new and requires workup  Encephalopathy acute: new and requires workup  Febrile illness, acute: new and requires workup  Sepsis, due to unspecified organism: new and requires workup     Amount and/or Complexity of Data Reviewed  Clinical lab tests: ordered and reviewed  Tests in the radiology section of CPT®: ordered and reviewed  Obtain history from someone other than the patient: yes  Review and summarize past medical records: yes  Discuss the patient with other providers: yes  Independent visualization of images, tracings, or specimens: yes    Patient Progress  Patient progress: improved      Final diagnoses:   Acute respiratory failure with hypoxia   Febrile illness, acute   Sepsis, due to unspecified organism   Encephalopathy acute       Documentation assistance provided by heather Cruz.  Information recorded by the scribe was done at my direction and has been verified and validated by me.     Krish Cruz  06/03/18 1323       Robbi Valadez MD  06/03/18 0851

## 2018-06-03 NOTE — PLAN OF CARE
Problem: Skin Injury Risk (Adult)  Intervention: Promote/Optimize Nutrition   06/03/18 1831   Nutrition Interventions   Oral Nutrition Promotion physical activity promoted;rest periods promoted       Goal: Identify Related Risk Factors and Signs and Symptoms   06/03/18 1831   Skin Injury Risk (Adult)   Related Risk Factors (Skin Injury Risk) advanced age;cognitive impairment;mobility impaired;medication;moisture     Goal: Skin Health and Integrity   06/03/18 1831   Skin Injury Risk (Adult)   Skin Health and Integrity making progress toward outcome

## 2018-06-03 NOTE — PROGRESS NOTES
"Pharmacy Consult-Vancomycin Dosing  Poonam Mata is a  87 y.o. female receiving vancomycin therapy.     Indication:sepsis  Consulting Provider:  ID Consult:     Goal Trough: 15-20    Current Antimicrobial Therapy  Anti-Infectives       Ordered     Dose/Rate Route Frequency Start Stop    06/03/18 1903  vancomycin (VANCOCIN) in iso-osmotic dextrose IVPB 1 g (premix) 200 mL     Ordering Provider:  Charley El MD    15 mg/kg × 68.9 kg  over 60 Minutes Intravenous Every 24 Hours 06/04/18 1400 06/14/18 1359    06/03/18 1848  piperacillin-tazobactam (ZOSYN) 4.5 g in iso-osmotic dextrose 100 mL IVPB (premix)     Ordering Provider:  Charley El MD    4.5 g  over 4 Hours Intravenous Every 8 Hours 06/03/18 2000 06/08/18 1959    06/03/18 1848  Pharmacy to dose vancomycin     Ordering Provider:  Charley El MD     Does not apply Continuous PRN 06/03/18 1844 06/08/18 1843    06/03/18 1340  vancomycin 1500 mg/500 mL 0.9% NS IVPB ([facility])     Ordering Provider:  Robbi Valadez MD    20 mg/kg × 68.9 kg  over 90 Minutes Intravenous Once 06/03/18 1342 06/03/18 1743    06/03/18 1340  piperacillin-tazobactam (ZOSYN) 4.5 g in iso-osmotic dextrose 100 mL IVPB (premix)     Ordering Provider:  Robbi Valadez MD    4.5 g Intravenous Once 06/03/18 1342 06/03/18 1533            Allergies  Allergies as of 06/03/2018 - Reviewed 06/03/2018   Allergen Reaction Noted   • Sulfa antibiotics Rash 03/29/2018   • Cortisone Other (See Comments) 03/29/2018   • Ativan [lorazepam] Anxiety 03/29/2018       Labs      Results from last 7 days     Lab Units 06/03/18  1422   BUN mg/dL 14   CREATININE mg/dL 0.70         Results from last 7 days     Lab Units 06/03/18  1355   WBC 10*3/mm3 10.66       Evaluation of Dosing     Last Dose Received in the ED/Outside Facility:     Ht - 157.5 cm (62\")  Wt - 68.9 kg (152 lb)    Estimated Creatinine Clearance: 45.1 mL/min (by C-G formula based on SCr of 0.7 mg/dL).    Intake & Output (last 3 days)        "  06/01 0701 - 06/02 0700 06/02 0701 - 06/03 0700 06/03 0701 - 06/04 0700    IV Piggyback   600    Total Intake(mL/kg)   600 (8.7)    Net     +600                   Microbiology and Radiology  Microbiology Results (last 10 days)       ** No results found for the last 240 hours. **            Evaluation of Level    Lab Results   Component Value Date    DA 10.20 05/16/2018       Assessment/Plan: Vanc trough ordered before 3 rd dose 6/5.

## 2018-06-04 LAB
ANION GAP SERPL CALCULATED.3IONS-SCNC: 8 MMOL/L (ref 3–11)
BUN BLD-MCNC: 12 MG/DL (ref 9–23)
BUN/CREAT SERPL: 20 (ref 7–25)
CALCIUM SPEC-SCNC: 8.7 MG/DL (ref 8.7–10.4)
CHLORIDE SERPL-SCNC: 103 MMOL/L (ref 99–109)
CO2 SERPL-SCNC: 25 MMOL/L (ref 20–31)
CREAT BLD-MCNC: 0.6 MG/DL (ref 0.6–1.3)
DEPRECATED RDW RBC AUTO: 50.4 FL (ref 37–54)
ERYTHROCYTE [DISTWIDTH] IN BLOOD BY AUTOMATED COUNT: 15.9 % (ref 11.3–14.5)
GFR SERPL CREATININE-BSD FRML MDRD: 95 ML/MIN/1.73
GLUCOSE BLD-MCNC: 151 MG/DL (ref 70–100)
HCT VFR BLD AUTO: 32.4 % (ref 34.5–44)
HGB BLD-MCNC: 10.1 G/DL (ref 11.5–15.5)
MCH RBC QN AUTO: 26.9 PG (ref 27–31)
MCHC RBC AUTO-ENTMCNC: 31.2 G/DL (ref 32–36)
MCV RBC AUTO: 86.2 FL (ref 80–99)
PLATELET # BLD AUTO: 242 10*3/MM3 (ref 150–450)
PMV BLD AUTO: 9 FL (ref 6–12)
POTASSIUM BLD-SCNC: 3.6 MMOL/L (ref 3.5–5.5)
RBC # BLD AUTO: 3.76 10*6/MM3 (ref 3.89–5.14)
SODIUM BLD-SCNC: 136 MMOL/L (ref 132–146)
URATE SERPL-MCNC: 2.2 MG/DL (ref 3.1–7.8)
WBC NRBC COR # BLD: 12.61 10*3/MM3 (ref 3.5–10.8)

## 2018-06-04 PROCEDURE — 87449 NOS EACH ORGANISM AG IA: CPT | Performed by: FAMILY MEDICINE

## 2018-06-04 PROCEDURE — 85027 COMPLETE CBC AUTOMATED: CPT | Performed by: FAMILY MEDICINE

## 2018-06-04 PROCEDURE — 87899 AGENT NOS ASSAY W/OPTIC: CPT | Performed by: FAMILY MEDICINE

## 2018-06-04 PROCEDURE — 25010000002 PIPERACILLIN SOD-TAZOBACTAM PER 1 G: Performed by: FAMILY MEDICINE

## 2018-06-04 PROCEDURE — 80048 BASIC METABOLIC PNL TOTAL CA: CPT | Performed by: FAMILY MEDICINE

## 2018-06-04 PROCEDURE — 99233 SBSQ HOSP IP/OBS HIGH 50: CPT | Performed by: INTERNAL MEDICINE

## 2018-06-04 PROCEDURE — 97166 OT EVAL MOD COMPLEX 45 MIN: CPT

## 2018-06-04 PROCEDURE — 97530 THERAPEUTIC ACTIVITIES: CPT

## 2018-06-04 PROCEDURE — 84550 ASSAY OF BLOOD/URIC ACID: CPT | Performed by: INTERNAL MEDICINE

## 2018-06-04 PROCEDURE — 25010000002 HEPARIN (PORCINE) PER 1000 UNITS: Performed by: FAMILY MEDICINE

## 2018-06-04 PROCEDURE — 92610 EVALUATE SWALLOWING FUNCTION: CPT

## 2018-06-04 RX ORDER — COLCHICINE 0.6 MG/1
0.6 TABLET ORAL DAILY
Status: DISCONTINUED | OUTPATIENT
Start: 2018-06-04 | End: 2018-06-07 | Stop reason: HOSPADM

## 2018-06-04 RX ORDER — NYSTATIN 100000 [USP'U]/G
POWDER TOPICAL EVERY 12 HOURS PRN
Status: DISCONTINUED | OUTPATIENT
Start: 2018-06-04 | End: 2018-06-07 | Stop reason: HOSPADM

## 2018-06-04 RX ADMIN — Medication 250 MG: at 21:16

## 2018-06-04 RX ADMIN — SUCRALFATE 1 G: 1 TABLET ORAL at 17:30

## 2018-06-04 RX ADMIN — TRAZODONE HYDROCHLORIDE 12.5 MG: 50 TABLET ORAL at 21:16

## 2018-06-04 RX ADMIN — TAZOBACTAM SODIUM AND PIPERACILLIN SODIUM 4.5 G: 500; 4 INJECTION, SOLUTION INTRAVENOUS at 03:29

## 2018-06-04 RX ADMIN — TAZOBACTAM SODIUM AND PIPERACILLIN SODIUM 4.5 G: 500; 4 INJECTION, SOLUTION INTRAVENOUS at 12:07

## 2018-06-04 RX ADMIN — Medication 5 MG: at 21:16

## 2018-06-04 RX ADMIN — HEPARIN SODIUM 5000 UNITS: 5000 INJECTION, SOLUTION INTRAVENOUS; SUBCUTANEOUS at 21:15

## 2018-06-04 RX ADMIN — TRAMADOL HYDROCHLORIDE 50 MG: 50 TABLET, COATED ORAL at 21:15

## 2018-06-04 RX ADMIN — BISACODYL 10 MG: 10 SUPPOSITORY RECTAL at 14:01

## 2018-06-04 RX ADMIN — POTASSIUM CHLORIDE 40 MEQ: 750 CAPSULE, EXTENDED RELEASE ORAL at 17:30

## 2018-06-04 RX ADMIN — MEMANTINE 10 MG: 10 TABLET ORAL at 21:16

## 2018-06-04 RX ADMIN — TERAZOSIN HYDROCHLORIDE ANHYDROUS 2 MG: 2 CAPSULE ORAL at 21:16

## 2018-06-04 RX ADMIN — TAZOBACTAM SODIUM AND PIPERACILLIN SODIUM 4.5 G: 500; 4 INJECTION, SOLUTION INTRAVENOUS at 21:15

## 2018-06-04 RX ADMIN — DOCUSATE SODIUM 100 MG: 100 CAPSULE, LIQUID FILLED ORAL at 21:16

## 2018-06-04 RX ADMIN — ACETAMINOPHEN 650 MG: 325 TABLET ORAL at 21:16

## 2018-06-04 RX ADMIN — HEPARIN SODIUM 5000 UNITS: 5000 INJECTION, SOLUTION INTRAVENOUS; SUBCUTANEOUS at 08:49

## 2018-06-04 RX ADMIN — MELOXICAM 7.5 MG: 7.5 TABLET ORAL at 21:16

## 2018-06-04 RX ADMIN — AMLODIPINE BESYLATE 10 MG: 10 TABLET ORAL at 21:16

## 2018-06-04 RX ADMIN — ALPRAZOLAM 0.25 MG: 0.25 TABLET ORAL at 21:17

## 2018-06-04 RX ADMIN — DONEPEZIL HYDROCHLORIDE 10 MG: 10 TABLET, FILM COATED ORAL at 21:16

## 2018-06-04 RX ADMIN — MIRTAZAPINE 30 MG: 15 TABLET, FILM COATED ORAL at 21:15

## 2018-06-04 RX ADMIN — GABAPENTIN 100 MG: 100 CAPSULE ORAL at 21:16

## 2018-06-04 RX ADMIN — SUCRALFATE 1 G: 1 TABLET ORAL at 21:15

## 2018-06-04 RX ADMIN — COLCHICINE 0.6 MG: 0.6 TABLET, FILM COATED ORAL at 14:01

## 2018-06-04 NOTE — PROGRESS NOTES
Discharge Planning Assessment  [facility]     Patient Name: Poonam Mata  MRN: [id number]  Today's Date: 6/4/2018    Admit Date: 6/3/2018          Discharge Needs Assessment     Row Name 06/04/18 1501       Living Environment    Lives With facility resident   [facility]    Current Living Arrangements extended care facility    Primary Care Provided by other (see comments)   SNF staff    Provides Primary Care For no one, unable/limited ability to care for self    Family Caregiver if Needed none    Quality of Family Relationships helpful;involved;supportive    Able to Return to Prior Arrangements yes       Resource/Environmental Concerns    Transportation Concerns other (see comments)   [facility]       Transition Planning    Patient/Family Anticipated Services at Transition     Transportation Anticipated other (see comments)   [facility]       Discharge Needs Assessment    Readmission Within the Last 30 Days previous discharge plan unsuccessful    Concerns to be Addressed discharge planning;adjustment to diagnosis/illness    Equipment Currently Used at Home wheelchair    Anticipated Changes Related to Illness inability to care for self    Equipment Needed After Discharge none    Outpatient/Agency/Support Group Needs skilled nursing facility    Discharge Facility/Level of Care Needs nursing facility, skilled            Discharge Plan     Row Name 06/04/18 1503       Plan    Plan [facility]    Patient/Family in Agreement with Plan yes    Plan Comments Spoke with pt's daughter, Vilma STEWART ([phone]) via phone.  Also spoke with Chayo at [facility].  Confirmed pt was in a skilled bed at [facility] since Jan 2018 and plans to transition to long-term bed when/if apppropriate.  Pt is WC bound at facility.  She is able to feed herself.  Dependent with all other ADLs/IADLs.  Daughter, Vilma, will arrange transportation via [facility] [place].  CM will cont to follow.      Final  Discharge Disposition Code 03 - skilled nursing facility (SNF)        Destination - Selection Complete     Service Request Status Selected Specialties Address Phone Number Fax Number    [facility] Selected Skilled Nursing Facility [address] [address] [phone]      Durable Medical Equipment     No service coordination in this encounter.      Dialysis/Infusion     No service coordination in this encounter.      Home Medical Care     No service coordination in this encounter.      Social Care     No service coordination in this encounter.        Expected Discharge Date and Time     Expected Discharge Date Expected Discharge Time    Jun 6, 2018               Demographic Summary     Row Name 06/04/18 1501       General Information    Admission Type inpatient    Referral Source admission list    Reason for Consult discharge planning    Preferred Language English       Contact Information    Permission Granted to Share Info With     Contact Information Obtained for             Functional Status     Row Name 06/04/18 1501       Functional Status    Usual Activity Tolerance poor       Functional Status, IADL    Medications completely dependent    Meal Preparation completely dependent    Housekeeping completely dependent    Laundry completely dependent    Shopping completely dependent            Psychosocial    No documentation.           Abuse/Neglect    No documentation.           Legal    No documentation.           Substance Abuse    No documentation.           Patient Forms    No documentation.         Katlyn Hawkins

## 2018-06-04 NOTE — PLAN OF CARE
Problem: Patient Care Overview  Goal: Plan of Care Review  Outcome: Ongoing (interventions implemented as appropriate)   06/04/18 6207   Coping/Psychosocial   Plan of Care Reviewed With patient;family   Plan of Care Review   Progress no change   OTHER   Outcome Summary pt passed swallow eval with [facility] today and is on pureed soft diet with thin liquids, pt very letargic but able to follow commands, pt has pain in left arm due to gouty arthritis      Goal: Individualization and Mutuality  Outcome: Ongoing (interventions implemented as appropriate)    Goal: Discharge Needs Assessment  Outcome: Ongoing (interventions implemented as appropriate)    Goal: Interprofessional Rounds/Family Conf  Outcome: Ongoing (interventions implemented as appropriate)      Problem: Fall Risk (Adult)  Goal: Identify Related Risk Factors and Signs and Symptoms  Outcome: Ongoing (interventions implemented as appropriate)    Goal: Absence of Fall  Outcome: Ongoing (interventions implemented as appropriate)

## 2018-06-04 NOTE — THERAPY EVALUATION
Acute Care - Speech Language Pathology   Swallow Initial Evaluation [facility]   Clinical Swallow Evaluation     Patient Name: Poonam Mata  : 1931  MRN: [id number]  Today's Date: 2018               Admit Date: 6/3/2018    Visit Dx:     ICD-10-CM ICD-9-CM   1. Acute respiratory failure with hypoxia J96.01 518.81   2. Febrile illness, acute R50.9 780.60   3. Sepsis, due to unspecified organism A41.9 038.9     995.91   4. Encephalopathy acute G93.40 348.30   5. Dysphagia, unspecified type R13.10 787.20     Patient Active Problem List   Diagnosis   • Pneumonia   • Hypokalemia   • Dementia   • Hypertension   • GERD (gastroesophageal reflux disease)   • Normocytic anemia   • Elevated alkaline phosphatase level   • Vasovagal episode   • Constipation   • Aortic heart murmur   • HCAP (healthcare-associated pneumonia)   • Acute respiratory failure with hypoxia   • Spinal stenosis   • Acute pain of left shoulder   • Left hand pain     Past Medical History:   Diagnosis Date   • Anxiety    • Arthropathy    • Constipation    • Dementia    • Essential hypertension    • GERD (gastroesophageal reflux disease)    • Hyperlipidemia    • Insomnia    • Spinal stenosis    • Syncope    • URI (upper respiratory infection)      Past Surgical History:   Procedure Laterality Date   • BLADDER SUSPENSION     • CHOLECYSTECTOMY     • HIP SURGERY     • HYSTERECTOMY     • TONSILLECTOMY            SWALLOW EVALUATION (last 72 hours)      SLP Adult Swallow Evaluation     Row Name 18 0915                   Rehab Evaluation    Document Type evaluation  -RD        Subjective Information no complaints  -RD        Patient Observations lethargic;cooperative  -RD        Patient/Family Observations Pt's son-in-law present  -RD        Patient Effort adequate  -RD           General Information    Patient Profile Reviewed yes  -RD        Pertinent History Of Current Problem Adm w/ acute resp insuff. HCAP. RLL PNA. Hx of dementia, GERD,  recurrent PNAs.   -RD        Current Method of Nutrition NPO  -RD        Precautions/Limitations, Vision other (see comments)   unknown, eyes closed  -RD        Precautions/Limitations, Hearing WFL;for purposes of eval  -RD        Prior Level of Function-Communication cognitive-linguistic impairment;other (see comments)   baseline dementia  -RD        Prior Level of Function-Swallowing no diet consistency restrictions  -RD        Plans/Goals Discussed with patient;family;agreed upon  -RD        Barriers to Rehab none identified  -RD        Patient's Goals for Discharge patient did not state  -RD        Family Goals for Discharge patient able to eat/drink without coughing/choking  -RD           Pain Assessment    Additional Documentation Pain Scale: FACES Pre/Post-Treatment (Group)  -RD           Pain Scale: FACES Pre/Post-Treatment    Pain: FACES Scale, Pretreatment 0-->no hurt  -RD        Pain: FACES Scale, Post-Treatment 0-->no hurt  -RD           Oral Motor and Function    Dentition Assessment natural, present and adequate  -RD        Secretion Management WNL/WFL  -RD        Mucosal Quality moist, healthy  -RD           Oral Musculature and Cranial Nerve Assessment    Oral Motor General Assessment generalized oral motor weakness  -RD        Oral Motor, Comment Not following oral commands  -RD           General Eating/Swallowing Observations    Respiratory Support Currently in Use room air  -RD        Eating/Swallowing Skills fed by SLP  -RD        Positioning During Eating upright 90 degree;upright in bed  -RD        Utensils Used spoon;cup;straw  -RD        Consistencies Trialed thin liquids;pudding thick;regular textures  -RD           Respiratory    Respiratory Status room air  -RD           Clinical Swallow Eval    Oral Prep Phase impaired  -RD        Oral Transit --  -RD        Pharyngeal Phase no overt signs/symptoms of pharyngeal impairment  -RD        Clinical Swallow Evaluation Summary BS eval complete.  Pt lethargic w/ eyes closed. Confused, but responding to questions. Trials given of thins via tsp, cup, and straw, pureed, and solid consistencies. No overt s/s of aspiration w/ thins or pureed, even when pushed w/ large consecutive straw sips. Pt confused and unable to bite into regular cracker. RECS: thin liquids, pureed diet, meds crushed in pureed, general aspiration and reflux precautions, oral care BID and PRN, supervision w/ PO, only feed if FULLY alert, SLP will f/u for BS re-eval tomorrow  -RD           Oral Prep Concerns    Oral Prep Concerns increased prep time;reduced lip opening  -RD        Reduced Lip Opening thin;pudding;regular consistencies  -RD        Increased Prep Time pudding  -RD        Oral Prep Concerns, Comment Suspect lethargy and confusion impacting  -RD           Clinical Impression    SLP Swallowing Diagnosis oral dysfunction  -RD        Functional Impact risk of aspiration/pneumonia  -RD        Rehab Potential/Prognosis, Swallowing good, to achieve stated therapy goals  -RD        Criteria for Skilled Therapeutic Interventions Met demonstrates skilled criteria  -RD           Recommendations    Therapy Frequency (Swallow) evaluation only;PRN  -RD        Predicted Duration Therapy Intervention (Days) until discharge  -RD        SLP Diet Recommendation puree;thin liquids;other (see comments)   only feed if FULLY alert, supervision w/ all PO  -RD        Recommended Diagnostics reassess via clinical swallow evaluation  -RD        Recommended Precautions and Strategies upright posture during/after eating;small bites of food and sips of liquid;other (see comments)   general aspiration and reflux precautions, Oral care BID/PRN  -RD        SLP Rec. for Method of Medication Administration meds crushed;with pudding or applesauce;as tolerated   only if alert  -RD        Monitor for Signs of Aspiration yes;notify SLP if any concerns;cough;gurgly voice;throat clearing  -RD        Anticipated Dischage  Disposition unknown  -RD          User Key  (r) = Recorded By, (t) = Taken By, (c) = Cosigned By    Initials Name Effective Dates    MELIZA Black MS CCC-SLP 04/03/18 -         EDUCATION  The patient has been educated in the following areas:   Dysphagia (Swallowing Impairment) Oral Care/Hydration Modified Diet Instruction.    SLP Recommendation and Plan  SLP Swallowing Diagnosis: oral dysfunction  SLP Diet Recommendation: puree, thin liquids, other (see comments) (only feed if FULLY alert, supervision w/ all PO)  Recommended Precautions and Strategies: upright posture during/after eating, small bites of food and sips of liquid, other (see comments) (general aspiration and reflux precautions, Oral care BID/PRN)     Monitor for Signs of Aspiration: yes, notify SLP if any concerns, cough, gurgly voice, throat clearing  Recommended Diagnostics: reassess via clinical swallow evaluation  Criteria for Skilled Therapeutic Interventions Met: demonstrates skilled criteria  Anticipated Dischage Disposition: unknown  Rehab Potential/Prognosis, Swallowing: good, to achieve stated therapy goals  Therapy Frequency (Swallow): evaluation only, PRN  Predicted Duration Therapy Intervention (Days): until discharge       Plan of Care Reviewed With: patient, other (see comments) (son-in-law present)  Plan of Care Review  Plan of Care Reviewed With: patient, other (see comments) (son-in-law present)             Time Calculation:         Time Calculation- SLP     Row Name 06/04/18 1010             Time Calculation- SLP    SLP Start Time 0915  -RD      SLP Received On 06/04/18  -        User Key  (r) = Recorded By, (t) = Taken By, (c) = Cosigned By    Initials Name Provider Type    MELIZA Black MS CCC-SLP Speech and Language Pathologist          Therapy Charges for Today     Code Description Service Date Service Provider Modifiers Qty    [id number]  ST EVAL ORAL PHARYNG SWALLOW 5 6/4/2018 Talia Black MS CCC-SLP  GN 1               Talia Black, MS CCC-SLP  6/4/2018

## 2018-06-04 NOTE — PLAN OF CARE
Problem: Patient Care Overview  Goal: Plan of Care Review  Outcome: Ongoing (interventions implemented as appropriate)   06/04/18 7859   Coping/Psychosocial   Plan of Care Reviewed With patient;other (see comments)  (son-in-law present)   SLP clinical dysphagia evaluation completed. Will address swallowing w/ f/u to ensure diet tolerance and BS re-eval tomorrow. Pt lethargic, eyes closed, but responding to questions. No overt s/s of aspiration w/ thins or pureed, even when pushed w/ large consecutive straw sips of thins. Unable to bite into regular cracker. Given lethargy, ensure pt FULLY alert prior to PO. RECS: thin liquids, pureed diet, small bites/sips precaution, meds crushed in pureed, general aspiration/reflux precautions, oral care BID and PRN, only feed when fully alert, supervision w/ PO, SLP will f/u for BS re-eval tomorrow. Please see note for further details and recommendations.

## 2018-06-04 NOTE — PLAN OF CARE
Problem: Patient Care Overview  Goal: Plan of Care Review  Outcome: Ongoing (interventions implemented as appropriate)   06/04/18 1430   Coping/Psychosocial   Plan of Care Reviewed With patient   Plan of Care Review   Progress (Evaluation)   OTHER   Outcome Summary OT eval complete. Pt with gout LUE profound weakness, decreased balance and activity tolerance. Pt max a x2 supine to sit , max A transfer to BSC and back to bed. OT will follow to advance pt toward increased independence with self care. Recommend SNF for rehab upon d/c.

## 2018-06-04 NOTE — THERAPY EVALUATION
Acute Care - Occupational Therapy Initial Evaluation  [facility]     Patient Name: Poonam Mata  : 1931  MRN: [id number]  Today's Date: 2018  Onset of Illness/Injury or Date of Surgery: 18  Date of Referral to OT: 18  Referring Physician: MD Nikko    Admit Date: 6/3/2018       ICD-10-CM ICD-9-CM   1. Acute respiratory failure with hypoxia J96.01 518.81   2. Febrile illness, acute R50.9 780.60   3. Sepsis, due to unspecified organism A41.9 038.9     995.91   4. Encephalopathy acute G93.40 348.30   5. Dysphagia, unspecified type R13.10 787.20   6. Impaired mobility and ADLs Z74.09 799.89     Patient Active Problem List   Diagnosis   • Pneumonia   • Hypokalemia   • Dementia   • Hypertension   • GERD (gastroesophageal reflux disease)   • Normocytic anemia   • Elevated alkaline phosphatase level   • Vasovagal episode   • Constipation   • Aortic heart murmur   • HCAP (healthcare-associated pneumonia)   • Acute respiratory failure with hypoxia   • Spinal stenosis   • Acute pain of left shoulder   • Left hand pain     Past Medical History:   Diagnosis Date   • Anxiety    • Arthropathy    • Constipation    • Dementia    • Essential hypertension    • GERD (gastroesophageal reflux disease)    • Hyperlipidemia    • Insomnia    • Spinal stenosis    • Syncope    • URI (upper respiratory infection)      Past Surgical History:   Procedure Laterality Date   • BLADDER SUSPENSION     • CHOLECYSTECTOMY     • HIP SURGERY     • HYSTERECTOMY     • TONSILLECTOMY            OT ASSESSMENT FLOWSHEET (last 72 hours)      Occupational Therapy Evaluation     Row Name 18 1310                   OT Evaluation Time/Intention    Subjective Information complains of;weakness;fatigue;pain  -AC        Document Type evaluation  -AC        Mode of Treatment occupational therapy  -AC        Patient Effort adequate  -AC           General Information    Patient Profile Reviewed? yes  -AC        Onset of Illness/Injury or  Date of Surgery 06/03/18  -        Referring Physician MD Nikko  -        Patient Observations cooperative;lethargic  -AC        Patient/Family Observations Dtr present in room  -        General Observations of Patient Pt received sleeping in bed.  IV intact, on O2  -AC        Prior Level of Function independent:;feeding;min assist:;grooming;mod assist:;bed mobility;max assist:;transfer;dressing;bathing  -AC        Equipment Currently Used at Home wheelchair  -        Pertinent History of Current Functional Problem Pt admit from ECF with SOA with O2 sat in 70's, confusion, fever, and gout LUE.   -AC        Limitations/Impairments hearing  -AC        Risks Reviewed patient and family:;LOB;increased discomfort  -AC        Benefits Reviewed patient and family:;improve function;increase independence;increase strength;increase balance;increase knowledge  -        Barriers to Rehab previous functional deficit  -AC           Relationship/Environment    Lives With facility resident  -           Resource/Environmental Concerns    Current Living Arrangements extended care facility  -           Cognitive Assessment/Interventions    Additional Documentation Cognitive Assessment/Intervention (Group)  -           Cognitive Assessment/Intervention- PT/OT    Orientation Status (Cognition) oriented to;person   knows she is in hospital, but not which one  -AC        Follows Commands (Cognition) follows one step commands;50-74% accuracy  -           Safety Issues, Functional Mobility    Impairments Affecting Function (Mobility) balance;grasp;pain;postural/trunk control;strength  -AC           Bed Mobility Assessment/Treatment    Bed Mobility Assessment/Treatment supine-sit;sit-supine  -AC        Supine-Sit [place] (Bed Mobility) maximum assist (25% patient effort);2 person assist  -        Sit-Supine [place] (Bed Mobility) dependent (less than 25% patient effort)  -        Assistive Device (Bed  Mobility) draw sheet;head of bed elevated  -           Functional Mobility    Functional Mobility- Ind. Level unable to perform  -           Transfer Assessment/Treatment    Transfer Assessment/Treatment sit-stand transfer;stand-sit transfer;toilet transfer  -        Comment (Transfers) pt with legs in ext in sitting and demo difficulty bending knees to get feet underneath her  -        Sit-Stand [place] (Transfers) verbal cues;maximum assist (25% patient effort);2 person assist  -AC        Stand-Sit [place] (Transfers) verbal cues;maximum assist (25% patient effort);2 person assist  -AC        [place] Level (Toilet Transfer) verbal cues;maximum assist (25% patient effort);2 person assist  -AC        Assistive Device (Toilet Transfer) --   gait belt and UE support  -           Sit-Stand Transfer    Assistive Device (Sit-Stand Transfers) --   gait belt and UE support  -           Stand-Sit Transfer    Assistive Device (Stand-Sit Transfers) --   gait belt and UE support  -           Toilet Transfer    Type (Toilet Transfer) sit-stand;stand-sit  -           BADL Safety/Performance    Impairments, BADL Safety/Performance balance;grasp/prehension;coordination;pain;strength  -           General ROM    GENERAL ROM COMMENTS B shld limtied 50 %; otherwisw WFL  -           General Assessment (Manual Muscle Testing)    Comment, General Manual Muscle Testing (MMT) Assessment B shld 2+/5; otherwise 3 to 3+/5  -           Motor Assessment/Interventions    Additional Documentation Balance (Group)  -           Balance    Balance static sitting balance  -           Static Sitting Balance    Level of [place] (Unsupported Sitting, Static Balance) contact guard assist   leans posterior and right  -           Sensory Assessment/Intervention    Additional Documentation Vision Assessment/Intervention (Group)  -           Vision Assessment/Intervention    Visual Impairment/Limitations --    difficulty keeping eyes open,able to id # fingers R/L  -AC           Positioning and Restraints    Pre-Treatment Position in bed  -AC        Post Treatment Position bed  -AC        In Bed supine;call light within reach;encouraged to call for assist;exit alarm on;with family/caregiver;SCD pump applied;heels elevated  -AC           Pain Assessment    Additional Documentation Pain Scale: FACES Pre/Post-Treatment (Group)  -AC           Pain Scale: Numbers Pre/Post-Treatment    Pain Location - Side Left  -AC        Pain Location - Orientation upper  -AC        Pain Location extremity  -AC           Pain Scale: FACES Pre/Post-Treatment    Pain: FACES Scale, Pretreatment 4-->hurts little more  -AC        Pain: FACES Scale, Post-Treatment 4-->hurts little more  -AC           Plan of Care Review    Plan of Care Reviewed With patient;daughter  -AC           Clinical Impression (OT)    Date of Referral to OT 06/03/18  -        OT Diagnosis ADL decline  -AC        Criteria for Skilled Therapeutic Interventions Met (OT Eval) yes;treatment indicated  -AC        Rehab Potential (OT Eval) good, to achieve stated therapy goals  -AC        Therapy Frequency (OT Eval) daily  -AC        Care Plan Review (OT) care plan/treatment goals reviewed;risks/benefits reviewed  -AC        Anticipated Discharge Disposition (OT) skilled nursing facility  -           Vital Signs    Pre Systolic BP Rehab 131  -AC        Pre Treatment Diastolic BP 61  -AC        Pretreatment Heart Rate (beats/min) 95  -AC        Post SpO2 (%) 92  -AC        O2 Delivery Post Treatment supplemental O2  -AC           Planned OT Interventions    Planned Therapy Interventions (OT Eval) adaptive equipment training;BADL retraining;functional balance retraining;strengthening exercise  -AC           OT Goals    Bed Mobility Goal Selection (OT) bed mobility, OT goal 1  -AC        Transfer Goal Selection (OT) transfer, OT goal 1  -AC        Grooming Goal Selection (OT)  grooming, OT goal 1  -AC        Strength Goal Selection (OT) strength, OT goal 1  -AC        Additional Documentation Grooming Goal Selection (OT) (Row);Strength Goal Selection (OT) (Row)  -AC           Bed Mobility Goal 1 (OT)    Activity/Assistive Device (Bed Mobility Goal 1, OT) supine to sit;sit to supine;rolling to right;rolling to left  -AC        [place] Level/Cues Needed (Bed Mobility Goal 1, OT) moderate assist (50-74% patient effort)  -AC        Time Frame (Bed Mobility Goal 1, OT) by discharge  -AC           Transfer Goal 1 (OT)    Activity/Assistive Device (Transfer Goal 1, OT) bed-to-chair/chair-to-bed;toilet  -AC        [place] Level/Cues Needed (Transfer Goal 1, OT) verbal cues required;moderate assist (50-74% patient effort)  -AC        Time Frame (Transfer Goal 1, OT) by discharge  -AC           Grooming Goal 1 (OT)    Activity/Device (Grooming Goal 1, OT) wash face, hands;hair care;oral care  -AC        [place] (Grooming Goal 1, OT) minimum assist (75% or more patient effort)  -AC        Time Frame (Grooming Goal 1, OT) by discharge  -AC           Strength Goal 1 (OT)    Strength Goal 1 (OT) pt will complete BUE AAROM/AROM 10-15 reps daily as needed   -AC        Time Frame (Strength Goal 1, OT) by discharge  -AC          User Key  (r) = Recorded By, (t) = Taken By, (c) = Cosigned By    Initials Name Effective Dates    AC Nena Grant OT 06/23/15 -            Occupational Therapy Education     Title: PT OT SLP Therapies (Active)     Topic: Occupational Therapy (Active)     Point: ADL training (Done)     Description: Instruct learner(s) on proper safety adaptation and remediation techniques during self care or transfers.   Instruct in proper use of assistive devices.   Learning Progress Summary     Learner Status Readiness Method Response Comment Documented by    Patient Done Acceptance ETB VU benefits of activity, role of OT AC 06/04/18 1428    Family Done Acceptance ETB VU  benefits of activity, role of OT  06/04/18 1428                      User Key     Initials Effective Dates Name Provider Type Discipline     06/23/15 -  Nena Grant, OT Occupational Therapist OT                  OT Recommendation and Plan  Outcome Summary/Treatment Plan (OT)  Anticipated Discharge Disposition (OT): skilled nursing facility  Planned Therapy Interventions (OT Eval): adaptive equipment training, BADL retraining, functional balance retraining, strengthening exercise  Therapy Frequency (OT Eval): daily  Plan of Care Review  Plan of Care Reviewed With: patient  Plan of Care Reviewed With: patient  Outcome Summary: OT eval complete. Pt with profound weakness, decreased balance and activity tolerance. Pt max a x2 supine to sit , max A transfer to BSC and back to bed.  OT will follow to advance pt toward increased independence with self care.  Recommend  SNF for rehab upon d/c.           Outcome Measures     Row Name 06/04/18 1310             How much help from another is currently needed...    Putting on and taking off regular lower body clothing? 1  -AC      Bathing (including washing, rinsing, and drying) 1  -AC      Toileting (which includes using toilet bed pan or urinal) 1  -AC      Putting on and taking off regular upper body clothing 2  -AC      Taking care of personal grooming (such as brushing teeth) 2  -AC      Eating meals 2  -AC      Score 9  -AC         Functional Assessment    Outcome Measure Options AM-PAC 6 Clicks Daily Activity (OT)  -AC        User Key  (r) = Recorded By, (t) = Taken By, (c) = Cosigned By    Initials Name Provider Type     Nena Grant OT Occupational Therapist          Time Calculation:   OT Start Time: 1310    Therapy Charges for Today     Code Description Service Date Service Provider Modifiers Qty    37907211817  OT EVAL MOD COMPLEXITY 4 6/4/2018 Nena Grant OT GO 1    [id number]  OT THER SUPP EA 15 MIN 6/4/2018 Nena Grant OT GO 2    [id number]  HC OT THERAPEUTIC ACT EA 15 MIN 6/4/2018 Nena Grant, OT GO 1               Nena Grant, OT  6/4/2018

## 2018-06-04 NOTE — PLAN OF CARE
Problem: Patient Care Overview  Goal: Plan of Care Review  Outcome: Ongoing (interventions implemented as appropriate)   06/04/18 0257   Coping/Psychosocial   Plan of Care Reviewed With patient   Plan of Care Review   Progress improving   OTHER   Outcome Summary Patient is still very weak and moderate pain in her left arm. Sinus Tach, IV abx       Problem: Fall Risk (Adult)  Goal: Identify Related Risk Factors and Signs and Symptoms  Outcome: Ongoing (interventions implemented as appropriate)      Problem: Skin Injury Risk (Adult)  Goal: Identify Related Risk Factors and Signs and Symptoms  Outcome: Ongoing (interventions implemented as appropriate)

## 2018-06-04 NOTE — PROGRESS NOTES
[facility] Medicine Services  PROGRESS NOTE    Patient Name: Poonam Mata  : 1931  MRN: [id number]    Date of Admission: 6/3/2018  Length of Stay: 1  Primary Care Physician: Leti Munoz MD    Subjective   Subjective     CC: f/u PNA    HPI: Patient resting comfortably this am. Awakens easily but quickly falls back asleep. Has no specific complaints. Son-in-law at bedside. No concerns.    Review of Systems  WOODROW fully due to AMS/lethargy.    Otherwise ROS is negative except as mentioned in the HPI.    Objective   Objective     Vital Signs:   Temp:  [98.6 °F (37 °C)-101.5 °F (38.6 °C)] 98.9 °F (37.2 °C)  Heart Rate:  [] 87  Resp:  [12-20] 12  BP: (131-181)/(60-81) 131/61        Physical Exam:  Constitutional: Mild distress, chronically ill appearing, asleep but awakens to voice  HENT: NCAT, mucous membranes moist  Respiratory: Normal WOB, bilateral right sided rhonchi  Cardiovascular: RRR, II/VI systolic murmur  Gastrointestinal: Positive bowel sounds, soft, nontender, nondistended  Musculoskeletal: No bilateral ankle edema  Psychiatric: WOODROW  Neurologic: WOODROW  Skin: No rashes    Results Reviewed:  I have personally reviewed current lab, radiology, and data and agree.      Results from last 7 days  Lab Units 18  0436 18  1355   WBC 10*3/mm3 12.61* 10.66   HEMOGLOBIN g/dL 10.1* 11.7   HEMATOCRIT % 32.4* 37.5   PLATELETS 10*3/mm3 242 264       Results from last 7 days  Lab Units 18  0436 18  1422   SODIUM mmol/L 136 139   POTASSIUM mmol/L 3.6 3.8   CHLORIDE mmol/L 103 104   CO2 mmol/L 25.0 27.0   BUN mg/dL 12 14   CREATININE mg/dL 0.60 0.70   GLUCOSE mg/dL 151* 116*   CALCIUM mg/dL 8.7 8.4*   ALT (SGPT) U/L  --  15   AST (SGOT) U/L  --  24     Estimated Creatinine Clearance: 45.1 mL/min (by C-G formula based on SCr of 0.6 mg/dL).  BNP   Date Value Ref Range Status   2018 7.0 0.0 - 100.0 pg/mL Final     Comment:     Results may be falsely  decreased if patient taking Biotin.     No results found for: PHART    Microbiology Results Abnormal     Procedure Component Value - Date/Time    Blood Culture - Blood, [[id number]]  (Normal) Collected:  06/03/18 1410    Lab Status:  Preliminary result Specimen:  Blood from Arm, Left Updated:  06/04/18 0245     Blood Culture No growth at less than 24 hours    Blood Culture - Blood, [[id number]]  (Normal) Collected:  06/03/18 1406    Lab Status:  Preliminary result Specimen:  Blood from Arm, Left Updated:  06/04/18 0245     Blood Culture No growth at less than 24 hours        Personally reviewed CTA chest with extensive right lung airspace disease. Agree with interpretation.   Imaging Results (last 24 hours)     Procedure Component Value Units Date/Time    XR Wrist 2 View Left [[id number]] Collected:  06/04/18 1034     Updated:  06/04/18 1103    Narrative:       EXAMINATION: XR WRIST 2 VW, LEFT-06/03/2018:      INDICATION: Pain, no known trauma; J96.01-Acute respiratory failure with  hypoxia; R50.9-Fever, unspecified; A41.9-Sepsis, unspecified organism;  G93.40-Encephalopathy, unspecified.      COMPARISON: NONE.     FINDINGS: Two views of the left wrist reveal severe degenerative changes  seen within the radiocarpal joint and ulna with osteophyte formation  identified. There is soft tissue swelling suggesting possibly gouty  arthritis. Severe osteopenia identified of the bony structures.           Impression:       Findings to suggest possible gouty arthritis. Clinical  correlation is needed.     D:  06/04/2018  E:  06/04/2018     This report was finalized on 6/4/2018 11:01 AM by Dr. Jackelin Marinelli MD.       XR Shoulder 2+ View Left [[id number]] Collected:  06/04/18 1035     Updated:  06/04/18 1103    Narrative:       EXAMINATION: XR SHOULDER 2+ VW, LEFT-06/03/2018:      INDICATION: Pain. No known trauma; J96.01-Acute respiratory failure with  hypoxia; R50.9-Fever, unspecified; A41.9-Sepsis, unspecified  organism;  G93.40-Encephalopathy, unspecified.      COMPARISON: NONE.     FINDINGS: Two views of the left shoulder reveal degenerative changes  seen of the acromioclavicular joint. Spurring identified of the humeral  head. Slight joint space narrowing. No fracture or dislocation.           Impression:       Degenerative changes seen within the shoulder with no  fracture or dislocation.     D:  06/04/2018  E:  06/04/2018     This report was finalized on 6/4/2018 11:01 AM by Dr. Jackelin Marinelli MD.       XR Hand 3+ View Left [[id number]] Collected:  06/04/18 1034     Updated:  06/04/18 1103    Narrative:       EXAMINATION: XR HAND 3+ VW, LEFT-05/03/2018:      INDICATION: Pain, no known trauma; J96.01-Acute respiratory failure with  hypoxia; R50.9-Fever, unspecified; A41.9-Sepsis, unspecified organism;  G93.40-Encephalopathy, unspecified.      COMPARISON: NONE.     FINDINGS: Three views of the left hand reveal severe degenerative  changes identified within the interphalangeal joints with joint space  narrowing and osteophyte formation identified. There is soft tissue  swelling present suggesting an inflammatory arthritis. Degenerative  changes seen of the first carpometacarpal joint as well as within the  radiocarpal joint and ulna. Severe osteopenia of the bony structures. No  fracture or dislocation.           Impression:       Severe degenerative changes identified within the  interphalangeal joints, carpal joints suggesting possibly gouty  arthritis with soft tissue swelling present. Clinical correlation  needed.     D:  06/04/2018  E:  06/04/2018     This report was finalized on 6/4/2018 11:01 AM by Dr. Jackelin Marinelli MD.       CT Angiogram Chest With Contrast [[id number]] Collected:  06/03/18 1750     Updated:  06/04/18 0829    Narrative:       EXAMINATION: CT ANGIOGRAM CHEST W/CONTRAST - 6/3/2018      INDICATION: Dyspnea.     TECHNIQUE: Spiral acquisition 3 mm post-IV contrast images through the  chest  with coronal 10 mm 2D reconstructions.     The radiation dose reduction device was turned on for each scan per the  ALARA (As Low as Reasonably Achievable) protocol.     COMPARISON: Portable chest exam of same date.     FINDINGS: Patient history indicates pneumonia, fever.     There is good contrast opacification of the pulmonary arteries. There  are no visible filling defects to suggest pulmonary embolic disease.  There is no evidence of thoracic aortic aneurysm or dissection,  pericardial or pleural effusion. No mediastinal adenopathy is seen.     Lung window images show moderately extensive patchy disease of the right  lower lobe and, to a lesser extent, the right middle lobe associated  with elevated right hemidiaphragm. This could all represent atelectasis  but an underlying pneumonia cannot be excluded. Mild discoid atelectasis  in the left base is more typical for simple atelectasis. Lungs appear  clear elsewhere.     Included images of the upper abdomen show no significant abnormalities  of the visualized portions of the liver, spleen, pancreatic tail,  adrenal glands or right upper renal pole. A near water density lesion in  the left upper renal pole is probably an incidental cyst.       Impression:       1. No evidence of pulmonary embolus.  2. Elevated right hemidiaphragm and moderately extensive and irregular  right lower lobe and middle lobe atelectasis versus pneumonia.     DICTATED:     6/3/2018  EDITED/ls :     6/3/2018      This report was finalized on 6/4/2018 8:27 AM by DR. Ifeanyi Curry MD.       XR Chest 1 View [[id number]] Collected:  06/03/18 1530     Updated:  06/04/18 0007    Narrative:          EXAMINATION: XR CHEST 1 VW - 6/3/2018     INDICATION: Shortness of air.     COMPARISON: 5/15/2018.     FINDINGS: There is new mild to moderate right basilar discoid  atelectasis, probably in the right lower lobe. There is trace linear  scarring left base which is unchanged. Lungs otherwise appear clear.  The  heart is upper normal size. The vasculature appears normal.           Impression:       New mild to moderate right lower lobe atelectasis or  pneumonia.     DICTATED:     6/3/2018  EDITED/ls :     6/3/2018      This report was finalized on 6/4/2018 12:05 AM by DR. Ifeanyi Curry MD.           Results for orders placed during the hospital encounter of 05/14/18   Adult Transthoracic Echo Complete W/ Cont if Necessary Per Protocol    Narrative · Mild aortic valve regurgitation is present.  · Calculated right ventricular systolic pressure from tricuspid   regurgitation is 24 mmHg.  · Mild tricuspid valve regurgitation is present.  · Left ventricular systolic function is normal. Estimated EF = 60%.  · Left ventricular diastolic dysfunction (grade I) consistent with   impaired relaxation.  · There is no evidence of pericardial effusion.  · No evidence of pulmonary hypertension is present.  · The aortic valve exhibits sclerosis.  · Normal right ventricular cavity size, wall thickness, systolic function   and septal motion noted.  · Moderate MAC is present.          I have reviewed the medications.    Assessment/Plan   Assessment / Plan     Hospital Problem List     * (Principal)Acute respiratory failure with hypoxia    Dementia    Hypertension    GERD (gastroesophageal reflux disease)    Constipation    Aortic heart murmur    HCAP (healthcare-associated pneumonia)    Spinal stenosis (Chronic)    Acute pain of left shoulder    Left hand pain          Brief Hospital Course to date:  Poonam Mata is a 87 y.o. female presenting to ED with fever and hypoxia found once again to have right sided pneumonia.    Assessment & Plan:  --Continue IV zosyn. Based on SLP eval this almost certainly represents aspiration pneumonia. This also supports the daughters claim that patient has had repeated pneumonias. Cultures are pending and she is now on modified diet which hopefully will help with prevention.  --Needs aggressive pulmonary  "toilet.  --Her hand and wrist pain may represent flare of inflammatory arthropathy. Radiology read suggest gouty arthritis, which patient is unknown to have. She has history of PMR, but this has not been active in over 20 years. Will check uric acid and start trial of colchicine. Could consider prednisone but she has allergy to cortisone (unknown.) After considering this, may speak with daughter.   --Initiated palliative discussion with daughter given repeated admissions however she is not ready for this type of discussion at this time and just wants \"mom to be better.\"  --Labs in am.    DVT Prophylaxis:  H    CODE STATUS: Conditional Code    Disposition: I expect the patient to be discharged TBD      Electronically signed by Ene Garcia II, , 06/04/18, 11:13 AM.    "

## 2018-06-05 LAB
ANION GAP SERPL CALCULATED.3IONS-SCNC: 3 MMOL/L (ref 3–11)
BUN BLD-MCNC: 13 MG/DL (ref 9–23)
BUN/CREAT SERPL: 26 (ref 7–25)
CALCIUM SPEC-SCNC: 8.5 MG/DL (ref 8.7–10.4)
CHLORIDE SERPL-SCNC: 107 MMOL/L (ref 99–109)
CO2 SERPL-SCNC: 28 MMOL/L (ref 20–31)
CREAT BLD-MCNC: 0.5 MG/DL (ref 0.6–1.3)
DEPRECATED RDW RBC AUTO: 49.9 FL (ref 37–54)
ERYTHROCYTE [DISTWIDTH] IN BLOOD BY AUTOMATED COUNT: 15.4 % (ref 11.3–14.5)
GFR SERPL CREATININE-BSD FRML MDRD: 117 ML/MIN/1.73
GLUCOSE BLD-MCNC: 92 MG/DL (ref 70–100)
HCT VFR BLD AUTO: 31.4 % (ref 34.5–44)
HGB BLD-MCNC: 9.8 G/DL (ref 11.5–15.5)
MCH RBC QN AUTO: 27.1 PG (ref 27–31)
MCHC RBC AUTO-ENTMCNC: 31.2 G/DL (ref 32–36)
MCV RBC AUTO: 87 FL (ref 80–99)
PLATELET # BLD AUTO: 228 10*3/MM3 (ref 150–450)
PMV BLD AUTO: 8.7 FL (ref 6–12)
POTASSIUM BLD-SCNC: 4 MMOL/L (ref 3.5–5.5)
RBC # BLD AUTO: 3.61 10*6/MM3 (ref 3.89–5.14)
SODIUM BLD-SCNC: 138 MMOL/L (ref 132–146)
WBC NRBC COR # BLD: 6.33 10*3/MM3 (ref 3.5–10.8)

## 2018-06-05 PROCEDURE — 25010000002 HEPARIN (PORCINE) PER 1000 UNITS: Performed by: FAMILY MEDICINE

## 2018-06-05 PROCEDURE — 92610 EVALUATE SWALLOWING FUNCTION: CPT

## 2018-06-05 PROCEDURE — 85027 COMPLETE CBC AUTOMATED: CPT | Performed by: INTERNAL MEDICINE

## 2018-06-05 PROCEDURE — 63710000001 PREDNISONE PER 5 MG: Performed by: INTERNAL MEDICINE

## 2018-06-05 PROCEDURE — 80048 BASIC METABOLIC PNL TOTAL CA: CPT | Performed by: INTERNAL MEDICINE

## 2018-06-05 PROCEDURE — 99233 SBSQ HOSP IP/OBS HIGH 50: CPT | Performed by: INTERNAL MEDICINE

## 2018-06-05 PROCEDURE — 25010000002 PIPERACILLIN SOD-TAZOBACTAM PER 1 G: Performed by: FAMILY MEDICINE

## 2018-06-05 RX ORDER — GABAPENTIN 100 MG/1
100 CAPSULE ORAL 2 TIMES DAILY
Status: DISCONTINUED | OUTPATIENT
Start: 2018-06-05 | End: 2018-06-07 | Stop reason: HOSPADM

## 2018-06-05 RX ORDER — PREDNISONE 10 MG/1
10 TABLET ORAL
Status: DISCONTINUED | OUTPATIENT
Start: 2018-06-05 | End: 2018-06-07 | Stop reason: HOSPADM

## 2018-06-05 RX ORDER — LIDOCAINE 50 MG/G
2 PATCH TOPICAL
Status: DISCONTINUED | OUTPATIENT
Start: 2018-06-05 | End: 2018-06-07 | Stop reason: HOSPADM

## 2018-06-05 RX ORDER — BISACODYL 10 MG
10 SUPPOSITORY, RECTAL RECTAL DAILY PRN
Status: DISCONTINUED | OUTPATIENT
Start: 2018-06-05 | End: 2018-06-07 | Stop reason: HOSPADM

## 2018-06-05 RX ORDER — SENNOSIDES 8.6 MG
2 TABLET ORAL NIGHTLY
Status: DISCONTINUED | OUTPATIENT
Start: 2018-06-05 | End: 2018-06-07 | Stop reason: HOSPADM

## 2018-06-05 RX ADMIN — MELOXICAM 7.5 MG: 7.5 TABLET ORAL at 20:40

## 2018-06-05 RX ADMIN — MEMANTINE 10 MG: 10 TABLET ORAL at 10:01

## 2018-06-05 RX ADMIN — MIRTAZAPINE 30 MG: 15 TABLET, FILM COATED ORAL at 20:40

## 2018-06-05 RX ADMIN — LIDOCAINE 2 PATCH: 50 PATCH CUTANEOUS at 17:17

## 2018-06-05 RX ADMIN — DOCUSATE SODIUM 100 MG: 100 CAPSULE, LIQUID FILLED ORAL at 10:02

## 2018-06-05 RX ADMIN — GABAPENTIN 100 MG: 100 CAPSULE ORAL at 20:40

## 2018-06-05 RX ADMIN — TRAZODONE HYDROCHLORIDE 12.5 MG: 50 TABLET ORAL at 20:41

## 2018-06-05 RX ADMIN — TAZOBACTAM SODIUM AND PIPERACILLIN SODIUM 4.5 G: 500; 4 INJECTION, SOLUTION INTRAVENOUS at 05:25

## 2018-06-05 RX ADMIN — AMLODIPINE BESYLATE 10 MG: 10 TABLET ORAL at 20:41

## 2018-06-05 RX ADMIN — OXYCODONE HYDROCHLORIDE AND ACETAMINOPHEN 500 MG: 500 TABLET ORAL at 10:01

## 2018-06-05 RX ADMIN — FERROUS SULFATE TAB 325 MG (65 MG ELEMENTAL FE) 325 MG: 325 (65 FE) TAB at 10:01

## 2018-06-05 RX ADMIN — GABAPENTIN 100 MG: 100 CAPSULE ORAL at 10:01

## 2018-06-05 RX ADMIN — MULTIPLE VITAMINS W/ MINERALS TAB 1 TABLET: TAB ORAL at 10:02

## 2018-06-05 RX ADMIN — TERAZOSIN HYDROCHLORIDE ANHYDROUS 2 MG: 2 CAPSULE ORAL at 20:40

## 2018-06-05 RX ADMIN — PREDNISONE 10 MG: 10 TABLET ORAL at 12:21

## 2018-06-05 RX ADMIN — SUCRALFATE 1 G: 1 TABLET ORAL at 17:17

## 2018-06-05 RX ADMIN — SUCRALFATE 1 G: 1 TABLET ORAL at 20:41

## 2018-06-05 RX ADMIN — COLCHICINE 0.6 MG: 0.6 TABLET, FILM COATED ORAL at 12:18

## 2018-06-05 RX ADMIN — HEPARIN SODIUM 5000 UNITS: 5000 INJECTION, SOLUTION INTRAVENOUS; SUBCUTANEOUS at 20:41

## 2018-06-05 RX ADMIN — DOCUSATE SODIUM 100 MG: 100 CAPSULE, LIQUID FILLED ORAL at 20:40

## 2018-06-05 RX ADMIN — ISOSORBIDE MONONITRATE 30 MG: 30 TABLET, EXTENDED RELEASE ORAL at 10:02

## 2018-06-05 RX ADMIN — TAZOBACTAM SODIUM AND PIPERACILLIN SODIUM 4.5 G: 500; 4 INJECTION, SOLUTION INTRAVENOUS at 20:39

## 2018-06-05 RX ADMIN — CLOPIDOGREL BISULFATE 75 MG: 75 TABLET ORAL at 10:01

## 2018-06-05 RX ADMIN — HEPARIN SODIUM 5000 UNITS: 5000 INJECTION, SOLUTION INTRAVENOUS; SUBCUTANEOUS at 10:01

## 2018-06-05 RX ADMIN — Medication 250 MG: at 20:40

## 2018-06-05 RX ADMIN — SUCRALFATE 1 G: 1 TABLET ORAL at 12:18

## 2018-06-05 RX ADMIN — DONEPEZIL HYDROCHLORIDE 10 MG: 10 TABLET, FILM COATED ORAL at 20:40

## 2018-06-05 RX ADMIN — Medication 5 MG: at 20:40

## 2018-06-05 RX ADMIN — SUCRALFATE 1 G: 1 TABLET ORAL at 10:02

## 2018-06-05 RX ADMIN — TRAMADOL HYDROCHLORIDE 50 MG: 50 TABLET, COATED ORAL at 10:02

## 2018-06-05 RX ADMIN — BISACODYL 10 MG: 10 SUPPOSITORY RECTAL at 10:00

## 2018-06-05 RX ADMIN — MEMANTINE 10 MG: 10 TABLET ORAL at 20:40

## 2018-06-05 RX ADMIN — TAZOBACTAM SODIUM AND PIPERACILLIN SODIUM 4.5 G: 500; 4 INJECTION, SOLUTION INTRAVENOUS at 12:18

## 2018-06-05 RX ADMIN — SENNOSIDES 17.2 MG: 8.6 TABLET ORAL at 20:40

## 2018-06-05 RX ADMIN — Medication 250 MG: at 10:01

## 2018-06-05 RX ADMIN — PANTOPRAZOLE SODIUM 40 MG: 40 TABLET, DELAYED RELEASE ORAL at 10:01

## 2018-06-05 RX ADMIN — TRAMADOL HYDROCHLORIDE 50 MG: 50 TABLET, COATED ORAL at 20:41

## 2018-06-05 NOTE — PLAN OF CARE
Problem: Palliative Care (Adult)  Goal: Identify Related Risk Factors and Signs and Symptoms  Outcome: Ongoing (interventions implemented as appropriate)   06/05/18 1898   Palliative Care (Adult)   Palliative Care: Related Risk Factors advanced age;chronic illness;condition is progressive;worsening symptoms;uncontrolled pain   Palliative Care: Signs and Symptoms anxiety;pain;grief     Goal: Maximized Comfort  Outcome: Ongoing (interventions implemented as appropriate)    Goal: Enhanced Quality of Life  Outcome: Ongoing (interventions implemented as appropriate)

## 2018-06-05 NOTE — PLAN OF CARE
Problem: Patient Care Overview  Goal: Plan of Care Review  Outcome: Ongoing (interventions implemented as appropriate)   06/05/18 2193   Coping/Psychosocial   Plan of Care Reviewed With patient;daughter   SLP re-evaluation completed. Dysphagia Re-Eval: Continues to demonstrate no overt s/s aspiration with any trials. More alert, though some degree of lethargy, tolerated solid trial without issue. Given RLL and apparent recurrent pneumonia, cannot exclude silent aspiration as etiology. Discussed pros/cons of MBS with pt's dtr. She does not wish to proceed with MBS at this time, would not want to modify diet (e.g., thickened liquids) which could then cause other issues (e.g., consequences of dehydration/decreased intake). Understands all discussed. Is feeling somewhat overwhelmed as just went through this with her father, who passed away. Recommend: Soft/whole diet and thin liquids, may advance to regular solids when fully alert. SLP will check back once more for diet tolerance and to make sure pt's dtr with no further questions or educational needs.  Please see note for further details and recommendations.

## 2018-06-05 NOTE — DISCHARGE PLACEMENT REQUEST
":  [phone]  Plans to transfer patient on Thursday (6/7)      Cinthia Mata  (87 y.o. Female)     Date of Birth Social Security Number Address Home Phone MRN    01/01/1931  [address] [facility] [address] [phone] [id number]    [employer] Mar[employer] Status          [place]        Admission Date Admission Type Admitting Provider Attending Provider Department, Room/Bed    6/3/18 Emergency Ene Garcia II, Ene Arteaga II,  [address], S451/1    Discharge Date Discharge Disposition Discharge Destination                       Attending Provider:  Ene Garcia II, DO    Allergies:  Sulfa Antibiotics, Cortisone, Ativan [Lorazepam]    Isolation:  None   Infection:  None   Code Status:  Conditional    Ht:  157.5 cm (62\")   Wt:  68.8 kg (151 lb 11.2 oz)    Admission Cmt:  None   Principal Problem:  Acute respiratory failure with hypoxia [J96.01]                 Active Insurance as of 6/3/2018     Primary Coverage     Payor Plan Insurance Group Employer/Plan Group    MEDICARE MEDICARE A & B      Payor Plan Address Payor Plan Phone Number Effective From Effective To    [address] [phone] 12/1/1995     [facility] [id number]       Subscriber Name Subscriber Birth Date Member ID       CINTHIA MATA 1/1/1931 [id number]           Secondary Coverage     Payor Plan Insurance Group Employer/Plan Group    AARP MED SUPP AARP HEALTH CARE OPTIONS      Payor Plan Address Payor Plan Phone Number Effective From Effective To    [facility] [phone] 1/1/2018     [address]       [facility] [id number]       Subscriber Name Subscriber Birth Date Member ID       CINTHIA MATA 1/1/1931 [id number]                 Emergency Contacts      (Rel.) Home Phone Work Phone Mobile Phone    Vilma Slade (Power of ) [phone] -- --    Rah Slade -- -- [phone]               Physician Progress Notes (last 24 hours) (Notes from " 2018  2:19 PM through 2018  2:19 PM)      Ene RENE Jose II, DO at 2018 11:08 AM              [facility] Medicine Services  PROGRESS NOTE    Patient Name: Poonam Mata  : 1931  MRN: [id number]    Date of Admission: 6/3/2018  Length of Stay: 2  Primary Care Physician: Leti Munoz MD    Subjective   Subjective     CC: f/u PNA    HPI: More alert today. Patient states that she feels better today but still in pain. Daughter at bedside.     Review of Systems  Gen- No fevers, chills  CV- No chest pain, palpitations  Resp- No cough, dyspnea  GI- No N/V/D, abd pain    Otherwise ROS is negative except as mentioned in the HPI.    Objective   Objective     Vital Signs:   Temp:  [97.5 °F (36.4 °C)-100.2 °F (37.9 °C)] 97.5 °F (36.4 °C)  Heart Rate:  [96-98] 96  Resp:  [16-18] 18  BP: (147-162)/(59-60) 162/60        Physical Exam:  Constitutional: No acute distress, awake, more alert, chronically ill appearing  HENT: NCAT, mucous membranes moist  Respiratory: Clear to auscultation bilaterally, respiratory effort normal   Cardiovascular: RRR, no murmurs, rubs, or gallops, palpable pedal pulses bilaterally  Gastrointestinal: Positive bowel sounds, soft, nontender, nondistended  Musculoskeletal: Left hand edema and TTP  Psychiatric: Appropriate affect, cooperative  Neurologic: Oriented x 3, strength symmetric in all extremities, Cranial Nerves grossly intact to confrontation, speech clear  Skin: No rashes    Results Reviewed:  I have personally reviewed current lab, radiology, and data and agree.      Results from last 7 days  Lab Units 18  0609 18  0436 18  1355   WBC 10*3/mm3 6.33 12.61* 10.66   HEMOGLOBIN g/dL 9.8* 10.1* 11.7   HEMATOCRIT % 31.4* 32.4* 37.5   PLATELETS 10*3/mm3 228 242 264       Results from last 7 days  Lab Units 18  0609 18  0436 18  1422   SODIUM mmol/L 138 136 139   POTASSIUM mmol/L 4.0 3.6 3.8   CHLORIDE mmol/L 107  103 104   CO2 mmol/L 28.0 25.0 27.0   BUN mg/dL 13 12 14   CREATININE mg/dL 0.50* 0.60 0.70   GLUCOSE mg/dL 92 151* 116*   CALCIUM mg/dL 8.5* 8.7 8.4*   ALT (SGPT) U/L  --   --  15   AST (SGOT) U/L  --   --  24     Estimated Creatinine Clearance: 45.1 mL/min (A) (by C-G formula based on SCr of 0.5 mg/dL (L)).  BNP   Date Value Ref Range Status   06/03/2018 7.0 0.0 - 100.0 pg/mL Final     Comment:     Results may be falsely decreased if patient taking Biotin.     No results found for: PHART    Microbiology Results Abnormal     Procedure Component Value - Date/Time    Blood Culture - Blood, [[id number]]  (Normal) Collected:  06/03/18 1410    Lab Status:  Preliminary result Specimen:  Blood from Arm, Left Updated:  06/04/18 1445     Blood Culture No growth at 24 hours    Blood Culture - Blood, [[id number]]  (Normal) Collected:  06/03/18 1406    Lab Status:  Preliminary result Specimen:  Blood from Arm, Left Updated:  06/04/18 1445     Blood Culture No growth at 24 hours          Imaging Results (last 24 hours)     ** No results found for the last 24 hours. **        Results for orders placed during the hospital encounter of 05/14/18   Adult Transthoracic Echo Complete W/ Cont if Necessary Per Protocol    Narrative · Mild aortic valve regurgitation is present.  · Calculated right ventricular systolic pressure from tricuspid   regurgitation is 24 mmHg.  · Mild tricuspid valve regurgitation is present.  · Left ventricular systolic function is normal. Estimated EF = 60%.  · Left ventricular diastolic dysfunction (grade I) consistent with   impaired relaxation.  · There is no evidence of pericardial effusion.  · No evidence of pulmonary hypertension is present.  · The aortic valve exhibits sclerosis.  · Normal right ventricular cavity size, wall thickness, systolic function   and septal motion noted.  · Moderate MAC is present.          I have reviewed the medications.    Assessment/Plan   Assessment / Plan     Hospital  Problem List     * (Principal)Acute respiratory failure with hypoxia    Dementia    Hypertension    GERD (gastroesophageal reflux disease)    Constipation    Aortic heart murmur    HCAP (healthcare-associated pneumonia)    Spinal stenosis (Chronic)    Acute pain of left shoulder    Left hand pain          Brief Hospital Course to date:  Poonam Mata is a 87 y.o. female presenting to ED with fever and hypoxia found once again to have right sided pneumonia.     Assessment & Plan:  --Continue IV zosyn. Based on SLP eval this almost certainly represents aspiration pneumonia. This also supports the daughters claim that patient has had repeated pneumonias. I have discussed this with her daughter as well as discussed the typical course of a person with swallowing difficulty and repeated pneumonias.  --Continue aggressive pulmonary toilet.  --Her hand and wrist pain may represent flare of inflammatory arthropathy. Radiology read suggest gouty arthritis, which patient is unknown to have. She has history of PMR, but this has not been active in over 20 years. Uric acid is low, but during flare can be depressed. Colchicine started which improved her some. Will also try short course of prednisone.   --Long discussion again with daughter. Since the majority of her complaints are centered around her pain and repeated hospitalizations I think it reasonable that she meet with palliative to discuss her further goals of care. She is agreeable to this today. Consult placed.  --Labs in am.     DVT Prophylaxis:  [facility]     CODE STATUS: Conditional Code     Disposition: I expect the patient to be discharged TBD    Counseling was given to patient and family for the following topics: diagnostic results, prognosis, patient and family education and impressions . Total time of the encounter was 50 minutes and 45 minutes was spend counseling.    Electronically signed by Ene Garcia II, DO, 06/05/18, 11:08 AM.      Electronically signed  by Ene Garcia II, DO at 2018 11:12 AM       Physical Therapy Notes (last 24 hours) (Notes from 2018  2:19 PM through 2018  2:19 PM)     No notes of this type exist for this encounter.           Occupational Therapy Notes (last 24 hours) (Notes from 2018  2:19 PM through 2018  2:19 PM)      Nena Grant, OT at 2018  2:37 PM          Acute Care - Occupational Therapy Initial Evaluation  [facility]     Patient Name: Poonam Mata  : 1931  MRN: [id number]  Today's Date: 2018  Onset of Illness/Injury or Date of Surgery: 18  Date of Referral to OT: 18  Referring Physician: MD Nikko    Admit Date: 6/3/2018       ICD-10-CM ICD-9-CM   1. Acute respiratory failure with hypoxia J96.01 518.81   2. Febrile illness, acute R50.9 780.60   3. Sepsis, due to unspecified organism A41.9 038.9     995.91   4. Encephalopathy acute G93.40 348.30   5. Dysphagia, unspecified type R13.10 787.20   6. Impaired mobility and ADLs Z74.09 799.89     Patient Active Problem List   Diagnosis   • Pneumonia   • Hypokalemia   • Dementia   • Hypertension   • GERD (gastroesophageal reflux disease)   • Normocytic anemia   • Elevated alkaline phosphatase level   • Vasovagal episode   • Constipation   • Aortic heart murmur   • HCAP (healthcare-associated pneumonia)   • Acute respiratory failure with hypoxia   • Spinal stenosis   • Acute pain of left shoulder   • Left hand pain     Past Medical History:   Diagnosis Date   • Anxiety    • Arthropathy    • Constipation    • Dementia    • Essential hypertension    • GERD (gastroesophageal reflux disease)    • Hyperlipidemia    • Insomnia    • Spinal stenosis    • Syncope    • URI (upper respiratory infection)      Past Surgical History:   Procedure Laterality Date   • BLADDER SUSPENSION     • CHOLECYSTECTOMY     • HIP SURGERY     • HYSTERECTOMY     • TONSILLECTOMY            OT ASSESSMENT FLOWSHEET (last 72 hours)      Occupational Therapy Evaluation      Row Name 06/04/18 1310                   OT Evaluation Time/Intention    Subjective Information complains of;weakness;fatigue;pain  -AC        Document Type evaluation  -AC        Mode of Treatment occupational therapy  -AC        Patient Effort adequate  -           General Information    Patient Profile Reviewed? yes  -AC        Onset of Illness/Injury or Date of Surgery 06/03/18  -        Referring Physician MD Nikko  -        Patient Observations cooperative;lethargic  -AC        Patient/Family Observations Dtr present in room  -        General Observations of Patient Pt received sleeping in bed.  IV intact, on O2  -AC        Prior Level of Function independent:;feeding;min assist:;grooming;mod assist:;bed mobility;max assist:;transfer;dressing;bathing  -        Equipment Currently Used at Home wheelchair  -        Pertinent History of Current Functional Problem Pt admit from ECF with SOA with O2 sat in 70's, confusion, fever, and gout LUE.   -AC        Limitations/Impairments hearing  -AC        Risks Reviewed patient and family:;LOB;increased discomfort  -AC        Benefits Reviewed patient and family:;improve function;increase independence;increase strength;increase balance;increase knowledge  -AC        Barriers to Rehab previous functional deficit  -AC           Relationship/Environment    Lives With facility resident  -AC           Resource/Environmental Concerns    Current Living Arrangements extended care facility  -           Cognitive Assessment/Interventions    Additional Documentation Cognitive Assessment/Intervention (Group)  -           Cognitive Assessment/Intervention- PT/OT    Orientation Status (Cognition) oriented to;person   knows she is in hospital, but not which one  -AC        Follows Commands (Cognition) follows one step commands;50-74% accuracy  -AC           Safety Issues, Functional Mobility    Impairments Affecting Function (Mobility) balance;grasp;pain;postural/trunk  control;strength  -AC           Bed Mobility Assessment/Treatment    Bed Mobility Assessment/Treatment supine-sit;sit-supine  -AC        Supine-Sit [place] (Bed Mobility) maximum assist (25% patient effort);2 person assist  -AC        Sit-Supine [place] (Bed Mobility) dependent (less than 25% patient effort)  -        Assistive Device (Bed Mobility) draw sheet;head of bed elevated  -           Functional Mobility    Functional Mobility- Ind. Level unable to perform  -           Transfer Assessment/Treatment    Transfer Assessment/Treatment sit-stand transfer;stand-sit transfer;toilet transfer  -        Comment (Transfers) pt with legs in ext in sitting and demo difficulty bending knees to get feet underneath her  -        Sit-Stand [place] (Transfers) verbal cues;maximum assist (25% patient effort);2 person assist  -AC        Stand-Sit [place] (Transfers) verbal cues;maximum assist (25% patient effort);2 person assist  -AC        [place] Level (Toilet Transfer) verbal cues;maximum assist (25% patient effort);2 person assist  -AC        Assistive Device (Toilet Transfer) --   gait belt and UE support  -AC           Sit-Stand Transfer    Assistive Device (Sit-Stand Transfers) --   gait belt and UE support  -AC           Stand-Sit Transfer    Assistive Device (Stand-Sit Transfers) --   gait belt and UE support  -AC           Toilet Transfer    Type (Toilet Transfer) sit-stand;stand-sit  -           BADL Safety/Performance    Impairments, BADL Safety/Performance balance;grasp/prehension;coordination;pain;strength  -AC           General ROM    GENERAL ROM COMMENTS B shld limtied 50 %; otherwisw WFL  -           General Assessment (Manual Muscle Testing)    Comment, General Manual Muscle Testing (MMT) Assessment B shld 2+/5; otherwise 3 to 3+/5  -           Motor Assessment/Interventions    Additional Documentation Balance (Group)  -           Balance    Balance static sitting  balance  -AC           Static Sitting Balance    Level of [place] (Unsupported Sitting, Static Balance) contact guard assist   leans posterior and right  -AC           Sensory Assessment/Intervention    Additional Documentation Vision Assessment/Intervention (Group)  -AC           Vision Assessment/Intervention    Visual Impairment/Limitations --   difficulty keeping eyes open,able to id # fingers R/L  -AC           Positioning and Restraints    Pre-Treatment Position in bed  -AC        Post Treatment Position bed  -AC        In Bed supine;call light within reach;encouraged to call for assist;exit alarm on;with family/caregiver;SCD pump applied;heels elevated  -AC           Pain Assessment    Additional Documentation Pain Scale: FACES Pre/Post-Treatment (Group)  -AC           Pain Scale: Numbers Pre/Post-Treatment    Pain Location - Side Left  -AC        Pain Location - Orientation upper  -AC        Pain Location extremity  -AC           Pain Scale: FACES Pre/Post-Treatment    Pain: FACES Scale, Pretreatment 4-->hurts little more  -AC        Pain: FACES Scale, Post-Treatment 4-->hurts little more  -AC           Plan of Care Review    Plan of Care Reviewed With patient;daughter  -AC           Clinical Impression (OT)    Date of Referral to OT 06/03/18  -AC        OT Diagnosis ADL decline  -AC        Criteria for Skilled Therapeutic Interventions Met (OT Eval) yes;treatment indicated  -AC        Rehab Potential (OT Eval) good, to achieve stated therapy goals  -AC        Therapy Frequency (OT Eval) daily  -AC        Care Plan Review (OT) care plan/treatment goals reviewed;risks/benefits reviewed  -AC        Anticipated Discharge Disposition (OT) skilled nursing facility  -AC           Vital Signs    Pre Systolic BP Rehab 131  -AC        Pre Treatment Diastolic BP 61  -AC        Pretreatment Heart Rate (beats/min) 95  -AC        Post SpO2 (%) 92  -AC        O2 Delivery Post Treatment supplemental O2  -AC            Planned OT Interventions    Planned Therapy Interventions (OT Eval) adaptive equipment training;BADL retraining;functional balance retraining;strengthening exercise  -AC           OT Goals    Bed Mobility Goal Selection (OT) bed mobility, OT goal 1  -AC        Transfer Goal Selection (OT) transfer, OT goal 1  -AC        Grooming Goal Selection (OT) grooming, OT goal 1  -AC        Strength Goal Selection (OT) strength, OT goal 1  -AC        Additional Documentation Grooming Goal Selection (OT) (Row);Strength Goal Selection (OT) (Row)  -AC           Bed Mobility Goal 1 (OT)    Activity/Assistive Device (Bed Mobility Goal 1, OT) supine to sit;sit to supine;rolling to right;rolling to left  -AC        [place] Level/Cues Needed (Bed Mobility Goal 1, OT) moderate assist (50-74% patient effort)  -AC        Time Frame (Bed Mobility Goal 1, OT) by discharge  -AC           Transfer Goal 1 (OT)    Activity/Assistive Device (Transfer Goal 1, OT) bed-to-chair/chair-to-bed;toilet  -AC        [place] Level/Cues Needed (Transfer Goal 1, OT) verbal cues required;moderate assist (50-74% patient effort)  -AC        Time Frame (Transfer Goal 1, OT) by discharge  -AC           Grooming Goal 1 (OT)    Activity/Device (Grooming Goal 1, OT) wash face, hands;hair care;oral care  -AC        [place] (Grooming Goal 1, OT) minimum assist (75% or more patient effort)  -AC        Time Frame (Grooming Goal 1, OT) by discharge  -AC           Strength Goal 1 (OT)    Strength Goal 1 (OT) pt will complete BUE AAROM/AROM 10-15 reps daily as needed   -AC        Time Frame (Strength Goal 1, OT) by discharge  -AC          User Key  (r) = Recorded By, (t) = Taken By, (c) = Cosigned By    Initials Name Effective Dates    AC Nena Grant, OT 06/23/15 -            Occupational Therapy Education     Title: PT OT SLP Therapies (Active)     Topic: Occupational Therapy (Active)     Point: ADL training (Done)     Description: Instruct learner(s) on  proper safety adaptation and remediation techniques during self care or transfers.   Instruct in proper use of assistive devices.   Learning Progress Summary     Learner Status Readiness Method Response Comment Documented by    Patient Done Acceptance E,TB VU benefits of activity, role of OT  06/04/18 1428    Family Done Acceptance E,TB VU benefits of activity, role of OT  06/04/18 1428                      User Key     Initials Effective Dates Name Provider Type Discipline     06/23/15 -  Nena Grant, OT Occupational Therapist OT                  OT Recommendation and Plan  Outcome Summary/Treatment Plan (OT)  Anticipated Discharge Disposition (OT): skilled nursing facility  Planned Therapy Interventions (OT Eval): adaptive equipment training, BADL retraining, functional balance retraining, strengthening exercise  Therapy Frequency (OT Eval): daily  Plan of Care Review  Plan of Care Reviewed With: patient  Plan of Care Reviewed With: patient  Outcome Summary: OT eval complete. Pt with profound weakness, decreased balance and activity tolerance. Pt max a x2 supine to sit , max A transfer to BSC and back to bed.  OT will follow to advance pt toward increased independence with self care.  Recommend  SNF for rehab upon d/c.           Outcome Measures     Row Name 06/04/18 1310             How much help from another is currently needed...    Putting on and taking off regular lower body clothing? 1  -AC      Bathing (including washing, rinsing, and drying) 1  -AC      Toileting (which includes using toilet bed pan or urinal) 1  -AC      Putting on and taking off regular upper body clothing 2  -AC      Taking care of personal grooming (such as brushing teeth) 2  -AC      Eating meals 2  -AC      Score 9  -AC         Functional Assessment    Outcome Measure Options AM-PAC 6 Clicks Daily Activity (OT)  -AC        User Key  (r) = Recorded By, (t) = Taken By, (c) = Cosigned By    Initials Name Provider Type    AMAYA Barrett  COSTA Grant OT Occupational Therapist          Time Calculation:   OT Start Time: 1310    Therapy Charges for Today     Code Description Service Date Service Provider Modifiers Qty    03147848793  OT EVAL MOD COMPLEXITY 4 2018 Nena Grant OT GO 1    [id number]  OT THER SUPP EA 15 MIN 2018 Nena Grant OT GO 2    [id number]  OT THERAPEUTIC ACT EA 15 MIN 2018 Nena Grant OT GO 1               Nena Grant OT  2018    Electronically signed by Nena Grant OT at 2018  2:37 PM     Nena Grant OT at 2018  2:35 PM          Problem: Patient Care Overview  Goal: Plan of Care Review  Outcome: Ongoing (interventions implemented as appropriate)   18 1430   Coping/Psychosocial   Plan of Care Reviewed With patient   Plan of Care Review   Progress (Evaluation)   OTHER   Outcome Summary OT eval complete. Pt with gout LUE profound weakness, decreased balance and activity tolerance. Pt max a x2 supine to sit , max A transfer to BSC and back to bed. OT will follow to advance pt toward increased independence with self care. Recommend SNF for rehab upon d/c.            Electronically signed by Nena Grant OT at 2018  2:35 PM          Speech Language Pathology Notes (last 24 hours) (Notes from 2018  2:19 PM through 2018  2:19 PM)      Asiya Christensen MS CCC-SLP at 2018 11:38 AM          Acute Care - Speech Language Pathology   Swallow Re-Evaluation [facility]   Clinical Swallow Re-Evaluation     Patient Name: Poonam Mata  : 1931  MRN: [id number]  Today's Date: 2018  Onset of Illness/Injury or Date of Surgery: 18     Referring Physician: MD Nikko      Admit Date: 6/3/2018    Visit Dx:     ICD-10-CM ICD-9-CM   1. Acute respiratory failure with hypoxia J96.01 518.81   2. Febrile illness, acute R50.9 780.60   3. Sepsis, due to unspecified organism A41.9 038.9     995.91   4. Encephalopathy acute G93.40 348.30   5. Dysphagia, unspecified type  R13.10 787.20   6. Impaired mobility and ADLs Z74.09 799.89     Patient Active Problem List   Diagnosis   • Pneumonia   • Hypokalemia   • Dementia   • Hypertension   • GERD (gastroesophageal reflux disease)   • Normocytic anemia   • Elevated alkaline phosphatase level   • Vasovagal episode   • Constipation   • Aortic heart murmur   • HCAP (healthcare-associated pneumonia)   • Acute respiratory failure with hypoxia   • Spinal stenosis   • Acute pain of left shoulder   • Left hand pain     Past Medical History:   Diagnosis Date   • Anxiety    • Arthropathy    • Constipation    • Dementia    • Essential hypertension    • GERD (gastroesophageal reflux disease)    • Hyperlipidemia    • Insomnia    • Spinal stenosis    • Syncope    • URI (upper respiratory infection)      Past Surgical History:   Procedure Laterality Date   • BLADDER SUSPENSION     • CHOLECYSTECTOMY     • HIP SURGERY     • HYSTERECTOMY     • TONSILLECTOMY            SWALLOW EVALUATION (last 72 hours)      SLP Adult Swallow Evaluation     Row Name 06/05/18 0915 06/04/18 0915                Rehab Evaluation    Document Type re-evaluation  - evaluation  -RD       Subjective Information no complaints  - no complaints  -RD       Patient Observations alert;cooperative;lethargic  - lethargic;cooperative  -RD       Patient/Family Observations Dtr present  -SM Pt's son-in-law present  -RD       Patient Effort  -- adequate  -RD          General Information    Patient Profile Reviewed  -- yes  -RD       Pertinent History Of Current Problem  -- Adm w/ acute resp insuff. HCAP. RLL PNA. Hx of dementia, GERD, recurrent PNAs.   -RD       Current Method of Nutrition pureed;thin liquids  -SM NPO  -RD       Precautions/Limitations, Vision  -- other (see comments)   unknown, eyes closed  -RD       Precautions/Limitations, Hearing  -- WFL;for purposes of eval  -RD       Prior Level of Function-Communication  -- cognitive-linguistic impairment;other (see comments)    baseline dementia  -RD       Prior Level of Function-Swallowing safe, efficient swallowing in all situations  -SM no diet consistency restrictions  -RD       Plans/Goals Discussed with patient and family;agreed upon  -SM patient;family;agreed upon  -RD       Barriers to Rehab none identified  -SM none identified  -RD       Patient's Goals for Discharge patient did not state  -SM patient did not state  -RD       Family Goals for Discharge patient able to eat/drink without coughing/choking;other (see comments)   though avoid further study  -SM patient able to eat/drink without coughing/choking  -RD          Pain Assessment    Additional Documentation  -- Pain Scale: FACES Pre/Post-Treatment (Group)  -RD          Pain Scale: FACES Pre/Post-Treatment    Pain: FACES Scale, Pretreatment 2-->hurts little bit  -SM 0-->no hurt  -RD       Pain: FACES Scale, Post-Treatment 2-->hurts little bit  -SM 0-->no hurt  -RD          Oral Motor and Function    Dentition Assessment  -- natural, present and adequate  -RD       Secretion Management  -- WNL/WFL  -RD       Mucosal Quality  -- moist, healthy  -RD          Oral Musculature and Cranial Nerve Assessment    Oral Motor General Assessment  -- generalized oral motor weakness  -RD       Oral Motor, Comment  -- Not following oral commands  -RD          General Eating/Swallowing Observations    Respiratory Support Currently in Use  -- room air  -RD       Eating/Swallowing Skills  -- fed by SLP  -RD       Positioning During Eating  -- upright 90 degree;upright in bed  -RD       Utensils Used  -- spoon;cup;straw  -RD       Consistencies Trialed  -- thin liquids;pudding thick;regular textures  -RD          Respiratory    Respiratory Status  -- room air  -RD          Clinical Swallow Eval    Oral Prep Phase WFL   mild prolonged though adequate  -SM impaired  -RD       Oral Transit  -- --  -RD       Pharyngeal Phase no overt signs/symptoms of pharyngeal impairment  -SM no overt  signs/symptoms of pharyngeal impairment  -RD       Clinical Swallow Evaluation Summary Showing no overt s/s aspiration with any trials. Cannot fully r/o silent aspiration without MBS. Discussed pros/cons of pursuing MBS with pt's dtr. Dtr wishes to avoid testing and negative consequences r/t potential for diet adjustment/modification.   -SM BS eval complete. Pt lethargic w/ eyes closed. Confused, but responding to questions. Trials given of thins via tsp, cup, and straw, pureed, and solid consistencies. No overt s/s of aspiration w/ thins or pureed, even when pushed w/ large consecutive straw sips. Pt confused and unable to bite into regular cracker. RECS: thin liquids, pureed diet, meds crushed in pureed, general aspiration and reflux precautions, oral care BID and PRN, supervision w/ PO, only feed if FULLY alert, SLP will f/u for BS re-eval tomorrow  -RD          Oral Prep Concerns    Oral Prep Concerns  -- increased prep time;reduced lip opening  -RD       Reduced Lip Opening  -- thin;pudding;regular consistencies  -RD       Increased Prep Time  -- pudding  -RD       Oral Prep Concerns, Comment  -- Suspect lethargy and confusion impacting  -RD          Clinical Impression    SLP Swallowing Diagnosis  -- oral dysfunction  -RD       Functional Impact  -- risk of aspiration/pneumonia  -RD       Rehab Potential/Prognosis, Swallowing  -- good, to achieve stated therapy goals  -RD       Criteria for Skilled Therapeutic Interventions Met  -- demonstrates skilled criteria  -RD          Recommendations    Therapy Frequency (Swallow)  -- evaluation only;PRN  -RD       Predicted Duration Therapy Intervention (Days)  -- until discharge  -RD       SLP Diet Recommendation soft textures;whole;thin liquids;other (see comments)   may advance to regular solids as alert/tolerated  -SM puree;thin liquids;other (see comments)   only feed if FULLY alert, supervision w/ all PO  -RD       Recommended Diagnostics  -- reassess via  clinical swallow evaluation  -RD       Recommended Precautions and Strategies upright posture during/after eating  - upright posture during/after eating;small bites of food and sips of liquid;other (see comments)   general aspiration and reflux precautions, Oral care BID/PRN  -RD       SLP Rec. for Method of Medication Administration meds whole;with thin liquids;with pudding or applesauce   depending on level of alertness  -SM meds crushed;with pudding or applesauce;as tolerated   only if alert  -RD       Monitor for Signs of Aspiration  -- yes;notify SLP if any concerns;cough;gurgly voice;throat clearing  -RD       Anticipated Dischage Disposition  -- unknown  -RD         User Key  (r) = Recorded By, (t) = Taken By, (c) = Cosigned By    Initials Name Effective Dates    JOSH Christensen MS CCC-SLP 06/22/15 -     RD Talia Black MS CCC-SLP 04/03/18 -         EDUCATION  The patient/dtr has been educated in the following areas:   Dysphagia (Swallowing Impairment) Modified Diet Instruction.    SLP Recommendation and Plan     SLP Diet Recommendation: soft textures, whole, thin liquids, other (see comments) (may advance to regular solids as alert/tolerated)  Recommended Precautions and Strategies: upright posture during/after eating                               Plan of Care Reviewed With: patient, daughter  Plan of Care Review  Plan of Care Reviewed With: patient, daughter             Time Calculation:         Time Calculation- SLP     Row Name 06/05/18 1138             Time Calculation- SLP    SLP Start Time 0915  -      SLP Received On 06/05/18  -        User Key  (r) = Recorded By, (t) = Taken By, (c) = Cosigned By    Initials Name Provider Type    JOSH Christensen MS CCC-SLP Speech and Language Pathologist          Therapy Charges for Today     Code Description Service Date Service Provider Modifiers Qty    [id number] [facility] EVAL ORAL PHARYNG SWALLOW 3 6/5/2018 Asiya Christensen MS  CCC-SLP GN 1               Asiya Christensen MS CCC-SLP  6/5/2018    Electronically signed by Asiya Christensen MS CCC-SLP at 6/5/2018 11:39 AM     Asiya Christensen MS CCC-SLP at 6/5/2018 11:38 AM          Problem: Patient Care Overview  Goal: Plan of Care Review  Outcome: Ongoing (interventions implemented as appropriate)   06/05/18 1133   Coping/Psychosocial   Plan of Care Reviewed With patient;daughter   SLP re-evaluation completed. Dysphagia Re-Eval: Continues to demonstrate no overt s/s aspiration with any trials. More alert, though some degree of lethargy, tolerated solid trial without issue. Given RLL and apparent recurrent pneumonia, cannot exclude silent aspiration as etiology. Discussed pros/cons of MBS with pt's dtr. She does not wish to proceed with MBS at this time, would not want to modify diet (e.g., thickened liquids) which could then cause other issues (e.g., consequences of dehydration/decreased intake). Understands all discussed. Is feeling somewhat overwhelmed as just went through this with her father, who passed away. Recommend: Soft/whole diet and thin liquids, may advance to regular solids when fully alert. SLP will check back once more for diet tolerance and to make sure pt's dtr with no further questions or educational needs.  Please see note for further details and recommendations.          Electronically signed by Asiya Christensen MS CCC-SLP at 6/5/2018 11:38 AM

## 2018-06-05 NOTE — THERAPY RE-EVALUATION
Acute Care - Speech Language Pathology   Swallow Re-Evaluation [facility]   Clinical Swallow Re-Evaluation     Patient Name: Poonam Mata  : 1931  MRN: [id number]  Today's Date: 2018  Onset of Illness/Injury or Date of Surgery: 18     Referring Physician: MD Nikko      Admit Date: 6/3/2018    Visit Dx:     ICD-10-CM ICD-9-CM   1. Acute respiratory failure with hypoxia J96.01 518.81   2. Febrile illness, acute R50.9 780.60   3. Sepsis, due to unspecified organism A41.9 038.9     995.91   4. Encephalopathy acute G93.40 348.30   5. Dysphagia, unspecified type R13.10 787.20   6. Impaired mobility and ADLs Z74.09 799.89     Patient Active Problem List   Diagnosis   • Pneumonia   • Hypokalemia   • Dementia   • Hypertension   • GERD (gastroesophageal reflux disease)   • Normocytic anemia   • Elevated alkaline phosphatase level   • Vasovagal episode   • Constipation   • Aortic heart murmur   • HCAP (healthcare-associated pneumonia)   • Acute respiratory failure with hypoxia   • Spinal stenosis   • Acute pain of left shoulder   • Left hand pain     Past Medical History:   Diagnosis Date   • Anxiety    • Arthropathy    • Constipation    • Dementia    • Essential hypertension    • GERD (gastroesophageal reflux disease)    • Hyperlipidemia    • Insomnia    • Spinal stenosis    • Syncope    • URI (upper respiratory infection)      Past Surgical History:   Procedure Laterality Date   • BLADDER SUSPENSION     • CHOLECYSTECTOMY     • HIP SURGERY     • HYSTERECTOMY     • TONSILLECTOMY            SWALLOW EVALUATION (last 72 hours)      SLP Adult Swallow Evaluation     Row Name 18 0915 18 0915                Rehab Evaluation    Document Type re-evaluation  -SM evaluation  -RD       Subjective Information no complaints  -SM no complaints  -RD       Patient Observations alert;cooperative;lethargic  -SM lethargic;cooperative  -RD       Patient/Family Observations Dtr present  -SM Pt's son-in-law  present  -RD       Patient Effort  -- adequate  -RD          General Information    Patient Profile Reviewed  -- yes  -RD       Pertinent History Of Current Problem  -- Adm w/ acute resp insuff. HCAP. RLL PNA. Hx of dementia, GERD, recurrent PNAs.   -RD       Current Method of Nutrition pureed;thin liquids  - NPO  -RD       Precautions/Limitations, Vision  -- other (see comments)   unknown, eyes closed  -RD       Precautions/Limitations, Hearing  -- WFL;for purposes of eval  -RD       Prior Level of Function-Communication  -- cognitive-linguistic impairment;other (see comments)   baseline dementia  -RD       Prior Level of Function-Swallowing safe, efficient swallowing in all situations  - no diet consistency restrictions  -RD       Plans/Goals Discussed with patient and family;agreed upon  - patient;family;agreed upon  -RD       Barriers to Rehab none identified  - none identified  -RD       Patient's Goals for Discharge patient did not state  -SM patient did not state  -RD       Family Goals for Discharge patient able to eat/drink without coughing/choking;other (see comments)   though avoid further study  - patient able to eat/drink without coughing/choking  -RD          Pain Assessment    Additional Documentation  -- Pain Scale: FACES Pre/Post-Treatment (Group)  -RD          Pain Scale: FACES Pre/Post-Treatment    Pain: FACES Scale, Pretreatment 2-->hurts little bit  -SM 0-->no hurt  -RD       Pain: FACES Scale, Post-Treatment 2-->hurts little bit  -SM 0-->no hurt  -RD          Oral Motor and Function    Dentition Assessment  -- natural, present and adequate  -RD       Secretion Management  -- WNL/WFL  -RD       Mucosal Quality  -- moist, healthy  -RD          Oral Musculature and Cranial Nerve Assessment    Oral Motor General Assessment  -- generalized oral motor weakness  -RD       Oral Motor, Comment  -- Not following oral commands  -RD          General Eating/Swallowing Observations    Respiratory  Support Currently in Use  -- room air  -RD       Eating/Swallowing Skills  -- fed by SLP  -RD       Positioning During Eating  -- upright 90 degree;upright in bed  -RD       Utensils Used  -- spoon;cup;straw  -RD       Consistencies Trialed  -- thin liquids;pudding thick;regular textures  -RD          Respiratory    Respiratory Status  -- room air  -RD          Clinical Swallow Eval    Oral Prep Phase WFL   mild prolonged though adequate  -SM impaired  -RD       Oral Transit  -- --  -RD       Pharyngeal Phase no overt signs/symptoms of pharyngeal impairment  -SM no overt signs/symptoms of pharyngeal impairment  -RD       Clinical Swallow Evaluation Summary Showing no overt s/s aspiration with any trials. Cannot fully r/o silent aspiration without MBS. Discussed pros/cons of pursuing MBS with pt's dtr. Dtr wishes to avoid testing and negative consequences r/t potential for diet adjustment/modification.   -SM BS eval complete. Pt lethargic w/ eyes closed. Confused, but responding to questions. Trials given of thins via tsp, cup, and straw, pureed, and solid consistencies. No overt s/s of aspiration w/ thins or pureed, even when pushed w/ large consecutive straw sips. Pt confused and unable to bite into regular cracker. RECS: thin liquids, pureed diet, meds crushed in pureed, general aspiration and reflux precautions, oral care BID and PRN, supervision w/ PO, only feed if FULLY alert, SLP will f/u for BS re-eval tomorrow  -RD          Oral Prep Concerns    Oral Prep Concerns  -- increased prep time;reduced lip opening  -RD       Reduced Lip Opening  -- thin;pudding;regular consistencies  -RD       Increased Prep Time  -- pudding  -RD       Oral Prep Concerns, Comment  -- Suspect lethargy and confusion impacting  -RD          Clinical Impression    SLP Swallowing Diagnosis  -- oral dysfunction  -RD       Functional Impact  -- risk of aspiration/pneumonia  -RD       Rehab Potential/Prognosis, Swallowing  -- good, to  achieve stated therapy goals  -RD       Criteria for Skilled Therapeutic Interventions Met  -- demonstrates skilled criteria  -RD          Recommendations    Therapy Frequency (Swallow)  -- evaluation only;PRN  -RD       Predicted Duration Therapy Intervention (Days)  -- until discharge  -RD       SLP Diet Recommendation soft textures;whole;thin liquids;other (see comments)   may advance to regular solids as alert/tolerated  -SM puree;thin liquids;other (see comments)   only feed if FULLY alert, supervision w/ all PO  -RD       Recommended Diagnostics  -- reassess via clinical swallow evaluation  -RD       Recommended Precautions and Strategies upright posture during/after eating  -SM upright posture during/after eating;small bites of food and sips of liquid;other (see comments)   general aspiration and reflux precautions, Oral care BID/PRN  -RD       SLP Rec. for Method of Medication Administration meds whole;with thin liquids;with pudding or applesauce   depending on level of alertness  -SM meds crushed;with pudding or applesauce;as tolerated   only if alert  -RD       Monitor for Signs of Aspiration  -- yes;notify SLP if any concerns;cough;gurgly voice;throat clearing  -RD       Anticipated Dischage Disposition  -- unknown  -RD         User Key  (r) = Recorded By, (t) = Taken By, (c) = Cosigned By    Initials Name Effective Dates     Asiya Christensen, MS CCC-SLP 06/22/15 -     MELIZA Black, MS CCC-SLP 04/03/18 -         EDUCATION  The patient/dtr has been educated in the following areas:   Dysphagia (Swallowing Impairment) Modified Diet Instruction.    SLP Recommendation and Plan     SLP Diet Recommendation: soft textures, whole, thin liquids, other (see comments) (may advance to regular solids as alert/tolerated)  Recommended Precautions and Strategies: upright posture during/after eating                               Plan of Care Reviewed With: patient, daughter  Plan of Care Review  Plan of Care  Reviewed With: patient, daughter             Time Calculation:         Time Calculation- SLP     Row Name 06/05/18 1138             Time Calculation- SLP    SLP Start Time 0915  -SM      SLP Received On 06/05/18  -        User Key  (r) = Recorded By, (t) = Taken By, (c) = Cosigned By    Initials Name Provider Type     Asiya Christensen MS CCC-SLP Speech and Language Pathologist          Therapy Charges for Today     Code Description Service Date Service Provider Modifiers Qty    [id number] [facility] EVAL ORAL PHARYNG SWALLOW 3 6/5/2018 Asiya Christensen MS CCC-SLP GN 1               Asiya Christensen MS CCC-SLP  6/5/2018

## 2018-06-05 NOTE — PLAN OF CARE
Problem: Patient Care Overview  Goal: Plan of Care Review  Outcome: Ongoing (interventions implemented as appropriate)   06/05/18 0151   Coping/Psychosocial   Plan of Care Reviewed With patient   Plan of Care Review   Progress improving   OTHER   Outcome Summary Patient states she feels better and arm is in less pain. VSS. Tolerating new diet       Problem: Fall Risk (Adult)  Goal: Identify Related Risk Factors and Signs and Symptoms  Outcome: Ongoing (interventions implemented as appropriate)

## 2018-06-05 NOTE — PLAN OF CARE
Problem: Patient Care Overview  Goal: Plan of Care Review  Outcome: Ongoing (interventions implemented as appropriate)   06/05/18 9238   Coping/Psychosocial   Plan of Care Reviewed With patient;daughter   OTHER   Outcome Summary vitals stable today pt more alert and eating without problems     Goal: Individualization and Mutuality  Outcome: Ongoing (interventions implemented as appropriate)    Goal: Discharge Needs Assessment  Outcome: Ongoing (interventions implemented as appropriate)    Goal: Interprofessional Rounds/Family Conf  Outcome: Ongoing (interventions implemented as appropriate)      Problem: Fall Risk (Adult)  Goal: Absence of Fall  Outcome: Ongoing (interventions implemented as appropriate)

## 2018-06-05 NOTE — CONSULTS
"Leti Munoz MD  Consulting physician: Ene Garcia  Reason for referral : goals of care  Chief Complaint   Patient presents with   • Shortness of Breath       HPI:   87 y.o. female with PMH significant for HTN, GERD, mild dementia, and vasovagal syncope (often occurs on the toilet with BM's).  On 6/3 she presents from the [facility] facility, [facility] in [facility] with fever 101.5.  She has had 2 recent hospitalizations here (March and May earlier this year) for fever.  The first was thought to be left sided PNA, however the CXR was described as somewhat questionable, and during the second admission she was only found to be positive for parainfluenza virus B.  The history is obtained from the patient's daughter as the patient is sleeping.  The patient was mildly confused on the telephone yesterday morning. When the daughter visited in th evening, she alerted the nursing staff and the patient's O2 sat was determined to be 78% on RA. The last few days the patient has been complaining of severe left hand and left shoulder pain.  She does have a history of PMR about 20 years ago, she took steroids for \"a few years\" and has not had a repeat flare.  She was brought to [facility] ER for further eval and treatment on 6/3/18.  During the hospital course patient is being managed for acute respiratory failure with hypoxia, right side pneumonia, possible inflammatory arthropathy/gouty arthritis  Symptoms:   Patient reports chronic BLE neuropathic pain and left SI joint pain. Pain is intermittent.   Acute left shoulder pain on admission, patient was unable to lift upper extremities.   Acute left hand/wrist pain.   No dyspnea, nausea or anxiety. Reports difficulty with sleeping.   Patient reports upper/posterior thoracic pain, relieved with neck pillow.   Patient having difficulty describing symptoms or answering more in depth questions about her symptoms.   Daughter states that with current med regimen: meloxicam, tramadol and " gabapentin patient has been able to work with therapy but seems lethargic at times.   Reported that the patient had taken amitriptyline at bedtime for years but it was discontinued with placement at LTC facility.   Patient has had some increase lethargy at LTC facility with changing of med regimen.   + constipation    + dysphagia, daughter notes only intermittently that patient seems to cough with liquids.     Functional status: patient was participating in therapy at [facility], ambulating with walker a few steps.     Advance care planning discussed: yes  Code Status: conditional: DNR/DNI  Advance Directive: none on medical record  Surrogate decision maker: daughterVilma  Past Medical History:   Diagnosis Date   • Anxiety    • Arthropathy    • Constipation    • Dementia    • Essential hypertension    • GERD (gastroesophageal reflux disease)    • Hyperlipidemia    • Insomnia    • Spinal stenosis    • Syncope    • URI (upper respiratory infection)      Past Surgical History:   Procedure Laterality Date   • BLADDER SUSPENSION     • CHOLECYSTECTOMY     • HIP SURGERY     • HYSTERECTOMY     • TONSILLECTOMY         Reviewed current scheduled and prn medications for route, type, dose and frequency.    Current Facility-Administered Medications   Medication Dose Route Frequency Provider Last Rate Last Dose   • acetaminophen (TYLENOL) tablet 650 mg  650 mg Oral Q6H PRN Charley El MD   650 mg at 06/04/18 2116   • ALPRAZolam (XANAX) tablet 0.25 mg  0.25 mg Oral BID PRN Charley El MD   0.25 mg at 06/04/18 2117   • amLODIPine (NORVASC) tablet 10 mg  10 mg Oral Nightly Charley El MD   10 mg at 06/04/18 2116   • bisacodyl (DULCOLAX) EC tablet 5 mg  5 mg Oral Daily PRN Charley El MD       • bisacodyl (DULCOLAX) suppository 10 mg  10 mg Rectal Daily Charley El MD   10 mg at 06/05/18 1000   • calcium carbonate (TUMS) chewable tablet 500 mg (200 mg elemental)  2 tablet Oral BID PRN  Charley El MD       • clopidogrel (PLAVIX) tablet 75 mg  75 mg Oral Daily Charley El MD   75 mg at 06/05/18 1001   • colchicine tablet 0.6 mg  0.6 mg Oral Daily Een Garcia IIDO   0.6 mg at 06/05/18 1218   • docusate sodium (COLACE) capsule 100 mg  100 mg Oral BID Charley El MD   100 mg at 06/05/18 1002   • donepezil (ARICEPT) tablet 10 mg  10 mg Oral Nightly Charley El MD   10 mg at 06/04/18 2116   • ferrous sulfate tablet 325 mg  325 mg Oral Daily With Breakfast Charley El MD   325 mg at 06/05/18 1001   • gabapentin (NEURONTIN) capsule 100 mg  100 mg Oral TID Charley El MD   100 mg at 06/05/18 1001   • heparin (porcine) 5000 UNIT/ML injection 5,000 Units  5,000 Units Subcutaneous Q12H Charley El MD   5,000 Units at 06/05/18 1001   • ipratropium-albuterol (DUO-NEB) nebulizer solution 3 mL  3 mL Nebulization Q4H PRN Charley El MD       • isosorbide mononitrate (IMDUR) 24 hr tablet 30 mg  30 mg Oral Daily Charley El MD   30 mg at 06/05/18 1002   • melatonin sublingual tablet 5 mg  5 mg Sublingual Nightly Charley El MD   5 mg at 06/04/18 2116   • meloxicam (MOBIC) tablet 7.5 mg  7.5 mg Oral Nightly Charley El MD   7.5 mg at 06/04/18 2116   • memantine (NAMENDA) tablet 10 mg  10 mg Oral Q12H Charley El MD   10 mg at 06/05/18 1001   • mirtazapine (REMERON) tablet 30 mg  30 mg Oral Nightly Charley El MD   30 mg at 06/04/18 2115   • multivitamin with minerals 1 tablet  1 tablet Oral Daily Charley El MD   1 tablet at 06/05/18 1002   • nystatin (MYCOSTATIN) powder   Topical Q12H PRN Tiara Bar PA-C       • pantoprazole (PROTONIX) EC tablet 40 mg  40 mg Oral Daily Charley El MD   40 mg at 06/05/18 1001   • piperacillin-tazobactam (ZOSYN) 4.5 g in iso-osmotic dextrose 100 mL IVPB (premix)  4.5 g Intravenous Q8H Charley El MD   4.5 g at 06/05/18 1218   • polyethylene glycol 3350 powder (packet)  17 g Oral Daily PRN  "Charley El MD       • potassium chloride (MICRO-K) CR capsule 40 mEq  40 mEq Oral PRN Charley El MD   40 mEq at 06/04/18 1730    Or   • potassium chloride (KLOR-CON) packet 40 mEq  40 mEq Oral PRN Charley El MD        Or   • potassium chloride 10 mEq in 100 mL IVPB  10 mEq Intravenous Q1H PRN Charley El MD       • predniSONE (DELTASONE) tablet 10 mg  10 mg Oral Daily With Breakfast Ene Garcia II DO   10 mg at 06/05/18 1221   • saccharomyces boulardii (FLORASTOR) capsule 250 mg  250 mg Oral BID Charley El MD   250 mg at 06/05/18 1001   • sodium chloride 0.9 % flush 1-10 mL  1-10 mL Intravenous PRN Charley El MD       • sodium chloride 0.9 % flush 10 mL  10 mL Intravenous PRN Robbi Valadez MD       • sucralfate (CARAFATE) tablet 1 g  1 g Oral 4x Daily Charley El MD   1 g at 06/05/18 1218   • terazosin (HYTRIN) capsule 2 mg  2 mg Oral Nightly Charley El MD   2 mg at 06/04/18 2116   • traMADol (ULTRAM) tablet 50 mg  50 mg Oral BID Charley El MD   50 mg at 06/05/18 1002   • traZODone (DESYREL) tablet 12.5 mg  12.5 mg Oral Nightly Charley El MD   12.5 mg at 06/04/18 2116   • vitamin C (ASCORBIC ACID) tablet 500 mg  500 mg Oral Daily Charley El MD   500 mg at 06/05/18 1001        •  acetaminophen  •  ALPRAZolam  •  bisacodyl  •  calcium carbonate  •  ipratropium-albuterol  •  nystatin  •  polyethylene glycol  •  potassium chloride **OR** potassium chloride **OR** potassium chloride  •  sodium chloride  •  sodium chloride  Allergies   Allergen Reactions   • Sulfa Antibiotics Rash     UNKNOWN     • Cortisone Other (See Comments)     Elevated BP   • Ativan [Lorazepam] Anxiety     \"opposite effect\"     History reviewed. No pertinent family history.  Social History     Social History   • Marital status:      Spouse name: N/A   • Number of children: N/A   • Years of education: N/A     Occupational History   • Not on file.     Social History Main " "Topics   • Smoking status: Never Smoker   • Smokeless tobacco: Not on file   • Alcohol use No   • Drug use: No   • Sexual activity: Defer     Other Topics Concern   • Not on file     Social History Narrative    Lives in [facility], has not walked for several months   Recently ,   . Only one daughter.     Review of Systems - +/- per HPI and symptom review.    PPS: 30%  /60 (BP Location: Left arm, Patient Position: Lying)   Pulse 96   Temp 97.5 °F (36.4 °C) (Axillary)   Resp 18   Ht 157.5 cm (62\")   Wt 68.8 kg (151 lb 11.2 oz)   SpO2 (!) 89%   BMI 27.75 kg/m²   68.8 kg (151 lb 11.2 oz) Body mass index is 27.75 kg/m².  Intake & Output (last day)       701 -  07 -  07    IV Piggyback      Total Intake(mL/kg)      Net              Unmeasured Urine Occurrence 5 x 2 x    Unmeasured Stool Occurrence 2 x 1 x          Physical Exam:  General Appearance: No acute distress, ill appearing, resting in bed  Head: Normocephalic without obvious abnormality, atraumatic  Eyes: Conjunctivae and sclerae normal, no icterus, COOPER  Throat: No oral lesions, no thrush, oral mucosa moist  Lungs: Clear to auscultation, respirations regular, even and non-labored  Heart: Regular rhythm and normal rate, normal S1  Abdomen: Normal bowel sounds, soft, non-distended, non-tender  Extremities: Moves upper extremities, no redness, no cyanosis, no edema, no inflammation of left shoulder or hand, left hand puffy  Pulses: Distal pulses palpable and equal bilaterally  Skin: Warm and dry  Neurological: Awake, able to direct questions, no myoclonus, equal hand  and strength, difficulty with lifting lower extremities off bed minimally    Reviewed labs and diagnostic results.  Lab Results   Component Value Date    HGBA1C 5.4 2015       Results from last 7 days  Lab Units 18  0609   WBC 10*3/mm3 6.33   HEMOGLOBIN g/dL 9.8*   HEMATOCRIT % 31.4*   PLATELETS 10*3/mm3 228 "       Results from last 7 days  Lab Units 06/05/18  0609  06/03/18  1422   SODIUM mmol/L 138  < > 139   POTASSIUM mmol/L 4.0  < > 3.8   CHLORIDE mmol/L 107  < > 104   CO2 mmol/L 28.0  < > 27.0   BUN mg/dL 13  < > 14   CREATININE mg/dL 0.50*  < > 0.70   CALCIUM mg/dL 8.5*  < > 8.4*   BILIRUBIN mg/dL  --   --  0.4   ALK PHOS U/L  --   --  132*   ALT (SGPT) U/L  --   --  15   AST (SGOT) U/L  --   --  24   GLUCOSE mg/dL 92  < > 116*   < > = values in this interval not displayed.    Results from last 7 days  Lab Units 06/05/18  0609   SODIUM mmol/L 138   POTASSIUM mmol/L 4.0   CHLORIDE mmol/L 107   CO2 mmol/L 28.0   BUN mg/dL 13   CREATININE mg/dL 0.50*   GLUCOSE mg/dL 92   CALCIUM mg/dL 8.5*     Imaging Results (last 72 hours)     Procedure Component Value Units Date/Time    XR Wrist 2 View Left [[id number]] Collected:  06/04/18 1034     Updated:  06/04/18 1103    Narrative:       EXAMINATION: XR WRIST 2 VW, LEFT-06/03/2018:      INDICATION: Pain, no known trauma; J96.01-Acute respiratory failure with  hypoxia; R50.9-Fever, unspecified; A41.9-Sepsis, unspecified organism;  G93.40-Encephalopathy, unspecified.      COMPARISON: NONE.     FINDINGS: Two views of the left wrist reveal severe degenerative changes  seen within the radiocarpal joint and ulna with osteophyte formation  identified. There is soft tissue swelling suggesting possibly gouty  arthritis. Severe osteopenia identified of the bony structures.           Impression:       Findings to suggest possible gouty arthritis. Clinical  correlation is needed.     D:  06/04/2018  E:  06/04/2018     This report was finalized on 6/4/2018 11:01 AM by Dr. Jackelin Marinelli MD.       XR Shoulder 2+ View Left [[id number]] Collected:  06/04/18 1035     Updated:  06/04/18 1103    Narrative:       EXAMINATION: XR SHOULDER 2+ VW, LEFT-06/03/2018:      INDICATION: Pain. No known trauma; J96.01-Acute respiratory failure with  hypoxia; R50.9-Fever, unspecified; A41.9-Sepsis,  unspecified organism;  G93.40-Encephalopathy, unspecified.      COMPARISON: NONE.     FINDINGS: Two views of the left shoulder reveal degenerative changes  seen of the acromioclavicular joint. Spurring identified of the humeral  head. Slight joint space narrowing. No fracture or dislocation.           Impression:       Degenerative changes seen within the shoulder with no  fracture or dislocation.     D:  06/04/2018  E:  06/04/2018     This report was finalized on 6/4/2018 11:01 AM by Dr. Jackelin Marinelli MD.       XR Hand 3+ View Left [[id number]] Collected:  06/04/18 1034     Updated:  06/04/18 1103    Narrative:       EXAMINATION: XR HAND 3+ VW, LEFT-05/03/2018:      INDICATION: Pain, no known trauma; J96.01-Acute respiratory failure with  hypoxia; R50.9-Fever, unspecified; A41.9-Sepsis, unspecified organism;  G93.40-Encephalopathy, unspecified.      COMPARISON: NONE.     FINDINGS: Three views of the left hand reveal severe degenerative  changes identified within the interphalangeal joints with joint space  narrowing and osteophyte formation identified. There is soft tissue  swelling present suggesting an inflammatory arthritis. Degenerative  changes seen of the first carpometacarpal joint as well as within the  radiocarpal joint and ulna. Severe osteopenia of the bony structures. No  fracture or dislocation.           Impression:       Severe degenerative changes identified within the  interphalangeal joints, carpal joints suggesting possibly gouty  arthritis with soft tissue swelling present. Clinical correlation  needed.     D:  06/04/2018  E:  06/04/2018     This report was finalized on 6/4/2018 11:01 AM by Dr. Jackelin Marinelli MD.       CT Angiogram Chest With Contrast [[id number]] Collected:  06/03/18 1750     Updated:  06/04/18 0829    Narrative:       EXAMINATION: CT ANGIOGRAM CHEST W/CONTRAST - 6/3/2018      INDICATION: Dyspnea.     TECHNIQUE: Spiral acquisition 3 mm post-IV contrast images through  the  chest with coronal 10 mm 2D reconstructions.     The radiation dose reduction device was turned on for each scan per the  ALARA (As Low as Reasonably Achievable) protocol.     COMPARISON: Portable chest exam of same date.     FINDINGS: Patient history indicates pneumonia, fever.     There is good contrast opacification of the pulmonary arteries. There  are no visible filling defects to suggest pulmonary embolic disease.  There is no evidence of thoracic aortic aneurysm or dissection,  pericardial or pleural effusion. No mediastinal adenopathy is seen.     Lung window images show moderately extensive patchy disease of the right  lower lobe and, to a lesser extent, the right middle lobe associated  with elevated right hemidiaphragm. This could all represent atelectasis  but an underlying pneumonia cannot be excluded. Mild discoid atelectasis  in the left base is more typical for simple atelectasis. Lungs appear  clear elsewhere.     Included images of the upper abdomen show no significant abnormalities  of the visualized portions of the liver, spleen, pancreatic tail,  adrenal glands or right upper renal pole. A near water density lesion in  the left upper renal pole is probably an incidental cyst.       Impression:       1. No evidence of pulmonary embolus.  2. Elevated right hemidiaphragm and moderately extensive and irregular  right lower lobe and middle lobe atelectasis versus pneumonia.     DICTATED:     6/3/2018  EDITED/ls :     6/3/2018      This report was finalized on 6/4/2018 8:27 AM by DR. Ifeanyi Curry MD.       XR Chest 1 View [[id number]] Collected:  06/03/18 1530     Updated:  06/04/18 0007    Narrative:          EXAMINATION: XR CHEST 1 VW - 6/3/2018     INDICATION: Shortness of air.     COMPARISON: 5/15/2018.     FINDINGS: There is new mild to moderate right basilar discoid  atelectasis, probably in the right lower lobe. There is trace linear  scarring left base which is unchanged. Lungs otherwise  appear clear. The  heart is upper normal size. The vasculature appears normal.           Impression:       New mild to moderate right lower lobe atelectasis or  pneumonia.     DICTATED:     6/3/2018  EDITED/ls :     6/3/2018      This report was finalized on 6/4/2018 12:05 AM by DR. Ifeanyi Curry MD.           Impression: 87 y.o. female with acute respiratory failure with hypoxia, acute gouty arthritis  Plan:   Acute left shoulder and left hand pain - improving with increase mobility.     Chronic bilateral neuropathic pain - will try reduced dose BID gabapentin. Patient has had some increase drowsiness, may require a slower titration.     Chronic Left SI joint pain - trial of lidoderm patches    Constipation - add scheduled senna.     Dysphagia - Discussed options with daughter about comfort diet, possible recurrent aspiration pneumonia, different choices with speech therapy involvement and possible interventions.    Daughter has decided that the patient has been through too much since her 's death and hospitalizations, so will not proceed with any further testing for dysphagia evaluation.  Discussed that thickened liquids, different types of cups, exercises may be of assistance in the future and could follow with speech therapy at the LTC facility.     Goals of care discussion - met with daughter and her  in conference room along with palliative nurse. Reviewed options with different plans of care and discussed hospice services.  Reviewed that with changes in functional status if patient unable to continue to participate with therapy or has diminished po intake or more difficulties with swallowing that she could request palliative/hospice services at the facility.   Palliative medicine will continue to provide support with ongoing clinical decisions.     Ciara Wright, UMAIR  [phone]  06/05/18  2:03 PM      Time: 90 minutes spent reviewing medical and medication records, assessing and examining  patient, discussing with patient, family and nursing staff, answering questions, formulating a plan and documentation of care. > 50% time spent face to face

## 2018-06-05 NOTE — PROGRESS NOTES
[facility] Medicine Services  PROGRESS NOTE    Patient Name: Poonam Mata  : 1931  MRN: [id number]    Date of Admission: 6/3/2018  Length of Stay: 2  Primary Care Physician: Leti Munoz MD    Subjective   Subjective     CC: f/u PNA    HPI: More alert today. Patient states that she feels better today but still in pain. Daughter at bedside.     Review of Systems  Gen- No fevers, chills  CV- No chest pain, palpitations  Resp- No cough, dyspnea  GI- No N/V/D, abd pain    Otherwise ROS is negative except as mentioned in the HPI.    Objective   Objective     Vital Signs:   Temp:  [97.5 °F (36.4 °C)-100.2 °F (37.9 °C)] 97.5 °F (36.4 °C)  Heart Rate:  [96-98] 96  Resp:  [16-18] 18  BP: (147-162)/(59-60) 162/60        Physical Exam:  Constitutional: No acute distress, awake, more alert, chronically ill appearing  HENT: NCAT, mucous membranes moist  Respiratory: Clear to auscultation bilaterally, respiratory effort normal   Cardiovascular: RRR, no murmurs, rubs, or gallops, palpable pedal pulses bilaterally  Gastrointestinal: Positive bowel sounds, soft, nontender, nondistended  Musculoskeletal: Left hand edema and TTP  Psychiatric: Appropriate affect, cooperative  Neurologic: Oriented x 3, strength symmetric in all extremities, Cranial Nerves grossly intact to confrontation, speech clear  Skin: No rashes    Results Reviewed:  I have personally reviewed current lab, radiology, and data and agree.      Results from last 7 days  Lab Units 18  0618  0436 18  1355   WBC 10*3/mm3 6.33 12.61* 10.66   HEMOGLOBIN g/dL 9.8* 10.1* 11.7   HEMATOCRIT % 31.4* 32.4* 37.5   PLATELETS 10*3/mm3 228 242 264       Results from last 7 days  Lab Units 18  0609 18  0436 18  1422   SODIUM mmol/L 138 136 139   POTASSIUM mmol/L 4.0 3.6 3.8   CHLORIDE mmol/L 107 103 104   CO2 mmol/L 28.0 25.0 27.0   BUN mg/dL 13 12 14   CREATININE mg/dL 0.50* 0.60 0.70   GLUCOSE  mg/dL 92 151* 116*   CALCIUM mg/dL 8.5* 8.7 8.4*   ALT (SGPT) U/L  --   --  15   AST (SGOT) U/L  --   --  24     Estimated Creatinine Clearance: 45.1 mL/min (A) (by C-G formula based on SCr of 0.5 mg/dL (L)).  BNP   Date Value Ref Range Status   06/03/2018 7.0 0.0 - 100.0 pg/mL Final     Comment:     Results may be falsely decreased if patient taking Biotin.     No results found for: PHART    Microbiology Results Abnormal     Procedure Component Value - Date/Time    Blood Culture - Blood, [[id number]]  (Normal) Collected:  06/03/18 1410    Lab Status:  Preliminary result Specimen:  Blood from Arm, Left Updated:  06/04/18 1445     Blood Culture No growth at 24 hours    Blood Culture - Blood, [[id number]]  (Normal) Collected:  06/03/18 1406    Lab Status:  Preliminary result Specimen:  Blood from Arm, Left Updated:  06/04/18 1445     Blood Culture No growth at 24 hours          Imaging Results (last 24 hours)     ** No results found for the last 24 hours. **        Results for orders placed during the hospital encounter of 05/14/18   Adult Transthoracic Echo Complete W/ Cont if Necessary Per Protocol    Narrative · Mild aortic valve regurgitation is present.  · Calculated right ventricular systolic pressure from tricuspid   regurgitation is 24 mmHg.  · Mild tricuspid valve regurgitation is present.  · Left ventricular systolic function is normal. Estimated EF = 60%.  · Left ventricular diastolic dysfunction (grade I) consistent with   impaired relaxation.  · There is no evidence of pericardial effusion.  · No evidence of pulmonary hypertension is present.  · The aortic valve exhibits sclerosis.  · Normal right ventricular cavity size, wall thickness, systolic function   and septal motion noted.  · Moderate MAC is present.          I have reviewed the medications.    Assessment/Plan   Assessment / Plan     Hospital Problem List     * (Principal)Acute respiratory failure with hypoxia    Dementia    Hypertension    GERD  (gastroesophageal reflux disease)    Constipation    Aortic heart murmur    HCAP (healthcare-associated pneumonia)    Spinal stenosis (Chronic)    Acute pain of left shoulder    Left hand pain          Brief Hospital Course to date:  Poonam Mata is a 87 y.o. female presenting to ED with fever and hypoxia found once again to have right sided pneumonia.     Assessment & Plan:  --Continue IV zosyn. Based on SLP eval this almost certainly represents aspiration pneumonia. This also supports the daughters claim that patient has had repeated pneumonias. I have discussed this with her daughter as well as discussed the typical course of a person with swallowing difficulty and repeated pneumonias.  --Continue aggressive pulmonary toilet.  --Her hand and wrist pain may represent flare of inflammatory arthropathy. Radiology read suggest gouty arthritis, which patient is unknown to have. She has history of PMR, but this has not been active in over 20 years. Uric acid is low, but during flare can be depressed. Colchicine started which improved her some. Will also try short course of prednisone.   --Long discussion again with daughter. Since the majority of her complaints are centered around her pain and repeated hospitalizations I think it reasonable that she meet with palliative to discuss her further goals of care. She is agreeable to this today. Consult placed.  --Labs in am.     DVT Prophylaxis:  [facility]     CODE STATUS: Conditional Code     Disposition: I expect the patient to be discharged TBD    Counseling was given to patient and family for the following topics: diagnostic results, prognosis, patient and family education and impressions . Total time of the encounter was 50 minutes and 45 minutes was spend counseling.    Electronically signed by Ene Garcia II, DO, 06/05/18, 11:08 AM.

## 2018-06-05 NOTE — PLAN OF CARE
Problem: Patient Care Overview  Goal: Plan of Care Review  Outcome: Ongoing (interventions implemented as appropriate)   06/05/18 7030   Coping/Psychosocial   Plan of Care Reviewed With daughter;patient   Plan of Care Review   Progress no change   OTHER   Outcome Summary new palliative consult. met with dtr and son in law. continue support for family and management of pain.

## 2018-06-06 LAB
ANION GAP SERPL CALCULATED.3IONS-SCNC: 6 MMOL/L (ref 3–11)
BACTERIA FLD CULT: NORMAL
BUN BLD-MCNC: 14 MG/DL (ref 9–23)
BUN/CREAT SERPL: 23 (ref 7–25)
CALCIUM SPEC-SCNC: 8.6 MG/DL (ref 8.7–10.4)
CHLORIDE SERPL-SCNC: 106 MMOL/L (ref 99–109)
CO2 SERPL-SCNC: 28 MMOL/L (ref 20–31)
CREAT BLD-MCNC: 0.61 MG/DL (ref 0.6–1.3)
GFR SERPL CREATININE-BSD FRML MDRD: 93 ML/MIN/1.73
GLUCOSE BLD-MCNC: 117 MG/DL (ref 70–100)
Lab: NORMAL
ORGANISM ID: NORMAL
POTASSIUM BLD-SCNC: 4.3 MMOL/L (ref 3.5–5.5)
S PNEUM AG SPEC QL LA: NEGATIVE
SODIUM BLD-SCNC: 140 MMOL/L (ref 132–146)
SPECIMEN SOURCE: NORMAL

## 2018-06-06 PROCEDURE — 63710000001 PREDNISONE PER 5 MG: Performed by: INTERNAL MEDICINE

## 2018-06-06 PROCEDURE — 80048 BASIC METABOLIC PNL TOTAL CA: CPT | Performed by: INTERNAL MEDICINE

## 2018-06-06 PROCEDURE — 97530 THERAPEUTIC ACTIVITIES: CPT

## 2018-06-06 PROCEDURE — 97110 THERAPEUTIC EXERCISES: CPT

## 2018-06-06 PROCEDURE — 25010000002 HEPARIN (PORCINE) PER 1000 UNITS: Performed by: FAMILY MEDICINE

## 2018-06-06 PROCEDURE — 99233 SBSQ HOSP IP/OBS HIGH 50: CPT | Performed by: HOSPITALIST

## 2018-06-06 PROCEDURE — 97162 PT EVAL MOD COMPLEX 30 MIN: CPT

## 2018-06-06 PROCEDURE — 25010000002 PIPERACILLIN SOD-TAZOBACTAM PER 1 G: Performed by: FAMILY MEDICINE

## 2018-06-06 PROCEDURE — 92526 ORAL FUNCTION THERAPY: CPT

## 2018-06-06 RX ORDER — HYDRALAZINE HYDROCHLORIDE 20 MG/ML
10 INJECTION INTRAMUSCULAR; INTRAVENOUS EVERY 6 HOURS PRN
Status: DISCONTINUED | OUTPATIENT
Start: 2018-06-06 | End: 2018-06-07 | Stop reason: HOSPADM

## 2018-06-06 RX ORDER — AMOXICILLIN AND CLAVULANATE POTASSIUM 875; 125 MG/1; MG/1
1 TABLET, FILM COATED ORAL EVERY 12 HOURS SCHEDULED
Status: DISCONTINUED | OUTPATIENT
Start: 2018-06-06 | End: 2018-06-07 | Stop reason: HOSPADM

## 2018-06-06 RX ADMIN — MIRTAZAPINE 30 MG: 15 TABLET, FILM COATED ORAL at 20:43

## 2018-06-06 RX ADMIN — OXYCODONE HYDROCHLORIDE AND ACETAMINOPHEN 500 MG: 500 TABLET ORAL at 08:57

## 2018-06-06 RX ADMIN — HEPARIN SODIUM 5000 UNITS: 5000 INJECTION, SOLUTION INTRAVENOUS; SUBCUTANEOUS at 20:45

## 2018-06-06 RX ADMIN — TAZOBACTAM SODIUM AND PIPERACILLIN SODIUM 4.5 G: 500; 4 INJECTION, SOLUTION INTRAVENOUS at 04:15

## 2018-06-06 RX ADMIN — SUCRALFATE 1 G: 1 TABLET ORAL at 12:42

## 2018-06-06 RX ADMIN — HEPARIN SODIUM 5000 UNITS: 5000 INJECTION, SOLUTION INTRAVENOUS; SUBCUTANEOUS at 08:58

## 2018-06-06 RX ADMIN — COLCHICINE 0.6 MG: 0.6 TABLET, FILM COATED ORAL at 08:57

## 2018-06-06 RX ADMIN — Medication 5 MG: at 20:43

## 2018-06-06 RX ADMIN — PANTOPRAZOLE SODIUM 40 MG: 40 TABLET, DELAYED RELEASE ORAL at 08:58

## 2018-06-06 RX ADMIN — Medication 250 MG: at 08:57

## 2018-06-06 RX ADMIN — DOCUSATE SODIUM 100 MG: 100 CAPSULE, LIQUID FILLED ORAL at 20:42

## 2018-06-06 RX ADMIN — ALPRAZOLAM 0.25 MG: 0.25 TABLET ORAL at 20:45

## 2018-06-06 RX ADMIN — SUCRALFATE 1 G: 1 TABLET ORAL at 08:58

## 2018-06-06 RX ADMIN — MEMANTINE 10 MG: 10 TABLET ORAL at 20:45

## 2018-06-06 RX ADMIN — SUCRALFATE 1 G: 1 TABLET ORAL at 20:44

## 2018-06-06 RX ADMIN — METOPROLOL TARTRATE 12.5 MG: 25 TABLET, FILM COATED ORAL at 17:48

## 2018-06-06 RX ADMIN — GABAPENTIN 100 MG: 100 CAPSULE ORAL at 20:43

## 2018-06-06 RX ADMIN — FERROUS SULFATE TAB 325 MG (65 MG ELEMENTAL FE) 325 MG: 325 (65 FE) TAB at 08:58

## 2018-06-06 RX ADMIN — AMLODIPINE BESYLATE 10 MG: 10 TABLET ORAL at 20:43

## 2018-06-06 RX ADMIN — DONEPEZIL HYDROCHLORIDE 10 MG: 10 TABLET, FILM COATED ORAL at 20:42

## 2018-06-06 RX ADMIN — Medication 250 MG: at 20:43

## 2018-06-06 RX ADMIN — SUCRALFATE 1 G: 1 TABLET ORAL at 17:48

## 2018-06-06 RX ADMIN — LIDOCAINE 2 PATCH: 50 PATCH CUTANEOUS at 08:59

## 2018-06-06 RX ADMIN — TRAZODONE HYDROCHLORIDE 12.5 MG: 50 TABLET ORAL at 20:44

## 2018-06-06 RX ADMIN — DOCUSATE SODIUM 100 MG: 100 CAPSULE, LIQUID FILLED ORAL at 08:57

## 2018-06-06 RX ADMIN — PREDNISONE 10 MG: 10 TABLET ORAL at 08:57

## 2018-06-06 RX ADMIN — TERAZOSIN HYDROCHLORIDE ANHYDROUS 2 MG: 2 CAPSULE ORAL at 20:43

## 2018-06-06 RX ADMIN — AMOXICILLIN AND CLAVULANATE POTASSIUM 1 TABLET: 875; 125 TABLET, FILM COATED ORAL at 20:43

## 2018-06-06 RX ADMIN — TRAMADOL HYDROCHLORIDE 50 MG: 50 TABLET, COATED ORAL at 20:42

## 2018-06-06 RX ADMIN — AMOXICILLIN AND CLAVULANATE POTASSIUM 1 TABLET: 875; 125 TABLET, FILM COATED ORAL at 12:42

## 2018-06-06 RX ADMIN — GABAPENTIN 100 MG: 100 CAPSULE ORAL at 08:57

## 2018-06-06 RX ADMIN — CLOPIDOGREL BISULFATE 75 MG: 75 TABLET ORAL at 08:58

## 2018-06-06 RX ADMIN — TRAMADOL HYDROCHLORIDE 50 MG: 50 TABLET, COATED ORAL at 08:58

## 2018-06-06 RX ADMIN — MULTIPLE VITAMINS W/ MINERALS TAB 1 TABLET: TAB ORAL at 08:58

## 2018-06-06 RX ADMIN — ISOSORBIDE MONONITRATE 30 MG: 30 TABLET, EXTENDED RELEASE ORAL at 08:58

## 2018-06-06 RX ADMIN — SENNOSIDES 17.2 MG: 8.6 TABLET ORAL at 20:43

## 2018-06-06 RX ADMIN — MELOXICAM 7.5 MG: 7.5 TABLET ORAL at 20:44

## 2018-06-06 RX ADMIN — MEMANTINE 10 MG: 10 TABLET ORAL at 08:58

## 2018-06-06 NOTE — THERAPY DISCHARGE NOTE
Acute Care - Speech Language Pathology   Swallow Progress Note/Discharge   [facility]     Patient Name: Poonam Mata  : 1931  MRN: [id number]  Today's Date: 2018  Onset of Illness/Injury or Date of Surgery: 18     Referring Physician: MD Nikko      Admit Date: 6/3/2018    Visit Dx:      ICD-10-CM ICD-9-CM   1. Acute respiratory failure with hypoxia J96.01 518.81   2. Febrile illness, acute R50.9 780.60   3. Sepsis, due to unspecified organism A41.9 038.9     995.91   4. Encephalopathy acute G93.40 348.30   5. Dysphagia, unspecified type R13.10 787.20   6. Impaired mobility and ADLs Z74.09 799.89   7. Impaired functional mobility, balance, gait, and endurance Z74.09 V49.89     Patient Active Problem List   Diagnosis   • Pneumonia   • Hypokalemia   • Dementia   • Hypertension   • GERD (gastroesophageal reflux disease)   • Normocytic anemia   • Elevated alkaline phosphatase level   • Vasovagal episode   • Constipation   • Aortic heart murmur   • HCAP (healthcare-associated pneumonia)   • Acute respiratory failure with hypoxia   • Spinal stenosis   • Acute pain of left shoulder   • Left hand pain       Therapy Treatment    Therapy Treatment / Health Promotion    Treatment Time/Intention  Discipline: speech language pathologist (18 1600 : Ailin Winkler MA, CFY-SLP)  Document Type: therapy note (daily note) (18 1600 : Ailin Winkler MA, CFY-SLP)  Subjective Information: no complaints (18 1600 : Ailin Winkler MA, CFY-SLP)  Mode of Treatment: speech-language pathology (18 1600 : Ailin Winkler MA, CFY-SLP)  Patient/Family Observations: family at bedside (18 1600 : Ailin Winkler MA, CFY-SLP)  Care Plan Review: care plan/treatment goals reviewed, risks/benefits reviewed, patient/other agree to care plan (18 1600 : Ailin Winkler MA, CFY-SLP)  Therapy Frequency (Swallow): PRN (18 1600 : Ailin Winkler MA, CFY-SLP)  Patient  Effort: good (06/06/18 1600 : Ailin Winkler MA, CFY-SLP)  Plan of Care Review  Plan of Care Reviewed With: patient, family (06/06/18 1611 : Ailin Winkler MA, CFY-SLP)    Vitals/Pain/Safety  Pain Assessment  Additional Documentation: Pain Scale: Numbers Pre/Post-Treatment (Group) (06/06/18 1600 : Ailin Winkler MA, CFY-SLP)  Pain Scale: Numbers Pre/Post-Treatment  Pain Scale: Numbers, Pretreatment: 0/10 - no pain (06/06/18 1600 : Ailin Winkler MA, CFY-SLP)  Pain Scale: Numbers, Post-Treatment: 0/10 - no pain (06/06/18 1600 : Ailin Winkler MA, CFY-SLP)    Cognition, Communication, Swallow  Recommendations  Therapy Frequency (Swallow): PRN (06/06/18 1600 : Ailin Winkler MA, CFY-SLP)  Anticipated Dischage Disposition: home, other (see comments) (per pt report) (06/06/18 1600 : Ailin Winkler MA, CFY-SLP)    Outcome Summary  Outcome Summary/Treatment Plan (SLP)  Daily Summary of Progress (SLP): progress toward functional goals as expected (06/06/18 1600 : Ailin Winkler MA, CFY-SLP)  Plan for Continued Treatment (SLP): Pt trialed thin liquids w/ no overt s/sxs aspiration. Provided education on aspiration risks/precautions. Pt reported d/c'ing soon and family confirmed no further instrumental evaluation desired. Will sign off for dysphagia services at this time. Please re-consult if any new concerns arise. (06/06/18 1600 : Ailin Winkler MA, CFY-SLP)  Anticipated Dischage Disposition: home, other (see comments) (per pt report) (06/06/18 1600 : Ailin Winkler MA, CFY-SLP)        SLP GOALS     Row Name 06/06/18 1600             Oral Nutrition/Hydration Goal 1 (SLP)    Oral Nutrition/Hydration Goal 1, SLP LTG: Patient will demonstrate functional swallow w/ no s/s aspiration or complications on 100% opportunities.  -VW      Time Frame (Oral Nutrition/Hydration Goal 1, SLP) by discharge  -VW      Barriers (Oral Nutrition/Hydration Goal 1, SLP) No overt s/sxs aspiration at  the bedside w/ thins.  -VW      Progress/Outcomes (Oral Nutrition/Hydration Goal 1, SLP) goal met  -VW        User Key  (r) = Recorded By, (t) = Taken By, (c) = Cosigned By    Initials Name Provider Type     Ailin Winkler MA, CFY-SLP Speech and Language Pathologist          EDUCATION  The patient has been educated in the following areas:   Dysphagia (Swallowing Impairment) Oral Care/Hydration Modified Diet Instruction.    SLP Recommendation and Plan                    Anticipated Dischage Disposition: home, other (see comments) (per pt report)     Therapy Frequency (Swallow): PRN          Daily Summary of Progress (SLP): progress toward functional goals as expected  Plan for Continued Treatment (SLP): Pt trialed thin liquids w/ no overt s/sxs aspiration. Provided education on aspiration risks/precautions. Pt reported d/c'ing soon and family confirmed no further instrumental evaluation desired. Will sign off for dysphagia services at this time. Please re-consult if any new concerns arise.  Plan of Care Reviewed With: patient, family  Plan of Care Reviewed With: patient, family           Time Calculation:         Time Calculation- SLP     Row Name 06/06/18 1613             Time Calculation- SLP    SLP Start Time 1600  -VW      SLP Received On 06/06/18  -VW        User Key  (r) = Recorded By, (t) = Taken By, (c) = Cosigned By    Initials Name Provider Type     Ailin Winkler MA, CFY-SLP Speech and Language Pathologist          Therapy Charges for Today     Code Description Service Date Service Provider Modifiers Qty    53658635796 HC ST TREATMENT SWALLOW 2 6/6/2018 Ailin Winkler MA, CFY-SLP GN 1               SLP Discharge Summary  Anticipated Dischage Disposition: home, other (see comments) (per pt report)    Ailin Winkler MA, CFY-SLP  6/6/2018

## 2018-06-06 NOTE — THERAPY EVALUATION
Acute Care - Physical Therapy Initial Evaluation  [facility]     Patient Name: Poonam Mata  : 1931  MRN: [id number]  Today's Date: 2018   Onset of Illness/Injury or Date of Surgery: 18  Date of Referral to PT: 18  Referring Physician: MD Nikko      Admit Date: 6/3/2018    Visit Dx:     ICD-10-CM ICD-9-CM   1. Acute respiratory failure with hypoxia J96.01 518.81   2. Febrile illness, acute R50.9 780.60   3. Sepsis, due to unspecified organism A41.9 038.9     995.91   4. Encephalopathy acute G93.40 348.30   5. Dysphagia, unspecified type R13.10 787.20   6. Impaired mobility and ADLs Z74.09 799.89   7. Impaired functional mobility, balance, gait, and endurance Z74.09 V49.89     Patient Active Problem List   Diagnosis   • Pneumonia   • Hypokalemia   • Dementia   • Hypertension   • GERD (gastroesophageal reflux disease)   • Normocytic anemia   • Elevated alkaline phosphatase level   • Vasovagal episode   • Constipation   • Aortic heart murmur   • HCAP (healthcare-associated pneumonia)   • Acute respiratory failure with hypoxia   • Spinal stenosis   • Acute pain of left shoulder   • Left hand pain     Past Medical History:   Diagnosis Date   • Anxiety    • Arthropathy    • Constipation    • Dementia    • Essential hypertension    • GERD (gastroesophageal reflux disease)    • Hyperlipidemia    • Insomnia    • Spinal stenosis    • Syncope    • URI (upper respiratory infection)      Past Surgical History:   Procedure Laterality Date   • BLADDER SUSPENSION     • CHOLECYSTECTOMY     • HIP SURGERY     • HYSTERECTOMY     • TONSILLECTOMY          PT ASSESSMENT (last 12 hours)      Physical Therapy Evaluation     Row Name 18 0937          PT Evaluation Time/Intention    Subjective Information no complaints  -EH     Document Type evaluation  -EH     Mode of Treatment individual therapy;physical therapy  -EH     Patient Effort good  -EH     Row Name 18 0937          General Information     Patient Profile Reviewed? yes  -     Onset of Illness/Injury or Date of Surgery 06/03/18  -     Referring Physician MD Nikko  -     Patient Observations alert;cooperative  -     Patient/Family Observations Son in law present in room  -     General Observations of Patient Pt reclined in bed with HOB elevated  -     Prior Level of Function independent:;feeding;min assist:;grooming;mod assist:;bed mobility  -     Equipment Currently Used at Home wheelchair  -     Pertinent History of Current Functional Problem Pt admitted from ECF with SOA, O2 sats in 70s, confusion, fever, gout LUE.   -     Limitations/Impairments hearing  -     Risks Reviewed patient and family:;LOB;increased discomfort  -     Benefits Reviewed patient and family:;improve function;increase independence;increase strength;increase balance  -     Barriers to Rehab previous functional deficit  -     Row Name 06/06/18 0937          Relationship/Environment    Primary Source of Support/Comfort parent  -     Lives With facility resident   [facility]  -     Family Caregiver if Needed none  -     Row Name 06/06/18 0937          Resource/Environmental Concerns    Current Living Arrangements extended care facility  -     Row Name 06/06/18 0937          Cognitive Assessment/Intervention- PT/OT    Orientation Status (Cognition) oriented to;person;situation;place  -     Follows Commands (Cognition) follows one step commands;50-74% accuracy  -     Row Name 06/06/18 0937          Bed Mobility Assessment/Treatment    Bed Mobility Assessment/Treatment supine-sit;sit-supine  -     Supine-Sit [place] (Bed Mobility) maximum assist (25% patient effort);2 person assist  -     Sit-Supine [place] (Bed Mobility) maximum assist (25% patient effort);2 person assist  -     Assistive Device (Bed Mobility) bed rails;head of bed elevated  -     Row Name 06/06/18 0937          Transfer Assessment/Treatment    Transfer  Assessment/Treatment sit-stand transfer;stand-sit transfer  -     Comment (Transfers) BUE support; Pt unable to fully stand  -     Sit-Stand [place] (Transfers) verbal cues;maximum assist (25% patient effort);2 person assist  -     Stand-Sit [place] (Transfers) verbal cues;maximum assist (25% patient effort);2 person assist  -     [place] Level (Toilet Transfer) verbal cues;maximum assist (25% patient effort);2 person assist  -EH     Row Name 06/06/18 09          Toilet Transfer    Type (Toilet Transfer) sit-stand;stand-sit  -EH     Row Name 06/06/18 0937          General ROM    GENERAL ROM COMMENTS BUE WFL; BLE hip flexion knee flexion, knee extension AP WFL.   -EH     Row Name 06/06/18 0937          General Assessment (Manual Muscle Testing)    Comment, General Manual Muscle Testing (MMT) Assessment 3/5 grossly  -EH     Row Name 06/06/18 0937          Motor Assessment/Intervention    Additional Documentation Therapeutic Exercise (Group)  -EH     Row Name 06/06/18 0937          Therapeutic Exercise    Therapeutic Exercise seated, lower extremities;seated, upper extremities  -     Additional Documentation Therapeutic Exercise (Row)  -EH     Row Name 06/06/18 0937          Upper Extremity Seated Therapeutic Exercise    Performed, Seated Upper Extremity (Therapeutic Exercise) shoulder flexion/extension;elbow flexion/extension  -     Exercise Type, Seated Upper Extremity (Therapeutic Exercise) AROM (active range of motion)  -EH     Row Name 06/06/18 0937          Lower Extremity Seated Therapeutic Exercise    Performed, Seated Lower Extremity (Therapeutic Exercise) LAQ (long arc quad), knee extension;ankle dorsiflexion/plantarflexion;knee flexion/extension  -EH     Row Name 06/06/18 0937          Balance    Balance static sitting balance;dynamic sitting balance  -EH     Row Name 06/06/18 0937          Static Sitting Balance    Level of [place] (Unsupported Sitting, Static Balance)  supervision   leans posterior and R.  -     Sitting Position (Unsupported Sitting, Static Balance) sitting on edge of bed  -     Time Able to Maintain Position (Unsupported Sitting, Static Balance) more than 5 minutes  -[facility] Name 06/06/18 09          Dynamic Sitting Balance    Level of [place], Reaches Outside Midline (Sitting, Dynamic Balance) contact guard assist  -     Sitting Position, Reaches Outside Midline (Sitting, Dynamic Balance) sitting on edge of bed  -     Comment, Reaches Outside Midline (Sitting, Dynamic Balance) reaches to same side, cross body, crossbody upward and downward with forward leans.   A-P movement  -[facility] Name 06/06/18 09          Pain Scale: FACES Pre/Post-Treatment    Pain: FACES Scale, Pretreatment 2-->hurts little bit  -     Pain: FACES Scale, Post-Treatment 2-->hurts little bit  -[facility] Name 06/06/18 09          Coping    Observed Emotional State accepting;cooperative  -     Verbalized Emotional State acceptance  -[facility] Name 06/06/18 09          Plan of Care Review    Plan of Care Reviewed With patient   son in law  -[facility] Name 06/06/18 [facility]          Physical Therapy Clinical Impression    Date of Referral to PT 06/06/18  -     PT Diagnosis (PT Clinical Impression) decreased functional mobility; decreased strength; decreased ROM  -     Criteria for Skilled Interventions Met (PT Clinical Impression) yes;treatment indicated  [facility]     Rehab Potential (PT Clinical Summary) fair, will monitor progress closely  -[facility] Name 06/06/18 09          Physical Therapy Goals    Bed Mobility Goal Selection (PT) bed mobility, PT goal 1  -     Transfer Goal Selection (PT) transfer, PT goal 1;transfer, PT goal 2  [facility]     Gait Training Goal Selection (PT) --  -[facility] Name 06/06/18 09          Bed Mobility Goal 1 (PT)    Activity/Assistive Device (Bed Mobility Goal 1, PT) sit to supine;supine to sit  -     [place] Level/Cues  Needed (Bed Mobility Goal 1, PT) moderate assist (50-74% patient effort)  -     Time Frame (Bed Mobility Goal 1, PT) long term goal (LTG);1 week  -     Row Name 06/06/18 0937          Transfer Goal 1 (PT)    Activity/Assistive Device (Transfer Goal 1, PT) sit-to-stand/stand-to-sit  -     [place] Level/Cues Needed (Transfer Goal 1, PT) maximum assist (25-49% patient effort)  -     Time Frame (Transfer Goal 1, PT) long term goal (LTG);1 week  -     Row Name 06/06/18 0937          Transfer Goal 2 (PT)    Activity/Assistive Device (Transfer Goal 2, PT) bed-to-chair/chair-to-bed  -EH     [place] Level/Cues Needed (Transfer Goal 2, PT) maximum assist (25-49% patient effort)  -     Time Frame (Transfer Goal 2, PT) long term goal (LTG);1 week  -     Row Name 06/06/18 0937          Positioning and Restraints    Pre-Treatment Position in bed  -     Post Treatment Position bed  -     In Bed supine;call light within reach;encouraged to call for assist;exit alarm on;with family/caregiver;heels elevated  -       User Key  (r) = Recorded By, (t) = Taken By, (c) = Cosigned By    Initials Name Provider Type     Sushma Rod PT Physical Therapist          Physical Therapy Education     Title: PT OT SLP Therapies (Active)     Topic: Physical Therapy (Active)     Point: Mobility training (Active)    Learning Progress Summary     Learner Status Readiness Method Response Comment Documented by    Patient Active Acceptance E,TB NR   06/06/18 1343          Point: Body mechanics (Active)    Learning Progress Summary     Learner Status Readiness Method Response Comment Documented by    Patient Active Acceptance E,TB NR   06/06/18 1343                      User Key     Initials Effective Dates Name Provider Type [facility] 06/19/15 -  Sushma Rod PT Physical Therapist PT                PT Recommendation and Plan  Anticipated Discharge Disposition (PT): extended care facility  Planned  Therapy Interventions (PT Eval): balance training, bed mobility training, home exercise program, strengthening, ROM (range of motion), transfer training  Outcome Summary/Treatment Plan (PT)  Anticipated Discharge Disposition (PT): extended care facility  Plan of Care Reviewed With: patient  Outcome Summary: Pt performs bed mobility with MAX Ax2, transfers to partial standing with MAX Ax2 and is unable to perform gait at baseline. PT to continue during stay, pt expected to return to [facility] with PT as prior.          Outcome Measures     Row Name 06/06/18 0937 06/04/18 1310          How much help from another person do you currently need...    Turning from your back to your side while in flat bed without using bedrails? 2  -EH  --     Moving from lying on back to sitting on the side of a flat bed without bedrails? 2  -EH  --     Moving to and from a bed to a chair (including a wheelchair)? 1  -EH  --     Standing up from a chair using your arms (e.g., wheelchair, bedside chair)? 1  -EH  --     Climbing 3-5 steps with a railing? 1  -EH  --     To walk in hospital room? 1  -EH  --     AM-PAC 6 Clicks Score 8  -EH  --        How much help from another is currently needed...    Putting on and taking off regular lower body clothing?  -- 1  -AC     Bathing (including washing, rinsing, and drying)  -- 1  -AC     Toileting (which includes using toilet bed pan or urinal)  -- 1  -AC     Putting on and taking off regular upper body clothing  -- 2  -AC     Taking care of personal grooming (such as brushing teeth)  -- 2  -AC     Eating meals  -- 2  -AC     Score  -- 9  -AC        Functional Assessment    Outcome Measure Options AM-PAC 6 Clicks Basic Mobility (PT)  -EH AM-PAC 6 Clicks Daily Activity (OT)  -AC       User Key  (r) = Recorded By, (t) = Taken By, (c) = Cosigned By    Initials Name Provider Type    AMAYA Grant, OT Occupational Therapist    LOWELL Rod, PT Physical Therapist           Time Calculation:          PT Charges     Row Name 06/06/18 1345             Time Calculation    Start Time 0937  -      PT Received On 06/06/18  -      PT Goal Re-Cert Due Date 06/16/18  -         Time Calculation- PT    Total Timed Code Minutes- PT 9 minute(s)  -        User Key  (r) = Recorded By, (t) = Taken By, (c) = Cosigned By    Initials Name Provider Type     Sushma Rod, PT Physical Therapist          Therapy Charges for Today     Code Description Service Date Service Provider Modifiers Qty    31675741678 HC PT EVAL MOD COMPLEXITY 4 6/6/2018 Sushma Rod, PT GP 1    16895442047 HC PT THER PROC EA 15 MIN 6/6/2018 Sushma Rod, PT GP 1          PT G-Codes  Outcome Measure Options: AM-PAC 6 Clicks Basic Mobility (PT)      Sushma Rod, PT  6/6/2018

## 2018-06-06 NOTE — PLAN OF CARE
Problem: Patient Care Overview  Goal: Plan of Care Review  Outcome: Ongoing (interventions implemented as appropriate)   06/06/18 3351   Coping/Psychosocial   Plan of Care Reviewed With patient   Plan of Care Review   Progress improving   OTHER   Outcome Summary Pt agreeable to all tasks, no c/o pain. Pt with significcant assist needed for txfrs. Contiinue OT.

## 2018-06-06 NOTE — PLAN OF CARE
Problem: Patient Care Overview  Goal: Plan of Care Review  Outcome: Ongoing (interventions implemented as appropriate)   06/06/18 5855   Coping/Psychosocial   Plan of Care Reviewed With patient   OTHER   Outcome Summary Pt performs bed mobility with MAX Ax2, transfers to partial standing with MAX Ax2 and is unable to perform gait at baseline. PT to continue during stay, pt expected to return to [facility] with PT as prior.

## 2018-06-06 NOTE — PLAN OF CARE
Problem: Patient Care Overview  Goal: Interprofessional Rounds/Family Conf  Outcome: Ongoing (interventions implemented as appropriate)  Palliative Team Conference: MESERET Giron RN, PN; MAN Walton LCSW, [facility]-; DC Bermudez DO; BONNIE Wright, APRN; CHRIS Levine, RN, CHPN; MICHOACANO Morrow MDiv   06/06/18 1601   Interdisciplinary Rounds/Family Conf   Summary Pt c/o sore/tired after PT, otherwise improved. Seen by Palliative SW. Plan is to return to [facility] at discharge, where there is an independent Palliative program.        Problem: Palliative Care (Adult)  Goal: Maximized Comfort  Outcome: Ongoing (interventions implemented as appropriate)   06/06/18 1601   Palliative Care (Adult)   Maximized Comfort making progress toward outcome

## 2018-06-06 NOTE — PLAN OF CARE
Problem: Patient Care Overview  Goal: Plan of Care Review  Outcome: Ongoing (interventions implemented as appropriate)   06/06/18 1611   Coping/Psychosocial   Plan of Care Reviewed With patient;family   SLP treatment completed. Will sign-off on dysphagia services at this time. Patient tolerating diet well and family confirmed that they do not wish to pursue any further instrumental swallowing evaluation. Provided education on aspiration precautions and risks as well as un-resolving/worsening symptoms upon d/c. Patient and family verbalized understanding. No further speech services warranted at this time. Please re-consult if any new concerns arise. Please see note for further details and recommendations.

## 2018-06-06 NOTE — PLAN OF CARE
Problem: Patient Care Overview  Goal: Plan of Care Review  Outcome: Ongoing (interventions implemented as appropriate)   06/06/18 1432   Coping/Psychosocial   Plan of Care Reviewed With patient   Plan of Care Review   Progress no change   OTHER   Outcome Summary Pt. transitioned from IV to PO abx today and is tolerating well. VSS. Anticipated d/c tomorrow. Will continue to monitor       Problem: Fall Risk (Adult)  Goal: Identify Related Risk Factors and Signs and Symptoms  Outcome: Ongoing (interventions implemented as appropriate)   06/06/18 1432   Fall Risk (Adult)   Related Risk Factors (Fall Risk) age-related changes;bladder function altered;gait/mobility problems   Signs and Symptoms (Fall Risk) presence of risk factors

## 2018-06-06 NOTE — PROGRESS NOTES
Continued Stay Note  [facility]     Patient Name: Poonam Mata  MRN: [id number]  Today's Date: 6/6/2018    Admit Date: 6/3/2018          Discharge Plan     Row Name 06/06/18 1106       Plan    Plan [facility]    Patient/Family in Agreement with Plan yes    Plan Comments Anticipate pt will be medically ready to return to [facility] on Thurs, 6/7.  Updated [place]o at SNF and daughter via phone.  RN to call report to [phone] and fax DC summary to [phone].  [facility] to transport.  CM or daughter will notify EMS when pt is fully ready for DC at which time they will dispatch, as they will not arrange a pre-set time for .  CM will cont to follow.    Final Discharge Disposition Code 03 - skilled nursing facility (SNF)              Discharge Codes    No documentation.       Expected Discharge Date and Time     Expected Discharge Date Expected Discharge Time    Jun 6, 2018             Katlyn Hawkins

## 2018-06-06 NOTE — PLAN OF CARE
Problem: Patient Care Overview  Goal: Plan of Care Review  Outcome: Ongoing (interventions implemented as appropriate)   06/06/18 0320   Coping/Psychosocial   Plan of Care Reviewed With patient;daughter;son   Plan of Care Review   Progress no change   OTHER   Outcome Summary Pt has no complaints throughout the night. Pt sleeping well. VSS. Will continue to monitor.        Problem: Fall Risk (Adult)  Goal: Identify Related Risk Factors and Signs and Symptoms  Outcome: Ongoing (interventions implemented as appropriate)    Goal: Absence of Fall  Outcome: Ongoing (interventions implemented as appropriate)      Problem: Palliative Care (Adult)  Goal: Identify Related Risk Factors and Signs and Symptoms  Outcome: Ongoing (interventions implemented as appropriate)    Goal: Maximized Comfort  Outcome: Ongoing (interventions implemented as appropriate)    Goal: Enhanced Quality of Life  Outcome: Ongoing (interventions implemented as appropriate)

## 2018-06-06 NOTE — PROGRESS NOTES
[facility] Medicine Services  PROGRESS NOTE    Patient Name: Poonam Mata  : 1931  MRN: [id number]    Date of Admission: 6/3/2018  Length of Stay: 3  Primary Care Physician: Leti Munoz MD    Subjective   Subjective     CC:  FU hypoxia    HPI:  Son-in-law at bedside. Pt reports no longer coughing. No SOB, CP. Had BM yesterday. Eating well.    Review of Systems  Gen- No fevers, chills  CV- No chest pain, palpitations  Resp- No cough, dyspnea  GI- No N/V/D, abd pain    Otherwise ROS is negative except as mentioned in the HPI.    Objective   Objective     Vital Signs:   Temp:  [97.7 °F (36.5 °C)-98.1 °F (36.7 °C)] 98.1 °F (36.7 °C)  Heart Rate:  [75-92] 89  Resp:  [16-18] 16  BP: (154-188)/(64-85) 188/85        Physical Exam:  Constitutional: No acute distress, awake, alert on RA  Eyes: PERRLA, sclerae anicteric, no conjunctival injection  HENT: NCAT, mucous membranes moist  Neck: Supple, no thyromegaly, no lymphadenopathy, trachea midline  Respiratory: Clear to auscultation bilaterally, nonlabored respirations   Cardiovascular: RRR, no murmurs, rubs, or gallops, palpable pedal pulses bilaterally  Gastrointestinal: Positive bowel sounds, soft, mild TTP in bilateral lower quadrants, nondistended  Musculoskeletal: No bilateral ankle edema, no clubbing or cyanosis to extremities  Psychiatric: Appropriate affect, cooperative  Neurologic: Oriented x 3, strength symmetric in all extremities, Cranial Nerves grossly intact to confrontation, speech clear  Skin: No rashes    Results Reviewed:  I have personally reviewed current lab, radiology, and data and agree.      Results from last 7 days  Lab Units 18  0618  1355   WBC 10*3/mm3 6.33 12.61* 10.66   HEMOGLOBIN g/dL 9.8* 10.1* 11.7   HEMATOCRIT % 31.4* 32.4* 37.5   PLATELETS 10*3/mm3 228 242 264       Results from last 7 days  Lab Units 18  0610 18  0609 18  0436 18  1420    SODIUM mmol/L 140 138 136 139   POTASSIUM mmol/L 4.3 4.0 3.6 3.8   CHLORIDE mmol/L 106 107 103 104   CO2 mmol/L 28.0 28.0 25.0 27.0   BUN mg/dL 14 13 12 14   CREATININE mg/dL 0.61 0.50* 0.60 0.70   GLUCOSE mg/dL 117* 92 151* 116*   CALCIUM mg/dL 8.6* 8.5* 8.7 8.4*   ALT (SGPT) U/L  --   --   --  15   AST (SGOT) U/L  --   --   --  24     BNP   Date Value Ref Range Status   06/03/2018 7.0 0.0 - 100.0 pg/mL Final     Comment:     Results may be falsely decreased if patient taking Biotin.     No results found for: PHART    Microbiology Results Abnormal     Procedure Component Value - Date/Time    Blood Culture - Blood, [[id number]]  (Normal) Collected:  06/03/18 1410    Lab Status:  Preliminary result Specimen:  Blood from Arm, Left Updated:  06/05/18 1445     Blood Culture No growth at 2 days    Blood Culture - Blood, [[id number]]  (Normal) Collected:  06/03/18 1406    Lab Status:  Preliminary result Specimen:  Blood from Arm, Left Updated:  06/05/18 1445     Blood Culture No growth at 2 days          Imaging Results (last 24 hours)     ** No results found for the last 24 hours. **        Results for orders placed during the hospital encounter of 05/14/18   Adult Transthoracic Echo Complete W/ Cont if Necessary Per Protocol    Narrative · Mild aortic valve regurgitation is present.  · Calculated right ventricular systolic pressure from tricuspid   regurgitation is 24 mmHg.  · Mild tricuspid valve regurgitation is present.  · Left ventricular systolic function is normal. Estimated EF = 60%.  · Left ventricular diastolic dysfunction (grade I) consistent with   impaired relaxation.  · There is no evidence of pericardial effusion.  · No evidence of pulmonary hypertension is present.  · The aortic valve exhibits sclerosis.  · Normal right ventricular cavity size, wall thickness, systolic function   and septal motion noted.  · Moderate MAC is present.          I have reviewed the medications.    Assessment/Plan   Assessment  / Plan     Hospital Problem List     * (Principal)Acute respiratory failure with hypoxia    Dementia    Hypertension    GERD (gastroesophageal reflux disease)    Constipation    Aortic heart murmur    HCAP (healthcare-associated pneumonia)    Spinal stenosis (Chronic)    Acute pain of left shoulder    Left hand pain           Brief Hospital Course to date:  Poonam Mata is a 87 y.o. female with PMH significant for HTN, GERD, mild dementia, and vasovagal syncope (often occurs on the toilet with BM's) presented to ED with fever and hypoxia found once again to have right sided pneumonia.    Assessment & Plan:  - Aspiration PNA: Much improved. Switch Zosyn D4 today to Augmentin  - HTN: Amlodipine 10mg daily. Re-check BP  - Gout: Colchicine, meloxicam daily, prednisone 10mg  - GERD: PPI    DVT Prophylaxis:  [facility]    CODE STATUS: Conditional Code    Disposition: I expect the patient to be discharged to [facility] tomorrow    Hayley Medley MD  06/06/18  10:22 AM

## 2018-06-07 VITALS
HEART RATE: 92 BPM | SYSTOLIC BLOOD PRESSURE: 129 MMHG | TEMPERATURE: 98.1 F | DIASTOLIC BLOOD PRESSURE: 60 MMHG | OXYGEN SATURATION: 95 % | BODY MASS INDEX: 28.03 KG/M2 | HEIGHT: 62 IN | WEIGHT: 152.34 LBS | RESPIRATION RATE: 18 BRPM

## 2018-06-07 PROBLEM — J69.0 ASPIRATION PNEUMONIA (HCC): Status: ACTIVE | Noted: 2018-03-29

## 2018-06-07 LAB
BACTERIA UR QL AUTO: NORMAL /HPF
BILIRUB UR QL STRIP: NEGATIVE
CLARITY UR: ABNORMAL
COLOR UR: YELLOW
FLUAV RNA SPEC QL NAA+PROBE: NEGATIVE
FLUBV RNA SPEC QL NAA+PROBE: NEGATIVE
GLUCOSE UR STRIP-MCNC: NEGATIVE MG/DL
HADV DNA SPEC QL NAA+PROBE: NEGATIVE
HGB UR QL STRIP.AUTO: NEGATIVE
HMPV RNA SPEC QL NAA+PROBE: NEGATIVE
HPIV1 RNA SPEC QL NAA+PROBE: NEGATIVE
HPIV2 SPEC QL CULT: NEGATIVE
HPIV3 SPEC QL CULT: NEGATIVE
HYALINE CASTS UR QL AUTO: NORMAL /LPF
KETONES UR QL STRIP: NEGATIVE
L PNEUMO1 AG UR QL IA: NEGATIVE
LEUKOCYTE ESTERASE UR QL STRIP.AUTO: NEGATIVE
NITRITE UR QL STRIP: NEGATIVE
PH UR STRIP.AUTO: 7.5 [PH] (ref 5–8)
PROT UR QL STRIP: NEGATIVE
RBC # UR: NORMAL /HPF
REF LAB TEST METHOD: NORMAL
RHINOVIRUS RNA SPEC QL NAA+PROBE: NEGATIVE
RSV A: NEGATIVE
RSV B RNA SPEC QL NAA+PROBE: NEGATIVE
SP GR UR STRIP: 1.01 (ref 1–1.03)
SQUAMOUS #/AREA URNS HPF: NORMAL /HPF
STARCH GRANULES URNS QL MICRO: NORMAL /HPF
UROBILINOGEN UR QL STRIP: ABNORMAL
WBC UR QL AUTO: NORMAL /HPF

## 2018-06-07 PROCEDURE — 25010000002 HEPARIN (PORCINE) PER 1000 UNITS: Performed by: FAMILY MEDICINE

## 2018-06-07 PROCEDURE — 99239 HOSP IP/OBS DSCHRG MGMT >30: CPT | Performed by: HOSPITALIST

## 2018-06-07 PROCEDURE — 63710000001 PREDNISONE PER 5 MG: Performed by: INTERNAL MEDICINE

## 2018-06-07 PROCEDURE — 81001 URINALYSIS AUTO W/SCOPE: CPT | Performed by: HOSPITALIST

## 2018-06-07 RX ORDER — GABAPENTIN 100 MG/1
100 CAPSULE ORAL EVERY 12 HOURS
Qty: 9 CAPSULE | Refills: 0 | Status: SHIPPED | OUTPATIENT
Start: 2018-06-07 | End: 2019-02-06

## 2018-06-07 RX ORDER — NYSTATIN 100000 [USP'U]/G
POWDER TOPICAL EVERY 12 HOURS PRN
Start: 2018-06-07 | End: 2019-02-06

## 2018-06-07 RX ORDER — CALCIUM CARBONATE 200(500)MG
2 TABLET,CHEWABLE ORAL 2 TIMES DAILY PRN
Start: 2018-06-07 | End: 2019-02-06

## 2018-06-07 RX ORDER — LIDOCAINE 50 MG/G
2 PATCH TOPICAL
Status: ON HOLD
Start: 2018-06-08 | End: 2019-02-07

## 2018-06-07 RX ORDER — CARVEDILOL 12.5 MG/1
12.5 TABLET ORAL EVERY 12 HOURS SCHEDULED
Status: DISCONTINUED | OUTPATIENT
Start: 2018-06-07 | End: 2018-06-07 | Stop reason: HOSPADM

## 2018-06-07 RX ORDER — CARVEDILOL 12.5 MG/1
12.5 TABLET ORAL EVERY 12 HOURS SCHEDULED
Start: 2018-06-07 | End: 2019-02-06

## 2018-06-07 RX ORDER — LORAZEPAM 0.5 MG/1
0.5 TABLET ORAL EVERY 12 HOURS PRN
Qty: 6 TABLET | Refills: 0 | Status: SHIPPED | OUTPATIENT
Start: 2018-06-07 | End: 2018-06-07 | Stop reason: HOSPADM

## 2018-06-07 RX ORDER — COLCHICINE 0.6 MG/1
0.6 TABLET ORAL DAILY
Start: 2018-06-08

## 2018-06-07 RX ORDER — PREDNISONE 10 MG/1
10 TABLET ORAL
Start: 2018-06-08 | End: 2018-06-10

## 2018-06-07 RX ORDER — AMOXICILLIN AND CLAVULANATE POTASSIUM 875; 125 MG/1; MG/1
1 TABLET, FILM COATED ORAL EVERY 12 HOURS SCHEDULED
Qty: 10 TABLET | Refills: 0
Start: 2018-06-07 | End: 2018-06-12

## 2018-06-07 RX ADMIN — SUCRALFATE 1 G: 1 TABLET ORAL at 12:54

## 2018-06-07 RX ADMIN — OXYCODONE HYDROCHLORIDE AND ACETAMINOPHEN 500 MG: 500 TABLET ORAL at 09:15

## 2018-06-07 RX ADMIN — TRAMADOL HYDROCHLORIDE 50 MG: 50 TABLET, COATED ORAL at 09:15

## 2018-06-07 RX ADMIN — DOCUSATE SODIUM 100 MG: 100 CAPSULE, LIQUID FILLED ORAL at 09:14

## 2018-06-07 RX ADMIN — AMOXICILLIN AND CLAVULANATE POTASSIUM 1 TABLET: 875; 125 TABLET, FILM COATED ORAL at 09:13

## 2018-06-07 RX ADMIN — FERROUS SULFATE TAB 325 MG (65 MG ELEMENTAL FE) 325 MG: 325 (65 FE) TAB at 09:15

## 2018-06-07 RX ADMIN — COLCHICINE 0.6 MG: 0.6 TABLET, FILM COATED ORAL at 09:14

## 2018-06-07 RX ADMIN — MEMANTINE 10 MG: 10 TABLET ORAL at 09:15

## 2018-06-07 RX ADMIN — LIDOCAINE 2 PATCH: 50 PATCH CUTANEOUS at 09:16

## 2018-06-07 RX ADMIN — GABAPENTIN 100 MG: 100 CAPSULE ORAL at 09:15

## 2018-06-07 RX ADMIN — PREDNISONE 10 MG: 10 TABLET ORAL at 09:13

## 2018-06-07 RX ADMIN — ISOSORBIDE MONONITRATE 30 MG: 30 TABLET, EXTENDED RELEASE ORAL at 09:15

## 2018-06-07 RX ADMIN — MULTIPLE VITAMINS W/ MINERALS TAB 1 TABLET: TAB ORAL at 09:13

## 2018-06-07 RX ADMIN — Medication 250 MG: at 09:13

## 2018-06-07 RX ADMIN — HEPARIN SODIUM 5000 UNITS: 5000 INJECTION, SOLUTION INTRAVENOUS; SUBCUTANEOUS at 09:13

## 2018-06-07 RX ADMIN — CLOPIDOGREL BISULFATE 75 MG: 75 TABLET ORAL at 09:15

## 2018-06-07 RX ADMIN — PANTOPRAZOLE SODIUM 40 MG: 40 TABLET, DELAYED RELEASE ORAL at 09:15

## 2018-06-07 RX ADMIN — SUCRALFATE 1 G: 1 TABLET ORAL at 09:14

## 2018-06-07 RX ADMIN — CARVEDILOL 12.5 MG: 12.5 TABLET, FILM COATED ORAL at 09:14

## 2018-06-07 NOTE — PLAN OF CARE
Problem: Patient Care Overview  Goal: Plan of Care Review  Outcome: Ongoing (interventions implemented as appropriate)   06/07/18 0400   Coping/Psychosocial   Plan of Care Reviewed With patient   Plan of Care Review   Progress improving   OTHER   Outcome Summary Pt c/o mild anxiety at beginning of shift about being d/c'd. See mar. No other complaints were made. Pt sleeping well. VSS. Will continue to monitor.        Problem: Fall Risk (Adult)  Goal: Identify Related Risk Factors and Signs and Symptoms  Outcome: Ongoing (interventions implemented as appropriate)    Goal: Absence of Fall  Outcome: Ongoing (interventions implemented as appropriate)      Problem: Palliative Care (Adult)  Goal: Identify Related Risk Factors and Signs and Symptoms  Outcome: Ongoing (interventions implemented as appropriate)    Goal: Maximized Comfort  Outcome: Ongoing (interventions implemented as appropriate)    Goal: Enhanced Quality of Life  Outcome: Ongoing (interventions implemented as appropriate)

## 2018-06-07 NOTE — DISCHARGE SUMMARY
[facility] Medicine Services  DISCHARGE SUMMARY    Patient Name: Poonam Mata  : 1931  MRN: [id number]    Date of Admission: 6/3/2018  Date of Discharge:  2018  Length of Stay: 4  Primary Care Physician: Leti Munoz MD    Consults     Date and Time Order Name Status Description    2018 1108 Inpatient Palliative Care MD Consult Completed         Hospital Course     Presenting Problem:   HCAP (healthcare-associated pneumonia) [J18.9]    Active Hospital Problems (** Indicates Principal Problem)    Diagnosis Date Noted   • **HCAP (healthcare-associated pneumonia) [J18.9] 2018   • Acute respiratory failure with hypoxia [J96.01] 2018   • Spinal stenosis [M48.00] 2018   • Acute pain of left shoulder [M25.512] 2018   • Left hand pain [M79.642] 2018   • Constipation [K59.00] 2018   • Aortic heart murmur [I35.8] 2018   • Hypertension [I10] 2018   • GERD (gastroesophageal reflux disease) [K21.9] 2018   • Dementia [F03.90] 2018      Resolved Hospital Problems    Diagnosis Date Noted Date Resolved   No resolved problems to display.          Hospital Course:  Poonam Mata is a 87 y.o. female with PMH significant for HTN, GERD, mild dementia, and vasovagal syncope (often occurs on the toilet with BM's) presented to ED with fever and hypoxia found once again to have right sided pneumonia from aspiration. Treated with Zosyn initially and later transitioned to Augmentin with improvement. Pt is to complete a 10-day course. Back on RA during the day. Pt is on 2LNC at night for FARNAZ. SLP Diet Recommendation: soft textures, whole, thin liquids, other (see comments) (may advance to regular solids as alert/tolerated) Recommended Precautions and Strategies: upright posture during/after eating    BP medications adjusted for better BP control. Pt complained of several joint pains and XRays shows findings consistent with  gout. Started on a short course of prednisone and colchicine with NSAID.      Palliative had been involved with her care while inpatient. Palliative discussed options with daughter about comfort diet, possible recurrent aspiration pneumonia, different choices with speech therapy involvement and possible interventions. Daughter has decided that the patient has been through too much since her 's death and hospitalizations, so will not proceed with any further testing for dysphagia evaluation.  Discussed that thickened liquids, different types of cups, exercises may be of assistance in the future and could follow with speech therapy at the LTC facility. Goals of care discussion - Palliative met with daughter and her  in conference room along with palliative nurse. Reviewed options with different plans of care and discussed hospice services.  Reviewed that with changes in functional status if patient unable to continue to participate with therapy or has diminished po intake or more difficulties with swallowing that she could request palliative/hospice services at the facility.       Day of Discharge     HPI:   Son-in-law at bedside. Pt reports feeling well. Minimal cough. Complained of urinary frequency. No dysuria or suprapubic pain    Review of Systems  Gen- No fevers or chills  CV- No chest pain or palpitations  Resp- Minimal cough. +Dyspnea  GI- No N/V/D or abd pain    Otherwise ROS is negative except as mentioned in the HPI.    Vital Signs:   Temp:  [98.1 °F (36.7 °C)-98.7 °F (37.1 °C)] 98.1 °F (36.7 °C)  Heart Rate:  [77-95] 92  Resp:  [16-18] 18  BP: (164-195)/(72-85) 172/72     Physical Exam:  Constitutional: No acute distress, awake, alert on RA  Eyes: PERRLA, sclerae anicteric, no conjunctival injection  HENT: NCAT, mucous membranes moist  Neck: Supple, no thyromegaly, no lymphadenopathy, trachea midline  Respiratory: Clear to auscultation bilaterally, nonlabored respirations   Cardiovascular:  RRR, no murmurs, rubs, or gallops, palpable pedal pulses bilaterally  Gastrointestinal: Positive bowel sounds, soft, nontender, nondistended  Musculoskeletal: No bilateral ankle edema, no clubbing or cyanosis to extremities  Psychiatric: Appropriate affect, cooperative  Neurologic: Oriented x 3, strength symmetric in all extremities, Cranial Nerves grossly intact to confrontation, speech clear  Skin: No rashes    Pertinent  and/or Most Recent Results         Results from last 7 days  Lab Units 06/06/18  0610 06/05/18  0609 06/04/18  0436 06/03/18  1422 06/03/18  1355   WBC 10*3/mm3  --  6.33 12.61*  --  10.66   HEMOGLOBIN g/dL  --  9.8* 10.1*  --  11.7   HEMATOCRIT %  --  31.4* 32.4*  --  37.5   PLATELETS 10*3/mm3  --  228 242  --  264   SODIUM mmol/L 140 138 136 139  --    POTASSIUM mmol/L 4.3 4.0 3.6 3.8  --    CHLORIDE mmol/L 106 107 103 104  --    CO2 mmol/L 28.0 28.0 25.0 27.0  --    BUN mg/dL 14 13 12 14  --    CREATININE mg/dL 0.61 0.50* 0.60 0.70  --    GLUCOSE mg/dL 117* 92 151* 116*  --    CALCIUM mg/dL 8.6* 8.5* 8.7 8.4*  --        Results from last 7 days  Lab Units 06/03/18  1422   BILIRUBIN mg/dL 0.4   ALK PHOS U/L 132*   ALT (SGPT) U/L 15   AST (SGOT) U/L 24           Invalid input(s): TG, LDLCALC, LDLREALC    Results from last 7 days  Lab Units 06/03/18  1355   BNP pg/mL 7.0     Brief Urine Lab Results  (Last result in the past 365 days)      Color   Clarity   Blood   Leuk Est   Nitrite   Protein   CREAT   Urine HCG        06/07/18 1127 Yellow Cloudy(A) Negative Negative Negative Negative               Microbiology Results Abnormal     Procedure Component Value - Date/Time    Resp Virus Profile (RVP) PCR - Swab, Nasopharynx [[id number]] Collected:  06/03/18 1357    Lab Status:  Final result Specimen:  Swab from Nasopharynx Updated:  06/07/18 0620     Influenza A PCR Negative     Influenza B PCR Negative     RSV A Negative     RSV B Negative     Parainfluenza Virus 1 Negative     Parainfluenza Virus 2  Negative     Parainfluenza Virus 3 Negative     Human Rhinovirus/Enterovirus Negative     Human Metapneumovirus Negative     Adenovirus Detection by PCR Negative    Narrative:       Performed at:  [address] [place] [address]  [id number]  : Louie Helton MD, Phone:  [phone]    S. Pneumo Ag Urine or CSF - Urine, Urine, Clean Catch [[id number]] Collected:  06/04/18 1220    Lab Status:  Final result Specimen:  Urine from Urine, Clean Catch Updated:  06/06/18 1517     Specimen Source Urine     STREP PNEUMONIAE ANTIGEN Negative     Body Fluid Culture, Sterile Not Indicated     Organism ID Not indicated.     Please note Comment     Comment: College [employer] standards require a culture to be  performed on CSF specimens submitted for bacterial antigen testing.  (CAP [id number]) Urine specimens will not be cultured.       Narrative:       Performed at:   - [address]  [id number]  : Louie Helton MD, Phone:  [phone]    Blood Culture - Blood, [[id number]]  (Normal) Collected:  06/03/18 1410    Lab Status:  Preliminary result Specimen:  Blood from Arm, Left Updated:  06/06/18 1445     Blood Culture No growth at 3 days    Blood Culture - Blood, [[id number]]  (Normal) Collected:  06/03/18 1406    Lab Status:  Preliminary result Specimen:  Blood from Arm, Left Updated:  06/06/18 1445     Blood Culture No growth at 3 days          Imaging Results (all)     Procedure Component Value Units Date/Time    XR Wrist 2 View Left [[id number]] Collected:  06/04/18 1034     Updated:  06/04/18 1103    Narrative:       EXAMINATION: XR WRIST 2 VW, LEFT-06/03/2018:      INDICATION: Pain, no known trauma; J96.01-Acute respiratory failure with  hypoxia; R50.9-Fever, unspecified; A41.9-Sepsis, unspecified organism;  G93.40-Encephalopathy, unspecified.      COMPARISON: NONE.     FINDINGS: Two views of the left wrist reveal severe  degenerative changes  seen within the radiocarpal joint and ulna with osteophyte formation  identified. There is soft tissue swelling suggesting possibly gouty  arthritis. Severe osteopenia identified of the bony structures.           Impression:       Findings to suggest possible gouty arthritis. Clinical  correlation is needed.     D:  06/04/2018  E:  06/04/2018     This report was finalized on 6/4/2018 11:01 AM by Dr. Jackelin Marinelli MD.       XR Shoulder 2+ View Left [[id number]] Collected:  06/04/18 1035     Updated:  06/04/18 1103    Narrative:       EXAMINATION: XR SHOULDER 2+ VW, LEFT-06/03/2018:      INDICATION: Pain. No known trauma; J96.01-Acute respiratory failure with  hypoxia; R50.9-Fever, unspecified; A41.9-Sepsis, unspecified organism;  G93.40-Encephalopathy, unspecified.      COMPARISON: NONE.     FINDINGS: Two views of the left shoulder reveal degenerative changes  seen of the acromioclavicular joint. Spurring identified of the humeral  head. Slight joint space narrowing. No fracture or dislocation.           Impression:       Degenerative changes seen within the shoulder with no  fracture or dislocation.     D:  06/04/2018  E:  06/04/2018     This report was finalized on 6/4/2018 11:01 AM by Dr. Jackelin Marinelli MD.       XR Hand 3+ View Left [[id number]] Collected:  06/04/18 1034     Updated:  06/04/18 1103    Narrative:       EXAMINATION: XR HAND 3+ VW, LEFT-05/03/2018:      INDICATION: Pain, no known trauma; J96.01-Acute respiratory failure with  hypoxia; R50.9-Fever, unspecified; A41.9-Sepsis, unspecified organism;  G93.40-Encephalopathy, unspecified.      COMPARISON: NONE.     FINDINGS: Three views of the left hand reveal severe degenerative  changes identified within the interphalangeal joints with joint space  narrowing and osteophyte formation identified. There is soft tissue  swelling present suggesting an inflammatory arthritis. Degenerative  changes seen of the first  carpometacarpal joint as well as within the  radiocarpal joint and ulna. Severe osteopenia of the bony structures. No  fracture or dislocation.           Impression:       Severe degenerative changes identified within the  interphalangeal joints, carpal joints suggesting possibly gouty  arthritis with soft tissue swelling present. Clinical correlation  needed.     D:  06/04/2018  E:  06/04/2018     This report was finalized on 6/4/2018 11:01 AM by Dr. Jackelin Marinelli MD.       CT Angiogram Chest With Contrast [[id number]] Collected:  06/03/18 1750     Updated:  06/04/18 0829    Narrative:       EXAMINATION: CT ANGIOGRAM CHEST W/CONTRAST - 6/3/2018      INDICATION: Dyspnea.     TECHNIQUE: Spiral acquisition 3 mm post-IV contrast images through the  chest with coronal 10 mm 2D reconstructions.     The radiation dose reduction device was turned on for each scan per the  ALARA (As Low as Reasonably Achievable) protocol.     COMPARISON: Portable chest exam of same date.     FINDINGS: Patient history indicates pneumonia, fever.     There is good contrast opacification of the pulmonary arteries. There  are no visible filling defects to suggest pulmonary embolic disease.  There is no evidence of thoracic aortic aneurysm or dissection,  pericardial or pleural effusion. No mediastinal adenopathy is seen.     Lung window images show moderately extensive patchy disease of the right  lower lobe and, to a lesser extent, the right middle lobe associated  with elevated right hemidiaphragm. This could all represent atelectasis  but an underlying pneumonia cannot be excluded. Mild discoid atelectasis  in the left base is more typical for simple atelectasis. Lungs appear  clear elsewhere.     Included images of the upper abdomen show no significant abnormalities  of the visualized portions of the liver, spleen, pancreatic tail,  adrenal glands or right upper renal pole. A near water density lesion in  the left upper renal pole is  probably an incidental cyst.       Impression:       1. No evidence of pulmonary embolus.  2. Elevated right hemidiaphragm and moderately extensive and irregular  right lower lobe and middle lobe atelectasis versus pneumonia.     DICTATED:     6/3/2018  EDITED/ls :     6/3/2018      This report was finalized on 6/4/2018 8:27 AM by DR. Ifeanyi Curry MD.       XR Chest 1 View [[id number]] Collected:  06/03/18 1530     Updated:  06/04/18 0007    Narrative:          EXAMINATION: XR CHEST 1 VW - 6/3/2018     INDICATION: Shortness of air.     COMPARISON: 5/15/2018.     FINDINGS: There is new mild to moderate right basilar discoid  atelectasis, probably in the right lower lobe. There is trace linear  scarring left base which is unchanged. Lungs otherwise appear clear. The  heart is upper normal size. The vasculature appears normal.           Impression:       New mild to moderate right lower lobe atelectasis or  pneumonia.     DICTATED:     6/3/2018  EDITED/ls :     6/3/2018      This report was finalized on 6/4/2018 12:05 AM by DR. Ifeanyi Curry MD.             Results for orders placed during the hospital encounter of 05/14/18   Adult Transthoracic Echo Complete W/ Cont if Necessary Per Protocol    Narrative · Mild aortic valve regurgitation is present.  · Calculated right ventricular systolic pressure from tricuspid   regurgitation is 24 mmHg.  · Mild tricuspid valve regurgitation is present.  · Left ventricular systolic function is normal. Estimated EF = 60%.  · Left ventricular diastolic dysfunction (grade I) consistent with   impaired relaxation.  · There is no evidence of pericardial effusion.  · No evidence of pulmonary hypertension is present.  · The aortic valve exhibits sclerosis.  · Normal right ventricular cavity size, wall thickness, systolic function   and septal motion noted.  · Moderate MAC is present.           Order Current Status    Legionella Antigen, Urine - Urine, Urine, Clean Catch In process    Blood  Culture - Blood, Preliminary result    Blood Culture - Blood, Preliminary result        Discharge Details      Poonam Mata   Home Medication Instructions ELMA:[id number]    Printed on:06/07/18 1330   Medication Information                      acetaminophen (TYLENOL) 325 MG tablet  Take 650 mg by mouth Every 6 (Six) Hours As Needed for Mild Pain .             amLODIPine (NORVASC) 10 MG tablet  Take 10 mg by mouth Every Night.             amoxicillin-clavulanate (AUGMENTIN) 875-125 MG per tablet  Take 1 tablet by mouth Every 12 (Twelve) Hours for 5 days.             bisacodyl (BISAC-EVAC) 10 MG suppository  Insert 10 mg into the rectum Daily.             bisacodyl (DULCOLAX) 5 MG EC tablet  Take 5 mg by mouth Daily As Needed for Constipation.             calcium carbonate (TUMS) 500 MG chewable tablet  Chew 1,000 mg 2 (Two) Times a Day As Needed for Heartburn.             carvedilol (COREG) 12.5 MG tablet  Take 1 tablet by mouth Every 12 (Twelve) Hours.             clopidogrel (PLAVIX) 75 MG tablet  Take 75 mg by mouth Daily.             colchicine 0.6 MG tablet  Take 1 tablet by mouth Daily.             docusate sodium (COLACE) 100 MG capsule  Take 100 mg by mouth 2 (Two) Times a Day.             donepezil (ARICEPT) 10 MG tablet  Take 10 mg by mouth Every Night.             ferrous sulfate 325 (65 FE) MG tablet  Take 325 mg by mouth Daily With Breakfast.             gabapentin (NEURONTIN) 100 MG capsule  Take 1 capsule by mouth Every 12 (Twelve) Hours.             ipratropium-albuterol (DUO-NEB) 0.5-2.5 mg/mL nebulizer  Take 3 mL by nebulization Every 4 (Four) Hours As Needed for Wheezing.             isosorbide mononitrate (IMDUR) 30 MG 24 hr tablet  Take 30 mg by mouth Daily.             lidocaine (LIDODERM) 5 %  Place 2 patches on the skin Daily. Remove & Discard patch within 12 hours or as directed by MD             melatonin 5 MG tablet tablet  Take 5 mg by mouth Every Night.             meloxicam  (MOBIC) 7.5 MG tablet  Take 7.5 mg by mouth Every Night.             memantine (NAMENDA XR) 28 MG capsule sustained-release 24 hr extended release capsule  Take 28 mg by mouth Daily.             mirtazapine (REMERON) 30 MG tablet  Take 30 mg by mouth Every Night.             Multiple Vitamins-Minerals (MULTIVITAMIN PO)  Take 1 tablet by mouth Daily.             nystatin (MYCOSTATIN) 169123 UNIT/GM powder  Apply  topically Every 12 (Twelve) Hours As Needed (eraw, excorited groin likely yeast).             pantoprazole (PROTONIX) 40 MG EC tablet  Take 40 mg by mouth Daily.             polyethylene glycol (MIRALAX) packet  Take 17 g by mouth Daily As Needed.             predniSONE (DELTASONE) 10 MG tablet  Take 1 tablet by mouth Daily With Breakfast for 2 days.             saccharomyces boulardii (FLORASTOR) 250 MG capsule  Take 250 mg by mouth 2 (Two) Times a Day.             sucralfate (CARAFATE) 1 g tablet  Take 1 g by mouth 4 (Four) Times a Day.             terazosin (HYTRIN) 2 MG capsule  Take 2 mg by mouth Every Night.             traMADol (ULTRAM) 50 MG tablet  Take 1 tablet by mouth 2 (Two) Times a Day.             traZODone (DESYREL) 25 MG half tablet  Take 12.5 mg by mouth Every Night.             vitamin C (ASCORBIC ACID) 500 MG tablet  Take 500 mg by mouth Daily.                   Discharge Disposition:  Short Term Hospital (DC - External)    Discharge Diet:  Diet Instructions     Cardiac, soft whole foods, thin liquids.              soft textures, whole, thin liquids, other (see comments) (may advance to regular solids as alert/tolerated)    Discharge Activity:  Activity Instructions     Up with assist as tolerated.             As tolerated    No future appointments.    Additional Instructions for the Follow-ups that You Need to Schedule     Discharge Follow-up with PCP    As directed      Follow Up Details:  1-2 weeks               Time Spent on Discharge:  >35min    Hayley Medley MD  06/07/18  1:31  PM

## 2018-06-07 NOTE — PLAN OF CARE
Problem: Patient Care Overview  Goal: Interprofessional Rounds/Family Conf  Outcome: Outcome(s) achieved Date Met: 06/07/18  Palliative Team Conference: MESERET Giron RN, PN; MAN Walton LCSW, [facility]; DC Bermudez DO; CHRIS Levine, RN, PN; BONNIE Alan, APRN; CHRIS Reed MDiv, [facility]   06/07/18 1435   Interdisciplinary Rounds/Family Conf   Summary Pt discharging today to [facility]; Palliative program available through facility.

## 2018-06-07 NOTE — PLAN OF CARE
Problem: Patient Care Overview  Goal: Plan of Care Review  Outcome: Outcome(s) achieved Date Met: 06/07/18 06/07/18 6846   Coping/Psychosocial   Plan of Care Reviewed With patient;family   Plan of Care Review   Progress improving   OTHER   Outcome Summary patient and family eager [place] [facility]VSS, urine sent.

## 2018-06-08 LAB
BACTERIA SPEC AEROBE CULT: NORMAL
BACTERIA SPEC AEROBE CULT: NORMAL

## 2019-02-06 ENCOUNTER — APPOINTMENT (OUTPATIENT)
Dept: GENERAL RADIOLOGY | Facility: HOSPITAL | Age: 84
End: 2019-02-06

## 2019-02-06 ENCOUNTER — HOSPITAL ENCOUNTER (INPATIENT)
Facility: HOSPITAL | Age: 84
LOS: 7 days | Discharge: NURSING, REHAB OR LONG-TERM CARE | End: 2019-02-13
Attending: EMERGENCY MEDICINE | Admitting: INTERNAL MEDICINE

## 2019-02-06 DIAGNOSIS — R13.10 DYSPHAGIA, UNSPECIFIED TYPE: ICD-10-CM

## 2019-02-06 DIAGNOSIS — Z78.9 IMPAIRED MOBILITY AND ADLS: ICD-10-CM

## 2019-02-06 DIAGNOSIS — R41.82 ALTERED MENTAL STATUS, UNSPECIFIED ALTERED MENTAL STATUS TYPE: ICD-10-CM

## 2019-02-06 DIAGNOSIS — Z74.09 IMPAIRED MOBILITY AND ADLS: ICD-10-CM

## 2019-02-06 DIAGNOSIS — A41.9 SEPSIS, DUE TO UNSPECIFIED ORGANISM: ICD-10-CM

## 2019-02-06 DIAGNOSIS — J18.9 HCAP (HEALTHCARE-ASSOCIATED PNEUMONIA): Primary | ICD-10-CM

## 2019-02-06 PROBLEM — N39.0 FREQUENT UTI: Status: ACTIVE | Noted: 2019-02-06

## 2019-02-06 LAB
ALBUMIN SERPL-MCNC: 4.23 G/DL (ref 3.2–4.8)
ALBUMIN/GLOB SERPL: 1.9 G/DL (ref 1.5–2.5)
ALP SERPL-CCNC: 112 U/L (ref 25–100)
ALT SERPL W P-5'-P-CCNC: 18 U/L (ref 7–40)
ANION GAP SERPL CALCULATED.3IONS-SCNC: 8 MMOL/L (ref 3–11)
AST SERPL-CCNC: 28 U/L (ref 0–33)
BACTERIA UR QL AUTO: NORMAL /HPF
BASOPHILS # BLD AUTO: 0.02 10*3/MM3 (ref 0–0.2)
BASOPHILS NFR BLD AUTO: 0.2 % (ref 0–1)
BILIRUB SERPL-MCNC: 0.8 MG/DL (ref 0.3–1.2)
BILIRUB UR QL STRIP: NEGATIVE
BUN BLD-MCNC: 13 MG/DL (ref 9–23)
BUN/CREAT SERPL: 20 (ref 7–25)
CALCIUM SPEC-SCNC: 8.5 MG/DL (ref 8.7–10.4)
CHLORIDE SERPL-SCNC: 105 MMOL/L (ref 99–109)
CLARITY UR: CLEAR
CO2 SERPL-SCNC: 28 MMOL/L (ref 20–31)
COLOR UR: YELLOW
CREAT BLD-MCNC: 0.65 MG/DL (ref 0.6–1.3)
D-LACTATE SERPL-SCNC: 1.8 MMOL/L (ref 0.5–2)
DEPRECATED RDW RBC AUTO: 45.7 FL (ref 37–54)
EOSINOPHIL # BLD AUTO: 0.02 10*3/MM3 (ref 0–0.3)
EOSINOPHIL NFR BLD AUTO: 0.2 % (ref 0–3)
ERYTHROCYTE [DISTWIDTH] IN BLOOD BY AUTOMATED COUNT: 13.2 % (ref 11.3–14.5)
FLUAV SUBTYP SPEC NAA+PROBE: NOT DETECTED
FLUBV RNA ISLT QL NAA+PROBE: NOT DETECTED
GFR SERPL CREATININE-BSD FRML MDRD: 86 ML/MIN/1.73
GLOBULIN UR ELPH-MCNC: 2.3 GM/DL
GLUCOSE BLD-MCNC: 110 MG/DL (ref 70–100)
GLUCOSE UR STRIP-MCNC: NEGATIVE MG/DL
HCT VFR BLD AUTO: 43.2 % (ref 34.5–44)
HGB BLD-MCNC: 14.4 G/DL (ref 11.5–15.5)
HGB UR QL STRIP.AUTO: NEGATIVE
HOLD SPECIMEN: NORMAL
HOLD SPECIMEN: NORMAL
HYALINE CASTS UR QL AUTO: NORMAL /LPF
IMM GRANULOCYTES # BLD AUTO: 0.04 10*3/MM3 (ref 0–0.03)
IMM GRANULOCYTES NFR BLD AUTO: 0.3 % (ref 0–0.6)
KETONES UR QL STRIP: NEGATIVE
LEUKOCYTE ESTERASE UR QL STRIP.AUTO: NEGATIVE
LYMPHOCYTES # BLD AUTO: 0.9 10*3/MM3 (ref 0.6–4.8)
LYMPHOCYTES NFR BLD AUTO: 7.6 % (ref 24–44)
MAGNESIUM SERPL-MCNC: 1.7 MG/DL (ref 1.3–2.7)
MCH RBC QN AUTO: 31.6 PG (ref 27–31)
MCHC RBC AUTO-ENTMCNC: 33.3 G/DL (ref 32–36)
MCV RBC AUTO: 94.7 FL (ref 80–99)
MONOCYTES # BLD AUTO: 0.71 10*3/MM3 (ref 0–1)
MONOCYTES NFR BLD AUTO: 6 % (ref 0–12)
MRSA DNA SPEC QL NAA+PROBE: POSITIVE
NEUTROPHILS # BLD AUTO: 10.14 10*3/MM3 (ref 1.5–8.3)
NEUTROPHILS NFR BLD AUTO: 86 % (ref 41–71)
NITRITE UR QL STRIP: NEGATIVE
PH UR STRIP.AUTO: 6.5 [PH] (ref 5–8)
PLATELET # BLD AUTO: 199 10*3/MM3 (ref 150–450)
PMV BLD AUTO: 9.7 FL (ref 6–12)
POTASSIUM BLD-SCNC: 3.5 MMOL/L (ref 3.5–5.5)
PROT SERPL-MCNC: 6.5 G/DL (ref 5.7–8.2)
PROT UR QL STRIP: ABNORMAL
RBC # BLD AUTO: 4.56 10*6/MM3 (ref 3.89–5.14)
RBC # UR: NORMAL /HPF
REF LAB TEST METHOD: NORMAL
SODIUM BLD-SCNC: 141 MMOL/L (ref 132–146)
SP GR UR STRIP: 1.01 (ref 1–1.03)
SQUAMOUS #/AREA URNS HPF: NORMAL /HPF
TROPONIN I SERPL-MCNC: 0.06 NG/ML (ref 0–0.07)
UROBILINOGEN UR QL STRIP: ABNORMAL
WBC NRBC COR # BLD: 11.79 10*3/MM3 (ref 3.5–10.8)
WBC UR QL AUTO: NORMAL /HPF
WHOLE BLOOD HOLD SPECIMEN: NORMAL
WHOLE BLOOD HOLD SPECIMEN: NORMAL

## 2019-02-06 PROCEDURE — 71045 X-RAY EXAM CHEST 1 VIEW: CPT

## 2019-02-06 PROCEDURE — 93005 ELECTROCARDIOGRAM TRACING: CPT | Performed by: EMERGENCY MEDICINE

## 2019-02-06 PROCEDURE — 25010000002 ENOXAPARIN PER 10 MG: Performed by: INTERNAL MEDICINE

## 2019-02-06 PROCEDURE — 87502 INFLUENZA DNA AMP PROBE: CPT | Performed by: INTERNAL MEDICINE

## 2019-02-06 PROCEDURE — 25010000002 PIPERACILLIN SOD-TAZOBACTAM PER 1 G: Performed by: INTERNAL MEDICINE

## 2019-02-06 PROCEDURE — 81001 URINALYSIS AUTO W/SCOPE: CPT | Performed by: EMERGENCY MEDICINE

## 2019-02-06 PROCEDURE — 84484 ASSAY OF TROPONIN QUANT: CPT

## 2019-02-06 PROCEDURE — 83605 ASSAY OF LACTIC ACID: CPT | Performed by: EMERGENCY MEDICINE

## 2019-02-06 PROCEDURE — 25010000002 VANCOMYCIN 10 G RECONSTITUTED SOLUTION: Performed by: EMERGENCY MEDICINE

## 2019-02-06 PROCEDURE — 99285 EMERGENCY DEPT VISIT HI MDM: CPT

## 2019-02-06 PROCEDURE — 83735 ASSAY OF MAGNESIUM: CPT | Performed by: EMERGENCY MEDICINE

## 2019-02-06 PROCEDURE — P9612 CATHETERIZE FOR URINE SPEC: HCPCS

## 2019-02-06 PROCEDURE — 80053 COMPREHEN METABOLIC PANEL: CPT | Performed by: EMERGENCY MEDICINE

## 2019-02-06 PROCEDURE — 99223 1ST HOSP IP/OBS HIGH 75: CPT | Performed by: INTERNAL MEDICINE

## 2019-02-06 PROCEDURE — 87040 BLOOD CULTURE FOR BACTERIA: CPT | Performed by: EMERGENCY MEDICINE

## 2019-02-06 PROCEDURE — 25010000002 PIPERACILLIN SOD-TAZOBACTAM PER 1 G: Performed by: EMERGENCY MEDICINE

## 2019-02-06 PROCEDURE — 87641 MR-STAPH DNA AMP PROBE: CPT | Performed by: INTERNAL MEDICINE

## 2019-02-06 PROCEDURE — 85025 COMPLETE CBC W/AUTO DIFF WBC: CPT | Performed by: EMERGENCY MEDICINE

## 2019-02-06 RX ORDER — BUSPIRONE HYDROCHLORIDE 15 MG/1
7.5 TABLET ORAL 2 TIMES DAILY
Status: DISCONTINUED | OUTPATIENT
Start: 2019-02-06 | End: 2019-02-13 | Stop reason: HOSPADM

## 2019-02-06 RX ORDER — MEMANTINE HYDROCHLORIDE 10 MG/1
10 TABLET ORAL EVERY 12 HOURS SCHEDULED
Status: DISCONTINUED | OUTPATIENT
Start: 2019-02-06 | End: 2019-02-13 | Stop reason: HOSPADM

## 2019-02-06 RX ORDER — VANCOMYCIN HYDROCHLORIDE 1 G/200ML
15 INJECTION, SOLUTION INTRAVENOUS EVERY 24 HOURS
Status: DISCONTINUED | OUTPATIENT
Start: 2019-02-07 | End: 2019-02-08

## 2019-02-06 RX ORDER — SODIUM CHLORIDE 9 MG/ML
50 INJECTION, SOLUTION INTRAVENOUS CONTINUOUS
Status: ACTIVE | OUTPATIENT
Start: 2019-02-06 | End: 2019-02-07

## 2019-02-06 RX ORDER — ACETAMINOPHEN 325 MG/1
650 TABLET ORAL EVERY 6 HOURS PRN
Status: DISCONTINUED | OUTPATIENT
Start: 2019-02-06 | End: 2019-02-13 | Stop reason: HOSPADM

## 2019-02-06 RX ORDER — NITROFURANTOIN 25; 75 MG/1; MG/1
50 CAPSULE ORAL DAILY
COMMUNITY
End: 2019-02-06

## 2019-02-06 RX ORDER — ACETAMINOPHEN 500 MG
1000 TABLET ORAL ONCE
Status: COMPLETED | OUTPATIENT
Start: 2019-02-06 | End: 2019-02-06

## 2019-02-06 RX ORDER — CLONIDINE HYDROCHLORIDE 0.1 MG/1
0.1 TABLET ORAL 2 TIMES DAILY PRN
Status: DISCONTINUED | OUTPATIENT
Start: 2019-02-06 | End: 2019-02-10

## 2019-02-06 RX ORDER — MELOXICAM 7.5 MG/1
7.5 TABLET ORAL NIGHTLY
Status: DISCONTINUED | OUTPATIENT
Start: 2019-02-06 | End: 2019-02-13 | Stop reason: HOSPADM

## 2019-02-06 RX ORDER — CLOPIDOGREL BISULFATE 75 MG/1
75 TABLET ORAL DAILY
Status: DISCONTINUED | OUTPATIENT
Start: 2019-02-07 | End: 2019-02-13 | Stop reason: HOSPADM

## 2019-02-06 RX ORDER — BUSPIRONE HYDROCHLORIDE 7.5 MG/1
10 TABLET ORAL 2 TIMES DAILY
COMMUNITY
End: 2020-02-05

## 2019-02-06 RX ORDER — DOCUSATE SODIUM 100 MG/1
100 CAPSULE, LIQUID FILLED ORAL 2 TIMES DAILY
Status: DISCONTINUED | OUTPATIENT
Start: 2019-02-06 | End: 2019-02-13 | Stop reason: HOSPADM

## 2019-02-06 RX ORDER — TERAZOSIN 2 MG/1
2 CAPSULE ORAL NIGHTLY
Status: DISCONTINUED | OUTPATIENT
Start: 2019-02-06 | End: 2019-02-13 | Stop reason: HOSPADM

## 2019-02-06 RX ORDER — SACCHAROMYCES BOULARDII 250 MG
250 CAPSULE ORAL 2 TIMES DAILY
Status: DISCONTINUED | OUTPATIENT
Start: 2019-02-06 | End: 2019-02-13 | Stop reason: HOSPADM

## 2019-02-06 RX ORDER — ONDANSETRON 4 MG/1
4 TABLET, FILM COATED ORAL EVERY 6 HOURS PRN
COMMUNITY

## 2019-02-06 RX ORDER — HYDRALAZINE HYDROCHLORIDE 20 MG/ML
10 INJECTION INTRAMUSCULAR; INTRAVENOUS EVERY 6 HOURS PRN
Status: DISCONTINUED | OUTPATIENT
Start: 2019-02-06 | End: 2019-02-13 | Stop reason: HOSPADM

## 2019-02-06 RX ORDER — ISOSORBIDE MONONITRATE 30 MG/1
30 TABLET, EXTENDED RELEASE ORAL DAILY
Status: DISCONTINUED | OUTPATIENT
Start: 2019-02-07 | End: 2019-02-08

## 2019-02-06 RX ORDER — COLCHICINE 0.6 MG/1
0.6 TABLET ORAL DAILY
Status: DISCONTINUED | OUTPATIENT
Start: 2019-02-07 | End: 2019-02-13 | Stop reason: HOSPADM

## 2019-02-06 RX ORDER — SUCRALFATE 1 G/1
1 TABLET ORAL
Status: DISCONTINUED | OUTPATIENT
Start: 2019-02-06 | End: 2019-02-13 | Stop reason: HOSPADM

## 2019-02-06 RX ORDER — GABAPENTIN 100 MG/1
200 CAPSULE ORAL 3 TIMES DAILY
COMMUNITY

## 2019-02-06 RX ORDER — TRAMADOL HYDROCHLORIDE 50 MG/1
50 TABLET ORAL EVERY 12 HOURS PRN
Status: DISCONTINUED | OUTPATIENT
Start: 2019-02-06 | End: 2019-02-08

## 2019-02-06 RX ORDER — TRAZODONE HYDROCHLORIDE 50 MG/1
25 TABLET ORAL NIGHTLY
COMMUNITY
End: 2020-02-05

## 2019-02-06 RX ORDER — METOPROLOL SUCCINATE 50 MG/1
75 TABLET, EXTENDED RELEASE ORAL DAILY
COMMUNITY
End: 2019-02-13 | Stop reason: HOSPADM

## 2019-02-06 RX ORDER — CALCIUM CARBONATE 200(500)MG
2 TABLET,CHEWABLE ORAL 2 TIMES DAILY PRN
Status: DISCONTINUED | OUTPATIENT
Start: 2019-02-06 | End: 2019-02-13 | Stop reason: HOSPADM

## 2019-02-06 RX ORDER — ASCORBIC ACID 500 MG
500 TABLET ORAL DAILY
Status: DISCONTINUED | OUTPATIENT
Start: 2019-02-07 | End: 2019-02-13 | Stop reason: HOSPADM

## 2019-02-06 RX ORDER — SODIUM CHLORIDE 0.9 % (FLUSH) 0.9 %
10 SYRINGE (ML) INJECTION AS NEEDED
Status: DISCONTINUED | OUTPATIENT
Start: 2019-02-06 | End: 2019-02-13 | Stop reason: HOSPADM

## 2019-02-06 RX ORDER — FERROUS SULFATE 325(65) MG
325 TABLET ORAL
Status: DISCONTINUED | OUTPATIENT
Start: 2019-02-07 | End: 2019-02-13 | Stop reason: HOSPADM

## 2019-02-06 RX ORDER — CETIRIZINE HYDROCHLORIDE 10 MG/1
10 TABLET ORAL DAILY
Status: DISCONTINUED | OUTPATIENT
Start: 2019-02-07 | End: 2019-02-13 | Stop reason: HOSPADM

## 2019-02-06 RX ORDER — LORATADINE 10 MG/1
1 CAPSULE, LIQUID FILLED ORAL DAILY
COMMUNITY
End: 2020-02-05

## 2019-02-06 RX ORDER — CLONIDINE HYDROCHLORIDE 0.1 MG/1
0.1 TABLET ORAL DAILY PRN
COMMUNITY
End: 2019-02-13 | Stop reason: HOSPADM

## 2019-02-06 RX ORDER — PANTOPRAZOLE SODIUM 40 MG/1
40 TABLET, DELAYED RELEASE ORAL DAILY
Status: DISCONTINUED | OUTPATIENT
Start: 2019-02-07 | End: 2019-02-09

## 2019-02-06 RX ORDER — ISOSORBIDE DINITRATE 30 MG/1
30 TABLET ORAL DAILY
COMMUNITY
End: 2019-02-06

## 2019-02-06 RX ORDER — NITROFURANTOIN MACROCRYSTALS 50 MG/1
50 CAPSULE ORAL NIGHTLY
COMMUNITY

## 2019-02-06 RX ORDER — METOPROLOL TARTRATE 75 MG/1
TABLET, FILM COATED ORAL DAILY
COMMUNITY
End: 2019-02-06

## 2019-02-06 RX ORDER — BISACODYL 5 MG/1
5 TABLET, DELAYED RELEASE ORAL DAILY PRN
Status: DISCONTINUED | OUTPATIENT
Start: 2019-02-06 | End: 2019-02-13 | Stop reason: HOSPADM

## 2019-02-06 RX ORDER — AMLODIPINE BESYLATE 10 MG/1
10 TABLET ORAL NIGHTLY
Status: DISCONTINUED | OUTPATIENT
Start: 2019-02-06 | End: 2019-02-13 | Stop reason: HOSPADM

## 2019-02-06 RX ORDER — DONEPEZIL HYDROCHLORIDE 10 MG/1
10 TABLET, FILM COATED ORAL NIGHTLY
Status: DISCONTINUED | OUTPATIENT
Start: 2019-02-06 | End: 2019-02-13 | Stop reason: HOSPADM

## 2019-02-06 RX ORDER — SODIUM CHLORIDE 0.9 % (FLUSH) 0.9 %
3 SYRINGE (ML) INJECTION EVERY 12 HOURS SCHEDULED
Status: DISCONTINUED | OUTPATIENT
Start: 2019-02-06 | End: 2019-02-13 | Stop reason: HOSPADM

## 2019-02-06 RX ORDER — OXYBUTYNIN CHLORIDE 5 MG/1
5 TABLET ORAL 2 TIMES DAILY
COMMUNITY
End: 2020-02-05

## 2019-02-06 RX ORDER — SODIUM CHLORIDE 0.9 % (FLUSH) 0.9 %
3-10 SYRINGE (ML) INJECTION AS NEEDED
Status: DISCONTINUED | OUTPATIENT
Start: 2019-02-06 | End: 2019-02-13 | Stop reason: HOSPADM

## 2019-02-06 RX ORDER — CALCIUM CARBONATE 200(500)MG
1 TABLET,CHEWABLE ORAL 2 TIMES DAILY PRN
COMMUNITY

## 2019-02-06 RX ADMIN — TAZOBACTAM SODIUM AND PIPERACILLIN SODIUM 4.5 G: 500; 4 INJECTION, SOLUTION INTRAVENOUS at 21:00

## 2019-02-06 RX ADMIN — MEMANTINE 10 MG: 10 TABLET ORAL at 20:59

## 2019-02-06 RX ADMIN — TAZOBACTAM SODIUM AND PIPERACILLIN SODIUM 3.38 G: 375; 3 INJECTION, SOLUTION INTRAVENOUS at 12:30

## 2019-02-06 RX ADMIN — SODIUM CHLORIDE 50 ML/HR: 9 INJECTION, SOLUTION INTRAVENOUS at 17:14

## 2019-02-06 RX ADMIN — VANCOMYCIN HYDROCHLORIDE 1500 MG: 100 INJECTION, POWDER, LYOPHILIZED, FOR SOLUTION INTRAVENOUS at 13:45

## 2019-02-06 RX ADMIN — AMLODIPINE BESYLATE 10 MG: 10 TABLET ORAL at 20:59

## 2019-02-06 RX ADMIN — DONEPEZIL HYDROCHLORIDE 10 MG: 10 TABLET, FILM COATED ORAL at 20:59

## 2019-02-06 RX ADMIN — TERAZOSIN HYDROCHLORIDE 2 MG: 2 CAPSULE ORAL at 20:58

## 2019-02-06 RX ADMIN — ENOXAPARIN SODIUM 40 MG: 40 INJECTION SUBCUTANEOUS at 17:54

## 2019-02-06 RX ADMIN — DOCUSATE SODIUM 100 MG: 100 CAPSULE, LIQUID FILLED ORAL at 20:59

## 2019-02-06 RX ADMIN — SUCRALFATE 1 G: 1 TABLET ORAL at 17:54

## 2019-02-06 RX ADMIN — ACETAMINOPHEN 1000 MG: 500 TABLET, FILM COATED ORAL at 13:49

## 2019-02-06 RX ADMIN — BUSPIRONE HYDROCHLORIDE 7.5 MG: 15 TABLET ORAL at 20:59

## 2019-02-06 RX ADMIN — Medication 250 MG: at 20:58

## 2019-02-06 RX ADMIN — ACETAMINOPHEN 650 MG: 325 TABLET ORAL at 20:58

## 2019-02-06 RX ADMIN — SODIUM CHLORIDE 1000 ML: 9 INJECTION, SOLUTION INTRAVENOUS at 12:30

## 2019-02-06 RX ADMIN — MELOXICAM 7.5 MG: 7.5 TABLET ORAL at 20:59

## 2019-02-06 NOTE — ED PROVIDER NOTES
"Subjective   Antonia Mata is an 88 y.o.female with a history of mild dementia, aspiration pneumonia, and recurrent urinary tract infections on low dose Macrobid who was sent to the emergency department from [facility] with complaints of altered mental status. EMS were told that the patient has been more confused today than usual. The patient denies nausea or shortness of breath. She does not have any complaints of pain at this time.          History provided by:  Patient and EMS personnel  History limited by:  Dementia  Altered Mental Status   Presenting symptoms: confusion    Severity:  Unable to specify  Most recent episode:  Today  Timing:  Constant  Progression:  Unchanged  Chronicity:  New  Context: dementia and nursing home resident    Associated symptoms: no nausea        Review of Systems   Unable to perform ROS: Dementia   Constitutional:        She denies any complaints of pain.   Respiratory: Negative for shortness of breath.    Gastrointestinal: Negative for nausea.   Psychiatric/Behavioral: Positive for confusion.       Past Medical History:   Diagnosis Date   • Anxiety    • Arthropathy    • Constipation    • Dementia    • 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 URINE   • Essential hypertension    • GERD (gastroesophageal reflux disease)    • Hyperlipidemia    • Insomnia    • Spinal stenosis    • Syncope    • URI (upper respiratory infection)        Allergies   Allergen Reactions   • Sulfa Antibiotics Rash     UNKNOWN     • Cortisone Other (See Comments)     Elevated BP   • Ativan [Lorazepam] Anxiety     \"opposite effect\"       Past Surgical History:   Procedure Laterality Date   • BLADDER SUSPENSION     • CHOLECYSTECTOMY     • HIP SURGERY     • HYSTERECTOMY     • TONSILLECTOMY         Family History   Family history unknown: Yes       Social History     Socioeconomic History   • Marital status:      Spouse name: Not on file   • Number of children: Not on file   • Years of education: Not on file   • " Highest education level: Not on file   Tobacco Use   • Smoking status: Never Smoker   Substance and Sexual Activity   • Alcohol use: No   • Drug use: No   • Sexual activity: Defer   Social History Narrative    Lives in [facility], has not walked for several months         Objective   Physical Exam   Constitutional: She appears well-developed and well-nourished. No distress.   HENT:   Head: Normocephalic and atraumatic.   Nose: Nose normal.   Mouth/Throat: Oropharynx is clear and moist.   Airway patent.   Eyes: Conjunctivae are normal. No scleral icterus.   Neck: Normal range of motion and phonation normal. Neck supple.   Cardiovascular: Normal rate, regular rhythm and normal heart sounds.   No murmur heard.  Pulmonary/Chest: Effort normal. No respiratory distress. She has rales.   Rales in the left lung.   Abdominal: Soft. There is no tenderness.   Abdomen is benign.     Musculoskeletal: She exhibits edema.   Arthritic knees. Trace pretibial edema in the left leg, 2+ in the right.   Neurological: She is alert.   Skin: Skin is warm and dry.   Psychiatric: She has a normal mood and affect. Her behavior is normal.   Nursing note and vitals reviewed.      Procedures         ED Course  ED Course as of Feb 06 1738   Wed Feb 06, 2019   1354 The hospitalist has been paged for admission.  [AS]      ED Course User Index  [AS] Louise Zepeda     Recent Results (from the past 24 hour(s))   Urinalysis With Microscopic If Indicated (No Culture) - Urine, Catheter    Collection Time: 02/06/19 12:08 PM   Result Value Ref Range    Color, UA Yellow Yellow, Straw    Appearance, UA Clear Clear    pH, UA 6.5 5.0 - 8.0    Specific Gravity, UA 1.014 1.001 - 1.030    Glucose, UA Negative Negative    Ketones, UA Negative Negative    Bilirubin, UA Negative Negative    Blood, UA Negative Negative    Protein,  mg/dL (2+) (A) Negative    Leuk Esterase, UA Negative Negative    Nitrite, UA Negative Negative    Urobilinogen, UA 0.2  E.U./dL 0.2 - 1.0 E.U./dL   Urinalysis, Microscopic Only - Urine, Catheter    Collection Time: 02/06/19 12:08 PM   Result Value Ref Range    RBC, UA 0-2 None Seen, 0-2 /HPF    WBC, UA 0-2 None Seen, 0-2 /HPF    Bacteria, UA None Seen None Seen, Trace /HPF    Squamous Epithelial Cells, UA 0-2 None Seen, 0-2 /HPF    Hyaline Casts, UA 0-6 0 - 6 /LPF    Methodology Automated Microscopy    POC Troponin, Rapid    Collection Time: 02/06/19 12:20 PM   Result Value Ref Range    Troponin I 0.06 0.00 - 0.07 ng/mL   Light Blue Top    Collection Time: 02/06/19 12:21 PM   Result Value Ref Range    Extra Tube hold for add-on    Lavender Top    Collection Time: 02/06/19 12:21 PM   Result Value Ref Range    Extra Tube hold for add-on    Gold Top - SST    Collection Time: 02/06/19 12:21 PM   Result Value Ref Range    Extra Tube Hold for add-ons.    CBC Auto Differential    Collection Time: 02/06/19 12:21 PM   Result Value Ref Range    WBC 11.79 (H) 3.50 - 10.80 10*3/mm3    RBC 4.56 3.89 - 5.14 10*6/mm3    Hemoglobin 14.4 11.5 - 15.5 g/dL    Hematocrit 43.2 34.5 - 44.0 %    MCV 94.7 80.0 - 99.0 fL    MCH 31.6 (H) 27.0 - 31.0 pg    MCHC 33.3 32.0 - 36.0 g/dL    RDW 13.2 11.3 - 14.5 %    RDW-SD 45.7 37.0 - 54.0 fl    MPV 9.7 6.0 - 12.0 fL    Platelets 199 150 - 450 10*3/mm3    Neutrophil % 86.0 (H) 41.0 - 71.0 %    Lymphocyte % 7.6 (L) 24.0 - 44.0 %    Monocyte % 6.0 0.0 - 12.0 %    Eosinophil % 0.2 0.0 - 3.0 %    Basophil % 0.2 0.0 - 1.0 %    Immature Grans % 0.3 0.0 - 0.6 %    Neutrophils, Absolute 10.14 (H) 1.50 - 8.30 10*3/mm3    Lymphocytes, Absolute 0.90 0.60 - 4.80 10*3/mm3    Monocytes, Absolute 0.71 0.00 - 1.00 10*3/mm3    Eosinophils, Absolute 0.02 0.00 - 0.30 10*3/mm3    Basophils, Absolute 0.02 0.00 - 0.20 10*3/mm3    Immature Grans, Absolute 0.04 (H) 0.00 - 0.03 10*3/mm3   Lactic Acid, Plasma    Collection Time: 02/06/19 12:21 PM   Result Value Ref Range    Lactate 1.8 0.5 - 2.0 mmol/L   Comprehensive Metabolic Panel     Collection Time: 02/06/19  1:02 PM   Result Value Ref Range    Glucose 110 (H) 70 - 100 mg/dL    BUN 13 9 - 23 mg/dL    Creatinine 0.65 0.60 - 1.30 mg/dL    Sodium 141 132 - 146 mmol/L    Potassium 3.5 3.5 - 5.5 mmol/L    Chloride 105 99 - 109 mmol/L    CO2 28.0 20.0 - 31.0 mmol/L    Calcium 8.5 (L) 8.7 - 10.4 mg/dL    Total Protein 6.5 5.7 - 8.2 g/dL    Albumin 4.23 3.20 - 4.80 g/dL    ALT (SGPT) 18 7 - 40 U/L    AST (SGOT) 28 0 - 33 U/L    Alkaline Phosphatase 112 (H) 25 - 100 U/L    Total Bilirubin 0.8 0.3 - 1.2 mg/dL    eGFR Non African Amer 86 >60 mL/min/1.73    Globulin 2.3 gm/dL    A/G Ratio 1.9 1.5 - 2.5 g/dL    BUN/Creatinine Ratio 20.0 7.0 - 25.0    Anion Gap 8.0 3.0 - 11.0 mmol/L   Magnesium    Collection Time: 02/06/19  1:02 PM   Result Value Ref Range    Magnesium 1.7 1.3 - 2.7 mg/dL   Green Top (Gel)    Collection Time: 02/06/19  1:02 PM   Result Value Ref Range    Extra Tube Hold for add-ons.    Influenza A & B, RT PCR - Swab, Nasopharynx    Collection Time: 02/06/19  2:58 PM   Result Value Ref Range    Influenza A PCR Not Detected Not Detected    Influenza B PCR Not Detected Not Detected   MRSA Screen, PCR - Swab, Nares    Collection Time: 02/06/19  2:58 PM   Result Value Ref Range    MRSA, PCR Positive (C) Negative     Note: In addition to lab results from this visit, the labs listed above may include labs taken at another facility or during a different encounter within the last 24 hours. Please correlate lab times with ED admission and discharge times for further clarification of the services performed during this visit.    XR Chest 1 View   Final Result   There is a very subtle vague patchy airspace process in the   right upper lobe which appears new when compared to previous   examinations.       D:  02/06/2019   E:  02/06/2019       This report was finalized on 2/6/2019 2:39 PM by Dr. Isaias Mejía MD.            Vitals:    02/06/19 1300 02/06/19 1500 02/06/19 1530 02/06/19 1649   BP: 168/74  107/51 112/49 116/51   BP Location:    Left arm   Patient Position:    Lying   Pulse: 91 77 72 70   Resp:    18   Temp:   (!) 101.4 °F (38.6 °C) (!) 101 °F (38.3 °C)   TempSrc:   Oral Axillary   SpO2: 92% 92% 92% 90%   Weight:       Height:         Medications   sodium chloride 0.9 % flush 10 mL (not administered)   acetaminophen (TYLENOL) tablet 650 mg (not administered)   amLODIPine (NORVASC) tablet 10 mg (not administered)   bisacodyl (DULCOLAX) EC tablet 5 mg (not administered)   busPIRone (BUSPAR) tablet 7.5 mg (not administered)   calcium carbonate (TUMS) chewable tablet 500 mg (200 mg elemental) (not administered)   clopidogrel (PLAVIX) tablet 75 mg (not administered)   colchicine tablet 0.6 mg (not administered)   docusate sodium (COLACE) capsule 100 mg (not administered)   donepezil (ARICEPT) tablet 10 mg (not administered)   ferrous sulfate tablet 325 mg (not administered)   isosorbide mononitrate (IMDUR) 24 hr tablet 30 mg (not administered)   cetirizine (zyrTEC) tablet 10 mg (not administered)   meloxicam (MOBIC) tablet 7.5 mg (not administered)   memantine (NAMENDA) tablet 10 mg (not administered)   pantoprazole (PROTONIX) EC tablet 40 mg (not administered)   polyethylene glycol 3350 powder (packet) (not administered)   saccharomyces boulardii (FLORASTOR) capsule 250 mg (not administered)   sucralfate (CARAFATE) tablet 1 g (not administered)   terazosin (HYTRIN) capsule 2 mg (not administered)   traMADol (ULTRAM) tablet 50 mg (not administered)   vitamin C (ASCORBIC ACID) tablet 500 mg (not administered)   sodium chloride 0.9 % flush 3 mL (not administered)   sodium chloride 0.9 % flush 3-10 mL (not administered)   enoxaparin (LOVENOX) syringe 40 mg (not administered)   sodium chloride 0.9 % infusion (50 mL/hr Intravenous New Bag 2/6/19 0241)   CloNIDine (CATAPRES) tablet 0.1 mg (not administered)   hydrALAZINE (APRESOLINE) injection 10 mg (not administered)   Pharmacy Consult - Pharmacy to dose (not  administered)   piperacillin-tazobactam (ZOSYN) 3.375 g in iso-osmotic dextrose 50 ml (premix) (0 g Intravenous Stopped 2/6/19 1353)   vancomycin 1500 mg/500 mL 0.9% NS IVPB ([facility]) (0 mg Intravenous Stopped 2/6/19 1541)   sodium chloride 0.9 % bolus 1,000 mL (0 mL Intravenous Stopped 2/6/19 1541)   acetaminophen (TYLENOL) tablet 1,000 mg (1,000 mg Oral Given 2/6/19 1349)     ECG/EMG Results (last 24 hours)     Procedure Component Value Units Date/Time    ECG 12 Lead [[id number]] Collected:  02/06/19 1204     Updated:  02/06/19 1207    Narrative:       Test Reason : Weak/Dizzy/AMS protocol  Blood Pressure : **/** mmHG  Vent. Rate : 095 BPM     Atrial Rate : 095 BPM     P-R Int : 174 ms          QRS Dur : 086 ms      QT Int : 364 ms       P-R-T Axes : 041 017 014 degrees     QTc Int : 457 ms    Normal sinus rhythm  When compared with ECG of 03-JUN-2018 16:24,  No significant change was found  Confirmed by TAYE LANDEROS MD ([id number]) on 2/6/2019 12:07:33 PM    Referred By:  SAAD           Confirmed By:TAYE LANDEROS MD        ECG 12 Lead   Final Result   Test Reason : Weak/Dizzy/AMS protocol   Blood Pressure : **/** mmHG   Vent. Rate : 095 BPM     Atrial Rate : 095 BPM      P-R Int : 174 ms          QRS Dur : 086 ms       QT Int : 364 ms       P-R-T Axes : 041 017 014 degrees      QTc Int : 457 ms      Normal sinus rhythm   When compared with ECG of 03-JUN-2018 16:24,   No significant change was found   Confirmed by TAYE LANDEROS MD ([id number]) on 2/6/2019 12:07:33 PM      Referred By:  SAAD           Confirmed By:TAYE LANDEROS MD                         [facility]    Final diagnoses:   HCAP (healthcare-associated pneumonia)   Altered mental status, unspecified altered mental status type   Sepsis, due to unspecified organism (CMS/HCC)       Documentation assistance provided by heather Zepeda.  Information recorded by the scribe was done at my direction and has been verified and validated by me.     Louise Zepeda  02/06/19  1257       Louise Zepeda  02/06/19 1355       Louise Zepeda  02/06/19 1738       Jordan Whitt MD  02/06/19 4548

## 2019-02-06 NOTE — H&P
[facility] Medicine Services  HISTORY AND PHYSICAL    Patient Name: Antonia Mata  : 1931  MRN: [id number]  Primary Care Physician: Leti Munzo MD  Date of admission: 2019      Subjective   Subjective     Chief Complaint:  Altered mental status    HPI:  Antonia Mata is a 88 y.o. female with history of aspiration pneumonia, dementia, frequent UTI, and hypertension presenting from her nursing home due to altered mental status.  Patient is unable to provide much history, however her daughter arrived and says that she has had a cough for approximately 1 week.  She was recently hospitalized 1.5 weeks ago at [facility] due to uncontrolled hypertension and has been started on metoprolol succinate and since then has had dry mouth, depressed affect, and confusion.  The daughter is not aware of any fevers at the nursing home, but patient had a temperature of 103 upon arrival to the ED.  CXR was concerning for new RUL infiltrate.  She will be admitted for further management.    Review of Systems   Gen- + fevers, chills  CV- No chest pain, palpitations  Resp- + cough, dyspnea  GI- No N/V/D, abd pain    Otherwise complete ROS reviewed and is negative except as mentioned in the HPI.    Personal History     Past Medical History:   Diagnosis Date   • Anxiety    • Arthropathy    • Constipation    • Dementia    • Essential hypertension    • GERD (gastroesophageal reflux disease)    • Hyperlipidemia    • Insomnia    • Spinal stenosis    • Syncope    • URI (upper respiratory infection)        Past Surgical History:   Procedure Laterality Date   • BLADDER SUSPENSION     • CHOLECYSTECTOMY     • HIP SURGERY     • HYSTERECTOMY     • TONSILLECTOMY         Family History: Family history is unknown by patient. Otherwise pertinent FHx was reviewed and unremarkable.     Social History:  reports that  has never smoked. She does not have any smokeless tobacco history on file. She  "reports that she does not drink alcohol or use drugs.  Social History     Social History Narrative    Lives in [facility], has not walked for several months       Medications:    Available home medication information reviewed.    (Not in a hospital admission)    Allergies   Allergen Reactions   • Sulfa Antibiotics Rash     UNKNOWN     • Cortisone Other (See Comments)     Elevated BP   • Ativan [Lorazepam] Anxiety     \"opposite effect\"       Objective   Objective     Vital Signs:   Temp:  [103.4 °F (39.7 °C)] 103.4 °F (39.7 °C)  Heart Rate:  [91-92] 91  Resp:  [18] 18  BP: (168-190)/(74-83) 168/74        Physical Exam   Constitutional: No acute distress, elderly female, ill appearing, sleeping but arousable  Eyes: Sclerae anicteric, no conjunctival injection  HENT: NCAT, mucous membranes moist  Neck: Supple, trachea midline  Respiratory: Coarse breath sounds bilaterally, nonlabored respirations on nasal cannula, frequent cough  Cardiovascular: RRR, no murmurs, rubs, or gallops  Gastrointestinal: Positive bowel sounds, soft, nontender, nondistended  Musculoskeletal: No bilateral ankle edema, no clubbing or cyanosis to extremities  Psychiatric: Confused, cooperative  Neurologic: Cranial Nerves grossly intact to confrontation, speech clear  Skin: No rashes    Results Reviewed:  I have personally reviewed current lab, radiology, and data and agree.    Results from last 7 days   Lab Units 02/06/19  1221   WBC 10*3/mm3 11.79*   HEMOGLOBIN g/dL 14.4   HEMATOCRIT % 43.2   PLATELETS 10*3/mm3 199     Results from last 7 days   Lab Units 02/06/19  1302 02/06/19  1220   SODIUM mmol/L 141  --    POTASSIUM mmol/L 3.5  --    CHLORIDE mmol/L 105  --    CO2 mmol/L 28.0  --    BUN mg/dL 13  --    CREATININE mg/dL 0.65  --    GLUCOSE mg/dL 110*  --    CALCIUM mg/dL 8.5*  --    ALT (SGPT) U/L 18  --    AST (SGOT) U/L 28  --    TROPONIN I ng/mL  --  0.06     Estimated Creatinine Clearance: 44.6 mL/min (by C-G formula based on SCr of " 0.65 mg/dL).  Brief Urine Lab Results  (Last result in the past 365 days)      Color   Clarity   Blood   Leuk Est   Nitrite   Protein   CREAT   Urine HCG        02/06/19 1208 Yellow Clear Negative Negative Negative 100 mg/dL (2+)             No results found for: BNP  Imaging Results (last 24 hours)     Procedure Component Value Units Date/Time    XR Chest 1 View [[id number]] Collected:  02/06/19 1434     Updated:  02/06/19 1442    Narrative:       EXAMINATION: XR CHEST 1 VW- 02/06/2019      INDICATION: Weak/Dizzy/AMS triage protocol      COMPARISON: NONE     FINDINGS: The cardiac silhouette is normal. There is no consolidation,  mass or effusion. There is minimally increased vague patchy airspace  disease in the right upper lobe which is new when compared to previous  examinations.        Impression:       There is a very subtle vague patchy airspace process in the  right upper lobe which appears new when compared to previous  examinations.     D:  02/06/2019  E:  02/06/2019     This report was finalized on 2/6/2019 2:39 PM by Dr. Isaias Mejía MD.           Results for orders placed during the hospital encounter of 05/14/18   Adult Transthoracic Echo Complete W/ Cont if Necessary Per Protocol    Narrative · Mild aortic valve regurgitation is present.  · Calculated right ventricular systolic pressure from tricuspid   regurgitation is 24 mmHg.  · Mild tricuspid valve regurgitation is present.  · Left ventricular systolic function is normal. Estimated EF = 60%.  · Left ventricular diastolic dysfunction (grade I) consistent with   impaired relaxation.  · There is no evidence of pericardial effusion.  · No evidence of pulmonary hypertension is present.  · The aortic valve exhibits sclerosis.  · Normal right ventricular cavity size, wall thickness, systolic function   and septal motion noted.  · Moderate MAC is present.          Assessment/Plan   Assessment / Plan     Active Hospital Problems    Diagnosis Date Noted   •  **HCAP (healthcare-associated pneumonia) [J18.9] 06/03/2018   • Frequent UTI [N39.0] 02/06/2019   • GERD (gastroesophageal reflux disease) [K21.9] 03/29/2018   • Dementia [F03.90] 03/29/2018   • Hypertension [I10] 03/29/2018     Healthcare associated pneumonia  -Influenza PCR pending  -MRSA PCR pending  -Blood cultures, sputum culture, urinary S. pneumo antigen pending  -Continue vancomycin/zosyn.  De-escalate vancomycin if MRSA negative  -History of suspected aspiration, family did not want modified diet in the past.  If flu negative, will request SLP evaluation.    Metabolic encephalopathy  -Likely due to acute illness  -Hold gabapentin, sedating medications    Dementia  -Continue home medications    HTN  -Difficult to control  -Continue home medications with the exception of metoprolol due to concern for side effects with this new medication  -PRN medications for significant elevations, adjust regimen as needed while here    GERD  -Continue PPI, sucralfate    Frequent UTI  -Recently started on prophylactic macrobid  -UA on admission not suggestive of UTI    DVT prophylaxis:  Lovenox    CODE STATUS:    Code Status and Medical Interventions:   Ordered at: 02/06/19 1450     Limited Support to NOT Include:    Intubation     Code Status:    No CPR     Medical Interventions (Level of Support Prior to Arrest):    Limited       Admission Status:  I believe this patient meets INPATIENT status due to the need for care which can only be reasonably provided in an hospital setting such as possible need for aggressive/expedited ancillary services and/or consultation services, IV medications, close physician monitoring, and/or procedures.  In such, I feel patient’s risk for adverse outcomes and need for care warrant INPATIENT evaluation and predict the patient’s care encounter to likely last beyond 2 midnights.      Electronically signed by Katy Davenport MD, 02/06/19, 2:50 PM.

## 2019-02-07 PROBLEM — J18.9 SEPSIS DUE TO PNEUMONIA (HCC): Status: ACTIVE | Noted: 2019-02-07

## 2019-02-07 PROBLEM — A41.9 SEPSIS DUE TO PNEUMONIA (HCC): Status: ACTIVE | Noted: 2019-02-07

## 2019-02-07 LAB
ANION GAP SERPL CALCULATED.3IONS-SCNC: 7 MMOL/L (ref 3–11)
BUN BLD-MCNC: 16 MG/DL (ref 9–23)
BUN/CREAT SERPL: 18.2 (ref 7–25)
CALCIUM SPEC-SCNC: 7.9 MG/DL (ref 8.7–10.4)
CHLORIDE SERPL-SCNC: 109 MMOL/L (ref 99–109)
CO2 SERPL-SCNC: 27 MMOL/L (ref 20–31)
CREAT BLD-MCNC: 0.88 MG/DL (ref 0.6–1.3)
DEPRECATED RDW RBC AUTO: 46.3 FL (ref 37–54)
ERYTHROCYTE [DISTWIDTH] IN BLOOD BY AUTOMATED COUNT: 13.2 % (ref 11.3–14.5)
GFR SERPL CREATININE-BSD FRML MDRD: 61 ML/MIN/1.73
GLUCOSE BLD-MCNC: 101 MG/DL (ref 70–100)
HCT VFR BLD AUTO: 35 % (ref 34.5–44)
HGB BLD-MCNC: 11.2 G/DL (ref 11.5–15.5)
MCH RBC QN AUTO: 30.7 PG (ref 27–31)
MCHC RBC AUTO-ENTMCNC: 32 G/DL (ref 32–36)
MCV RBC AUTO: 95.9 FL (ref 80–99)
PLATELET # BLD AUTO: 156 10*3/MM3 (ref 150–450)
PMV BLD AUTO: 9.7 FL (ref 6–12)
POTASSIUM BLD-SCNC: 2.9 MMOL/L (ref 3.5–5.5)
POTASSIUM BLD-SCNC: 3.6 MMOL/L (ref 3.5–5.5)
RBC # BLD AUTO: 3.65 10*6/MM3 (ref 3.89–5.14)
SODIUM BLD-SCNC: 143 MMOL/L (ref 132–146)
WBC NRBC COR # BLD: 10.37 10*3/MM3 (ref 3.5–10.8)

## 2019-02-07 PROCEDURE — 99232 SBSQ HOSP IP/OBS MODERATE 35: CPT | Performed by: INTERNAL MEDICINE

## 2019-02-07 PROCEDURE — 92610 EVALUATE SWALLOWING FUNCTION: CPT

## 2019-02-07 PROCEDURE — 85027 COMPLETE CBC AUTOMATED: CPT | Performed by: INTERNAL MEDICINE

## 2019-02-07 PROCEDURE — 84132 ASSAY OF SERUM POTASSIUM: CPT | Performed by: INTERNAL MEDICINE

## 2019-02-07 PROCEDURE — 25010000002 PIPERACILLIN SOD-TAZOBACTAM PER 1 G: Performed by: INTERNAL MEDICINE

## 2019-02-07 PROCEDURE — 25010000002 VANCOMYCIN PER 500 MG: Performed by: INTERNAL MEDICINE

## 2019-02-07 PROCEDURE — 25010000002 HYDRALAZINE PER 20 MG: Performed by: INTERNAL MEDICINE

## 2019-02-07 PROCEDURE — 80048 BASIC METABOLIC PNL TOTAL CA: CPT | Performed by: INTERNAL MEDICINE

## 2019-02-07 PROCEDURE — 25010000002 ENOXAPARIN PER 10 MG: Performed by: INTERNAL MEDICINE

## 2019-02-07 RX ORDER — MAGNESIUM SULFATE HEPTAHYDRATE 40 MG/ML
2 INJECTION, SOLUTION INTRAVENOUS AS NEEDED
Status: DISCONTINUED | OUTPATIENT
Start: 2019-02-07 | End: 2019-02-13 | Stop reason: HOSPADM

## 2019-02-07 RX ORDER — POTASSIUM CHLORIDE 750 MG/1
40 CAPSULE, EXTENDED RELEASE ORAL AS NEEDED
Status: DISCONTINUED | OUTPATIENT
Start: 2019-02-07 | End: 2019-02-13 | Stop reason: HOSPADM

## 2019-02-07 RX ORDER — LIDOCAINE 50 MG/G
1-3 PATCH TOPICAL EVERY 24 HOURS
COMMUNITY
End: 2019-02-13 | Stop reason: HOSPADM

## 2019-02-07 RX ORDER — MAGNESIUM SULFATE HEPTAHYDRATE 40 MG/ML
4 INJECTION, SOLUTION INTRAVENOUS AS NEEDED
Status: DISCONTINUED | OUTPATIENT
Start: 2019-02-07 | End: 2019-02-13 | Stop reason: HOSPADM

## 2019-02-07 RX ORDER — POTASSIUM CHLORIDE 7.45 MG/ML
10 INJECTION INTRAVENOUS
Status: DISCONTINUED | OUTPATIENT
Start: 2019-02-07 | End: 2019-02-13 | Stop reason: HOSPADM

## 2019-02-07 RX ORDER — POTASSIUM CHLORIDE 1.5 G/1.77G
40 POWDER, FOR SOLUTION ORAL AS NEEDED
Status: DISCONTINUED | OUTPATIENT
Start: 2019-02-07 | End: 2019-02-13 | Stop reason: HOSPADM

## 2019-02-07 RX ADMIN — Medication 250 MG: at 20:54

## 2019-02-07 RX ADMIN — ENOXAPARIN SODIUM 40 MG: 40 INJECTION SUBCUTANEOUS at 18:29

## 2019-02-07 RX ADMIN — ACETAMINOPHEN 650 MG: 325 TABLET ORAL at 16:33

## 2019-02-07 RX ADMIN — SODIUM CHLORIDE, PRESERVATIVE FREE 3 ML: 5 INJECTION INTRAVENOUS at 10:58

## 2019-02-07 RX ADMIN — POTASSIUM CHLORIDE 40 MEQ: 750 CAPSULE, EXTENDED RELEASE ORAL at 18:29

## 2019-02-07 RX ADMIN — CETIRIZINE HYDROCHLORIDE 10 MG: 10 TABLET, FILM COATED ORAL at 08:37

## 2019-02-07 RX ADMIN — TAZOBACTAM SODIUM AND PIPERACILLIN SODIUM 4.5 G: 500; 4 INJECTION, SOLUTION INTRAVENOUS at 04:08

## 2019-02-07 RX ADMIN — MEMANTINE 10 MG: 10 TABLET ORAL at 08:38

## 2019-02-07 RX ADMIN — SODIUM CHLORIDE 50 ML/HR: 9 INJECTION, SOLUTION INTRAVENOUS at 12:54

## 2019-02-07 RX ADMIN — ISOSORBIDE MONONITRATE 30 MG: 30 TABLET, EXTENDED RELEASE ORAL at 08:39

## 2019-02-07 RX ADMIN — TAZOBACTAM SODIUM AND PIPERACILLIN SODIUM 4.5 G: 500; 4 INJECTION, SOLUTION INTRAVENOUS at 20:54

## 2019-02-07 RX ADMIN — BUSPIRONE HYDROCHLORIDE 7.5 MG: 15 TABLET ORAL at 08:37

## 2019-02-07 RX ADMIN — MEMANTINE 10 MG: 10 TABLET ORAL at 20:55

## 2019-02-07 RX ADMIN — CLOPIDOGREL BISULFATE 75 MG: 75 TABLET, FILM COATED ORAL at 08:37

## 2019-02-07 RX ADMIN — PANTOPRAZOLE SODIUM 40 MG: 40 TABLET, DELAYED RELEASE ORAL at 08:37

## 2019-02-07 RX ADMIN — HYDRALAZINE HYDROCHLORIDE 10 MG: 20 INJECTION INTRAMUSCULAR; INTRAVENOUS at 15:47

## 2019-02-07 RX ADMIN — SUCRALFATE 1 G: 1 TABLET ORAL at 08:38

## 2019-02-07 RX ADMIN — BUSPIRONE HYDROCHLORIDE 7.5 MG: 15 TABLET ORAL at 20:54

## 2019-02-07 RX ADMIN — POTASSIUM CHLORIDE 40 MEQ: 750 CAPSULE, EXTENDED RELEASE ORAL at 10:57

## 2019-02-07 RX ADMIN — VANCOMYCIN HYDROCHLORIDE 1000 MG: 1 INJECTION, SOLUTION INTRAVENOUS at 10:57

## 2019-02-07 RX ADMIN — SUCRALFATE 1 G: 1 TABLET ORAL at 16:38

## 2019-02-07 RX ADMIN — MELOXICAM 7.5 MG: 7.5 TABLET ORAL at 20:54

## 2019-02-07 RX ADMIN — SODIUM CHLORIDE, PRESERVATIVE FREE 3 ML: 5 INJECTION INTRAVENOUS at 20:56

## 2019-02-07 RX ADMIN — Medication 325 MG: at 08:37

## 2019-02-07 RX ADMIN — AMLODIPINE BESYLATE 10 MG: 10 TABLET ORAL at 20:55

## 2019-02-07 RX ADMIN — OXYCODONE HYDROCHLORIDE AND ACETAMINOPHEN 500 MG: 500 TABLET ORAL at 08:38

## 2019-02-07 RX ADMIN — TERAZOSIN HYDROCHLORIDE 2 MG: 2 CAPSULE ORAL at 20:54

## 2019-02-07 RX ADMIN — TAZOBACTAM SODIUM AND PIPERACILLIN SODIUM 4.5 G: 500; 4 INJECTION, SOLUTION INTRAVENOUS at 12:54

## 2019-02-07 RX ADMIN — POTASSIUM CHLORIDE 40 MEQ: 750 CAPSULE, EXTENDED RELEASE ORAL at 15:30

## 2019-02-07 RX ADMIN — DONEPEZIL HYDROCHLORIDE 10 MG: 10 TABLET, FILM COATED ORAL at 20:55

## 2019-02-07 RX ADMIN — SUCRALFATE 1 G: 1 TABLET ORAL at 20:55

## 2019-02-07 RX ADMIN — SUCRALFATE 1 G: 1 TABLET ORAL at 11:42

## 2019-02-07 RX ADMIN — COLCHICINE 0.6 MG: 0.6 TABLET, FILM COATED ORAL at 08:37

## 2019-02-07 RX ADMIN — Medication 250 MG: at 08:37

## 2019-02-07 NOTE — PROGRESS NOTES
[facility] Medicine Services  PROGRESS NOTE    Patient Name: Antonia Mata  : 1931  MRN: [id number]    Date of Admission: 2019  Length of Stay: 1  Primary Care Physician: Leti Munoz MD    Subjective   Subjective     CC: F/U pneumonia    HPI:  Doing a lot better today.  She does not remember much from yesterday.  Still coughing some.    Review of Systems  Gen- No further fevers, chills  CV- No chest pain, palpitations  Resp- + cough, dyspnea  GI- No N/V/D, abd pain    Otherwise ROS is negative except as mentioned in the HPI.    Objective   Objective     Vital Signs:   Temp:  [97.8 °F (36.6 °C)-101.4 °F (38.6 °C)] 98.1 °F (36.7 °C)  Heart Rate:  [55-77] 68  Resp:  [16-18] 16  BP: ()/(44-66) 163/66        Physical Exam:  Constitutional: No acute distress, awake, alert, sitting up in bed  HENT: NCAT, mucous membranes moist  Respiratory: Clear to auscultation bilaterally, respiratory effort normal   Cardiovascular: RRR, no murmurs, rubs, or gallops  Gastrointestinal: Positive bowel sounds, soft, nontender, nondistended  Musculoskeletal: No bilateral ankle edema  Psychiatric: Appropriate affect, cooperative  Neurologic: Cranial Nerves grossly intact to confrontation, speech clear  Skin: No rashes    Results Reviewed:  I have personally reviewed current lab, radiology, and data and agree.    Results from last 7 days   Lab Units 19  0508 19  1221   WBC 10*3/mm3 10.37 11.79*   HEMOGLOBIN g/dL 11.2* 14.4   HEMATOCRIT % 35.0 43.2   PLATELETS 10*3/mm3 156 199     Results from last 7 days   Lab Units 19  0508 19  1302 19  1220   SODIUM mmol/L 143 141  --    POTASSIUM mmol/L 2.9* 3.5  --    CHLORIDE mmol/L 109 105  --    CO2 mmol/L 27.0 28.0  --    BUN mg/dL 16 13  --    CREATININE mg/dL 0.88 0.65  --    GLUCOSE mg/dL 101* 110*  --    CALCIUM mg/dL 7.9* 8.5*  --    ALT (SGPT) U/L  --  18  --    AST (SGOT) U/L  --  28  --    TROPONIN I ng/mL   --   --  0.06     Estimated Creatinine Clearance: 40.5 mL/min (by C-G formula based on SCr of 0.88 mg/dL).    No results found for: BNP    Microbiology Results Abnormal     Procedure Component Value - Date/Time    Blood Culture - Blood, Arm, Right [[id number]] Collected:  02/06/19 1210    Lab Status:  Preliminary result Specimen:  Blood from Arm, Right Updated:  02/07/19 1300     Blood Culture No growth at 24 hours    Blood Culture - Blood, Arm, Left [[id number]] Collected:  02/06/19 1220    Lab Status:  Preliminary result Specimen:  Blood from Arm, Left Updated:  02/07/19 1300     Blood Culture No growth at 24 hours    MRSA Screen, PCR - Swab, Nares [[id number]]  (Abnormal) Collected:  02/06/19 1458    Lab Status:  Final result Specimen:  Swab from Nares Updated:  02/06/19 1709     MRSA, PCR Positive    Influenza A & B, RT PCR - Swab, Nasopharynx [[id number]]  (Normal) Collected:  02/06/19 1458    Lab Status:  Final result Specimen:  Swab from Nasopharynx Updated:  02/06/19 1702     Influenza A PCR Not Detected     Influenza B PCR Not Detected          Imaging Results (last 24 hours)     Procedure Component Value Units Date/Time    XR Chest 1 View [[id number]] Collected:  02/06/19 1434     Updated:  02/06/19 1442    Narrative:       EXAMINATION: XR CHEST 1 VW- 02/06/2019      INDICATION: Weak/Dizzy/AMS triage protocol      COMPARISON: NONE     FINDINGS: The cardiac silhouette is normal. There is no consolidation,  mass or effusion. There is minimally increased vague patchy airspace  disease in the right upper lobe which is new when compared to previous  examinations.        Impression:       There is a very subtle vague patchy airspace process in the  right upper lobe which appears new when compared to previous  examinations.     D:  02/06/2019  E:  02/06/2019     This report was finalized on 2/6/2019 2:39 PM by Dr. Isaias Mejía MD.             Results for orders placed during the hospital encounter of 05/14/18   Adult  Transthoracic Echo Complete W/ Cont if Necessary Per Protocol    Narrative · Mild aortic valve regurgitation is present.  · Calculated right ventricular systolic pressure from tricuspid   regurgitation is 24 mmHg.  · Mild tricuspid valve regurgitation is present.  · Left ventricular systolic function is normal. Estimated EF = 60%.  · Left ventricular diastolic dysfunction (grade I) consistent with   impaired relaxation.  · There is no evidence of pericardial effusion.  · No evidence of pulmonary hypertension is present.  · The aortic valve exhibits sclerosis.  · Normal right ventricular cavity size, wall thickness, systolic function   and septal motion noted.  · Moderate MAC is present.          I have reviewed the medications:    Current Facility-Administered Medications:   •  [START ON 2/8/2019] ! Vanc trough due 11:00 am 2/8 - HOLD 12 noon dose until trough reviewed by Pharmacist, , Does not apply, Once, Katy Davenport MD  •  acetaminophen (TYLENOL) tablet 650 mg, 650 mg, Oral, Q6H PRN, Katy Davenport MD, 650 mg at 02/06/19 2058  •  amLODIPine (NORVASC) tablet 10 mg, 10 mg, Oral, Nightly, Katy Davenport MD, 10 mg at 02/06/19 2059  •  bisacodyl (DULCOLAX) EC tablet 5 mg, 5 mg, Oral, Daily PRN, Katy Davenport MD  •  busPIRone (BUSPAR) tablet 7.5 mg, 7.5 mg, Oral, BID, Katy Davenport MD, 7.5 mg at 02/07/19 0837  •  calcium carbonate (TUMS) chewable tablet 500 mg (200 mg elemental), 2 tablet, Oral, BID PRN, Katy Davenport MD  •  cetirizine (zyrTEC) tablet 10 mg, 10 mg, Oral, Daily, Katy Davenport MD, 10 mg at 02/07/19 0837  •  CloNIDine (CATAPRES) tablet 0.1 mg, 0.1 mg, Oral, BID PRN, Katy Davenport MD  •  clopidogrel (PLAVIX) tablet 75 mg, 75 mg, Oral, Daily, Katy Davenport MD, 75 mg at 02/07/19 0837  •  colchicine tablet 0.6 mg, 0.6 mg, Oral, Daily, Katy Davenport MD, 0.6 mg at 02/07/19 0837  •  docusate sodium (COLACE) capsule 100 mg, 100 mg, Oral, BID, Katy Davenport MD, 100 mg at 02/06/19 6755  •  donepezil  (ARICEPT) tablet 10 mg, 10 mg, Oral, Nightly, Katy Davenport MD, 10 mg at 02/06/19 2059  •  enoxaparin (LOVENOX) syringe 40 mg, 40 mg, Subcutaneous, Q24H, Katy Davenport MD, 40 mg at 02/06/19 1754  •  ferrous sulfate tablet 325 mg, 325 mg, Oral, Daily With Breakfast, Katy Davenport MD, 325 mg at 02/07/19 0837  •  hydrALAZINE (APRESOLINE) injection 10 mg, 10 mg, Intravenous, Q6H PRN, Katy Davenport MD  •  isosorbide mononitrate (IMDUR) 24 hr tablet 30 mg, 30 mg, Oral, Daily, Katy Davenport MD, 30 mg at 02/07/19 0839  •  Magnesium Sulfate 2 gram Bolus, followed by 8 gram infusion (total Mg dose 10 grams)- Mg less than or equal to 1mg/dL, 2 g, Intravenous, PRN **OR** Magnesium Sulfate 2 gram / 50mL Infusion (GIVE X 3 BAGS TO EQUAL 6GM TOTAL DOSE) - Mg 1.1 - 1.5 mg/dl, 2 g, Intravenous, PRN **OR** Magnesium Sulfate 4 gram infusion- Mg 1.6-1.9 mg/dL, 4 g, Intravenous, PRN, Katy Davenport MD  •  meloxicam (MOBIC) tablet 7.5 mg, 7.5 mg, Oral, Nightly, Katy Davenport MD, 7.5 mg at 02/06/19 2059  •  memantine (NAMENDA) tablet 10 mg, 10 mg, Oral, Q12H, Katy Davenport MD, 10 mg at 02/07/19 0838  •  pantoprazole (PROTONIX) EC tablet 40 mg, 40 mg, Oral, Daily, Katy Davenport MD, 40 mg at 02/07/19 0837  •  Pharmacy dosing:  Vancomycin & Zosyn, , Does not apply, Continuous PRN, Katy Davenport MD  •  piperacillin-tazobactam (ZOSYN) 4.5 g in iso-osmotic dextrose 100 mL IVPB (premix), 4.5 g, Intravenous, Q8H, Katy Davenport MD, 4.5 g at 02/07/19 1254  •  polyethylene glycol 3350 powder (packet), 17 g, Oral, Daily PRN, Katy Davenport MD  •  potassium chloride (MICRO-K) CR capsule 40 mEq, 40 mEq, Oral, PRN, 40 mEq at 02/07/19 1057 **OR** potassium chloride (KLOR-CON) packet 40 mEq, 40 mEq, Oral, PRN **OR** potassium chloride 10 mEq in 100 mL IVPB, 10 mEq, Intravenous, Q1H PRN, Katy Davenport MD  •  saccharomyces boulardii (FLORASTOR) capsule 250 mg, 250 mg, Oral, BID, Katy Davenport MD, 250 mg at 02/07/19 0837  •  sodium chloride 0.9 %  flush 10 mL, 10 mL, Intravenous, PRN, Jordan Whitt MD  •  sodium chloride 0.9 % flush 3 mL, 3 mL, Intravenous, Q12H, Katy Davenport MD, 3 mL at 02/07/19 1058  •  sodium chloride 0.9 % flush 3-10 mL, 3-10 mL, Intravenous, PRN, Katy Davenport MD  •  sodium chloride 0.9 % infusion, 50 mL/hr, Intravenous, Continuous, Katy Davenport MD, Last Rate: 50 mL/hr at 02/07/19 1254, 50 mL/hr at 02/07/19 1254  •  sucralfate (CARAFATE) tablet 1 g, 1 g, Oral, 4x Daily AC & at Bedtime, Katy Davenport MD, 1 g at 02/07/19 1142  •  terazosin (HYTRIN) capsule 2 mg, 2 mg, Oral, Nightly, Katy Davenport MD, 2 mg at 02/06/19 2058  •  traMADol (ULTRAM) tablet 50 mg, 50 mg, Oral, Q12H PRN, Katy Davenport MD  •  vancomycin (VANCOCIN) in iso-osmotic dextrose IVPB 1 g (premix) 200 mL, 15 mg/kg, Intravenous, Q24H, Katy Davenport MD, 1,000 mg at 02/07/19 1057  •  vitamin C (ASCORBIC ACID) tablet 500 mg, 500 mg, Oral, Daily, Katy Davenport MD, 500 mg at 02/07/19 0838      Assessment/Plan   Assessment / Plan     Active Hospital Problems    Diagnosis Date Noted   • **HCAP (healthcare-associated pneumonia) [J18.9] 06/03/2018   • Sepsis due to pneumonia (CMS/[facility]) [J18.9, A41.9] 02/07/2019   • Frequent UTI [N39.0] 02/06/2019   • Acute hypoxemic respiratory failure (CMS/[facility]) [J96.01] 05/14/2018   • GERD (gastroesophageal reflux disease) [K21.9] 03/29/2018   • Dementia [F03.90] 03/29/2018   • Hypertension [I10] 03/29/2018   • Hypokalemia [E87.6] 03/29/2018          Brief Hospital Course to date:  Antonia Mata is a 88 y.o. female with history of aspiration pneumonia, dementia, frequent UTI, and hypertension presenting from her nursing home due to altered mental status and found to have RUL pneumonia.    Sepsis due to healthcare associated pneumonia (POA)  Acute hypoxemic respiratory failure (POA)  -Influenza PCR negative  -MRSA PCR positive  -Blood cultures, sputum culture, urinary S. pneumo antigen pending  -Continue vancomycin/zosyn.  De-escalate to  augmentin/doxycycline in next few days if improving  -SLP evaluation     Metabolic encephalopathy  -Improved  -Likely due to acute illness  -Hold gabapentin, sedating medications     Dementia  -Continue home medications     HTN  -Difficult to control recently, per daughter  -Continue home medications with the exception of metoprolol due to concern for side effects with this new medication  -PRN medications for significant elevations, adjust regimen as needed while here     GERD  -Continue PPI, sucralfate     Frequent UTI  -Recently started on prophylactic macrobid-continue at discharge  -UA on admission not suggestive of UTI    Hypokalemia  -Replace    DVT Prophylaxis:  Lovenox    CODE STATUS:   Code Status and Medical Interventions:   Ordered at: 02/06/19 1450     Limited Support to NOT Include:    Intubation     Code Status:    No CPR     Medical Interventions (Level of Support Prior to Arrest):    Limited         Electronically signed by Katy Davenport MD, 02/07/19, 2:32 PM.

## 2019-02-07 NOTE — PLAN OF CARE
Problem: Patient Care Overview  Goal: Plan of Care Review  Outcome: Ongoing (interventions implemented as appropriate)   02/07/19 1015   Coping/Psychosocial   Plan of Care Reviewed With patient;daughter   SLP clinical dysphagia evaluation completed. Will address suspected pharyngeal dysphagia w/ trial modified diet to see if pt able to tolerate vs. MBS pending pt/family decisions. Please see note for further details and recommendations.

## 2019-02-07 NOTE — PROGRESS NOTES
Discharge Planning Assessment  [facility]     Patient Name: Antonia Mata  MRN: [id number]  Today's Date: 2/7/2019    Admit Date: 2/6/2019    Discharge Needs Assessment     Row Name 02/07/19 1436       Living Environment    Lives With  facility resident    Name(s) of Who Lives With Patient  [facility]     Current Living Arrangements  extended care facility    Primary Care Provided by  other (see comments)    Provides Primary Care For  no one, unable/limited ability to care for self    Family Caregiver if Needed  child(maría), adult    Quality of Family Relationships  helpful;involved       Resource/Environmental Concerns    Resource/Environmental Concerns  none    Transportation Concerns  car, none       Transition Planning    Patient/Family Anticipates Transition to  long term care facility    Patient/Family Anticipated Services at Transition      Transportation Anticipated  other (see comments)       Discharge Needs Assessment    Readmission Within the Last 30 Days  no previous admission in last 30 days    Concerns to be Addressed  discharge planning    Equipment Currently Used at Home  wheelchair    Anticipated Changes Related to Illness  none    Equipment Needed After Discharge  none        Discharge Plan     Row Name 02/07/19 1814       Plan    Plan  Return to [facility] when medically ready     Patient/Family in Agreement with Plan  yes    Plan Comments  Patient has a LTC bed at [facility] in [facility]. Verified with the facility that she does have a bed hold and can return when medically ready. CM will follow for transportation needs when closer to discharge - Kassandra [phone]     Final Discharge Disposition Code  64 - nursing facility under Medicaid        Destination      No service coordination in this encounter.      Durable Medical Equipment      No service coordination in this encounter.      Dialysis/Infusion      No service coordination in this encounter.      Home Medical  Care      No service coordination in this encounter.      Community Resources      No service coordination in this encounter.          Demographic Summary     Row Name 02/07/19 1436       General Information    Admission Type  inpatient    Arrived From  long term care    Referral Source  admission list    Reason for Consult  discharge planning    Preferred Language  English     Used During This Interaction  no       Contact Information    Permission Granted to Share Info With          Functional Status     Row Name 02/07/19 1436       Functional Status    Usual Activity Tolerance  poor    Current Activity Tolerance  poor       Functional Status, IADL    Medications  completely dependent    Meal Preparation  completely dependent    Housekeeping  completely dependent    Laundry  completely dependent    Shopping  completely dependent        Psychosocial    No documentation.       Abuse/Neglect    No documentation.       Legal    No documentation.       Substance Abuse    No documentation.       Patient Forms    No documentation.           Kassandra Caba RN

## 2019-02-07 NOTE — PLAN OF CARE
Problem: Fall Risk (Adult)  Goal: Identify Related Risk Factors and Signs and Symptoms  Outcome: Outcome(s) achieved Date Met: 02/07/19    Goal: Absence of Fall  Outcome: Ongoing (interventions implemented as appropriate)      Problem: Patient Care Overview  Goal: Plan of Care Review  Outcome: Ongoing (interventions implemented as appropriate)   02/07/19 0649   Coping/Psychosocial   Plan of Care Reviewed With patient;daughter   Plan of Care Review   Progress no change   OTHER   Outcome Summary Patient O2 increased to 5L during the night. O2 sat at 92%. Continue to monitor.       Problem: Pneumonia (Adult)  Goal: Signs and Symptoms of Listed Potential Problems Will be Absent, Minimized or Managed (Pneumonia)  Outcome: Ongoing (interventions implemented as appropriate)   02/07/19 0649   Goal/Outcome Evaluation   Problems Assessed (Pneumonia) all   Problems Present (Pneumonia) infection progression

## 2019-02-07 NOTE — THERAPY EVALUATION
Acute Care - Speech Language Pathology   Swallow Initial Evaluation [facility]   Clinical Swallow Evaluation     Patient Name: Antonia Mata  : 1931  MRN: [id number]  Today's Date: 2019               Admit Date: 2019    Visit Dx:     ICD-10-CM ICD-9-CM   1. HCAP (healthcare-associated pneumonia) J18.9 486   2. Altered mental status, unspecified altered mental status type R41.82 780.97   3. Sepsis, due to unspecified organism (CMS/HCC) A41.9 038.9     995.91   4. Dysphagia, unspecified type R13.10 787.20     Patient Active Problem List   Diagnosis   • Aspiration pneumonia (CMS/HCC)   • Hypokalemia   • Dementia   • Hypertension   • GERD (gastroesophageal reflux disease)   • Normocytic anemia   • Elevated alkaline phosphatase level   • Vasovagal episode   • Constipation   • Aortic heart murmur   • HCAP (healthcare-associated pneumonia)   • Acute respiratory failure with hypoxia (CMS/HCC)   • Spinal stenosis   • Acute pain of left shoulder   • Left hand pain   • Frequent UTI     Past Medical History:   Diagnosis Date   • Anxiety    • Arthropathy    • Constipation    • Dementia    • 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 URINE   • Essential hypertension    • GERD (gastroesophageal reflux disease)    • Hyperlipidemia    • Insomnia    • Spinal stenosis    • Syncope    • URI (upper respiratory infection)      Past Surgical History:   Procedure Laterality Date   • BLADDER SUSPENSION     • CHOLECYSTECTOMY     • HIP SURGERY     • HYSTERECTOMY     • TONSILLECTOMY          SWALLOW EVALUATION (last 72 hours)      [facility] Adult Swallow Evaluation     Row Name 19 1015                   Rehab Evaluation    Document Type  evaluation  -RD        Subjective Information  complains of;dyspnea  -RD        Patient Observations  alert;cooperative;agree to therapy  -RD        Patient Effort  good  -RD           General Information    Patient Profile Reviewed  yes  -RD        Pertinent History Of Current Problem  Adm w/ HCAP,  dementia, GERD, recurrent UTI, hx of aspiration PNA. Pt saw SLP during previous admission 6/6/18 and declined modified diet and instrumental at that time.   -RD        Current Method of Nutrition  regular textures;thin liquids  -RD        Precautions/Limitations, Hearing  WFL;for purposes of eval  -RD        Prior Level of Function-Communication  cognitive-linguistic impairment;other (see comments) dementia  -RD        Prior Level of Function-Swallowing  no diet consistency restrictions;other (see comments) coughing w/ liquids per pt/dtr  -RD        Plans/Goals Discussed with  patient;family;agreed upon  -RD        Barriers to Rehab  previous functional deficit  -RD        Patient's Goals for Discharge  eat/drink without coughing/choking;return to PO diet  -RD        Family Goals for Discharge  patient able to eat/drink without coughing/choking;patient able to return to PO diet  -RD           Pain Assessment    Additional Documentation  Pain Scale: Numbers Pre/Post-Treatment (Group)  -RD           Pain Scale: Numbers Pre/Post-Treatment    Pain Scale: Numbers, Pretreatment  0/10 - no pain  -RD        Pain Scale: Numbers, Post-Treatment  0/10 - no pain  -RD           Oral Motor and Function    Dentition Assessment  natural, present and adequate  -RD        Secretion Management  WNL/WFL  -RD        Mucosal Quality  moist, healthy  -RD        Volitional Swallow  delayed  -RD        Volitional Cough  WFL  -RD           Oral Musculature and Cranial Nerve Assessment    Oral Motor General Assessment  generalized oral motor weakness  -RD           General Eating/Swallowing Observations    Respiratory Support Currently in Use  nasal cannula  -RD        Eating/Swallowing Skills  fed by SLP;self-fed;rate is too fast  -RD        Positioning During Eating  upright 90 degree;upright in bed  -RD        Utensils Used  spoon;cup;straw  -RD        Consistencies Trialed  thin liquids;nectar/syrup-thick liquids;pureed;regular textures   -RD           Respiratory    Respiratory Status  increase in respiratory rate;during swallowing/eating;other (see comments) w/ large sips of thins   -RD           Clinical Swallow Eval    Oral Prep Phase  WFL  -RD        Oral Transit  WFL  -RD        Oral Residue  WFL  -RD        Pharyngeal Phase  suspected pharyngeal impairment  -RD        Clinical Swallow Evaluation Summary  Clinical dysphagia eval complete. Concern for aspiration PNA per chart. Dtr/pt report that pt frequently coughs w/ liquids at SNF for a while now. SLP saw pt 6/2018 and pt decided on comfort diet and refused swallow study at that time. Trials given of thins, nectar, pureed, and solid. Inconsistent wet vocal quality w/ large drinks of thins and coughing after multiple large consecutive drinks of nectar-thick via straw only. Pt impulsive w/ straw, but able to take single sips when cued. No s/s w/ single sips of nectar-thick, pureed, or solid. Mastication adequate for regular solid. Discussed s/s and risk of aspiration, safest vs. comfort diet, diet modifications, MBS vs. FEES, and dysphagia therapy w/ pt and pt's dtr. Also discussed risks and benefits of instrumental eval as well as balance of safest and overall quality of life. Pt/dtr undecided about completing swallowing study, as pt feeling too tired to go for exam today, but pt/dtr decided to trial modified diet to see if pt able to tolerate. SLP will modify diet to nectar-thick and Level-4 w/ reg meats/breads and f/u for diet tolerance and adjustments and determine if pt would like to complete MBS tomorrow vs. pursuing full/modified comfort diet.   -RD           Pharyngeal Phase Concerns    Pharyngeal Phase Concerns  cough;wet vocal quality  -RD        Wet Vocal Quality  thin;other (see comments) via large sips  -RD        Cough  nectar;other (see comments) via multiple large consecutive sips only  -RD        Pharyngeal Phase Concerns, Comment  Suspected pharyngeal dysphagia  -RD            Clinical Impression    SLP Swallowing Diagnosis  suspected pharyngeal dysfunction  -RD        Functional Impact  risk of aspiration/pneumonia;risk of malnutrition;risk of dehydration  -RD        Rehab Potential/Prognosis, Swallowing  adequate, monitor progress closely;re-evaluate goals as necessary  -RD        Swallow Criteria for Skilled Therapeutic Interventions Met  demonstrates skilled criteria  -RD           Recommendations    Therapy Frequency (Swallow)  evaluation only;PRN  -RD        Predicted Duration Therapy Intervention (Days)  2 weeks  -RD        SLP Diet Recommendation  mechanical soft with no mixed consistencies;nectar thick liquids;other (see comments) small single sips only  -RD        Recommended Diagnostics  reassess via clinical swallow evaluation;other (see comments) f/u for MBS pending pt/family decisions  -RD        Recommended Precautions and Strategies  upright posture during/after eating;small bites of food and sips of liquid;alternate between small bites of food and sips of liquid;other (see comments) general aspiration/reflux precautions; Oral care BID/PRN  -RD        SLP Rec. for Method of Medication Administration  meds whole;with pudding or applesauce;as tolerated  -RD        Monitor for Signs of Aspiration  yes;notify SLP if any concerns;cough;gurgly voice;throat clearing;pneumonia;right lower lobe infiltrates  -RD        Anticipated Dischage Disposition  skilled nursing facility;anticipate therapy at next level of care  -RD          User Key  (r) = Recorded By, (t) = Taken By, (c) = Cosigned By    Initials Name Effective Dates    Talia Palomino MS CCC-SLP 04/03/18 -           EDUCATION  The patient has been educated in the following areas:   Dysphagia (Swallowing Impairment) Oral Care/Hydration Modified Diet Instruction.    SLP Recommendation and Plan  SLP Swallowing Diagnosis: suspected pharyngeal dysfunction  SLP Diet Recommendation: mechanical soft with no mixed  consistencies, nectar thick liquids, other (see comments)(small single sips only)  Recommended Precautions and Strategies: upright posture during/after eating, small bites of food and sips of liquid, alternate between small bites of food and sips of liquid, other (see comments)(general aspiration/reflux precautions; Oral care BID/PRN)     Monitor for Signs of Aspiration: yes, notify SLP if any concerns, cough, gurgly voice, throat clearing, pneumonia, right lower lobe infiltrates  Recommended Diagnostics: reassess via clinical swallow evaluation, other (see comments)(f/u for MBS pending pt/family decisions)  Swallow Criteria for Skilled Therapeutic Interventions Met: demonstrates skilled criteria  Anticipated Dischage Disposition: skilled nursing facility, anticipate therapy at next level of care  Rehab Potential/Prognosis, Swallowing: adequate, monitor progress closely, re-evaluate goals as necessary  Therapy Frequency (Swallow): evaluation only, PRN  Predicted Duration Therapy Intervention (Days): 2 weeks       Plan of Care Reviewed With: patient, daughter  Plan of Care Review  Plan of Care Reviewed With: patient, daughter           Time Calculation:   Time Calculation- SLP     Row Name 02/07/19 1109             Time Calculation- SLP    SLP Start Time  1015  -RD      SLP Received On  02/07/19  -RD        User Key  (r) = Recorded By, (t) = Taken By, (c) = Cosigned By    Initials Name Provider Type    Talia Palomino MS CCC-SLP Speech and Language Pathologist          Therapy Charges for Today     Code Description Service Date Service Provider Modifiers Qty    [id number] HC ST EVAL ORAL PHARYNG SWALLOW 4 2/7/2019 Talia Black MS CCC-SLP GN 1               Talia Black MS CCC-SLP  2/7/2019

## 2019-02-08 LAB
ANION GAP SERPL CALCULATED.3IONS-SCNC: 7 MMOL/L (ref 3–11)
BUN BLD-MCNC: 8 MG/DL (ref 9–23)
BUN/CREAT SERPL: 12.9 (ref 7–25)
CALCIUM SPEC-SCNC: 8.8 MG/DL (ref 8.7–10.4)
CHLORIDE SERPL-SCNC: 107 MMOL/L (ref 99–109)
CO2 SERPL-SCNC: 27 MMOL/L (ref 20–31)
CREAT BLD-MCNC: 0.62 MG/DL (ref 0.6–1.3)
DEPRECATED RDW RBC AUTO: 45.4 FL (ref 37–54)
ERYTHROCYTE [DISTWIDTH] IN BLOOD BY AUTOMATED COUNT: 13 % (ref 11.3–14.5)
GFR SERPL CREATININE-BSD FRML MDRD: 91 ML/MIN/1.73
GLUCOSE BLD-MCNC: 108 MG/DL (ref 70–100)
HCT VFR BLD AUTO: 42.3 % (ref 34.5–44)
HGB BLD-MCNC: 13.7 G/DL (ref 11.5–15.5)
MAGNESIUM SERPL-MCNC: 1.9 MG/DL (ref 1.3–2.7)
MCH RBC QN AUTO: 30.4 PG (ref 27–31)
MCHC RBC AUTO-ENTMCNC: 32.4 G/DL (ref 32–36)
MCV RBC AUTO: 94 FL (ref 80–99)
PLATELET # BLD AUTO: 191 10*3/MM3 (ref 150–450)
PMV BLD AUTO: 9.6 FL (ref 6–12)
POTASSIUM BLD-SCNC: 3.5 MMOL/L (ref 3.5–5.5)
POTASSIUM BLD-SCNC: 4.1 MMOL/L (ref 3.5–5.5)
RBC # BLD AUTO: 4.5 10*6/MM3 (ref 3.89–5.14)
SODIUM BLD-SCNC: 141 MMOL/L (ref 132–146)
VANCOMYCIN TROUGH SERPL-MCNC: 7.2 MCG/ML (ref 10–20)
WBC NRBC COR # BLD: 6.12 10*3/MM3 (ref 3.5–10.8)

## 2019-02-08 PROCEDURE — 85027 COMPLETE CBC AUTOMATED: CPT | Performed by: INTERNAL MEDICINE

## 2019-02-08 PROCEDURE — 25010000002 ENOXAPARIN PER 10 MG: Performed by: INTERNAL MEDICINE

## 2019-02-08 PROCEDURE — 80202 ASSAY OF VANCOMYCIN: CPT | Performed by: INTERNAL MEDICINE

## 2019-02-08 PROCEDURE — 84132 ASSAY OF SERUM POTASSIUM: CPT | Performed by: INTERNAL MEDICINE

## 2019-02-08 PROCEDURE — 83735 ASSAY OF MAGNESIUM: CPT | Performed by: INTERNAL MEDICINE

## 2019-02-08 PROCEDURE — 92610 EVALUATE SWALLOWING FUNCTION: CPT

## 2019-02-08 PROCEDURE — 80048 BASIC METABOLIC PNL TOTAL CA: CPT

## 2019-02-08 PROCEDURE — 25010000002 PIPERACILLIN SOD-TAZOBACTAM PER 1 G: Performed by: INTERNAL MEDICINE

## 2019-02-08 PROCEDURE — 25010000002 HYDRALAZINE PER 20 MG: Performed by: INTERNAL MEDICINE

## 2019-02-08 PROCEDURE — 99232 SBSQ HOSP IP/OBS MODERATE 35: CPT | Performed by: INTERNAL MEDICINE

## 2019-02-08 PROCEDURE — 25010000002 VANCOMYCIN 10 G RECONSTITUTED SOLUTION

## 2019-02-08 RX ORDER — ISOSORBIDE MONONITRATE 60 MG/1
60 TABLET, EXTENDED RELEASE ORAL DAILY
Status: DISCONTINUED | OUTPATIENT
Start: 2019-02-08 | End: 2019-02-13 | Stop reason: HOSPADM

## 2019-02-08 RX ORDER — TRAZODONE HYDROCHLORIDE 50 MG/1
25 TABLET ORAL NIGHTLY
Status: DISCONTINUED | OUTPATIENT
Start: 2019-02-08 | End: 2019-02-13 | Stop reason: HOSPADM

## 2019-02-08 RX ORDER — GABAPENTIN 100 MG/1
200 CAPSULE ORAL 3 TIMES DAILY
Status: DISCONTINUED | OUTPATIENT
Start: 2019-02-08 | End: 2019-02-13 | Stop reason: HOSPADM

## 2019-02-08 RX ORDER — HYDRALAZINE HYDROCHLORIDE 10 MG/1
10 TABLET, FILM COATED ORAL EVERY 8 HOURS SCHEDULED
Status: DISCONTINUED | OUTPATIENT
Start: 2019-02-08 | End: 2019-02-10

## 2019-02-08 RX ORDER — TRAMADOL HYDROCHLORIDE 50 MG/1
50 TABLET ORAL EVERY 12 HOURS SCHEDULED
Status: DISCONTINUED | OUTPATIENT
Start: 2019-02-08 | End: 2019-02-13 | Stop reason: HOSPADM

## 2019-02-08 RX ADMIN — POTASSIUM CHLORIDE 40 MEQ: 750 CAPSULE, EXTENDED RELEASE ORAL at 13:15

## 2019-02-08 RX ADMIN — TRAMADOL HYDROCHLORIDE 50 MG: 50 TABLET, FILM COATED ORAL at 20:58

## 2019-02-08 RX ADMIN — OXYCODONE HYDROCHLORIDE AND ACETAMINOPHEN 500 MG: 500 TABLET ORAL at 09:04

## 2019-02-08 RX ADMIN — AMLODIPINE BESYLATE 10 MG: 10 TABLET ORAL at 20:58

## 2019-02-08 RX ADMIN — HYDRALAZINE HYDROCHLORIDE 10 MG: 10 TABLET ORAL at 13:18

## 2019-02-08 RX ADMIN — ENOXAPARIN SODIUM 40 MG: 40 INJECTION SUBCUTANEOUS at 17:51

## 2019-02-08 RX ADMIN — PANTOPRAZOLE SODIUM 40 MG: 40 TABLET, DELAYED RELEASE ORAL at 09:04

## 2019-02-08 RX ADMIN — BUSPIRONE HYDROCHLORIDE 7.5 MG: 15 TABLET ORAL at 20:55

## 2019-02-08 RX ADMIN — ACETAMINOPHEN 650 MG: 325 TABLET ORAL at 02:12

## 2019-02-08 RX ADMIN — TAZOBACTAM SODIUM AND PIPERACILLIN SODIUM 4.5 G: 500; 4 INJECTION, SOLUTION INTRAVENOUS at 13:15

## 2019-02-08 RX ADMIN — GABAPENTIN 200 MG: 100 CAPSULE ORAL at 20:55

## 2019-02-08 RX ADMIN — ACETAMINOPHEN 650 MG: 325 TABLET ORAL at 09:04

## 2019-02-08 RX ADMIN — COLCHICINE 0.6 MG: 0.6 TABLET, FILM COATED ORAL at 09:03

## 2019-02-08 RX ADMIN — TAZOBACTAM SODIUM AND PIPERACILLIN SODIUM 4.5 G: 500; 4 INJECTION, SOLUTION INTRAVENOUS at 20:54

## 2019-02-08 RX ADMIN — GABAPENTIN 200 MG: 100 CAPSULE ORAL at 10:16

## 2019-02-08 RX ADMIN — MICONAZOLE NITRATE: 2 CREAM TOPICAL at 10:17

## 2019-02-08 RX ADMIN — ISOSORBIDE MONONITRATE 60 MG: 60 TABLET, EXTENDED RELEASE ORAL at 09:03

## 2019-02-08 RX ADMIN — BUSPIRONE HYDROCHLORIDE 7.5 MG: 15 TABLET ORAL at 09:03

## 2019-02-08 RX ADMIN — Medication 250 MG: at 20:55

## 2019-02-08 RX ADMIN — SUCRALFATE 1 G: 1 TABLET ORAL at 13:15

## 2019-02-08 RX ADMIN — SUCRALFATE 1 G: 1 TABLET ORAL at 09:03

## 2019-02-08 RX ADMIN — VANCOMYCIN HYDROCHLORIDE 1250 MG: 100 INJECTION, POWDER, LYOPHILIZED, FOR SOLUTION INTRAVENOUS at 15:49

## 2019-02-08 RX ADMIN — MICONAZOLE NITRATE: 2 CREAM TOPICAL at 21:29

## 2019-02-08 RX ADMIN — GABAPENTIN 200 MG: 100 CAPSULE ORAL at 15:49

## 2019-02-08 RX ADMIN — SUCRALFATE 1 G: 1 TABLET ORAL at 17:51

## 2019-02-08 RX ADMIN — DONEPEZIL HYDROCHLORIDE 10 MG: 10 TABLET, FILM COATED ORAL at 20:55

## 2019-02-08 RX ADMIN — HYDRALAZINE HYDROCHLORIDE 10 MG: 10 TABLET ORAL at 21:05

## 2019-02-08 RX ADMIN — TRAMADOL HYDROCHLORIDE 50 MG: 50 TABLET, FILM COATED ORAL at 10:16

## 2019-02-08 RX ADMIN — MAGNESIUM SULFATE HEPTAHYDRATE 4 G: 40 INJECTION, SOLUTION INTRAVENOUS at 09:09

## 2019-02-08 RX ADMIN — CLOPIDOGREL BISULFATE 75 MG: 75 TABLET, FILM COATED ORAL at 09:04

## 2019-02-08 RX ADMIN — TRAZODONE HYDROCHLORIDE 25 MG: 50 TABLET ORAL at 20:58

## 2019-02-08 RX ADMIN — MEMANTINE 10 MG: 10 TABLET ORAL at 20:58

## 2019-02-08 RX ADMIN — DOCUSATE SODIUM 100 MG: 100 CAPSULE, LIQUID FILLED ORAL at 20:58

## 2019-02-08 RX ADMIN — TAZOBACTAM SODIUM AND PIPERACILLIN SODIUM 4.5 G: 500; 4 INJECTION, SOLUTION INTRAVENOUS at 04:51

## 2019-02-08 RX ADMIN — SODIUM CHLORIDE, PRESERVATIVE FREE 3 ML: 5 INJECTION INTRAVENOUS at 21:02

## 2019-02-08 RX ADMIN — POTASSIUM CHLORIDE 40 MEQ: 750 CAPSULE, EXTENDED RELEASE ORAL at 09:04

## 2019-02-08 RX ADMIN — HYDRALAZINE HYDROCHLORIDE 10 MG: 20 INJECTION INTRAMUSCULAR; INTRAVENOUS at 09:08

## 2019-02-08 RX ADMIN — CETIRIZINE HYDROCHLORIDE 10 MG: 10 TABLET, FILM COATED ORAL at 09:03

## 2019-02-08 RX ADMIN — MELOXICAM 7.5 MG: 7.5 TABLET ORAL at 20:58

## 2019-02-08 RX ADMIN — Medication 325 MG: at 09:03

## 2019-02-08 RX ADMIN — MEMANTINE 10 MG: 10 TABLET ORAL at 09:04

## 2019-02-08 RX ADMIN — SUCRALFATE 1 G: 1 TABLET ORAL at 20:58

## 2019-02-08 RX ADMIN — SODIUM CHLORIDE, PRESERVATIVE FREE 3 ML: 5 INJECTION INTRAVENOUS at 09:05

## 2019-02-08 RX ADMIN — Medication 250 MG: at 09:03

## 2019-02-08 RX ADMIN — TERAZOSIN HYDROCHLORIDE 2 MG: 2 CAPSULE ORAL at 20:55

## 2019-02-08 NOTE — PROGRESS NOTES
[facility] Medicine Services  PROGRESS NOTE    Patient Name: Antonia Mata  : 1931  MRN: [id number]    Date of Admission: 2019  Length of Stay: 2  Primary Care Physician: Leti Munoz MD    Subjective   Subjective     CC: F/U pneumonia    HPI:  Didn't sleep well last night.  Blood pressure has been high.    Review of Systems  Gen- No further fevers, chills  CV- No chest pain, palpitations  Resp- Improved cough, dyspnea  GI- No N/V/D, abd pain    Otherwise ROS is negative except as mentioned in the HPI.    Objective   Objective     Vital Signs:   Temp:  [97.5 °F (36.4 °C)-98.6 °F (37 °C)] 97.8 °F (36.6 °C)  Heart Rate:  [73-89] 73  Resp:  [16-18] 18  BP: (117-201)/(43-83) 168/51        Physical Exam:  Constitutional: No acute distress, awake, alert, sitting up in bed  HENT: NCAT, mucous membranes moist  Respiratory: Clear to auscultation bilaterally, respiratory effort normal   Cardiovascular: RRR, no murmurs, rubs, or gallops  Gastrointestinal: Positive bowel sounds, soft, nontender, nondistended  Musculoskeletal: No bilateral ankle edema  Psychiatric: Appropriate affect, cooperative  Neurologic: Cranial Nerves grossly intact to confrontation, speech clear  Skin: No rashes    Exam unchanged from yesterday    Results Reviewed:  I have personally reviewed current lab, radiology, and data and agree.    Results from last 7 days   Lab Units 19  0750 19  0508 19  1221   WBC 10*3/mm3 6.12 10.37 11.79*   HEMOGLOBIN g/dL 13.7 11.2* 14.4   HEMATOCRIT % 42.3 35.0 43.2   PLATELETS 10*3/mm3 191 156 199     Results from last 7 days   Lab Units 19  0750 19  2232 19  0508 19  1302  19  1220   SODIUM mmol/L 141  --  143 141  --   --    POTASSIUM mmol/L 3.5 3.6 2.9* 3.5   < >  --    CHLORIDE mmol/L 107  --  109 105  --   --    CO2 mmol/L 27.0  --  27.0 28.0  --   --    BUN mg/dL 8*  --  16 13  --   --    CREATININE mg/dL 0.62  --  0.88  0.65  --   --    GLUCOSE mg/dL 108*  --  101* 110*  --   --    CALCIUM mg/dL 8.8  --  7.9* 8.5*  --   --    ALT (SGPT) U/L  --   --   --  18  --   --    AST (SGOT) U/L  --   --   --  28  --   --    TROPONIN I ng/mL  --   --   --   --   --  0.06    < > = values in this interval not displayed.     Estimated Creatinine Clearance: 44.6 mL/min (by C-G formula based on SCr of 0.62 mg/dL).    No results found for: BNP    Microbiology Results Abnormal     Procedure Component Value - Date/Time    Blood Culture - Blood, Arm, Left [[id number]] Collected:  02/06/19 1220    Lab Status:  Preliminary result Specimen:  Blood from Arm, Left Updated:  02/08/19 1301     Blood Culture No growth at 2 days    Blood Culture - Blood, Arm, Right [[id number]] Collected:  02/06/19 1210    Lab Status:  Preliminary result Specimen:  Blood from Arm, Right Updated:  02/08/19 1301     Blood Culture No growth at 2 days    MRSA Screen, PCR - Swab, Nares [[id number]]  (Abnormal) Collected:  02/06/19 1458    Lab Status:  Final result Specimen:  Swab from Nares Updated:  02/06/19 1709     MRSA, PCR Positive    Influenza A & B, RT PCR - Swab, Nasopharynx [[id number]]  (Normal) Collected:  02/06/19 1458    Lab Status:  Final result Specimen:  Swab from Nasopharynx Updated:  02/06/19 1702     Influenza A PCR Not Detected     Influenza B PCR Not Detected          Imaging Results (last 24 hours)     ** No results found for the last 24 hours. **          Results for orders placed during the hospital encounter of 05/14/18   Adult Transthoracic Echo Complete W/ Cont if Necessary Per Protocol    Narrative · Mild aortic valve regurgitation is present.  · Calculated right ventricular systolic pressure from tricuspid   regurgitation is 24 mmHg.  · Mild tricuspid valve regurgitation is present.  · Left ventricular systolic function is normal. Estimated EF = 60%.  · Left ventricular diastolic dysfunction (grade I) consistent with   impaired relaxation.  · There is no  evidence of pericardial effusion.  · No evidence of pulmonary hypertension is present.  · The aortic valve exhibits sclerosis.  · Normal right ventricular cavity size, wall thickness, systolic function   and septal motion noted.  · Moderate MAC is present.          I have reviewed the medications:    Current Facility-Administered Medications:   •  ! Vanc trough due 11:00 am 2/8 - HOLD 12 noon dose until trough reviewed by Pharmacist, , Does not apply, Once, Katy Davenport MD  •  acetaminophen (TYLENOL) tablet 650 mg, 650 mg, Oral, Q6H PRN, Katy Davenport MD, 650 mg at 02/08/19 0904  •  amLODIPine (NORVASC) tablet 10 mg, 10 mg, Oral, Nightly, Katy Davenport MD, 10 mg at 02/07/19 2055  •  bisacodyl (DULCOLAX) EC tablet 5 mg, 5 mg, Oral, Daily PRN, Katy Davenport MD  •  busPIRone (BUSPAR) tablet 7.5 mg, 7.5 mg, Oral, BID, Katy Davenport MD, 7.5 mg at 02/08/19 0903  •  calcium carbonate (TUMS) chewable tablet 500 mg (200 mg elemental), 2 tablet, Oral, BID PRN, Katy Davenport MD  •  cetirizine (zyrTEC) tablet 10 mg, 10 mg, Oral, Daily, Katy Davenport MD, 10 mg at 02/08/19 0903  •  CloNIDine (CATAPRES) tablet 0.1 mg, 0.1 mg, Oral, BID PRN, Katy Davenport MD  •  clopidogrel (PLAVIX) tablet 75 mg, 75 mg, Oral, Daily, Katy Davenport MD, 75 mg at 02/08/19 0904  •  colchicine tablet 0.6 mg, 0.6 mg, Oral, Daily, Katy Davenport MD, 0.6 mg at 02/08/19 0903  •  docusate sodium (COLACE) capsule 100 mg, 100 mg, Oral, BID, Katy Davenport MD, 100 mg at 02/06/19 2059  •  donepezil (ARICEPT) tablet 10 mg, 10 mg, Oral, Nightly, Katy Davenport MD, 10 mg at 02/07/19 2055  •  enoxaparin (LOVENOX) syringe 40 mg, 40 mg, Subcutaneous, Q24H, Katy Davenport MD, 40 mg at 02/07/19 1829  •  ferrous sulfate tablet 325 mg, 325 mg, Oral, Daily With Breakfast, Katy Davenport MD, 325 mg at 02/08/19 0903  •  gabapentin (NEURONTIN) capsule 200 mg, 200 mg, Oral, TID, Katy Davenport MD, 200 mg at 02/08/19 1016  •  hydrALAZINE (APRESOLINE) injection 10 mg,  10 mg, Intravenous, Q6H PRN, Katy Davenport MD, 10 mg at 02/08/19 0908  •  hydrALAZINE (APRESOLINE) tablet 10 mg, 10 mg, Oral, Q8H, Katy Davenport MD, 10 mg at 02/08/19 1318  •  isosorbide mononitrate (IMDUR) 24 hr tablet 60 mg, 60 mg, Oral, Daily, Katy Davenport MD, 60 mg at 02/08/19 0903  •  Magnesium Sulfate 2 gram Bolus, followed by 8 gram infusion (total Mg dose 10 grams)- Mg less than or equal to 1mg/dL, 2 g, Intravenous, PRN **OR** Magnesium Sulfate 2 gram / 50mL Infusion (GIVE X 3 BAGS TO EQUAL 6GM TOTAL DOSE) - Mg 1.1 - 1.5 mg/dl, 2 g, Intravenous, PRN **OR** Magnesium Sulfate 4 gram infusion- Mg 1.6-1.9 mg/dL, 4 g, Intravenous, PRN, Katy Davenport MD, Last Rate: 25 mL/hr at 02/08/19 0909, 4 g at 02/08/19 0909  •  meloxicam (MOBIC) tablet 7.5 mg, 7.5 mg, Oral, Nightly, Katy Davenport MD, 7.5 mg at 02/07/19 2054  •  memantine (NAMENDA) tablet 10 mg, 10 mg, Oral, Q12H, Katy Davenport MD, 10 mg at 02/08/19 0904  •  miconazole (MICOTIN) 2 % cream, , Topical, Q12H, Katy Davenport MD  •  pantoprazole (PROTONIX) EC tablet 40 mg, 40 mg, Oral, Daily, Katy Davenport MD, 40 mg at 02/08/19 0904  •  Pharmacy dosing:  Vancomycin & Zosyn, , Does not apply, Continuous PRN, Katy Davenport MD  •  piperacillin-tazobactam (ZOSYN) 4.5 g in iso-osmotic dextrose 100 mL IVPB (premix), 4.5 g, Intravenous, Q8H, Katy Davenport MD, 4.5 g at 02/08/19 1315  •  polyethylene glycol 3350 powder (packet), 17 g, Oral, Daily PRN, Katy Davenport MD  •  potassium chloride (MICRO-K) CR capsule 40 mEq, 40 mEq, Oral, PRN, 40 mEq at 02/08/19 1315 **OR** potassium chloride (KLOR-CON) packet 40 mEq, 40 mEq, Oral, PRN **OR** potassium chloride 10 mEq in 100 mL IVPB, 10 mEq, Intravenous, Q1H PRN, Katy Davenport MD  •  saccharomyces boulardii (FLORASTOR) capsule 250 mg, 250 mg, Oral, BID, Katy Davenport MD, 250 mg at 02/08/19 0903  •  sodium chloride 0.9 % flush 10 mL, 10 mL, Intravenous, PRN, Jordan Whitt MD  •  sodium chloride 0.9 % flush 3 mL, 3  mL, Intravenous, Q12H, Katy Davenport MD, 3 mL at 02/08/19 0905  •  sodium chloride 0.9 % flush 3-10 mL, 3-10 mL, Intravenous, PRN, Katy Davenport MD  •  sucralfate (CARAFATE) tablet 1 g, 1 g, Oral, 4x Daily AC & at Bedtime, Katy Davenport MD, 1 g at 02/08/19 1315  •  terazosin (HYTRIN) capsule 2 mg, 2 mg, Oral, Nightly, Katy Davenport MD, 2 mg at 02/07/19 2054  •  traMADol (ULTRAM) tablet 50 mg, 50 mg, Oral, Q12H, Katy Davenport MD, 50 mg at 02/08/19 1016  •  traZODone (DESYREL) tablet 25 mg, 25 mg, Oral, Nightly, Katy Davenport MD  •  vancomycin 1250 mg/250 mL 0.9% NS IVPB ([facility]), 1,250 mg, Intravenous, Q24H, Justin Ribera [facility]  •  [START ON 2/11/2019] Vancomycin trough level prior to 1500 dose 2/11 . Please do not administer dose until level is evaluated for potential dose adjustments, , Does not apply, Continuous PRN, Justin Ribera RPH  •  vitamin C (ASCORBIC ACID) tablet 500 mg, 500 mg, Oral, Daily, Katy Davenport MD, 500 mg at 02/08/19 0904      Assessment/Plan   Assessment / Plan     Active Hospital Problems    Diagnosis Date Noted   • **HCAP (healthcare-associated pneumonia) [J18.9] 06/03/2018   • Sepsis due to pneumonia (CMS/[facility]) [J18.9, A41.9] 02/07/2019   • Frequent UTI [N39.0] 02/06/2019   • Acute hypoxemic respiratory failure (CMS/[facility]) [J96.01] 05/14/2018   • GERD (gastroesophageal reflux disease) [K21.9] 03/29/2018   • Dementia [F03.90] 03/29/2018   • Hypertension [I10] 03/29/2018   • Hypokalemia [E87.6] 03/29/2018          Brief Hospital Course to date:  Antonia Mata is a 88 y.o. female with history of aspiration pneumonia, dementia, frequent UTI, and hypertension presenting from her nursing home due to altered mental status and found to have RUL pneumonia.    Sepsis due to healthcare associated pneumonia (POA)  Acute hypoxemic respiratory failure (POA)  -Influenza PCR negative  -MRSA PCR positive  -Blood cultures NGTD, sputum culture pending  -Continue vancomycin/zosyn.  De-escalate to  augmentin/doxycycline tomorrow if improving  -SLP evaluation-on modified diet now     Metabolic encephalopathy  -Resolved  -Likely due to acute illness     Dementia  -Continue home medications     HTN  -Difficult to control  -Continue home medications with the exception of metoprolol due to concern for side effects with this new medication  -Increased IMDUR, added hydralazine 10mg TID-may increase if needed     GERD  -Continue PPI, sucralfate     Frequent UTI  -Recently started on prophylactic macrobid-continue at discharge  -UA on admission not suggestive of UTI    Hypokalemia  -Replaced    DVT Prophylaxis:  Lovenox    CODE STATUS:   Code Status and Medical Interventions:   Ordered at: 02/06/19 1450     Limited Support to NOT Include:    Intubation     Code Status:    No CPR     Medical Interventions (Level of Support Prior to Arrest):    Limited         Electronically signed by Katy Davenport MD, 02/08/19, 3:36 PM.

## 2019-02-08 NOTE — PROGRESS NOTES
Continued Stay Note  [facility]     Patient Name: Antonia Mata  MRN: [id number]  Today's Date: 2/8/2019    Admit Date: 2/6/2019    Discharge Plan     Row Name 02/08/19 1210       Plan    Plan  To [facility] tomorrow if medically ready     Patient/Family in Agreement with Plan  yes    Plan Comments  Patient discussed in rounds, per Dr Davenport - patient may be ready for discharge to [facility] tomorrow and requested [facility] be arranged in anticipation for dicharge - Ambulance has been set up for 2PM tomorrow - Left message with Chayo at [facility] to inform her the patient may be returning tomorrow - Report can be called to [phone]. Discharge summary to be called to [phone] - Kassandra [phone]         Discharge Codes    No documentation.             Kassandra Caba RN

## 2019-02-08 NOTE — PROGRESS NOTES
"Pharmacy Consult-Vancomycin Dosing  Antonia Mata is a  88 y.o. female receiving vancomycin therapy.     Indication: HCAP  Consulting Provider: hospitalist  ID Consult: no    Goal Trough:    Current Antimicrobial Therapy  Vancomycin PTD (day 3)  Zosyn (day 3)      Allergies  Allergies as of 02/06/2019 - Reviewed 02/06/2019   Allergen Reaction Noted   • Sulfa antibiotics Rash 03/29/2018   • Cortisone Other (See Comments) 03/29/2018   • Ativan [lorazepam] Anxiety 03/29/2018     Labs  Results from last 7 days   Lab Units 02/08/19  0750 02/07/19  2232 02/07/19  0508 02/06/19  1302   SODIUM mmol/L 141  --  143 141   POTASSIUM mmol/L 3.5 3.6 2.9* 3.5   CHLORIDE mmol/L 107  --  109 105   CO2 mmol/L 27.0  --  27.0 28.0   BUN mg/dL 8*  --  16 13   CREATININE mg/dL 0.62  --  0.88 0.65   GLUCOSE mg/dL 108*  --  101* 110*   CALCIUM mg/dL 8.8  --  7.9* 8.5*        Results from last 7 days   Lab Units 02/08/19  0750 02/07/19  0508 02/06/19  1221   WBC 10*3/mm3 6.12 10.37 11.79*   HEMOGLOBIN g/dL 13.7 11.2* 14.4   HEMATOCRIT % 42.3 35.0 43.2   PLATELETS 10*3/mm3 191 156 199     Evaluation of Dosing     Ht - 149.9 cm (59\")  Wt - 72.1 kg (159 lb)    Estimated Creatinine Clearance: 44.6 mL/min (by C-G formula based on SCr of 0.62 mg/dL).    Intake & Output (last 3 days)         02/05 0701 - 02/06 0700 02/06 0701 - 02/07 0700 02/07 0701 - 02/08 0700 02/08 0701 - 02/09 0700    P.O.    120    I.V. (mL/kg)  150 (2.1)      IV Piggyback  1650 100     Total Intake(mL/kg)  1800 (25) 100 (1.4) 120 (1.7)    Urine (mL/kg/hr)  400 1250 (0.7) 1800 (3.7)    Stool  0 0     Total Output  400 1250 1800    Net  +1400 -1150 -1680            Urine Unmeasured Occurrence   3 x     Stool Unmeasured Occurrence  1 x 1 x             Microbiology and Radiology  Microbiology Results (last 10 days)       Procedure Component Value - Date/Time    Influenza A & B, RT PCR - Swab, Nasopharynx [[id number]]  (Normal) Collected:  02/06/19 1455    Lab Status:  Final " result Specimen:  Swab from Nasopharynx Updated:  02/06/19 1702     Influenza A PCR Not Detected     Influenza B PCR Not Detected    MRSA Screen, PCR - Swab, Nares [[id number]]  (Abnormal) Collected:  02/06/19 1458    Lab Status:  Final result Specimen:  Swab from Nares Updated:  02/06/19 1709     MRSA, PCR Positive    Blood Culture - Blood, Arm, Left [[id number]] Collected:  02/06/19 1220    Lab Status:  Preliminary result Specimen:  Blood from Arm, Left Updated:  02/08/19 1301     Blood Culture No growth at 2 days    Blood Culture - Blood, Arm, Right [[id number]] Collected:  02/06/19 1210    Lab Status:  Preliminary result Specimen:  Blood from Arm, Right Updated:  02/08/19 1301     Blood Culture No growth at 2 days            Evaluation of Level  Results from last 7 days   Lab Units 02/08/19  1235   VANCOMYCIN TR mcg/mL 7.20*       Assessment/Plan:    2/8 Vancomycin trough - 7.8 mcg/ml @1235  Vancomycin trough level drawn 24.5 hours following 2nd dose (non steady state concentration)  Expect drug accumulation with continued dosing  Goal trough: 15-20 mcg/ml  SCr - 0.62, WBC -6.12, 24hr Tmax - 98.6    Will adjust to vancomycin 1250mg q24h  Obtain vancomycin trough 2/11 prior to 1500 dose  Pharmacy will continue to monitor and make adjustments to dose as necessary based on renal function, cultures, labs, and clinical status.     Thanks,  Justin Ribera [facility]   2/8/2019  1:56 PM

## 2019-02-08 NOTE — PLAN OF CARE
Problem: Patient Care Overview  Goal: Plan of Care Review  Outcome: Ongoing (interventions implemented as appropriate)   02/08/19 1133   Coping/Psychosocial   Plan of Care Reviewed With patient;daughter   Plan of Care Review   Progress no change   SLP re-evaluation completed. Will continue to address dysphagia. Please see note for further details and recommendations.

## 2019-02-08 NOTE — PROGRESS NOTES
Continued Stay Note  [facility]     Patient Name: Antonia Mata  MRN: [id number]  Today's Date: 2/8/2019    Admit Date: 2/6/2019    Discharge Plan     Row Name 02/08/19 1618       Plan    Plan  To [facility] Monday     Patient/Family in Agreement with Plan  yes    Plan Comments  Spoke with [facility] and patient's daughter - due to staffing over the weekend, [facility] needs to wait until Monday for the patient to return. Will arrange ambulance transport on Monday for the afternoon - Kassandra [phone]         Discharge Codes    No documentation.             Kassandra Caba RN

## 2019-02-08 NOTE — PLAN OF CARE
Problem: Patient Care Overview  Goal: Plan of Care Review  Outcome: Ongoing (interventions implemented as appropriate)   02/08/19 8012   Coping/Psychosocial   Plan of Care Reviewed With patient;daughter   Plan of Care Review   Progress no change   OTHER   Outcome Summary At this time patient presents with slight fungal rash to bilateral groin region. Will order MICOTIN cream BID. CHARLEE mattress has been ordered. No pressure areas can be identified at this time. Mild MASD to bilateral gluteal. Applied barrier cream and dry flow pads in place. Please contact if needs arise. Thanks

## 2019-02-08 NOTE — PLAN OF CARE
Problem: Patient Care Overview  Goal: Plan of Care Review  Outcome: Ongoing (interventions implemented as appropriate)   02/08/19 1864   Coping/Psychosocial   Plan of Care Reviewed With patient   Plan of Care Review   Progress no change;  Patient's daughter refused modified barium swallow today stating that the patient does not feel up to it.  Patient transferred to specialty bed - [facility].  Urinary output reduced after cessation of IV fluids.  Gabapentin restarted and tramadol scheduled.  Patient has rested most of day.  Daughter states does not want patient to go back to the facility in [place] that she came from, stating they will not take care of her.  Notified SAIDA Cannon that patient's daughter would like to discuss this concern.

## 2019-02-08 NOTE — THERAPY EVALUATION
Acute Care - Speech Language Pathology   Swallow Re-Evaluation [facility]     Patient Name: Antonia Mata  : 1931  MRN: [id number]  Today's Date: 2019               Admit Date: 2019    Visit Dx:     ICD-10-CM ICD-9-CM   1. HCAP (healthcare-associated pneumonia) J18.9 486   2. Altered mental status, unspecified altered mental status type R41.82 780.97   3. Sepsis, due to unspecified organism (CMS/HCC) A41.9 038.9     995.91   4. Dysphagia, unspecified type R13.10 787.20     Patient Active Problem List   Diagnosis   • Aspiration pneumonia (CMS/HCC)   • Hypokalemia   • Dementia   • Hypertension   • GERD (gastroesophageal reflux disease)   • Normocytic anemia   • Elevated alkaline phosphatase level   • Vasovagal episode   • Acute hypoxemic respiratory failure (CMS/HCC)   • Constipation   • Aortic heart murmur   • HCAP (healthcare-associated pneumonia)   • Acute respiratory failure with hypoxia (CMS/HCC)   • Spinal stenosis   • Acute pain of left shoulder   • Left hand pain   • Frequent UTI   • Sepsis due to pneumonia (CMS/HCC)     Past Medical History:   Diagnosis Date   • Anxiety    • Arthropathy    • Constipation    • Dementia    • 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 URINE   • Essential hypertension    • GERD (gastroesophageal reflux disease)    • Hyperlipidemia    • Insomnia    • Spinal stenosis    • Syncope    • URI (upper respiratory infection)      Past Surgical History:   Procedure Laterality Date   • BLADDER SUSPENSION     • CHOLECYSTECTOMY     • HIP SURGERY     • HYSTERECTOMY     • TONSILLECTOMY          SWALLOW EVALUATION (last 72 hours)      [facility] Adult Swallow Evaluation     Row Name 19 1030 19 1015                Rehab Evaluation    Document Type  re-evaluation  -LR  evaluation  -RD       Subjective Information  no complaints  -LR  complains of;dyspnea  -RD       Patient Observations  alert;cooperative;agree to therapy  -LR  alert;cooperative;agree to therapy  -RD       Patient Effort   good  -LR  good  -RD          General Information    Patient Profile Reviewed  yes  -LR  yes  -RD       Pertinent History Of Current Problem  Per daughter, pt had choking episode last pm w/ dinner tray. Per daughter, pt started coughing from pna w/ food in mouth and became choked on food. Daughter reported she would like her mother to receive MBS to further assess swallow, but pt is too weak today. Swallow re-eval indicated given choking episode last pm.   -LR  Adm w/ HCAP, dementia, GERD, recurrent UTI, hx of aspiration PNA. Pt saw SLP during previous admission 6/6/18 and declined modified diet and instrumental at that time.   -RD       Current Method of Nutrition  mechanical soft, no mixed consistencies;nectar/syrup-thick liquids  -LR  regular textures;thin liquids  -RD       Precautions/Limitations, Hearing  --  WFL;for purposes of eval  -RD       Prior Level of Function-Communication  --  cognitive-linguistic impairment;other (see comments) dementia  -RD       Prior Level of Function-Swallowing  --  no diet consistency restrictions;other (see comments) coughing w/ liquids per pt/dtr  -RD       Plans/Goals Discussed with  --  patient;family;agreed upon  -RD       Barriers to Rehab  --  previous functional deficit  -RD       Patient's Goals for Discharge  --  eat/drink without coughing/choking;return to PO diet  -RD       Family Goals for Discharge  --  patient able to eat/drink without coughing/choking;patient able to return to PO diet  -RD          Pain Assessment    Additional Documentation  --  Pain Scale: Numbers Pre/Post-Treatment (Group)  -RD          Pain Scale: Numbers Pre/Post-Treatment    Pain Scale: Numbers, Pretreatment  --  0/10 - no pain  -RD       Pain Scale: Numbers, Post-Treatment  --  0/10 - no pain  -RD          Oral Motor and Function    Dentition Assessment  natural, present and adequate  -LR  natural, present and adequate  -RD       Secretion Management  WNL/WFL  -LR  WNL/WFL  -RD        Mucosal Quality  moist, healthy  -LR  moist, healthy  -RD       Volitional Swallow  WFL  -LR  delayed  -RD       Volitional Cough  WFL  -LR  WFL  -RD          Oral Musculature and Cranial Nerve Assessment    Oral Motor General Assessment  generalized oral motor weakness  -LR  generalized oral motor weakness  -RD          General Eating/Swallowing Observations    Respiratory Support Currently in Use  nasal cannula  -LR  nasal cannula  -RD       Eating/Swallowing Skills  fed by SLP  -LR  fed by SLP;self-fed;rate is too fast  -RD       Positioning During Eating  upright 90 degree;upright in bed  -LR  upright 90 degree;upright in bed  -RD       Utensils Used  spoon;cup  -LR  spoon;cup;straw  -RD       Consistencies Trialed  nectar/syrup-thick liquids;pureed;mechanical soft, no mixed consistencies  -LR  thin liquids;nectar/syrup-thick liquids;pureed;regular textures  -RD          Respiratory    Respiratory Status  --  increase in respiratory rate;during swallowing/eating;other (see comments) w/ large sips of thins   -RD          Clinical Swallow Eval    Oral Prep Phase  WFL  -LR  WFL  -RD       Oral Transit  WFL  -LR  WFL  -RD       Oral Residue  WFL  -LR  WFL  -RD       Pharyngeal Phase  no overt signs/symptoms of pharyngeal impairment  -LR  suspected pharyngeal impairment  -RD       Esophageal Phase  unremarkable  -LR  --       Clinical Swallow Evaluation Summary  Swallow re-eval completed. Pt's daughter reported pt had choking episode last pm with solids. Pt given trials of nectar thick liquids via cup, pureed, and soft solids. Oral skill are WFL for consistencies presented. No overt s/s of aspiration with trials given. Recommend to continue Level 4 diet with nectar thick liquids. Educated daughter on safe swallowing precautions. Pt's daughter would like to pursue MBS to further assess swallowing, but feels her mother is too weak today to go to Radiology. Will f/u with MBS when daughter feels pt is ready.   -LR   Clinical dysphagia eval complete. Concern for aspiration PNA per chart. Dtr/pt report that pt frequently coughs w/ liquids at SNF for a while now. SLP saw pt 6/2018 and pt decided on comfort diet and refused swallow study at that time. Trials given of thins, nectar, pureed, and solid. Inconsistent wet vocal quality w/ large drinks of thins and coughing after multiple large consecutive drinks of nectar-thick via straw only. Pt impulsive w/ straw, but able to take single sips when cued. No s/s w/ single sips of nectar-thick, pureed, or solid. Mastication adequate for regular solid. Discussed s/s and risk of aspiration, safest vs. comfort diet, diet modifications, MBS vs. FEES, and dysphagia therapy w/ pt and pt's dtr. Also discussed risks and benefits of instrumental eval as well as balance of safest and overall quality of life. Pt/dtr undecided about completing swallowing study, as pt feeling too tired to go for exam today, but pt/dtr decided to trial modified diet to see if pt able to tolerate. SLP will modify diet to nectar-thick and Level-4 w/ reg meats/breads and f/u for diet tolerance and adjustments and determine if pt would like to complete MBS tomorrow vs. pursuing full/modified comfort diet.   -RD          Pharyngeal Phase Concerns    Pharyngeal Phase Concerns  --  cough;wet vocal quality  -RD       Wet Vocal Quality  --  thin;other (see comments) via large sips  -RD       Cough  --  nectar;other (see comments) via multiple large consecutive sips only  -RD       Pharyngeal Phase Concerns, Comment  --  Suspected pharyngeal dysphagia  -RD          Clinical Impression    SLP Swallowing Diagnosis  suspected pharyngeal dysfunction  -LR  suspected pharyngeal dysfunction  -RD       Functional Impact  risk of aspiration/pneumonia;risk of malnutrition;risk of dehydration  -LR  risk of aspiration/pneumonia;risk of malnutrition;risk of dehydration  -RD       Rehab Potential/Prognosis, Swallowing  adequate, monitor  progress closely  -LR  adequate, monitor progress closely;re-evaluate goals as necessary  -RD       Swallow Criteria for Skilled Therapeutic Interventions Met  demonstrates skilled criteria  -LR  demonstrates skilled criteria  -RD          Recommendations    Therapy Frequency (Swallow)  PRN;evaluation only  -LR  evaluation only;PRN  -RD       Predicted Duration Therapy Intervention (Days)  until discharge  -LR  2 weeks  -RD       SLP Diet Recommendation  mechanical soft with no mixed consistencies;nectar thick liquids  -LR  mechanical soft with no mixed consistencies;nectar thick liquids;other (see comments) small single sips only  -RD       Recommended Diagnostics  VFSS (MBS) when daughter agrees pt is ready  -LR  reassess via clinical swallow evaluation;other (see comments) f/u for MBS pending pt/family decisions  -RD       Recommended Precautions and Strategies  upright posture during/after eating;small bites of food and sips of liquid;no straw  -LR  upright posture during/after eating;small bites of food and sips of liquid;alternate between small bites of food and sips of liquid;other (see comments) general aspiration/reflux precautions; Oral care BID/PRN  -RD       SLP Rec. for Method of Medication Administration  meds whole;with pudding or applesauce  -LR  meds whole;with pudding or applesauce;as tolerated  -RD       Monitor for Signs of Aspiration  yes;notify SLP if any concerns  -LR  yes;notify SLP if any concerns;cough;gurgly voice;throat clearing;pneumonia;right lower lobe infiltrates  -RD       Anticipated Dischage Disposition  unknown  -LR  skilled nursing facility;anticipate therapy at next level of care  -RD         User Key  (r) = Recorded By, (t) = Taken By, (c) = Cosigned By    Initials Name Effective Dates    LR Kaylyn Tucekr MS CCC-SLP 06/22/15 -     Talia Palomino MS CCC-SLP 04/03/18 -           EDUCATION  The patient has been educated in the following areas:   Dysphagia (Swallowing  Impairment).    SLP Recommendation and Plan  SLP Swallowing Diagnosis: suspected pharyngeal dysfunction  SLP Diet Recommendation: mechanical soft with no mixed consistencies, nectar thick liquids  Recommended Precautions and Strategies: upright posture during/after eating, small bites of food and sips of liquid, no straw     Monitor for Signs of Aspiration: yes, notify SLP if any concerns  Recommended Diagnostics: [facility] [place] [place]when daughter agrees pt is ready)  Swallow Criteria for Skilled Therapeutic Interventions Met: demonstrates skilled criteria  Anticipated Dischage Disposition: unknown  Rehab Potential/Prognosis, Swallowing: adequate, monitor progress closely  Therapy Frequency (Swallow): PRN, evaluation only  Predicted Duration Therapy Intervention (Days): until discharge       Plan of Care Reviewed With: patient, daughter  Plan of Care Review  Plan of Care Reviewed With: patient, daughter  Progress: no change           Time Calculation:   Time Calculation- SLP     Row Name 02/08/19 1133             Time Calculation- SLP    SLP Start Time  1030  -LR      SLP Received On  02/08/19  -LR        User Key  (r) = Recorded By, (t) = Taken By, (c) = Cosigned By    Initials Name Provider Type    LR Kaylyn Tucker MS CCC-SLP Speech and Language Pathologist          Therapy Charges for Today     Code Description Service Date Service Provider Modifiers Qty    [id number] HC ST EVAL ORAL PHARYNG SWALLOW 4 2/8/2019 Kaylyn Tucker MS CCC-SLP GN 1               Kaylyn Tucker MS CCC-SLP  2/8/2019

## 2019-02-09 ENCOUNTER — APPOINTMENT (OUTPATIENT)
Dept: GENERAL RADIOLOGY | Facility: HOSPITAL | Age: 84
End: 2019-02-09

## 2019-02-09 LAB
ANION GAP SERPL CALCULATED.3IONS-SCNC: 6 MMOL/L (ref 3–11)
BUN BLD-MCNC: 15 MG/DL (ref 9–23)
BUN/CREAT SERPL: 23.1 (ref 7–25)
CALCIUM SPEC-SCNC: 8.7 MG/DL (ref 8.7–10.4)
CHLORIDE SERPL-SCNC: 110 MMOL/L (ref 99–109)
CO2 SERPL-SCNC: 26 MMOL/L (ref 20–31)
CREAT BLD-MCNC: 0.65 MG/DL (ref 0.6–1.3)
GFR SERPL CREATININE-BSD FRML MDRD: 86 ML/MIN/1.73
GLUCOSE BLD-MCNC: 101 MG/DL (ref 70–100)
MAGNESIUM SERPL-MCNC: 2.3 MG/DL (ref 1.3–2.7)
POTASSIUM BLD-SCNC: 3.8 MMOL/L (ref 3.5–5.5)
SODIUM BLD-SCNC: 142 MMOL/L (ref 132–146)

## 2019-02-09 PROCEDURE — 92611 MOTION FLUOROSCOPY/SWALLOW: CPT

## 2019-02-09 PROCEDURE — 25010000002 ENOXAPARIN PER 10 MG: Performed by: INTERNAL MEDICINE

## 2019-02-09 PROCEDURE — 80048 BASIC METABOLIC PNL TOTAL CA: CPT | Performed by: INTERNAL MEDICINE

## 2019-02-09 PROCEDURE — 99233 SBSQ HOSP IP/OBS HIGH 50: CPT | Performed by: NURSE PRACTITIONER

## 2019-02-09 PROCEDURE — 25010000002 PIPERACILLIN SOD-TAZOBACTAM PER 1 G: Performed by: INTERNAL MEDICINE

## 2019-02-09 PROCEDURE — 83735 ASSAY OF MAGNESIUM: CPT | Performed by: INTERNAL MEDICINE

## 2019-02-09 PROCEDURE — 74230 X-RAY XM SWLNG FUNCJ C+: CPT

## 2019-02-09 RX ORDER — FAMOTIDINE 20 MG/1
20 TABLET, FILM COATED ORAL DAILY
Status: DISCONTINUED | OUTPATIENT
Start: 2019-02-09 | End: 2019-02-13 | Stop reason: HOSPADM

## 2019-02-09 RX ADMIN — ISOSORBIDE MONONITRATE 60 MG: 60 TABLET, EXTENDED RELEASE ORAL at 08:16

## 2019-02-09 RX ADMIN — MEMANTINE 10 MG: 10 TABLET ORAL at 08:16

## 2019-02-09 RX ADMIN — ENOXAPARIN SODIUM 40 MG: 40 INJECTION SUBCUTANEOUS at 17:42

## 2019-02-09 RX ADMIN — FAMOTIDINE 20 MG: 20 TABLET ORAL at 12:47

## 2019-02-09 RX ADMIN — OXYCODONE HYDROCHLORIDE AND ACETAMINOPHEN 500 MG: 500 TABLET ORAL at 08:16

## 2019-02-09 RX ADMIN — GABAPENTIN 200 MG: 100 CAPSULE ORAL at 08:16

## 2019-02-09 RX ADMIN — BUSPIRONE HYDROCHLORIDE 7.5 MG: 15 TABLET ORAL at 08:16

## 2019-02-09 RX ADMIN — PANTOPRAZOLE SODIUM 40 MG: 40 TABLET, DELAYED RELEASE ORAL at 08:16

## 2019-02-09 RX ADMIN — AMLODIPINE BESYLATE 10 MG: 10 TABLET ORAL at 20:35

## 2019-02-09 RX ADMIN — BUSPIRONE HYDROCHLORIDE 7.5 MG: 15 TABLET ORAL at 20:32

## 2019-02-09 RX ADMIN — Medication 250 MG: at 20:34

## 2019-02-09 RX ADMIN — BARIUM SULFATE 20 ML: 400 PASTE ORAL at 10:38

## 2019-02-09 RX ADMIN — GABAPENTIN 200 MG: 100 CAPSULE ORAL at 16:10

## 2019-02-09 RX ADMIN — SUCRALFATE 1 G: 1 TABLET ORAL at 16:10

## 2019-02-09 RX ADMIN — MEMANTINE 10 MG: 10 TABLET ORAL at 20:33

## 2019-02-09 RX ADMIN — TERAZOSIN HYDROCHLORIDE 2 MG: 2 CAPSULE ORAL at 20:29

## 2019-02-09 RX ADMIN — HYDRALAZINE HYDROCHLORIDE 10 MG: 10 TABLET ORAL at 20:34

## 2019-02-09 RX ADMIN — SODIUM CHLORIDE, PRESERVATIVE FREE 3 ML: 5 INJECTION INTRAVENOUS at 22:13

## 2019-02-09 RX ADMIN — TRAMADOL HYDROCHLORIDE 50 MG: 50 TABLET, FILM COATED ORAL at 20:32

## 2019-02-09 RX ADMIN — GABAPENTIN 200 MG: 100 CAPSULE ORAL at 20:33

## 2019-02-09 RX ADMIN — TAZOBACTAM SODIUM AND PIPERACILLIN SODIUM 4.5 G: 500; 4 INJECTION, SOLUTION INTRAVENOUS at 04:08

## 2019-02-09 RX ADMIN — MELOXICAM 7.5 MG: 7.5 TABLET ORAL at 20:34

## 2019-02-09 RX ADMIN — MICONAZOLE NITRATE: 2 CREAM TOPICAL at 22:13

## 2019-02-09 RX ADMIN — SUCRALFATE 1 G: 1 TABLET ORAL at 06:36

## 2019-02-09 RX ADMIN — SODIUM CHLORIDE, PRESERVATIVE FREE 3 ML: 5 INJECTION INTRAVENOUS at 08:17

## 2019-02-09 RX ADMIN — TRAMADOL HYDROCHLORIDE 50 MG: 50 TABLET, FILM COATED ORAL at 08:16

## 2019-02-09 RX ADMIN — SUCRALFATE 1 G: 1 TABLET ORAL at 12:47

## 2019-02-09 RX ADMIN — HYDRALAZINE HYDROCHLORIDE 10 MG: 10 TABLET ORAL at 12:48

## 2019-02-09 RX ADMIN — HYDRALAZINE HYDROCHLORIDE 10 MG: 10 TABLET ORAL at 06:36

## 2019-02-09 RX ADMIN — CLOPIDOGREL BISULFATE 75 MG: 75 TABLET, FILM COATED ORAL at 08:16

## 2019-02-09 RX ADMIN — MICONAZOLE NITRATE: 2 CREAM TOPICAL at 08:17

## 2019-02-09 RX ADMIN — COLCHICINE 0.6 MG: 0.6 TABLET, FILM COATED ORAL at 08:16

## 2019-02-09 RX ADMIN — BARIUM SULFATE 100 ML: 0.81 POWDER, FOR SUSPENSION ORAL at 10:38

## 2019-02-09 RX ADMIN — SUCRALFATE 1 G: 1 TABLET ORAL at 20:36

## 2019-02-09 RX ADMIN — CETIRIZINE HYDROCHLORIDE 10 MG: 10 TABLET, FILM COATED ORAL at 08:17

## 2019-02-09 RX ADMIN — Medication 250 MG: at 08:16

## 2019-02-09 RX ADMIN — Medication 325 MG: at 08:16

## 2019-02-09 RX ADMIN — TRAZODONE HYDROCHLORIDE 25 MG: 50 TABLET ORAL at 20:35

## 2019-02-09 RX ADMIN — DONEPEZIL HYDROCHLORIDE 10 MG: 10 TABLET, FILM COATED ORAL at 20:32

## 2019-02-09 NOTE — MBS/VFSS/FEES
Acute Care - Speech Language Pathology   Swallow Initial Evaluation  Julien   Modified Barium Swallow Study (MBS)    Patient Name: Antonia Mata  : 1931  MRN: [id number]  Today's Date: 2019               Admit Date: 2019    Visit Dx:     ICD-10-CM ICD-9-CM   1. HCAP (healthcare-associated pneumonia) J18.9 486   2. Altered mental status, unspecified altered mental status type R41.82 780.97   3. Sepsis, due to unspecified organism (CMS/HCC) A41.9 038.9     995.91   4. Dysphagia, unspecified type R13.10 787.20     Patient Active Problem List   Diagnosis   • Aspiration pneumonia (CMS/HCC)   • Hypokalemia   • Dementia   • Hypertension   • GERD (gastroesophageal reflux disease)   • Normocytic anemia   • Elevated alkaline phosphatase level   • Vasovagal episode   • Acute hypoxemic respiratory failure (CMS/HCC)   • Constipation   • Aortic heart murmur   • HCAP (healthcare-associated pneumonia)   • Acute respiratory failure with hypoxia (CMS/HCC)   • Spinal stenosis   • Acute pain of left shoulder   • Left hand pain   • Frequent UTI   • Sepsis due to pneumonia (CMS/HCC)     Past Medical History:   Diagnosis Date   • Anxiety    • Arthropathy    • Constipation    • Dementia    • 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 URINE   • Essential hypertension    • GERD (gastroesophageal reflux disease)    • Hyperlipidemia    • Insomnia    • Spinal stenosis    • Syncope    • URI (upper respiratory infection)      Past Surgical History:   Procedure Laterality Date   • BLADDER SUSPENSION     • CHOLECYSTECTOMY     • HIP SURGERY     • HYSTERECTOMY     • TONSILLECTOMY          SWALLOW EVALUATION (last 72 hours)      [facility] Adult Swallow Evaluation     Row Name 19 1015 19 1030 19 1015             Rehab Evaluation    Document Type  evaluation  -LR  re-evaluation  -LR  evaluation  -RD      Subjective Information  no complaints  -LR  no complaints  -LR  complains of;dyspnea  -RD      Patient Observations   alert;cooperative;agree to therapy  -LR  alert;cooperative;agree to therapy  -LR  alert;cooperative;agree to therapy  -RD      Patient Effort  good  -LR  good  -LR  good  -RD         General Information    Patient Profile Reviewed  yes  -LR  yes  -LR  yes  -RD      Pertinent History Of Current Problem  Adm w/ HCAP, dementia, GERD, recurrent UTI, hx of aspiration PNA. Pt saw SLP during previous admission 6/6/18 and declined modified diet and instrumental at that time.   -LR  Per daughter, pt had choking episode last pm w/ dinner tray. Per daughter, pt started coughing from pna w/ food in mouth and became choked on food. Daughter reported she would like her mother to receive MBS to further assess swallow, but pt is too weak today. Swallow re-eval indicated given choking episode last pm.   -LR  Adm w/ HCAP, dementia, GERD, recurrent UTI, hx of aspiration PNA. Pt saw SLP during previous admission 6/6/18 and declined modified diet and instrumental at that time.   -RD      Current Method of Nutrition  mechanical soft, no mixed consistencies;nectar/syrup-thick liquids  -LR  mechanical soft, no mixed consistencies;nectar/syrup-thick liquids  -LR  regular textures;thin liquids  -RD      Precautions/Limitations, Vision  WFL;for purposes of eval  -LR  --  --      Precautions/Limitations, Hearing  WFL;for purposes of eval  -LR  --  WFL;for purposes of eval  -RD      Prior Level of Function-Communication  cognitive-linguistic impairment;other (see comments)  -LR  --  cognitive-linguistic impairment;other (see comments) dementia  -RD      Prior Level of Function-Swallowing  no diet consistency restrictions;other (see comments)  -LR  --  no diet consistency restrictions;other (see comments) coughing w/ liquids per pt/dtr  -RD      Plans/Goals Discussed with  patient;family;agreed upon  -LR  --  patient;family;agreed upon  -RD      Barriers to Rehab  previous functional deficit  -LR  --  previous functional deficit  -RD       Patient's Goals for Discharge  eat/drink without coughing/choking  -LR  --  eat/drink without coughing/choking;return to PO diet  -RD      Family Goals for Discharge  patient able to eat/drink without coughing/choking  -LR  --  patient able to eat/drink without coughing/choking;patient able to return to PO diet  -RD         Pain Assessment    Additional Documentation  --  --  Pain Scale: Numbers Pre/Post-Treatment (Group)  -RD         Pain Scale: Numbers Pre/Post-Treatment    Pain Scale: Numbers, Pretreatment  --  --  0/10 - no pain  -RD      Pain Scale: Numbers, Post-Treatment  --  --  0/10 - no pain  -RD         Oral Motor and Function    Dentition Assessment  --  natural, present and adequate  -LR  natural, present and adequate  -RD      Secretion Management  --  WNL/WFL  -LR  WNL/WFL  -RD      Mucosal Quality  --  moist, healthy  -LR  moist, healthy  -RD      Volitional Swallow  --  WFL  -LR  delayed  -RD      Volitional Cough  --  WFL  -LR  WFL  -RD         Oral Musculature and Cranial Nerve Assessment    Oral Motor General Assessment  --  generalized oral motor weakness  -LR  generalized oral motor weakness  -RD         General Eating/Swallowing Observations    Respiratory Support Currently in Use  --  nasal cannula  -LR  nasal cannula  -RD      Eating/Swallowing Skills  --  fed by SLP  -LR  fed by SLP;self-fed;rate is too fast  -RD      Positioning During Eating  --  upright 90 degree;upright in bed  -LR  upright 90 degree;upright in bed  -RD      Utensils Used  --  spoon;cup  -LR  spoon;cup;straw  -RD      Consistencies Trialed  --  nectar/syrup-thick liquids;pureed;mechanical soft, no mixed consistencies  -LR  thin liquids;nectar/syrup-thick liquids;pureed;regular textures  -RD         Respiratory    Respiratory Status  --  --  increase in respiratory rate;during swallowing/eating;other (see comments) w/ large sips of thins   -RD         Clinical Swallow Eval    Oral Prep Phase  --  WFL  -LR  WFL  -RD       Oral Transit  --  WFL  -LR  WFL  -RD      Oral Residue  --  WFL  -LR  WFL  -RD      Pharyngeal Phase  --  no overt signs/symptoms of pharyngeal impairment  -LR  suspected pharyngeal impairment  -RD      Esophageal Phase  --  unremarkable  -LR  --      Clinical Swallow Evaluation Summary  --  Swallow re-eval completed. Pt's daughter reported pt had choking episode last pm with solids. Pt given trials of nectar thick liquids via cup, pureed, and soft solids. Oral skill are WFL for consistencies presented. No overt s/s of aspiration with trials given. Recommend to continue Level 4 diet with nectar thick liquids. Educated daughter on safe swallowing precautions. Pt's daughter would like to pursue MBS to further assess swallowing, but feels her mother is too weak today to go to Radiology. Will f/u with MBS when daughter feels pt is ready.   -LR  Clinical dysphagia eval complete. Concern for aspiration PNA per chart. Dtr/pt report that pt frequently coughs w/ liquids at SNF for a while now. SLP saw pt 6/2018 and pt decided on comfort diet and refused swallow study at that time. Trials given of thins, nectar, pureed, and solid. Inconsistent wet vocal quality w/ large drinks of thins and coughing after multiple large consecutive drinks of nectar-thick via straw only. Pt impulsive w/ straw, but able to take single sips when cued. No s/s w/ single sips of nectar-thick, pureed, or solid. Mastication adequate for regular solid. Discussed s/s and risk of aspiration, safest vs. comfort diet, diet modifications, MBS vs. FEES, and dysphagia therapy w/ pt and pt's dtr. Also discussed risks and benefits of instrumental eval as well as balance of safest and overall quality of life. Pt/dtr undecided about completing swallowing study, as pt feeling too tired to go for exam today, but pt/dtr decided to trial modified diet to see if pt able to tolerate. SLP will modify diet to nectar-thick and Level-4 w/ reg meats/breads and f/u for diet  tolerance and adjustments and determine if pt would like to complete MBS tomorrow vs. pursuing full/modified comfort diet.   -RD         Pharyngeal Phase Concerns    Pharyngeal Phase Concerns  --  --  cough;wet vocal quality  -RD      Wet Vocal Quality  --  --  thin;other (see comments) via large sips  -RD      Cough  --  --  nectar;other (see comments) via multiple large consecutive sips only  -RD      Pharyngeal Phase Concerns, Comment  --  --  Suspected pharyngeal dysphagia  -RD         MBS/VFSS    Utensils Used  spoon;cup;straw  -LR  --  --      Consistencies Trialed  regular textures;pureed;thin liquids  -LR  --  --         MBS/VFSS Interpretation    Oral Prep Phase  WFL  -LR  --  --      Oral Transit Phase  WFL  -LR  --  --      Oral Residue  WFL  -LR  --  --      VFSS Summary  MBS completed. Pt given trials of thin liquids via spoon, cup, and straw, pudding, and solid. Pt presents with functional oral and pharyngeal swallow. Pt demonstrated mildly increased oral prep time with solid due to no partial in place during study.  Pt demonstrated premature spillage x 1 with thin liquids via cup. No penetration, aspiration, or pharyngeal residue present with any trials during the study. Recommend pt resume a regular diet with thin liquids and standard aspiration precautions.   -LR  --  --      Oral Phase, Comment  premature spillage x 1 with thin liquids via cup  -LR  --  --         Oral Transit Phase    Impaired Oral Transit Phase  premature spillage of liquids into pharynx  -LR  --  --      Premature Spillage of Liquids into Pharynx  thin liquids  -LR  --  --      Oral Transit Phase, Comment  premature spillage x 1 w/ thin liquids via cup  -LR  --  --         Initiation of Pharyngeal Swallow    Initiation of Pharyngeal Swallow  WFL  -LR  --  --      Pharyngeal Phase  functional pharyngeal phase of swallowing  -LR  --  --         SLP Communication to Radiology    Severity Level of Dysphagia  WFL  -LR  --  --          Clinical Impression    SLP Swallowing Diagnosis  functional oral phase;functional pharyngeal phase  -LR  suspected pharyngeal dysfunction  -LR  suspected pharyngeal dysfunction  -RD      Functional Impact  no impact on function  -LR  risk of aspiration/pneumonia;risk of malnutrition;risk of dehydration  -LR  risk of aspiration/pneumonia;risk of malnutrition;risk of dehydration  -RD      Rehab Potential/Prognosis, Swallowing  --  adequate, monitor progress closely  -LR  adequate, monitor progress closely;re-evaluate goals as necessary  -RD      Swallow Criteria for Skilled Therapeutic Interventions Met  no problems identified which require skilled intervention  -LR  demonstrates skilled criteria  -LR  demonstrates skilled criteria  -RD         Recommendations    Therapy Frequency (Swallow)  evaluation only  -LR  PRN;evaluation only  -LR  evaluation only;PRN  -RD      Predicted Duration Therapy Intervention (Days)  --  until discharge  -LR  2 weeks  -RD      SLP Diet Recommendation  regular textures;thin liquids  -LR  mechanical soft with no mixed consistencies;nectar thick liquids  -LR  mechanical soft with no mixed consistencies;nectar thick liquids;other (see comments) small single sips only  -RD      Recommended Diagnostics  --  VFSS (MBS) when daughter agrees pt is ready  -LR  reassess via clinical swallow evaluation;other (see comments) f/u for MBS pending pt/family decisions  -RD      Recommended Precautions and Strategies  upright posture during/after eating;small bites of food and sips of liquid  -LR  upright posture during/after eating;small bites of food and sips of liquid;no straw  -LR  upright posture during/after eating;small bites of food and sips of liquid;alternate between small bites of food and sips of liquid;other (see comments) general aspiration/reflux precautions; Oral care BID/PRN  -RD      SLP Rec. for Method of Medication Administration  meds whole;with pudding or applesauce  -LR  meds  whole;with pudding or applesauce  -LR  meds whole;with pudding or applesauce;as tolerated  -RD      Monitor for Signs of Aspiration  --  yes;notify SLP if any concerns  -LR  yes;notify SLP if any concerns;cough;gurgly voice;throat clearing;pneumonia;right lower lobe infiltrates  -RD      Anticipated Dischage Disposition  --  unknown  -LR  skilled nursing facility;anticipate therapy at next level of care  -RD        User Key  (r) = Recorded By, (t) = Taken By, (c) = Cosigned By    Initials Name Effective Dates    LR Kaylyn Tucker MS CCC-SLP 06/22/15 -     RD NicoleTalia garcia, MS CCC-SLP 04/03/18 -           EDUCATION  The patient has been educated in the following areas:   Dysphagia (Swallowing Impairment).    SLP Recommendation and Plan  SLP Swallowing Diagnosis: functional oral phase, functional pharyngeal phase  SLP Diet Recommendation: regular textures, thin liquids  Recommended Precautions and Strategies: upright posture during/after eating, small bites of food and sips of liquid           Swallow Criteria for Skilled Therapeutic Interventions Met: no problems identified which require skilled intervention        Therapy Frequency (Swallow): evaluation only          Plan of Care Reviewed With: patient(son-in-law)  Plan of Care Review  Plan of Care Reviewed With: patient(son-in-law)  Progress: no change           Time Calculation:   Time Calculation- SLP     Row Name 02/09/19 1107             Time Calculation- SLP    SLP Start Time  0830  -LR        User Key  (r) = Recorded By, (t) = Taken By, (c) = Cosigned By    Initials Name Provider Type    Kaylyn Mcdaniels MS CCC-SLP Speech and Language Pathologist          Therapy Charges for Today     Code Description Service Date Service Provider Modifiers Qty    91853422323 HC ST EVAL ORAL PHARYNG SWALLOW 4 2/8/2019 Kaylyn Tucker MS CCC-SLP GN 1    [id number] HC ST MOTION FLUORO EVAL SWALLOW 6 2/9/2019 Kaylyn Tucker MS CCC-SLP GN 1               Kaylyn Tucker  MS CCC-SLP  2/9/2019

## 2019-02-09 NOTE — PLAN OF CARE
Problem: Patient Care Overview  Goal: Plan of Care Review  Outcome: Ongoing (interventions implemented as appropriate)   02/09/19 1105   Coping/Psychosocial   Plan of Care Reviewed With patient  (son-in-law)   SLP evaluation and caregiver instruction completed. MBS completed. Will sign-off dysphagia. Please see note for further details and recommendations.

## 2019-02-09 NOTE — PROGRESS NOTES
[facility] Medicine Services  PROGRESS NOTE    Patient Name: Antonia Mata  : 1931  MRN: [id number]    Date of Admission: 2019  Length of Stay: 3  Primary Care Physician: Leti Munoz MD    Subjective   Subjective     CC: F/U pneumonia    HPI:  Resting comfortably in bed this morning.  Slept decently well last night but is still complaining of being tired.  Breathing is okay.  No increased shortness of breath.      Review of Systems  Gen- No further fevers, chills  CV- No chest pain, palpitations  Resp- Improved cough, dyspnea  GI- No N/V/D, abd pain    Otherwise ROS is negative except as mentioned in the HPI.    Objective   Objective     Vital Signs:   Temp:  [97.4 °F (36.3 °C)-98.4 °F (36.9 °C)] 97.4 °F (36.3 °C)  Heart Rate:  [73-90] 84  Resp:  [18] 18  BP: (134-195)/(49-85) 188/72        Physical Exam:  Constitutional: No acute distress, awake, alert, sitting up in bed  HENT: NCAT, mucous membranes moist  Respiratory: Clear to auscultation bilaterally, decreased in bases, respiratory effort normal on 02  Cardiovascular: RRR, no murmurs, rubs, or gallops  Gastrointestinal: Positive bowel sounds, soft, nontender, nondistended  Musculoskeletal: No bilateral ankle edema  Psychiatric: Appropriate affect, cooperative  Neurologic: Cranial Nerves grossly intact to confrontation, speech clear  Skin: No rashes    Exam unchanged from yesterday    Results Reviewed:  I have personally reviewed current lab, radiology, and data and agree.    Results from last 7 days   Lab Units 19  0750 19  0508 19  1221   WBC 10*3/mm3 6.12 10.37 11.79*   HEMOGLOBIN g/dL 13.7 11.2* 14.4   HEMATOCRIT % 42.3 35.0 43.2   PLATELETS 10*3/mm3 191 156 199     Results from last 7 days   Lab Units 19  0604 19  1740 19  0750  19  0508 19  1302  19  1220   SODIUM mmol/L 142  --  141  --  143 141   < >  --    POTASSIUM mmol/L 3.8 4.1 3.5   < > 2.9* 3.5    < >  --    CHLORIDE mmol/L 110*  --  107  --  109 105   < >  --    CO2 mmol/L 26.0  --  27.0  --  27.0 28.0   < >  --    BUN mg/dL 15  --  8*  --  16 13   < >  --    CREATININE mg/dL 0.65  --  0.62  --  0.88 0.65   < >  --    GLUCOSE mg/dL 101*  --  108*  --  101* 110*   < >  --    CALCIUM mg/dL 8.7  --  8.8  --  7.9* 8.5*   < >  --    ALT (SGPT) U/L  --   --   --   --   --  18  --   --    AST (SGOT) U/L  --   --   --   --   --  28  --   --    TROPONIN I ng/mL  --   --   --   --   --   --   --  0.06    < > = values in this interval not displayed.     Estimated Creatinine Clearance: 44.6 mL/min (by C-G formula based on SCr of 0.65 mg/dL).    No results found for: BNP    Microbiology Results Abnormal     Procedure Component Value - Date/Time    Blood Culture - Blood, Arm, Left [[id number]] Collected:  02/06/19 1220    Lab Status:  Preliminary result Specimen:  Blood from Arm, Left Updated:  02/08/19 1301     Blood Culture No growth at 2 days    Blood Culture - Blood, Arm, Right [[id number]] Collected:  02/06/19 1210    Lab Status:  Preliminary result Specimen:  Blood from Arm, Right Updated:  02/08/19 1301     Blood Culture No growth at 2 days    MRSA Screen, PCR - Swab, Nares [[id number]]  (Abnormal) Collected:  02/06/19 1458    Lab Status:  Final result Specimen:  Swab from Nares Updated:  02/06/19 1709     MRSA, PCR Positive    Influenza A & B, RT PCR - Swab, Nasopharynx [[id number]]  (Normal) Collected:  02/06/19 1458    Lab Status:  Final result Specimen:  Swab from Nasopharynx Updated:  02/06/19 1702     Influenza A PCR Not Detected     Influenza B PCR Not Detected          Imaging Results (last 24 hours)     ** No results found for the last 24 hours. **          Results for orders placed during the hospital encounter of 05/14/18   Adult Transthoracic Echo Complete W/ Cont if Necessary Per Protocol    Narrative · Mild aortic valve regurgitation is present.  · Calculated right ventricular systolic pressure from  tricuspid   regurgitation is 24 mmHg.  · Mild tricuspid valve regurgitation is present.  · Left ventricular systolic function is normal. Estimated EF = 60%.  · Left ventricular diastolic dysfunction (grade I) consistent with   impaired relaxation.  · There is no evidence of pericardial effusion.  · No evidence of pulmonary hypertension is present.  · The aortic valve exhibits sclerosis.  · Normal right ventricular cavity size, wall thickness, systolic function   and septal motion noted.  · Moderate MAC is present.          I have reviewed the medications:    Current Facility-Administered Medications:   •  ! Vanc trough due 11:00 am 2/8 - HOLD 12 noon dose until trough reviewed by Pharmacist, , Does not apply, Once, Katy Davenport MD  •  acetaminophen (TYLENOL) tablet 650 mg, 650 mg, Oral, Q6H PRN, Katy Davenport MD, 650 mg at 02/08/19 0904  •  amLODIPine (NORVASC) tablet 10 mg, 10 mg, Oral, Nightly, Katy Davenport MD, 10 mg at 02/08/19 2058  •  bisacodyl (DULCOLAX) EC tablet 5 mg, 5 mg, Oral, Daily PRN, Katy Davenport MD  •  busPIRone (BUSPAR) tablet 7.5 mg, 7.5 mg, Oral, BID, Katy Davenport MD, 7.5 mg at 02/09/19 0816  •  calcium carbonate (TUMS) chewable tablet 500 mg (200 mg elemental), 2 tablet, Oral, BID PRN, Katy Davenport MD  •  cetirizine (zyrTEC) tablet 10 mg, 10 mg, Oral, Daily, Katy Davenport MD, 10 mg at 02/09/19 0817  •  CloNIDine (CATAPRES) tablet 0.1 mg, 0.1 mg, Oral, BID PRN, Katy Davenport MD  •  clopidogrel (PLAVIX) tablet 75 mg, 75 mg, Oral, Daily, Katy Davenport MD, 75 mg at 02/09/19 0816  •  colchicine tablet 0.6 mg, 0.6 mg, Oral, Daily, Katy Davenport MD, 0.6 mg at 02/09/19 0816  •  docusate sodium (COLACE) capsule 100 mg, 100 mg, Oral, BID, Katy Davenport MD, 100 mg at 02/08/19 2058  •  donepezil (ARICEPT) tablet 10 mg, 10 mg, Oral, Nightly, Katy Davenport MD, 10 mg at 02/08/19 2055  •  enoxaparin (LOVENOX) syringe 40 mg, 40 mg, Subcutaneous, Q24H, Katy Davenport MD, 40 mg at 02/08/19 1751  •   ferrous sulfate tablet 325 mg, 325 mg, Oral, Daily With Breakfast, Katy aDvenport MD, 325 mg at 02/09/19 0816  •  gabapentin (NEURONTIN) capsule 200 mg, 200 mg, Oral, TID, Katy Davenport MD, 200 mg at 02/09/19 0816  •  hydrALAZINE (APRESOLINE) injection 10 mg, 10 mg, Intravenous, Q6H PRN, Katy Davenport MD, 10 mg at 02/08/19 0908  •  hydrALAZINE (APRESOLINE) tablet 10 mg, 10 mg, Oral, Q8H, Katy Davenport MD, 10 mg at 02/09/19 0636  •  isosorbide mononitrate (IMDUR) 24 hr tablet 60 mg, 60 mg, Oral, Daily, Katy Davenport MD, 60 mg at 02/09/19 0816  •  Magnesium Sulfate 2 gram Bolus, followed by 8 gram infusion (total Mg dose 10 grams)- Mg less than or equal to 1mg/dL, 2 g, Intravenous, PRN **OR** Magnesium Sulfate 2 gram / 50mL Infusion (GIVE X 3 BAGS TO EQUAL 6GM TOTAL DOSE) - Mg 1.1 - 1.5 mg/dl, 2 g, Intravenous, PRN **OR** Magnesium Sulfate 4 gram infusion- Mg 1.6-1.9 mg/dL, 4 g, Intravenous, PRN, Katy Davenport MD, Last Rate: 25 mL/hr at 02/08/19 0909, 4 g at 02/08/19 0909  •  meloxicam (MOBIC) tablet 7.5 mg, 7.5 mg, Oral, Nightly, Katy Davenport MD, 7.5 mg at 02/08/19 2058  •  memantine (NAMENDA) tablet 10 mg, 10 mg, Oral, Q12H, Katy Davenport MD, 10 mg at 02/09/19 0816  •  miconazole (MICOTIN) 2 % cream, , Topical, Q12H, Katy Davenport MD  •  pantoprazole (PROTONIX) EC tablet 40 mg, 40 mg, Oral, Daily, Katy Davenport MD, 40 mg at 02/09/19 0816  •  Pharmacy dosing:  Vancomycin & Zosyn, , Does not apply, Continuous PRN, Katy Davenport MD  •  piperacillin-tazobactam (ZOSYN) 4.5 g in iso-osmotic dextrose 100 mL IVPB (premix), 4.5 g, Intravenous, Q8H, Katy Davenport MD, 4.5 g at 02/09/19 0408  •  polyethylene glycol 3350 powder (packet), 17 g, Oral, Daily PRN, Katy Davenport MD  •  potassium chloride (MICRO-K) CR capsule 40 mEq, 40 mEq, Oral, PRN, 40 mEq at 02/08/19 1315 **OR** potassium chloride (KLOR-CON) packet 40 mEq, 40 mEq, Oral, PRN **OR** potassium chloride 10 mEq in 100 mL IVPB, 10 mEq, Intravenous, Q1H PRN,  Katy Davenport MD  •  saccharomyces boulardii (FLORASTOR) capsule 250 mg, 250 mg, Oral, BID, Katy Davenport MD, 250 mg at 02/09/19 0816  •  sodium chloride 0.9 % flush 10 mL, 10 mL, Intravenous, PRN, Jordan Whitt MD  •  sodium chloride 0.9 % flush 3 mL, 3 mL, Intravenous, Q12H, Katy Davenport MD, 3 mL at 02/09/19 0817  •  sodium chloride 0.9 % flush 3-10 mL, 3-10 mL, Intravenous, PRN, Katy Davenport MD  •  sucralfate (CARAFATE) tablet 1 g, 1 g, Oral, 4x Daily AC & at Bedtime, Katy Davenport MD, 1 g at 02/09/19 0636  •  terazosin (HYTRIN) capsule 2 mg, 2 mg, Oral, Nightly, Katy Davenport MD, 2 mg at 02/08/19 2055  •  traMADol (ULTRAM) tablet 50 mg, 50 mg, Oral, Q12H, Katy Davenport MD, 50 mg at 02/09/19 0816  •  traZODone (DESYREL) tablet 25 mg, 25 mg, Oral, Nightly, Katy Davenport MD, 25 mg at 02/08/19 2058  •  vancomycin 1250 mg/250 mL 0.9% NS IVPB ([facility]), 1,250 mg, Intravenous, Q24H, Justin Ribera RP, 1,250 mg at 02/08/19 1549  •  [START ON 2/11/2019] Vancomycin trough level prior to 1500 dose 2/11 . Please do not administer dose until level is evaluated for potential dose adjustments, , Does not apply, Continuous PRN, Justin Ribera RP  •  vitamin C (ASCORBIC ACID) tablet 500 mg, 500 mg, Oral, Daily, Katy Davenport MD, 500 mg at 02/09/19 0816      Assessment/Plan   Assessment / Plan     Active Hospital Problems    Diagnosis Date Noted   • **HCAP (healthcare-associated pneumonia) [J18.9] 06/03/2018   • Sepsis due to pneumonia (CMS/HCC) [J18.9, A41.9] 02/07/2019   • Frequent UTI [N39.0] 02/06/2019   • Acute hypoxemic respiratory failure (CMS/HCC) [J96.01] 05/14/2018   • GERD (gastroesophageal reflux disease) [K21.9] 03/29/2018   • Dementia [F03.90] 03/29/2018   • Hypertension [I10] 03/29/2018   • Hypokalemia [E87.6] 03/29/2018          Brief Hospital Course to date:  Antonia Mata is a 88 y.o. female with history of aspiration pneumonia, dementia, frequent UTI, and hypertension presenting from her  nursing home due to altered mental status and found to have RUL pneumonia.    Sepsis due to healthcare associated pneumonia (POA)  Acute hypoxemic respiratory failure (POA)  -Influenza PCR negative  -MRSA PCR positive  -Blood cultures NGTD, sputum culture pending  -Will de-escalate abx to augmentin and doxy today.    -SLP evaluation-on modified diet now     Metabolic encephalopathy  -Resolved  -Likely due to acute illness     Dementia  -Continue home medications     HTN  -Difficult to control  -Continue home medications with the exception of metoprolol due to concern for side effects with this new medication  -Increased IMDUR, added hydralazine 10mg TID-may increase if needed     GERD  -will switch PPI to H2 blocker while on antibiotics due to C-diff risk.  Can resume PPI once finished with antibiotic course.       Frequent UTI  -Recently started on prophylactic macrobid-continue at discharge  -UA on admission not suggestive of UTI    Hypokalemia  -Replaced    DVT Prophylaxis:  Lovenox    CODE STATUS:   Code Status and Medical Interventions:   Ordered at: 02/06/19 1450     Limited Support to NOT Include:    Intubation     Code Status:    No CPR     Medical Interventions (Level of Support Prior to Arrest):    Limited         Electronically signed by UMAIR Mendoza, 02/09/19, 10:26 AM.

## 2019-02-09 NOTE — PLAN OF CARE
Problem: Fall Risk (Adult)  Goal: Absence of Fall  Outcome: Ongoing (interventions implemented as appropriate)      Problem: Patient Care Overview  Goal: Plan of Care Review  Outcome: Ongoing (interventions implemented as appropriate)   02/09/19 0513   Coping/Psychosocial   Plan of Care Reviewed With patient   Plan of Care Review   Progress improving   OTHER   Outcome Summary Pt alert and oriented while awake. Currently denies pain. BP tends to be elevated, responds to scheduled meds to treat. Pt maintaining in NSR on 2L O2 via nasal cannula. Continue to monitor.       Problem: Skin Injury Risk (Adult)  Goal: Skin Health and Integrity  Outcome: Ongoing (interventions implemented as appropriate)

## 2019-02-09 NOTE — PLAN OF CARE
Problem: Patient Care Overview  Goal: Plan of Care Review  Outcome: Ongoing (interventions implemented as appropriate)   02/09/19 6537   Coping/Psychosocial   Plan of Care Reviewed With patient   Plan of Care Review   Progress Improving;  Patient had modified barium swallow today without difficulty and diet changed to regular with thin liquids.  Blood pressure continues to rise at certain times during the day.  More controlled with added PO hydralazine.  Patient appears more lethargic today; appropriately responds but slow to answer/process; tramadol and gabapentin restarted yesterday which was initially held to prevent sedation.

## 2019-02-10 PROCEDURE — 25010000002 ENOXAPARIN PER 10 MG: Performed by: INTERNAL MEDICINE

## 2019-02-10 PROCEDURE — 99232 SBSQ HOSP IP/OBS MODERATE 35: CPT | Performed by: NURSE PRACTITIONER

## 2019-02-10 RX ORDER — HYDRALAZINE HYDROCHLORIDE 25 MG/1
25 TABLET, FILM COATED ORAL EVERY 8 HOURS SCHEDULED
Status: DISCONTINUED | OUTPATIENT
Start: 2019-02-10 | End: 2019-02-11

## 2019-02-10 RX ADMIN — CLOPIDOGREL BISULFATE 75 MG: 75 TABLET, FILM COATED ORAL at 08:45

## 2019-02-10 RX ADMIN — HYDRALAZINE HYDROCHLORIDE 25 MG: 25 TABLET ORAL at 21:04

## 2019-02-10 RX ADMIN — TRAMADOL HYDROCHLORIDE 50 MG: 50 TABLET, FILM COATED ORAL at 08:45

## 2019-02-10 RX ADMIN — CETIRIZINE HYDROCHLORIDE 10 MG: 10 TABLET, FILM COATED ORAL at 08:44

## 2019-02-10 RX ADMIN — COLCHICINE 0.6 MG: 0.6 TABLET, FILM COATED ORAL at 08:45

## 2019-02-10 RX ADMIN — Medication 250 MG: at 08:45

## 2019-02-10 RX ADMIN — OXYCODONE HYDROCHLORIDE AND ACETAMINOPHEN 500 MG: 500 TABLET ORAL at 08:44

## 2019-02-10 RX ADMIN — GABAPENTIN 200 MG: 100 CAPSULE ORAL at 17:23

## 2019-02-10 RX ADMIN — MEMANTINE 10 MG: 10 TABLET ORAL at 21:04

## 2019-02-10 RX ADMIN — HYDRALAZINE HYDROCHLORIDE 25 MG: 25 TABLET ORAL at 15:09

## 2019-02-10 RX ADMIN — TRAZODONE HYDROCHLORIDE 25 MG: 50 TABLET ORAL at 21:04

## 2019-02-10 RX ADMIN — SUCRALFATE 1 G: 1 TABLET ORAL at 12:22

## 2019-02-10 RX ADMIN — ENOXAPARIN SODIUM 40 MG: 40 INJECTION SUBCUTANEOUS at 17:23

## 2019-02-10 RX ADMIN — Medication 250 MG: at 21:04

## 2019-02-10 RX ADMIN — FAMOTIDINE 20 MG: 20 TABLET ORAL at 08:46

## 2019-02-10 RX ADMIN — SUCRALFATE 1 G: 1 TABLET ORAL at 21:04

## 2019-02-10 RX ADMIN — MEMANTINE 10 MG: 10 TABLET ORAL at 08:45

## 2019-02-10 RX ADMIN — TRAMADOL HYDROCHLORIDE 50 MG: 50 TABLET, FILM COATED ORAL at 21:04

## 2019-02-10 RX ADMIN — ISOSORBIDE MONONITRATE 60 MG: 60 TABLET, EXTENDED RELEASE ORAL at 08:44

## 2019-02-10 RX ADMIN — MICONAZOLE NITRATE: 2 CREAM TOPICAL at 21:03

## 2019-02-10 RX ADMIN — BUSPIRONE HYDROCHLORIDE 7.5 MG: 15 TABLET ORAL at 08:43

## 2019-02-10 RX ADMIN — BUSPIRONE HYDROCHLORIDE 7.5 MG: 15 TABLET ORAL at 21:03

## 2019-02-10 RX ADMIN — SUCRALFATE 1 G: 1 TABLET ORAL at 17:23

## 2019-02-10 RX ADMIN — HYDRALAZINE HYDROCHLORIDE 10 MG: 10 TABLET ORAL at 06:36

## 2019-02-10 RX ADMIN — AMLODIPINE BESYLATE 10 MG: 10 TABLET ORAL at 21:04

## 2019-02-10 RX ADMIN — Medication 325 MG: at 08:44

## 2019-02-10 RX ADMIN — SODIUM CHLORIDE, PRESERVATIVE FREE 3 ML: 5 INJECTION INTRAVENOUS at 21:05

## 2019-02-10 RX ADMIN — MELOXICAM 7.5 MG: 7.5 TABLET ORAL at 21:04

## 2019-02-10 RX ADMIN — DONEPEZIL HYDROCHLORIDE 10 MG: 10 TABLET, FILM COATED ORAL at 21:04

## 2019-02-10 RX ADMIN — DOCUSATE SODIUM 100 MG: 100 CAPSULE, LIQUID FILLED ORAL at 08:46

## 2019-02-10 RX ADMIN — TERAZOSIN HYDROCHLORIDE 2 MG: 2 CAPSULE ORAL at 21:04

## 2019-02-10 RX ADMIN — SUCRALFATE 1 G: 1 TABLET ORAL at 06:36

## 2019-02-10 RX ADMIN — GABAPENTIN 200 MG: 100 CAPSULE ORAL at 08:53

## 2019-02-10 RX ADMIN — DOCUSATE SODIUM 100 MG: 100 CAPSULE, LIQUID FILLED ORAL at 21:04

## 2019-02-10 RX ADMIN — GABAPENTIN 200 MG: 100 CAPSULE ORAL at 21:04

## 2019-02-10 NOTE — PLAN OF CARE
Problem: Fall Risk (Adult)  Goal: Absence of Fall  Outcome: Ongoing (interventions implemented as appropriate)      Problem: Patient Care Overview  Goal: Plan of Care Review  Outcome: Ongoing (interventions implemented as appropriate)   02/10/19 0572   Coping/Psychosocial   Plan of Care Reviewed With patient   Plan of Care Review   Progress no change   OTHER   Outcome Summary Pt continues to be alert and oriented while awake. Denies pain. Utilizing purewick to manage urinary incontinence. Continues on 2L O2 via nc. No acute events during this shift.       Problem: Pneumonia (Adult)  Goal: Signs and Symptoms of Listed Potential Problems Will be Absent, Minimized or Managed (Pneumonia)  Outcome: Ongoing (interventions implemented as appropriate)

## 2019-02-10 NOTE — PROGRESS NOTES
[facility] Medicine Services  PROGRESS NOTE    Patient Name: Antonia Mata  : 1931  MRN: [id number]    Date of Admission: 2019  Length of Stay: 4  Primary Care Physician: Leti Munoz MD    Subjective   Subjective   CC: F/U pneumonia    HPI:  Patient lying in bed and having no complaints at this time.  Daughters in the room and is concerned the patient has not gotten up since her admission.  She states that the nursing home that she comes from is unable to care for her and prior to admission she was able to get around by herself in a wheelchair and get to the bathroom and feed herself.  Positive BM.  Still having episodes of hypertension, so will adjust her meds.    Review of Systems  Gen- No further fevers, chills  CV- No chest pain, palpitations  Resp- Improved cough, dyspnea  GI- No N/V/D, abd pain  Otherwise ROS is negative except as mentioned in the HPI.    Objective   Objective   Vital Signs:   Temp:  [97.3 °F (36.3 °C)-98.2 °F (36.8 °C)] 97.3 °F (36.3 °C)  Heart Rate:  [76-89] 84  Resp:  [16-18] 16  BP: (146-199)/() 167/59  Physical Exam:  Constitutional: No acute distress, awake, alert, sitting up in bed  HENT: NCAT, mucous membranes moist  Respiratory: Clear to auscultation bilaterally, decreased in bases, respiratory effort normal on 02  Cardiovascular: RRR, no murmurs, rubs, or gallops; +DP pulses bilaterally  Gastrointestinal: Positive bowel sounds, soft, nontender, nondistended  Musculoskeletal: No bilateral ankle edema  Psychiatric: Appropriate affect, cooperative  Neurologic: Cranial Nerves grossly intact to confrontation, speech clear  Skin: No rashes on exposed skin    Results Reviewed:  I have personally reviewed current lab, radiology, and data and agree.    Results from last 7 days   Lab Units 19  0750 19  0508 19  1221   WBC 10*3/mm3 6.12 10.37 11.79*   HEMOGLOBIN g/dL 13.7 11.2* 14.4   HEMATOCRIT % 42.3 35.0 43.2    PLATELETS 10*3/mm3 191 156 199     Results from last 7 days   Lab Units 02/09/19  0604 02/08/19  1740 02/08/19  0750  02/07/19  0508 02/06/19  1302  02/06/19  1220   SODIUM mmol/L 142  --  141  --  143 141   < >  --    POTASSIUM mmol/L 3.8 4.1 3.5   < > 2.9* 3.5   < >  --    CHLORIDE mmol/L 110*  --  107  --  109 105   < >  --    CO2 mmol/L 26.0  --  27.0  --  27.0 28.0   < >  --    BUN mg/dL 15  --  8*  --  16 13   < >  --    CREATININE mg/dL 0.65  --  0.62  --  0.88 0.65   < >  --    GLUCOSE mg/dL 101*  --  108*  --  101* 110*   < >  --    CALCIUM mg/dL 8.7  --  8.8  --  7.9* 8.5*   < >  --    ALT (SGPT) U/L  --   --   --   --   --  18  --   --    AST (SGOT) U/L  --   --   --   --   --  28  --   --    TROPONIN I ng/mL  --   --   --   --   --   --   --  0.06    < > = values in this interval not displayed.     Estimated Creatinine Clearance: 44.6 mL/min (by C-G formula based on SCr of 0.65 mg/dL).    No results found for: BNP    Microbiology Results Abnormal     Procedure Component Value - Date/Time    Blood Culture - Blood, Arm, Left [[id number]] Collected:  02/06/19 1220    Lab Status:  Preliminary result Specimen:  Blood from Arm, Left Updated:  02/09/19 1300     Blood Culture No growth at 3 days    Blood Culture - Blood, Arm, Right [[id number]] Collected:  02/06/19 1210    Lab Status:  Preliminary result Specimen:  Blood from Arm, Right Updated:  02/09/19 1300     Blood Culture No growth at 3 days    MRSA Screen, PCR - Swab, Nares [[id number]]  (Abnormal) Collected:  02/06/19 1458    Lab Status:  Final result Specimen:  Swab from Nares Updated:  02/06/19 1709     MRSA, PCR Positive    Influenza A & B, RT PCR - Swab, Nasopharynx [[id number]]  (Normal) Collected:  02/06/19 2150    Lab Status:  Final result Specimen:  Swab from Nasopharynx Updated:  02/06/19 3772     Influenza A PCR Not Detected     Influenza B PCR Not Detected        Imaging Results (last 24 hours)     Procedure Component Value Units Date/Time    FL  Video Swallow With Speech [[id number]] Collected:  02/09/19 1426     Updated:  02/09/19 1427    Narrative:       EXAMINATION: FL VIDEO SWALLOW W/SPEECH - 2/9/2019     INDICATION: J18.9-Pneumonia, unspecified organism; R41.82-Altered mental  status, unspecified; A41.9-Sepsis, unspecified organism;  R13.10-Dysphagia, unspecified. Difficulty swallowing.     TECHNIQUE: 1 minute and 18 seconds of fluoroscopy used for modified  barium swallow. 6 images available for evaluation.     COMPARISON: NONE     FINDINGS: Patient referred for modified barium swallow. The patient was  evaluated in the sitting position. Various consistencies of barium were  administered to the patient. No evidence of aspiration was visualized  for the examination. Please see the speech therapy team report for full  details.       Impression:       Fluoroscopy for modified barium swallow. Please see the  speech therapy team report for full details.     DICTATED:   2/9/2019  EDITED/ls :   2/9/2019                Results for orders placed during the hospital encounter of 05/14/18   Adult Transthoracic Echo Complete W/ Cont if Necessary Per Protocol    Narrative · Mild aortic valve regurgitation is present.  · Calculated right ventricular systolic pressure from tricuspid   regurgitation is 24 mmHg.  · Mild tricuspid valve regurgitation is present.  · Left ventricular systolic function is normal. Estimated EF = 60%.  · Left ventricular diastolic dysfunction (grade I) consistent with   impaired relaxation.  · There is no evidence of pericardial effusion.  · No evidence of pulmonary hypertension is present.  · The aortic valve exhibits sclerosis.  · Normal right ventricular cavity size, wall thickness, systolic function   and septal motion noted.  · Moderate MAC is present.        I have reviewed the medications:    Current Facility-Administered Medications:   •  acetaminophen (TYLENOL) tablet 650 mg, 650 mg, Oral, Q6H PRN, Katy Davenport MD, 650 mg at  02/08/19 0904  •  amLODIPine (NORVASC) tablet 10 mg, 10 mg, Oral, Nightly, Katy Davenport MD, 10 mg at 02/09/19 2035  •  bisacodyl (DULCOLAX) EC tablet 5 mg, 5 mg, Oral, Daily PRN, Katy Davenport MD  •  busPIRone (BUSPAR) tablet 7.5 mg, 7.5 mg, Oral, BID, Katy Davenport MD, 7.5 mg at 02/10/19 0843  •  calcium carbonate (TUMS) chewable tablet 500 mg (200 mg elemental), 2 tablet, Oral, BID PRN, Katy Davenport MD  •  cetirizine (zyrTEC) tablet 10 mg, 10 mg, Oral, Daily, Katy Davenport MD, 10 mg at 02/10/19 0844  •  CloNIDine (CATAPRES) tablet 0.1 mg, 0.1 mg, Oral, BID PRN, Katy Davenport MD  •  clopidogrel (PLAVIX) tablet 75 mg, 75 mg, Oral, Daily, Katy Davenport MD, 75 mg at 02/10/19 0845  •  colchicine tablet 0.6 mg, 0.6 mg, Oral, Daily, Katy Davenport MD, 0.6 mg at 02/10/19 0845  •  docusate sodium (COLACE) capsule 100 mg, 100 mg, Oral, BID, Katy Davenport MD, 100 mg at 02/10/19 0846  •  donepezil (ARICEPT) tablet 10 mg, 10 mg, Oral, Nightly, Katy Davenport MD, 10 mg at 02/09/19 2032  •  enoxaparin (LOVENOX) syringe 40 mg, 40 mg, Subcutaneous, Q24H, Katy Davenport MD, 40 mg at 02/09/19 1742  •  famotidine (PEPCID) tablet 20 mg, 20 mg, Oral, Daily, Earle, Karol, APRN, 20 mg at 02/10/19 0846  •  ferrous sulfate tablet 325 mg, 325 mg, Oral, Daily With Breakfast, Katy Davenport MD, 325 mg at 02/10/19 0844  •  gabapentin (NEURONTIN) capsule 200 mg, 200 mg, Oral, TID, Katy Davenport MD, 200 mg at 02/10/19 0853  •  hydrALAZINE (APRESOLINE) injection 10 mg, 10 mg, Intravenous, Q6H PRN, Katy Davenport MD, 10 mg at 02/08/19 0908  •  hydrALAZINE (APRESOLINE) tablet 10 mg, 10 mg, Oral, Q8H, Katy Davenport MD, 10 mg at 02/10/19 0636  •  isosorbide mononitrate (IMDUR) 24 hr tablet 60 mg, 60 mg, Oral, Daily, Katy Davenport MD, 60 mg at 02/10/19 0844  •  Magnesium Sulfate 2 gram Bolus, followed by 8 gram infusion (total Mg dose 10 grams)- Mg less than or equal to 1mg/dL, 2 g, Intravenous, PRN **OR** Magnesium Sulfate 2 gram / 50mL  Infusion (GIVE X 3 BAGS TO EQUAL 6GM TOTAL DOSE) - Mg 1.1 - 1.5 mg/dl, 2 g, Intravenous, PRN **OR** Magnesium Sulfate 4 gram infusion- Mg 1.6-1.9 mg/dL, 4 g, Intravenous, PRN, Katy Davenport MD, Last Rate: 25 mL/hr at 02/08/19 0909, 4 g at 02/08/19 0909  •  meloxicam (MOBIC) tablet 7.5 mg, 7.5 mg, Oral, Nightly, Katy Davenport MD, 7.5 mg at 02/09/19 2034  •  memantine (NAMENDA) tablet 10 mg, 10 mg, Oral, Q12H, Katy Davenport MD, 10 mg at 02/10/19 0845  •  miconazole (MICOTIN) 2 % cream, , Topical, Q12H, Katy Davenport MD  •  polyethylene glycol 3350 powder (packet), 17 g, Oral, Daily PRN, Katy Davenport MD  •  potassium chloride (MICRO-K) CR capsule 40 mEq, 40 mEq, Oral, PRN, 40 mEq at 02/08/19 1315 **OR** potassium chloride (KLOR-CON) packet 40 mEq, 40 mEq, Oral, PRN **OR** potassium chloride 10 mEq in 100 mL IVPB, 10 mEq, Intravenous, Q1H PRN, Katy Davenport MD  •  saccharomyces boulardii (FLORASTOR) capsule 250 mg, 250 mg, Oral, BID, Katy Davenport MD, 250 mg at 02/10/19 0845  •  sodium chloride 0.9 % flush 10 mL, 10 mL, Intravenous, PRN, Jordan Whitt MD  •  sodium chloride 0.9 % flush 3 mL, 3 mL, Intravenous, Q12H, Katy Davenport MD, 3 mL at 02/09/19 2213  •  sodium chloride 0.9 % flush 3-10 mL, 3-10 mL, Intravenous, PRN, Katy Davenport MD  •  sucralfate (CARAFATE) tablet 1 g, 1 g, Oral, 4x Daily AC & at Bedtime, Katy Davenport MD, 1 g at 02/10/19 0636  •  terazosin (HYTRIN) capsule 2 mg, 2 mg, Oral, Nightly, Katy Davenport MD, 2 mg at 02/09/19 2029  •  traMADol (ULTRAM) tablet 50 mg, 50 mg, Oral, Q12H, Katy Davenport MD, 50 mg at 02/10/19 0845  •  traZODone (DESYREL) tablet 25 mg, 25 mg, Oral, Nightly, Katy Davenport MD, 25 mg at 02/09/19 2035  •  vitamin C (ASCORBIC ACID) tablet 500 mg, 500 mg, Oral, Daily, Katy Davenport MD, 500 mg at 02/10/19 0844      Assessment/Plan   Assessment / Plan     Active Hospital Problems    Diagnosis Date Noted   • **HCAP (healthcare-associated pneumonia) [J18.9] 06/03/2018    • Sepsis due to pneumonia (CMS/[facility]) [J18.9, A41.9] 02/07/2019   • Frequent UTI [N39.0] 02/06/2019   • Acute hypoxemic respiratory failure (CMS/[facility]) [J96.01] 05/14/2018   • GERD (gastroesophageal reflux disease) [K21.9] 03/29/2018   • Dementia [F03.90] 03/29/2018   • Hypertension [I10] 03/29/2018   • Hypokalemia [E87.6] 03/29/2018     Brief Hospital Course to date:  Antonia Mata is a 88 y.o. female with history of aspiration pneumonia, dementia, frequent UTI, and hypertension presenting from her nursing home due to altered mental status and found to have RUL pneumonia.    Sepsis due to healthcare associated pneumonia (POA)  Acute hypoxemic respiratory failure (POA)  -Influenza PCR negative  -MRSA PCR positive  -Blood cultures NGTD, sputum culture pending  -Will de-escalate abx to augmentin and doxy today.    -SLP evaluation-on modified diet now     Metabolic encephalopathy  -Resolved  -Likely due to acute illness     Dementia  -Continue home medications     HTN  -Difficult to control  -Continue home medications with the exception of metoprolol due to concern for side effects with this new medication  -Increased IMDUR, increased hydralazine 10mg TID; continued amlodipine     GERD  -will switch PPI to H2 blocker while on antibiotics due to C-diff risk.  Can resume PPI once finished with antibiotic course.       Frequent UTI  -Recently started on prophylactic macrobid-continue at discharge  -UA on admission not suggestive of UTI    Hypokalemia  -Replaced    Mobility  --Consulted PT/OT    DVT Prophylaxis:  Lovenox    CODE STATUS:   Code Status and Medical Interventions:   Ordered at: 02/06/19 1450     Limited Support to NOT Include:    Intubation     Code Status:    No CPR     Medical Interventions (Level of Support Prior to Arrest):    Limited     Dispo:  Need to wait until Monday AM to transfer back to [facility] per [facility]    Electronically signed by UMAIR Dumont, 02/10/19, 9:21 AM.

## 2019-02-11 PROBLEM — R53.1 GENERALIZED WEAKNESS: Status: ACTIVE | Noted: 2019-02-11

## 2019-02-11 LAB
BACTERIA SPEC AEROBE CULT: NORMAL
BACTERIA SPEC AEROBE CULT: NORMAL

## 2019-02-11 PROCEDURE — 97166 OT EVAL MOD COMPLEX 45 MIN: CPT | Performed by: OCCUPATIONAL THERAPIST

## 2019-02-11 PROCEDURE — 97162 PT EVAL MOD COMPLEX 30 MIN: CPT

## 2019-02-11 PROCEDURE — 99233 SBSQ HOSP IP/OBS HIGH 50: CPT | Performed by: NURSE PRACTITIONER

## 2019-02-11 PROCEDURE — 25010000002 ENOXAPARIN PER 10 MG: Performed by: INTERNAL MEDICINE

## 2019-02-11 PROCEDURE — 97530 THERAPEUTIC ACTIVITIES: CPT

## 2019-02-11 RX ORDER — HYDRALAZINE HYDROCHLORIDE 50 MG/1
50 TABLET, FILM COATED ORAL EVERY 8 HOURS SCHEDULED
Status: DISCONTINUED | OUTPATIENT
Start: 2019-02-11 | End: 2019-02-13 | Stop reason: HOSPADM

## 2019-02-11 RX ADMIN — GABAPENTIN 200 MG: 100 CAPSULE ORAL at 17:00

## 2019-02-11 RX ADMIN — TRAZODONE HYDROCHLORIDE 25 MG: 50 TABLET ORAL at 21:24

## 2019-02-11 RX ADMIN — CETIRIZINE HYDROCHLORIDE 10 MG: 10 TABLET, FILM COATED ORAL at 08:00

## 2019-02-11 RX ADMIN — BUSPIRONE HYDROCHLORIDE 7.5 MG: 15 TABLET ORAL at 08:01

## 2019-02-11 RX ADMIN — HYDRALAZINE HYDROCHLORIDE 50 MG: 50 TABLET, FILM COATED ORAL at 21:23

## 2019-02-11 RX ADMIN — ISOSORBIDE MONONITRATE 60 MG: 60 TABLET, EXTENDED RELEASE ORAL at 08:01

## 2019-02-11 RX ADMIN — MICONAZOLE NITRATE: 2 CREAM TOPICAL at 08:05

## 2019-02-11 RX ADMIN — Medication 325 MG: at 08:03

## 2019-02-11 RX ADMIN — Medication 250 MG: at 08:03

## 2019-02-11 RX ADMIN — SUCRALFATE 1 G: 1 TABLET ORAL at 21:24

## 2019-02-11 RX ADMIN — BUSPIRONE HYDROCHLORIDE 7.5 MG: 15 TABLET ORAL at 21:24

## 2019-02-11 RX ADMIN — MELOXICAM 7.5 MG: 7.5 TABLET ORAL at 21:24

## 2019-02-11 RX ADMIN — AMLODIPINE BESYLATE 10 MG: 10 TABLET ORAL at 21:24

## 2019-02-11 RX ADMIN — MEMANTINE 10 MG: 10 TABLET ORAL at 21:23

## 2019-02-11 RX ADMIN — GABAPENTIN 200 MG: 100 CAPSULE ORAL at 08:00

## 2019-02-11 RX ADMIN — FAMOTIDINE 20 MG: 20 TABLET ORAL at 08:00

## 2019-02-11 RX ADMIN — HYDRALAZINE HYDROCHLORIDE 25 MG: 25 TABLET ORAL at 05:34

## 2019-02-11 RX ADMIN — MEMANTINE 10 MG: 10 TABLET ORAL at 08:00

## 2019-02-11 RX ADMIN — OXYCODONE HYDROCHLORIDE AND ACETAMINOPHEN 500 MG: 500 TABLET ORAL at 08:03

## 2019-02-11 RX ADMIN — SUCRALFATE 1 G: 1 TABLET ORAL at 17:00

## 2019-02-11 RX ADMIN — DONEPEZIL HYDROCHLORIDE 10 MG: 10 TABLET, FILM COATED ORAL at 21:24

## 2019-02-11 RX ADMIN — COLCHICINE 0.6 MG: 0.6 TABLET, FILM COATED ORAL at 08:03

## 2019-02-11 RX ADMIN — HYDRALAZINE HYDROCHLORIDE 50 MG: 50 TABLET, FILM COATED ORAL at 14:34

## 2019-02-11 RX ADMIN — Medication 250 MG: at 21:23

## 2019-02-11 RX ADMIN — TERAZOSIN HYDROCHLORIDE 2 MG: 2 CAPSULE ORAL at 21:24

## 2019-02-11 RX ADMIN — MICONAZOLE NITRATE: 2 CREAM TOPICAL at 21:24

## 2019-02-11 RX ADMIN — TRAMADOL HYDROCHLORIDE 50 MG: 50 TABLET, FILM COATED ORAL at 08:03

## 2019-02-11 RX ADMIN — GABAPENTIN 200 MG: 100 CAPSULE ORAL at 21:23

## 2019-02-11 RX ADMIN — TRAMADOL HYDROCHLORIDE 50 MG: 50 TABLET, FILM COATED ORAL at 21:23

## 2019-02-11 RX ADMIN — SUCRALFATE 1 G: 1 TABLET ORAL at 12:10

## 2019-02-11 RX ADMIN — ENOXAPARIN SODIUM 40 MG: 40 INJECTION SUBCUTANEOUS at 17:01

## 2019-02-11 RX ADMIN — DOCUSATE SODIUM 100 MG: 100 CAPSULE, LIQUID FILLED ORAL at 21:24

## 2019-02-11 RX ADMIN — CLOPIDOGREL BISULFATE 75 MG: 75 TABLET, FILM COATED ORAL at 08:03

## 2019-02-11 RX ADMIN — SUCRALFATE 1 G: 1 TABLET ORAL at 07:58

## 2019-02-11 NOTE — THERAPY EVALUATION
Acute Care - Occupational Therapy Initial Evaluation  [facility]     Patient Name: Antonia Mtaa  : 1931  MRN: [id number]  Today's Date: 2019  Onset of Illness/Injury or Date of Surgery: 19  Date of Referral to OT: 19  Referring Physician: MD Jatinder    Admit Date: 2019       ICD-10-CM ICD-9-CM   1. HCAP (healthcare-associated pneumonia) J18.9 486   2. Altered mental status, unspecified altered mental status type R41.82 780.97   3. Sepsis, due to unspecified organism (CMS/HCC) A41.9 038.9     995.91   4. Dysphagia, unspecified type R13.10 787.20   5. Impaired mobility and ADLs Z74.09 799.89     Patient Active Problem List   Diagnosis   • Aspiration pneumonia (CMS/HCC)   • Hypokalemia   • Dementia   • Hypertension   • GERD (gastroesophageal reflux disease)   • Normocytic anemia   • Elevated alkaline phosphatase level   • Vasovagal episode   • Acute hypoxemic respiratory failure (CMS/HCC)   • Constipation   • Aortic heart murmur   • HCAP (healthcare-associated pneumonia)   • Acute respiratory failure with hypoxia (CMS/HCC)   • Spinal stenosis   • Acute pain of left shoulder   • Left hand pain   • Frequent UTI   • Sepsis due to pneumonia (CMS/HCC)     Past Medical History:   Diagnosis Date   • Anxiety    • Arthropathy    • Constipation    • Dementia    • 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 URINE   • Essential hypertension    • GERD (gastroesophageal reflux disease)    • Hyperlipidemia    • Insomnia    • Spinal stenosis    • Syncope    • URI (upper respiratory infection)      Past Surgical History:   Procedure Laterality Date   • BLADDER SUSPENSION     • CHOLECYSTECTOMY     • HIP SURGERY     • HYSTERECTOMY     • TONSILLECTOMY            OT ASSESSMENT FLOWSHEET (last 72 hours)      Occupational Therapy Evaluation     Row Name 19 0856                   OT Evaluation Time/Intention    Subjective Information  complains of;pain  -SD        Document Type  evaluation  -SD        Mode of Treatment   occupational therapy  -SD        Total Evaluation Minutes, Occupational Therapy  34  -SD        Patient Effort  good  -SD        Symptoms Noted During/After Treatment  none  -SD        Comment  pt. remarked that it felt good to sit up in the chair  -SD           General Information    Patient Profile Reviewed?  yes  -SD        Onset of Illness/Injury or Date of Surgery  02/06/19  -SD        Referring Physician  MD Jatinder  -SD        Patient Observations  alert;cooperative;agree to therapy  -SD        Patient/Family Observations  Pt. supine in bed. Pt.'s dtr. present at bedside.  -SD        General Observations of Patient  Pt. on tele, O2 sat monitor, bed check, IV heplocked, & purewick catheter.  -SD        Prior Level of Function  min assist:;transfer;mod assist:;ADL's  -SD        Equipment Currently Used at Home  wheelchair  -SD        Pertinent History of Current Functional Problem  87y/o WF presents from NH with AMS & RUL pneumonia.  -SD        Existing Precautions/Restrictions  fall  -SD        Risks Reviewed  patient and family:;LOB;increased discomfort  -SD        Benefits Reviewed  patient and family:;improve function;increase independence;increase strength;increase balance;decrease pain;increase knowledge  -SD        Barriers to Rehab  previous functional deficit  -SD           Relationship/Environment    Primary Source of Support/Comfort  child(maría)  -SD        Lives With  facility resident  -SD           Resource/Environmental Concerns    Current Living Arrangements  extended care facility  -SD        Resource/Environmental Concerns  none  -SD        Transportation Concerns  car, none  -SD           Cognitive Assessment/Intervention- PT/OT    Orientation Status (Cognition)  oriented to;person;place  -SD        Follows Commands (Cognition)  follows one step commands;75-90% accuracy;verbal cues/prompting required  -SD        Safety Deficit (Cognitive)  mild deficit;insight into deficits/self  awareness;safety precautions awareness  -SD           Bed Mobility Assessment/Treatment    Bed Mobility Assessment/Treatment  supine-sit;scooting/bridging  -SD        Scooting/Bridging [place] (Bed Mobility)  maximum assist (25% patient effort)  -SD        Supine-Sit [place] (Bed Mobility)  maximum assist (25% patient effort)  -SD        Bed Mobility, Safety Issues  decreased use of arms for pushing/pulling;decreased use of legs for bridging/pushing  -SD        Assistive Device (Bed Mobility)  bed rails;draw sheet;head of bed elevated  -SD        Comment (Bed Mobility)  pt. stated that this bed is difficult to move in; pt. in specialty bed  -SD           Transfer Assessment/Treatment    Transfer Assessment/Treatment  bed-chair transfer;sit-stand transfer;stand-sit transfer  -SD           Bed-Chair Transfer    Bed-Chair [place] (Transfers)  maximum assist (25% patient effort);2 person assist  -SD           Sit-Stand Transfer    Sit-Stand [place] (Transfers)  maximum assist (25% patient effort);2 person assist  -SD           Stand-Sit Transfer    Stand-Sit [place] (Transfers)  moderate assist (50% patient effort);2 person assist  -SD           ADL Assessment/Intervention    BADL Assessment/Intervention  upper body dressing;lower body dressing  -SD           Upper Body Dressing Assessment/Training    Upper Body Dressing [place] Level  don;pajama/robe;moderate assist (50% patient effort)  -SD        Upper Body Dressing Position  edge of bed sitting  -SD           Lower Body Dressing Assessment/Training    Lower Body Dressing [place] Level  don;socks;dependent (less than 25% patient effort)  -SD        Lower Body Dressing Position  edge of bed sitting  -SD           General ROM    GENERAL ROM COMMENTS  B UE AROM WFL (limited to 100 degrees shldr flex B)  -SD           MMT (Manual Muscle Testing)    General MMT Comments  B UE Strength 4-/5 grossly  -SD           Motor  Assessment/Interventions    Additional Documentation  Balance (Group)  -SD           Balance    Balance  static sitting balance;static standing balance  -SD           Static Sitting Balance    Level of [place] (Unsupported Sitting, Static Balance)  contact guard assist  -SD        Sitting Position (Unsupported Sitting, Static Balance)  sitting on edge of bed  -SD        Time Able to Maintain Position (Unsupported Sitting, Static Balance)  more than 5 minutes  -SD        Comment (Unsupported Sitting, Static Balance)  v/c's to shift to midline, pt. leaning to L side  -SD           Static Standing Balance    Level of [place] (Supported Standing, Static Balance)  maximal assist, 25 to 49% patient effort;2 person assist  -SD        Time Able to Maintain Position (Supported Standing, Static Balance)  less than 15 seconds  -SD           Positioning and Restraints    Pre-Treatment Position  in bed  -SD        Post Treatment Position  chair  -SD        In Chair  notified nsg;reclined;call light within reach;encouraged to call for assist;with family/caregiver;legs elevated  -SD           Pain Assessment    Additional Documentation  Pain Scale: FACES Pre/Post-Treatment (Group)  -SD           Pain Scale: FACES Pre/Post-Treatment    Pain: FACES Scale, Pretreatment  2-->hurts little bit  -SD        Pain: FACES Scale, Post-Treatment  2-->hurts little bit  -SD        Pre/Post Treatment Pain Comment  pt.'s dtr. stated that pt. is in a constant state of pain with stenosis & joint pain  -SD           Coping    Observed Emotional State  accepting;calm;cooperative  -SD        Verbalized Emotional State  acceptance  -SD           Plan of Care Review    Plan of Care Reviewed With  patient;daughter  -SD           Clinical Impression (OT)    Date of Referral to OT  02/11/19  -SD        OT Diagnosis  decreased ADL & functional mobility independence  -SD        Patient/Family Goals Statement (OT Eval)  pt.'s dtr. stated that she  would like for pt. to be able to continue t/f'-ing herself in & out of w/c, so that she can go on outings with the family  -SD        Criteria for Skilled Therapeutic Interventions Met (OT Eval)  yes;treatment indicated  -SD        Rehab Potential (OT Eval)  good, to achieve stated therapy goals  -SD        Therapy Frequency (OT Eval)  daily  -SD        Care Plan Review (OT)  evaluation/treatment results reviewed;care plan/treatment goals reviewed;risks/benefits reviewed;current/potential barriers reviewed;patient/other agree to care plan  -SD        Care Plan Review, Other Participant (OT Eval)  daughter  -SD        Anticipated Discharge Disposition (OT)  extended care facility  -SD           Vital Signs    Pre Patient Position  Supine  -SD        Intra Patient Position  Sitting  -SD        Post Patient Position  Sitting  -SD           OT Goals    Bed Mobility Goal Selection (OT)  bed mobility, OT goal 1  -SD        Transfer Goal Selection (OT)  transfer, OT goal 1  -SD        Dressing Goal Selection (OT)  dressing, OT goal 1  -SD           Bed Mobility Goal 1 (OT)    Activity/Assistive Device (Bed Mobility Goal 1, OT)  sit to supine/supine to sit  -SD        [place] Level/Cues Needed (Bed Mobility Goal 1, OT)  minimum assist (75% or more patient effort)  -SD        Time Frame (Bed Mobility Goal 1, OT)  short term goal (STG);by discharge  -SD           Transfer Goal 1 (OT)    Activity/Assistive Device (Transfer Goal 1, OT)  sit-to-stand/stand-to-sit;bed-to-chair/chair-to-bed;toilet;wheelchair transfer  -SD        [place] Level/Cues Needed (Transfer Goal 1, OT)  moderate assist (50-74% patient effort)  -SD        Time Frame (Transfer Goal 1, OT)  short term goal (STG);by discharge  -SD           Dressing Goal 1 (OT)    Activity/Assistive Device (Dressing Goal 1, OT)  upper body dressing  -SD        [place]/Cues Needed (Dressing Goal 1, OT)  minimum assist (75% or more patient effort)  -SD        Time  Frame (Dressing Goal 1, OT)  short term goal (STG);by discharge  -SD           Living Environment    Home Accessibility  wheelchair accessible  -SD          User Key  (r) = Recorded By, (t) = Taken By, (c) = Cosigned By    Initials Name Effective Dates    SD Sara Cazares, OT 06/08/18 -          Occupational Therapy Education     Title: PT OT SLP Therapies (In Progress)     Topic: Occupational Therapy (In Progress)     Point: ADL training (Done)     Description: Instruct learner(s) on proper safety adaptation and remediation techniques during self care or transfers.   Instruct in proper use of assistive devices.    Learning Progress Summary           Patient Acceptance, E, VU,NR by SD at 2/11/2019  9:52 AM    Comment:  Pt. & dtr. educated on role of OT. Both were agreeable to OT POC.   Family Acceptance, E, VU,NR by SD at 2/11/2019  9:52 AM    Comment:  Pt. & dtr. educated on role of OT. Both were agreeable to OT POC.                   Point: Home exercise program (Done)     Description: Instruct learner(s) on appropriate technique for monitoring, assisting and/or progressing therapeutic exercises/activities.    Learning Progress Summary           Patient Acceptance, E, VU,NR by SD at 2/11/2019  9:52 AM    Comment:  Pt. & dtr. educated on role of OT. Both were agreeable to OT POC.   Family Acceptance, E, VU,NR by SD at 2/11/2019  9:52 AM    Comment:  Pt. & dtr. educated on role of OT. Both were agreeable to OT POC.                   Point: Precautions (Done)     Description: Instruct learner(s) on prescribed precautions during self-care and functional transfers.    Learning Progress Summary           Patient Acceptance, E, VU,NR by SD at 2/11/2019  9:52 AM    Comment:  Pt. & dtr. educated on role of OT. Both were agreeable to OT POC.   Family Acceptance, E, VU,NR by SD at 2/11/2019  9:52 AM    Comment:  Pt. & dtr. educated on role of OT. Both were agreeable to OT POC.                               User Key      Initials Effective Dates Name Provider Type Discipline    SD 06/08/18 -  Sara Cazares OT Occupational Therapist OT                  OT Recommendation and Plan  Outcome Summary/Treatment Plan (OT)  Anticipated Discharge Disposition (OT): extended care facility  Therapy Frequency (OT Eval): daily  Plan of Care Review  Plan of Care Reviewed With: patient, daughter  Plan of Care Reviewed With: patient, daughter  Outcome Summary: OT IE completed. Pt. supine to sit on EOB with max A. Pt. stating that this specialty bed is difficult for her to move on. Pt. held her balance sitting EOB with CGA. Pt. t/f'ed EOB to chair with max A x 2 in stand-pivot. Pt. attempted to shift herself back into chair, but required assist to complete. Pt. is appropriate for OT skilled services to address decreased ADL & functional mobility independence. Recommend SNF upon d/c to continue rehab.    Outcome Measures     Row Name 02/11/19 0856             How much help from another is currently needed...    Putting on and taking off regular lower body clothing?  1  -SD      Bathing (including washing, rinsing, and drying)  1  -SD      Toileting (which includes using toilet bed pan or urinal)  2  -SD      Putting on and taking off regular upper body clothing  2  -SD      Taking care of personal grooming (such as brushing teeth)  3  -SD      Eating meals  3  -SD      Score  12  -SD         Functional Assessment    Outcome Measure Options  AM-PAC 6 Clicks Daily Activity (OT)  -SD        User Key  (r) = Recorded By, (t) = Taken By, (c) = Cosigned By    Initials Name Provider Type    SD Sara Cazares OT Occupational Therapist          Time Calculation:   Time Calculation- OT     Row Name 02/11/19 0856             Time Calculation- OT    OT Start Time  0856  -SD      OT Stop Time  0953  -SD      OT Time Calculation (min)  57 min  -SD      OT Received On  02/11/19  -SD      OT Goal Re-Cert Due Date  02/21/19  -SD        User Key   (r) = Recorded By, (t) = Taken By, (c) = Cosigned By    Initials Name Provider Type    Sara Monteiro, OT Occupational Therapist        Therapy Suggested Charges     Code   Minutes Charges    None           Therapy Charges for Today     Code Description Service Date Service Provider Modifiers Qty    [id number] HC OT EVAL MOD COMPLEXITY 4 2/11/2019 Sara Cazares OT GO 1               Sara Cazares OT  2/11/2019

## 2019-02-11 NOTE — DISCHARGE PLACEMENT REQUEST
"Expected discharge today     Thank you   Kassandra [phone]       Antonia Mata (88 y.o. Female)     Date of Birth Social Security Number Address Home Phone MRN    01/01/1931  [address] [facility] [address] [phone] [id number]    Buddhist Marital Status          Yazidi        Admission Date Admission Type Admitting Provider Attending Provider Department, Room/Bed    2/6/19 Emergency Zayra Tobias MD Howard, Gabriela Kirk, MD [facility] 6B, N627/1    Discharge Date Discharge Disposition Discharge Destination                       Attending Provider:  Zayra Tobias MD    Allergies:  Sulfa Antibiotics, Cortisone, Lopressor [Metoprolol], Ativan [Lorazepam]    Isolation:  None   Infection:  MRSA (02/06/19)   Code Status:  No CPR    Ht:  149.9 cm (59\")   Wt:  72.1 kg (159 lb)    Admission Cmt:  None   Principal Problem:  HCAP (healthcare-associated pneumonia) [J18.9]                 Active Insurance as of 2/6/2019     Primary Coverage     Payor Plan Insurance Group Employer/Plan Group    MEDICARE MEDICARE A & B      Payor Plan Address Payor Plan Phone Number Payor Plan Fax Number Effective Dates    [address] [phone]  12/1/1995 - None Entered    [facility] [id number]       Subscriber Name Subscriber Birth Date Member ID       ANTONIA MATA 1/1/1931 [id number]           Secondary Coverage     Payor Plan Insurance Group Employer/Plan Group    AARP MED SUPP AARP HEALTH CARE OPTIONS      Payor Plan Address Payor Plan Phone Number Payor Plan Fax Number Effective Dates    [facility] [phone]  1/1/2018 - None Entered    [address]       [facility] [address]       Subscriber Name Subscriber Birth Date Member ID       ANTONIA MATA 1/1/1931 [id number]           Tertiary Coverage     Payor Plan Insurance Group Employer/Plan Group    [place]      Payor Plan Address Payor Plan Phone Number Payor Plan Fax Number Effective " Dates    [address]  [phone]  2019 - None Entered    [place] [address]       Subscriber Name Subscriber Birth Date Member ID       ANTONIA MATA 1931 [id number]                 Emergency Contacts      (Rel.) Home Phone Work Phone Mobile Phone    [employer] (Power of ) [phone] -- --    [employer] -- -- [phone]               History & Physical      Katy Davenport MD at 2019  2:37 PM              [facility] Medicine Services  HISTORY AND PHYSICAL    Patient Name: Antonia Mata  : 1931  MRN: [id number]  Primary Care Physician: Leti Munoz MD  Date of admission: 2019      Subjective   Subjective     Chief Complaint:  Altered mental status    HPI:  Antonia Mata is a 88 y.o. female with history of aspiration pneumonia, dementia, frequent UTI, and hypertension presenting from her nursing home due to altered mental status.  Patient is unable to provide much history, however her daughter arrived and says that she has had a cough for approximately 1 week.  She was recently hospitalized 1.5 weeks ago at [facility] due to uncontrolled hypertension and has been started on metoprolol succinate and since then has had dry mouth, depressed affect, and confusion.  The daughter is not aware of any fevers at the nursing home, but patient had a temperature of 103 upon arrival to the ED.  CXR was concerning for new RUL infiltrate.  She will be admitted for further management.    Review of Systems   Gen- + fevers, chills  CV- No chest pain, palpitations  Resp- + cough, dyspnea  GI- No N/V/D, abd pain    Otherwise complete ROS reviewed and is negative except as mentioned in the HPI.    Personal History     Past Medical History:   Diagnosis Date   • Anxiety    • Arthropathy    • Constipation    • Dementia    • Essential hypertension    • GERD (gastroesophageal reflux disease)    • Hyperlipidemia    • Insomnia    • Spinal stenosis   "  • Syncope    • URI (upper respiratory infection)        Past Surgical History:   Procedure Laterality Date   • BLADDER SUSPENSION     • CHOLECYSTECTOMY     • HIP SURGERY     • HYSTERECTOMY     • TONSILLECTOMY         Family History: Family history is unknown by patient. Otherwise pertinent FHx was reviewed and unremarkable.     Social History:  reports that  has never smoked. She does not have any smokeless tobacco history on file. She reports that she does not drink alcohol or use drugs.  Social History     Social History Narrative    Lives in [facility], has not walked for several months       Medications:    Available home medication information reviewed.    (Not in a hospital admission)    Allergies   Allergen Reactions   • Sulfa Antibiotics Rash     UNKNOWN     • Cortisone Other (See Comments)     Elevated BP   • Ativan [Lorazepam] Anxiety     \"opposite effect\"       Objective   Objective     Vital Signs:   Temp:  [103.4 °F (39.7 °C)] 103.4 °F (39.7 °C)  Heart Rate:  [91-92] 91  Resp:  [18] 18  BP: (168-190)/(74-83) 168/74        Physical Exam   Constitutional: No acute distress, elderly female, ill appearing, sleeping but arousable  Eyes: Sclerae anicteric, no conjunctival injection  HENT: NCAT, mucous membranes moist  Neck: Supple, trachea midline  Respiratory: Coarse breath sounds bilaterally, nonlabored respirations on nasal cannula, frequent cough  Cardiovascular: RRR, no murmurs, rubs, or gallops  Gastrointestinal: Positive bowel sounds, soft, nontender, nondistended  Musculoskeletal: No bilateral ankle edema, no clubbing or cyanosis to extremities  Psychiatric: Confused, cooperative  Neurologic: Cranial Nerves grossly intact to confrontation, speech clear  Skin: No rashes    Results Reviewed:  I have personally reviewed current lab, radiology, and data and agree.    Results from last 7 days   Lab Units 02/06/19  1221   WBC 10*3/mm3 11.79*   HEMOGLOBIN g/dL 14.4   HEMATOCRIT % 43.2   PLATELETS " 10*3/mm3 199     Results from last 7 days   Lab Units 02/06/19  1302 02/06/19  1220   SODIUM mmol/L 141  --    POTASSIUM mmol/L 3.5  --    CHLORIDE mmol/L 105  --    CO2 mmol/L 28.0  --    BUN mg/dL 13  --    CREATININE mg/dL 0.65  --    GLUCOSE mg/dL 110*  --    CALCIUM mg/dL 8.5*  --    ALT (SGPT) U/L 18  --    AST (SGOT) U/L 28  --    TROPONIN I ng/mL  --  0.06     Estimated Creatinine Clearance: 44.6 mL/min (by C-G formula based on SCr of 0.65 mg/dL).  Brief Urine Lab Results  (Last result in the past 365 days)      Color   Clarity   Blood   Leuk Est   Nitrite   Protein   CREAT   Urine HCG        02/06/19 1208 Yellow Clear Negative Negative Negative 100 mg/dL (2+)             No results found for: BNP  Imaging Results (last 24 hours)     Procedure Component Value Units Date/Time    XR Chest 1 View [[id number]] Collected:  02/06/19 1434     Updated:  02/06/19 1442    Narrative:       EXAMINATION: XR CHEST 1 VW- 02/06/2019      INDICATION: Weak/Dizzy/AMS triage protocol      COMPARISON: NONE     FINDINGS: The cardiac silhouette is normal. There is no consolidation,  mass or effusion. There is minimally increased vague patchy airspace  disease in the right upper lobe which is new when compared to previous  examinations.        Impression:       There is a very subtle vague patchy airspace process in the  right upper lobe which appears new when compared to previous  examinations.     D:  02/06/2019  E:  02/06/2019     This report was finalized on 2/6/2019 2:39 PM by Dr. Isaias Mejía MD.           Results for orders placed during the hospital encounter of 05/14/18   Adult Transthoracic Echo Complete W/ Cont if Necessary Per Protocol    Narrative · Mild aortic valve regurgitation is present.  · Calculated right ventricular systolic pressure from tricuspid   regurgitation is 24 mmHg.  · Mild tricuspid valve regurgitation is present.  · Left ventricular systolic function is normal. Estimated EF = 60%.  · Left ventricular  diastolic dysfunction (grade I) consistent with   impaired relaxation.  · There is no evidence of pericardial effusion.  · No evidence of pulmonary hypertension is present.  · The aortic valve exhibits sclerosis.  · Normal right ventricular cavity size, wall thickness, systolic function   and septal motion noted.  · Moderate MAC is present.          Assessment/Plan   Assessment / Plan     Active Hospital Problems    Diagnosis Date Noted   • **HCAP (healthcare-associated pneumonia) [J18.9] 06/03/2018   • Frequent UTI [N39.0] 02/06/2019   • GERD (gastroesophageal reflux disease) [K21.9] 03/29/2018   • Dementia [F03.90] 03/29/2018   • Hypertension [I10] 03/29/2018     Healthcare associated pneumonia  -Influenza PCR pending  -MRSA PCR pending  -Blood cultures, sputum culture, urinary S. pneumo antigen pending  -Continue vancomycin/zosyn.  De-escalate vancomycin if MRSA negative  -History of suspected aspiration, family did not want modified diet in the past.  If flu negative, will request SLP evaluation.    Metabolic encephalopathy  -Likely due to acute illness  -Hold gabapentin, sedating medications    Dementia  -Continue home medications    HTN  -Difficult to control  -Continue home medications with the exception of metoprolol due to concern for side effects with this new medication  -PRN medications for significant elevations, adjust regimen as needed while here    GERD  -Continue PPI, sucralfate    Frequent UTI  -Recently started on prophylactic macrobid  -UA on admission not suggestive of UTI    DVT prophylaxis:  Lovenox    CODE STATUS:    Code Status and Medical Interventions:   Ordered at: 02/06/19 1450     Limited Support to NOT Include:    Intubation     Code Status:    No CPR     Medical Interventions (Level of Support Prior to Arrest):    Limited       Admission Status:  I believe this patient meets INPATIENT status due to the need for care which can only be reasonably provided in an hospital setting such as  possible need for aggressive/expedited ancillary services and/or consultation services, IV medications, close physician monitoring, and/or procedures.  In such, I feel patient’s risk for adverse outcomes and need for care warrant INPATIENT evaluation and predict the patient’s care encounter to likely last beyond 2 midnights.      Electronically signed by Katy Davenport MD, 02/06/19, 2:50 PM.        Electronically signed by Katy Davenport MD at 2/6/2019  3:06 PM       Hospital Medications (active)       Dose Frequency Start End    acetaminophen (TYLENOL) tablet 650 mg 650 mg Every 6 Hours PRN 2/6/2019     Sig - Route: Take 2 tablets by mouth Every 6 (Six) Hours As Needed for Mild Pain . - Oral    amLODIPine (NORVASC) tablet 10 mg 10 mg Nightly 2/6/2019     Sig - Route: Take 1 tablet by mouth Every Night. - Oral    bisacodyl (DULCOLAX) EC tablet 5 mg 5 mg Daily PRN 2/6/2019     Sig - Route: Take 1 tablet by mouth Daily As Needed for Constipation. - Oral    busPIRone (BUSPAR) tablet 7.5 mg 7.5 mg 2 Times Daily 2/6/2019     Sig - Route: Take 0.5 tablets by mouth 2 (Two) Times a Day. - Oral    calcium carbonate (TUMS) chewable tablet 500 mg (200 mg elemental) 2 tablet 2 Times Daily PRN 2/6/2019     Sig - Route: Chew 1,000 mg 2 (Two) Times a Day As Needed for Heartburn. - Oral    cetirizine (zyrTEC) tablet 10 mg 10 mg Daily 2/7/2019     Sig - Route: Take 1 tablet by mouth Daily. - Oral    clopidogrel (PLAVIX) tablet 75 mg 75 mg Daily 2/7/2019     Sig - Route: Take 1 tablet by mouth Daily. - Oral    colchicine tablet 0.6 mg 0.6 mg Daily 2/7/2019     Sig - Route: Take 1 tablet by mouth Daily. - Oral    docusate sodium (COLACE) capsule 100 mg 100 mg 2 Times Daily 2/6/2019     Sig - Route: Take 1 capsule by mouth 2 (Two) Times a Day. - Oral    donepezil (ARICEPT) tablet 10 mg 10 mg Nightly 2/6/2019     Sig - Route: Take 1 tablet by mouth Every Night. - Oral    enoxaparin (LOVENOX) syringe 40 mg 40 mg Every 24 Hours 2/6/2019   "   Sig - Route: Inject 0.4 mL under the skin into the appropriate area as directed Daily. - Subcutaneous    famotidine (PEPCID) tablet 20 mg 20 mg Daily 2/9/2019     Sig - Route: Take 1 tablet by mouth Daily. - Oral    ferrous sulfate tablet 325 mg 325 mg Daily With Breakfast 2/7/2019     Sig - Route: Take 1 tablet by mouth Daily With Breakfast. - Oral    gabapentin (NEURONTIN) capsule 200 mg 200 mg 3 Times Daily 2/8/2019     Sig - Route: Take 2 capsules by mouth 3 (Three) Times a Day. - Oral    hydrALAZINE (APRESOLINE) injection 10 mg 10 mg Every 6 Hours PRN 2/6/2019     Sig - Route: Infuse 0.5 mL into a venous catheter Every 6 (Six) Hours As Needed for High Blood Pressure. - Intravenous    hydrALAZINE (APRESOLINE) tablet 25 mg 25 mg Every 8 Hours Scheduled 2/10/2019     Sig - Route: Take 1 tablet by mouth Every 8 (Eight) Hours. - Oral    isosorbide mononitrate (IMDUR) 24 hr tablet 60 mg 60 mg Daily 2/8/2019     Sig - Route: Take 1 tablet by mouth Daily. - Oral    Magnesium Sulfate 2 gram / 50mL Infusion (GIVE X 3 BAGS TO EQUAL 6GM TOTAL DOSE) - Mg 1.1 - 1.5 mg/dl 2 g As Needed 2/7/2019     Sig - Route: Infuse 50 mL into a venous catheter As Needed (See Administration Instructions). - Intravenous    Linked Group 1:  \"Or\" Linked Group Details        Magnesium Sulfate 2 gram Bolus, followed by 8 gram infusion (total Mg dose 10 grams)- Mg less than or equal to 1mg/dL 2 g As Needed 2/7/2019     Sig - Route: Infuse 50 mL into a venous catheter As Needed (See Administration Instructions). - Intravenous    Linked Group 1:  \"Or\" Linked Group Details        Magnesium Sulfate 4 gram infusion- Mg 1.6-1.9 mg/dL 4 g As Needed 2/7/2019     Sig - Route: Infuse 100 mL into a venous catheter As Needed (See Administration Instructions). - Intravenous    Linked Group 1:  \"Or\" Linked Group Details        meloxicam (MOBIC) tablet 7.5 mg 7.5 mg Nightly 2/6/2019     Sig - Route: Take 1 tablet by mouth Every Night. - Oral    memantine " "(NAMENDA) tablet 10 mg 10 mg Every 12 Hours Scheduled 2/6/2019     Sig - Route: Take 1 tablet by mouth Every 12 (Twelve) Hours. - Oral    miconazole (MICOTIN) 2 % cream  Every 12 Hours Scheduled 2/8/2019     Sig - Route: Apply  topically to the appropriate area as directed Every 12 (Twelve) Hours. - Topical    Cosign for Ordering: Accepted by Katy Davenport MD on 2/8/2019 10:20 AM    polyethylene glycol 3350 powder (packet) 17 g Daily PRN 2/6/2019     Sig - Route: Take 17 g by mouth Daily As Needed for Constipation. - Oral    potassium chloride (KLOR-CON) packet 40 mEq 40 mEq As Needed 2/7/2019     Sig - Route: Take 40 mEq by mouth As Needed (potassium replacement, see admin instructions). - Oral    Linked Group 2:  \"Or\" Linked Group Details        potassium chloride (MICRO-K) CR capsule 40 mEq 40 mEq As Needed 2/7/2019     Sig - Route: Take 4 capsules by mouth As Needed (potassium replacement.  see admin instructions). - Oral    Linked Group 2:  \"Or\" Linked Group Details        potassium chloride 10 mEq in 100 mL IVPB 10 mEq Every 1 Hour PRN 2/7/2019     Sig - Route: Infuse 100 mL into a venous catheter Every 1 (One) Hour As Needed (potassium protocol PERIPHERAL - see admin instructions). - Intravenous    Linked Group 2:  \"Or\" Linked Group Details        saccharomyces boulardii (FLORASTOR) capsule 250 mg 250 mg 2 Times Daily 2/6/2019     Sig - Route: Take 1 capsule by mouth 2 (Two) Times a Day. - Oral    sodium chloride 0.9 % flush 10 mL 10 mL As Needed 2/6/2019     Sig - Route: Infuse 10 mL into a venous catheter As Needed for Line Care. - Intravenous    Cosign for Ordering: Accepted by Jordan Whitt MD on 2/6/2019  8:56 PM    sodium chloride 0.9 % flush 3 mL 3 mL Every 12 Hours Scheduled 2/6/2019     Sig - Route: Infuse 3 mL into a venous catheter Every 12 (Twelve) Hours. - Intravenous    sodium chloride 0.9 % flush 3-10 mL 3-10 mL As Needed 2/6/2019     Sig - Route: Infuse 3-10 mL into a venous catheter As " Needed for Line Care. - Intravenous    sucralfate (CARAFATE) tablet 1 g 1 g 4 Times Daily Before Meals & Nightly 2019     Sig - Route: Take 1 tablet by mouth 4 (Four) Times a Day Before Meals & at Bedtime. - Oral    terazosin (HYTRIN) capsule 2 mg 2 mg Nightly 2019     Sig - Route: Take 1 capsule by mouth Every Night. - Oral    traMADol (ULTRAM) tablet 50 mg 50 mg Every 12 Hours Scheduled 2019    Sig - Route: Take 1 tablet by mouth Every 12 (Twelve) Hours. - Oral    traZODone (DESYREL) tablet 25 mg 25 mg Nightly 2019     Sig - Route: Take 0.5 tablets by mouth Every Night. - Oral    vitamin C (ASCORBIC ACID) tablet 500 mg 500 mg Daily 2019     Sig - Route: Take 1 tablet by mouth Daily. - Oral    CloNIDine (CATAPRES) tablet 0.1 mg (Discontinued) 0.1 mg 2 Times Daily PRN 2019 2/10/2019    Sig - Route: Take 1 tablet by mouth 2 (Two) Times a Day As Needed for High Blood Pressure (SBP >190). - Oral    hydrALAZINE (APRESOLINE) tablet 10 mg (Discontinued) 10 mg Every 8 Hours Scheduled 2019 2/10/2019    Sig - Route: Take 1 tablet by mouth Every 8 (Eight) Hours. - Oral             Physician Progress Notes (most recent note)      Loan Ross APRN at 2/10/2019  9:21 AM              [facility] Medicine Services  PROGRESS NOTE    Patient Name: Antonia Mata  : 1931  MRN: [id number]    Date of Admission: 2019  Length of Stay: 4  Primary Care Physician: Leti Munoz MD    Subjective   Subjective   CC: F/U pneumonia    HPI:  Patient lying in bed and having no complaints at this time.  Daughters in the room and is concerned the patient has not gotten up since her admission.  She states that the nursing home that she comes from is unable to care for her and prior to admission she was able to get around by herself in a wheelchair and get to the bathroom and feed herself.  Positive BM.  Still having episodes of hypertension, so will adjust her  meds.    Review of Systems  Gen- No further fevers, chills  CV- No chest pain, palpitations  Resp- Improved cough, dyspnea  GI- No N/V/D, abd pain  Otherwise ROS is negative except as mentioned in the HPI.    Objective   Objective   Vital Signs:   Temp:  [97.3 °F (36.3 °C)-98.2 °F (36.8 °C)] 97.3 °F (36.3 °C)  Heart Rate:  [76-89] 84  Resp:  [16-18] 16  BP: (146-199)/() 167/59  Physical Exam:  Constitutional: No acute distress, awake, alert, sitting up in bed  HENT: NCAT, mucous membranes moist  Respiratory: Clear to auscultation bilaterally, decreased in bases, respiratory effort normal on 02  Cardiovascular: RRR, no murmurs, rubs, or gallops; +DP pulses bilaterally  Gastrointestinal: Positive bowel sounds, soft, nontender, nondistended  Musculoskeletal: No bilateral ankle edema  Psychiatric: Appropriate affect, cooperative  Neurologic: Cranial Nerves grossly intact to confrontation, speech clear  Skin: No rashes on exposed skin    Results Reviewed:  I have personally reviewed current lab, radiology, and data and agree.    Results from last 7 days   Lab Units 02/08/19  0750 02/07/19  0508 02/06/19  1221   WBC 10*3/mm3 6.12 10.37 11.79*   HEMOGLOBIN g/dL 13.7 11.2* 14.4   HEMATOCRIT % 42.3 35.0 43.2   PLATELETS 10*3/mm3 191 156 199     Results from last 7 days   Lab Units 02/09/19  0604 02/08/19  1740 02/08/19  0750  02/07/19  0508 02/06/19  1302  02/06/19  1220   SODIUM mmol/L 142  --  141  --  143 141   < >  --    POTASSIUM mmol/L 3.8 4.1 3.5   < > 2.9* 3.5   < >  --    CHLORIDE mmol/L 110*  --  107  --  109 105   < >  --    CO2 mmol/L 26.0  --  27.0  --  27.0 28.0   < >  --    BUN mg/dL 15  --  8*  --  16 13   < >  --    CREATININE mg/dL 0.65  --  0.62  --  0.88 0.65   < >  --    GLUCOSE mg/dL 101*  --  108*  --  101* 110*   < >  --    CALCIUM mg/dL 8.7  --  8.8  --  7.9* 8.5*   < >  --    ALT (SGPT) U/L  --   --   --   --   --  18  --   --    AST (SGOT) U/L  --   --   --   --   --  28  --   --    TROPONIN  I ng/mL  --   --   --   --   --   --   --  0.06    < > = values in this interval not displayed.     Estimated Creatinine Clearance: 44.6 mL/min (by C-G formula based on SCr of 0.65 mg/dL).    No results found for: BNP    Microbiology Results Abnormal     Procedure Component Value - Date/Time    Blood Culture - Blood, Arm, Left [[id number]] Collected:  02/06/19 1220    Lab Status:  Preliminary result Specimen:  Blood from Arm, Left Updated:  02/09/19 1300     Blood Culture No growth at 3 days    Blood Culture - Blood, Arm, Right [[id number]] Collected:  02/06/19 1210    Lab Status:  Preliminary result Specimen:  Blood from Arm, Right Updated:  02/09/19 1300     Blood Culture No growth at 3 days    MRSA Screen, PCR - Swab, Nares [[id number]]  (Abnormal) Collected:  02/06/19 1458    Lab Status:  Final result Specimen:  Swab from Nares Updated:  02/06/19 1709     MRSA, PCR Positive    Influenza A & B, RT PCR - Swab, Nasopharynx [[id number]]  (Normal) Collected:  02/06/19 1458    Lab Status:  Final result Specimen:  Swab from Nasopharynx Updated:  02/06/19 1702     Influenza A PCR Not Detected     Influenza B PCR Not Detected        Imaging Results (last 24 hours)     Procedure Component Value Units Date/Time    FL Video Swallow With Speech [[id number]] Collected:  02/09/19 1426     Updated:  02/09/19 1427    Narrative:       EXAMINATION: FL VIDEO SWALLOW W/SPEECH - 2/9/2019     INDICATION: J18.9-Pneumonia, unspecified organism; R41.82-Altered mental  status, unspecified; A41.9-Sepsis, unspecified organism;  R13.10-Dysphagia, unspecified. Difficulty swallowing.     TECHNIQUE: 1 minute and 18 seconds of fluoroscopy used for modified  barium swallow. 6 images available for evaluation.     COMPARISON: NONE     FINDINGS: Patient referred for modified barium swallow. The patient was  evaluated in the sitting position. Various consistencies of barium were  administered to the patient. No evidence of aspiration was visualized  for  the examination. Please see the speech therapy team report for full  details.       Impression:       Fluoroscopy for modified barium swallow. Please see the  speech therapy team report for full details.     DICTATED:   2/9/2019  EDITED/ls :   2/9/2019                Results for orders placed during the hospital encounter of 05/14/18   Adult Transthoracic Echo Complete W/ Cont if Necessary Per Protocol    Narrative · Mild aortic valve regurgitation is present.  · Calculated right ventricular systolic pressure from tricuspid   regurgitation is 24 mmHg.  · Mild tricuspid valve regurgitation is present.  · Left ventricular systolic function is normal. Estimated EF = 60%.  · Left ventricular diastolic dysfunction (grade I) consistent with   impaired relaxation.  · There is no evidence of pericardial effusion.  · No evidence of pulmonary hypertension is present.  · The aortic valve exhibits sclerosis.  · Normal right ventricular cavity size, wall thickness, systolic function   and septal motion noted.  · Moderate MAC is present.        I have reviewed the medications:    Current Facility-Administered Medications:   •  acetaminophen (TYLENOL) tablet 650 mg, 650 mg, Oral, Q6H PRN, Katy Davenport MD, 650 mg at 02/08/19 0904  •  amLODIPine (NORVASC) tablet 10 mg, 10 mg, Oral, Nightly, Katy Davenport MD, 10 mg at 02/09/19 2035  •  bisacodyl (DULCOLAX) EC tablet 5 mg, 5 mg, Oral, Daily PRN, Katy Davenport MD  •  busPIRone (BUSPAR) tablet 7.5 mg, 7.5 mg, Oral, BID, Katy Davenport MD, 7.5 mg at 02/10/19 0843  •  calcium carbonate (TUMS) chewable tablet 500 mg (200 mg elemental), 2 tablet, Oral, BID PRN, Katy Davenport MD  •  cetirizine (zyrTEC) tablet 10 mg, 10 mg, Oral, Daily, Katy Davenport MD, 10 mg at 02/10/19 0844  •  CloNIDine (CATAPRES) tablet 0.1 mg, 0.1 mg, Oral, BID PRN, Katy Davenport MD  •  clopidogrel (PLAVIX) tablet 75 mg, 75 mg, Oral, Daily, Katy Davenport MD, 75 mg at 02/10/19 0845  •  colchicine tablet 0.6 mg,  0.6 mg, Oral, Daily, Katy Davenport MD, 0.6 mg at 02/10/19 0845  •  docusate sodium (COLACE) capsule 100 mg, 100 mg, Oral, BID, Katy Davenport MD, 100 mg at 02/10/19 0846  •  donepezil (ARICEPT) tablet 10 mg, 10 mg, Oral, Nightly, Katy Davenport MD, 10 mg at 02/09/19 2032  •  enoxaparin (LOVENOX) syringe 40 mg, 40 mg, Subcutaneous, Q24H, Katy Davenpotr MD, 40 mg at 02/09/19 1742  •  famotidine (PEPCID) tablet 20 mg, 20 mg, Oral, Daily, Karol Og, APRN, 20 mg at 02/10/19 0846  •  ferrous sulfate tablet 325 mg, 325 mg, Oral, Daily With Breakfast, Katy Davenport MD, 325 mg at 02/10/19 0844  •  gabapentin (NEURONTIN) capsule 200 mg, 200 mg, Oral, TID, Katy Davenport MD, 200 mg at 02/10/19 0853  •  hydrALAZINE (APRESOLINE) injection 10 mg, 10 mg, Intravenous, Q6H PRN, Katy Davenport MD, 10 mg at 02/08/19 0908  •  hydrALAZINE (APRESOLINE) tablet 10 mg, 10 mg, Oral, Q8H, Katy Davenport MD, 10 mg at 02/10/19 0636  •  isosorbide mononitrate (IMDUR) 24 hr tablet 60 mg, 60 mg, Oral, Daily, Katy Davenport MD, 60 mg at 02/10/19 0844  •  Magnesium Sulfate 2 gram Bolus, followed by 8 gram infusion (total Mg dose 10 grams)- Mg less than or equal to 1mg/dL, 2 g, Intravenous, PRN **OR** Magnesium Sulfate 2 gram / 50mL Infusion (GIVE X 3 BAGS TO EQUAL 6GM TOTAL DOSE) - Mg 1.1 - 1.5 mg/dl, 2 g, Intravenous, PRN **OR** Magnesium Sulfate 4 gram infusion- Mg 1.6-1.9 mg/dL, 4 g, Intravenous, PRN, Katy Davenport MD, Last Rate: 25 mL/hr at 02/08/19 0909, 4 g at 02/08/19 0909  •  meloxicam (MOBIC) tablet 7.5 mg, 7.5 mg, Oral, Nightly, Katy Davenport MD, 7.5 mg at 02/09/19 2034  •  memantine (NAMENDA) tablet 10 mg, 10 mg, Oral, Q12H, Katy Davenport MD, 10 mg at 02/10/19 0845  •  miconazole (MICOTIN) 2 % cream, , Topical, Q12H, Katy Davenport MD  •  polyethylene glycol 3350 powder (packet), 17 g, Oral, Daily PRN, Katy Davenport MD  •  potassium chloride (MICRO-K) CR capsule 40 mEq, 40 mEq, Oral, PRN, 40 mEq at 02/08/19 1315 **OR** potassium  chloride (KLOR-CON) packet 40 mEq, 40 mEq, Oral, PRN **OR** potassium chloride 10 mEq in 100 mL IVPB, 10 mEq, Intravenous, Q1H PRN, Katy Davenport MD  •  saccharomyces boulardii (FLORASTOR) capsule 250 mg, 250 mg, Oral, BID, Katy Davenport MD, 250 mg at 02/10/19 0845  •  sodium chloride 0.9 % flush 10 mL, 10 mL, Intravenous, PRN, Jordan Whitt MD  •  sodium chloride 0.9 % flush 3 mL, 3 mL, Intravenous, Q12H, Katy Davenport MD, 3 mL at 02/09/19 2213  •  sodium chloride 0.9 % flush 3-10 mL, 3-10 mL, Intravenous, PRN, Katy Davenport MD  •  sucralfate (CARAFATE) tablet 1 g, 1 g, Oral, 4x Daily AC & at Bedtime, Katy Davenport MD, 1 g at 02/10/19 0636  •  terazosin (HYTRIN) capsule 2 mg, 2 mg, Oral, Nightly, Katy Davenport MD, 2 mg at 02/09/19 2029  •  traMADol (ULTRAM) tablet 50 mg, 50 mg, Oral, Q12H, Katy Davenport MD, 50 mg at 02/10/19 0845  •  traZODone (DESYREL) tablet 25 mg, 25 mg, Oral, Nightly, Katy Davenport MD, 25 mg at 02/09/19 2035  •  vitamin C (ASCORBIC ACID) tablet 500 mg, 500 mg, Oral, Daily, Katy Davenport MD, 500 mg at 02/10/19 0844      Assessment/Plan   Assessment / Plan     Active Hospital Problems    Diagnosis Date Noted   • **HCAP (healthcare-associated pneumonia) [J18.9] 06/03/2018   • Sepsis due to pneumonia (CMS/[facility]) [J18.9, A41.9] 02/07/2019   • Frequent UTI [N39.0] 02/06/2019   • Acute hypoxemic respiratory failure (CMS/[facility]) [J96.01] 05/14/2018   • GERD (gastroesophageal reflux disease) [K21.9] 03/29/2018   • Dementia [F03.90] 03/29/2018   • Hypertension [I10] 03/29/2018   • Hypokalemia [E87.6] 03/29/2018     Brief Hospital Course to date:  Antonia Mata is a 88 y.o. female with history of aspiration pneumonia, dementia, frequent UTI, and hypertension presenting from her nursing home due to altered mental status and found to have RUL pneumonia.    Sepsis due to healthcare associated pneumonia (POA)  Acute hypoxemic respiratory failure (POA)  -Influenza PCR negative  -MRSA PCR  positive  -Blood cultures NGTD, sputum culture pending  -Will de-escalate abx to augmentin and doxy today.    -SLP evaluation-on modified diet now     Metabolic encephalopathy  -Resolved  -Likely due to acute illness     Dementia  -Continue home medications     HTN  -Difficult to control  -Continue home medications with the exception of metoprolol due to concern for side effects with this new medication  -Increased IMDUR, increased hydralazine 10mg TID; continued amlodipine     GERD  -will switch PPI to H2 blocker while on antibiotics due to C-diff risk.  Can resume PPI once finished with antibiotic course.       Frequent UTI  -Recently started on prophylactic macrobid-continue at discharge  -UA on admission not suggestive of UTI    Hypokalemia  -Replaced    Mobility  --Consulted PT/OT    DVT Prophylaxis:  Lovenox    CODE STATUS:   Code Status and Medical Interventions:   Ordered at: 19 1450     Limited Support to NOT Include:    Intubation     Code Status:    No CPR     Medical Interventions (Level of Support Prior to Arrest):    Limited     Dispo:  Need to wait until Monday AM to transfer back to [facility] per CM    Electronically signed by UMAIR Dumont, 02/10/19, 9:21 AM.        Electronically signed by Loan Ross APRN at 2/10/2019 11:27 AM       Physical Therapy Notes (most recent note)     No notes of this type exist for this encounter.           Occupational Therapy Notes (most recent note)      Sara Cazares OT at 2019  9:53 AM          Acute Care - Occupational Therapy Initial Evaluation  [facility]     Patient Name: Antonia Mata  : 1931  MRN: [id number]  Today's Date: 2019  Onset of Illness/Injury or Date of Surgery: 19  Date of Referral to OT: 19  Referring Physician: MD Jatinder    Admit Date: 2019       ICD-10-CM ICD-9-CM   1. HCAP (healthcare-associated pneumonia) J18.9 486   2. Altered mental status, unspecified altered mental status  type R41.82 780.97   3. Sepsis, due to unspecified organism (CMS/[facility]) A41.9 038.9     995.91   4. Dysphagia, unspecified type R13.10 787.20   5. Impaired mobility and ADLs Z74.09 799.89     Patient Active Problem List   Diagnosis   • Aspiration pneumonia (CMS/[facility])   • Hypokalemia   • Dementia   • Hypertension   • GERD (gastroesophageal reflux disease)   • Normocytic anemia   • Elevated alkaline phosphatase level   • Vasovagal episode   • Acute hypoxemic respiratory failure (CMS/[facility])   • Constipation   • Aortic heart murmur   • HCAP (healthcare-associated pneumonia)   • Acute respiratory failure with hypoxia (CMS/[facility])   • Spinal stenosis   • Acute pain of left shoulder   • Left hand pain   • Frequent UTI   • Sepsis due to pneumonia (CMS/[facility])     Past Medical History:   Diagnosis Date   • Anxiety    • Arthropathy    • Constipation    • Dementia    • 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 URINE   • Essential hypertension    • GERD (gastroesophageal reflux disease)    • Hyperlipidemia    • Insomnia    • Spinal stenosis    • Syncope    • URI (upper respiratory infection)      Past Surgical History:   Procedure Laterality Date   • BLADDER SUSPENSION     • CHOLECYSTECTOMY     • HIP SURGERY     • HYSTERECTOMY     • TONSILLECTOMY            OT ASSESSMENT FLOWSHEET (last 72 hours)      Occupational Therapy Evaluation     Row Name 02/11/19 0856                   OT Evaluation Time/Intention    Subjective Information  complains of;pain  -SD        Document Type  evaluation  -SD        Mode of Treatment  occupational therapy  -SD        Total Evaluation Minutes, Occupational Therapy  34  -SD        Patient Effort  good  -SD        Symptoms Noted During/After Treatment  none  -SD        Comment  pt. remarked that it felt good to sit up in the chair  -SD           General Information    Patient Profile Reviewed?  yes  -SD        Onset of Illness/Injury or Date of Surgery  02/06/19  -SD        Referring Physician  MD Jatinder  -SD         Patient Observations  alert;cooperative;agree to therapy  -SD        Patient/Family Observations  Pt. supine in bed. Pt.'s dtr. present at bedside.  -SD        General Observations of Patient  Pt. on tele, O2 sat monitor, bed check, IV heplocked, & purewick catheter.  -SD        Prior Level of Function  min assist:;transfer;mod assist:;ADL's  -SD        Equipment Currently Used at Home  wheelchair  -SD        Pertinent History of Current Functional Problem  87y/o WF presents from NH with AMS & RUL pneumonia.  -SD        Existing Precautions/Restrictions  fall  -SD        Risks Reviewed  patient and family:;LOB;increased discomfort  -SD        Benefits Reviewed  patient and family:;improve function;increase independence;increase strength;increase balance;decrease pain;increase knowledge  -SD        Barriers to Rehab  previous functional deficit  -SD           Relationship/Environment    Primary Source of Support/Comfort  child(maría)  -SD        Lives With  facility resident  -SD           Resource/Environmental Concerns    Current Living Arrangements  extended care facility  -SD        Resource/Environmental Concerns  none  -SD        Transportation Concerns  car, none  -SD           Cognitive Assessment/Intervention- PT/OT    Orientation Status (Cognition)  oriented to;person;place  -SD        Follows Commands (Cognition)  follows one step commands;75-90% accuracy;verbal cues/prompting required  -SD        Safety Deficit (Cognitive)  mild deficit;insight into deficits/self awareness;safety precautions awareness  -SD           Bed Mobility Assessment/Treatment    Bed Mobility Assessment/Treatment  supine-sit;scooting/bridging  -SD        Scooting/Bridging [place] (Bed Mobility)  maximum assist (25% patient effort)  -SD        Supine-Sit [place] (Bed Mobility)  maximum assist (25% patient effort)  -SD        Bed Mobility, Safety Issues  decreased use of arms for pushing/pulling;decreased use of legs for  bridging/pushing  -SD        Assistive Device (Bed Mobility)  bed rails;draw sheet;head of bed elevated  -SD        Comment (Bed Mobility)  pt. stated that this bed is difficult to move in; pt. in specialty bed  -SD           Transfer Assessment/Treatment    Transfer Assessment/Treatment  bed-chair transfer;sit-stand transfer;stand-sit transfer  -SD           Bed-Chair Transfer    Bed-Chair [place] (Transfers)  maximum assist (25% patient effort);2 person assist  -SD           Sit-Stand Transfer    Sit-Stand [place] (Transfers)  maximum assist (25% patient effort);2 person assist  -SD           Stand-Sit Transfer    Stand-Sit [place] (Transfers)  moderate assist (50% patient effort);2 person assist  -SD           ADL Assessment/Intervention    BADL Assessment/Intervention  upper body dressing;lower body dressing  -SD           Upper Body Dressing Assessment/Training    Upper Body Dressing [place] Level  don;pajama/robe;moderate assist (50% patient effort)  -SD        Upper Body Dressing Position  edge of bed sitting  -SD           Lower Body Dressing Assessment/Training    Lower Body Dressing [place] Level  don;socks;dependent (less than 25% patient effort)  -SD        Lower Body Dressing Position  edge of bed sitting  -SD           General ROM    GENERAL ROM COMMENTS  B UE AROM WFL (limited to 100 degrees shldr flex B)  -SD           MMT (Manual Muscle Testing)    General MMT Comments  B UE Strength 4-/5 grossly  -SD           Motor Assessment/Interventions    Additional Documentation  Balance (Group)  -SD           Balance    Balance  static sitting balance;static standing balance  -SD           Static Sitting Balance    Level of [place] (Unsupported Sitting, Static Balance)  contact guard assist  -SD        Sitting Position (Unsupported Sitting, Static Balance)  sitting on edge of bed  -SD        Time Able to Maintain Position (Unsupported Sitting, Static Balance)  more than 5  minutes  -SD        Comment (Unsupported Sitting, Static Balance)  v/c's to shift to midline, pt. leaning to L side  -SD           Static Standing Balance    Level of [place] (Supported Standing, Static Balance)  maximal assist, 25 to 49% patient effort;2 person assist  -SD        Time Able to Maintain Position (Supported Standing, Static Balance)  less than 15 seconds  -SD           Positioning and Restraints    Pre-Treatment Position  in bed  -SD        Post Treatment Position  chair  -SD        In Chair  notified nsg;reclined;call light within reach;encouraged to call for assist;with family/caregiver;legs elevated  -SD           Pain Assessment    Additional Documentation  Pain Scale: FACES Pre/Post-Treatment (Group)  -SD           Pain Scale: FACES Pre/Post-Treatment    Pain: FACES Scale, Pretreatment  2-->hurts little bit  -SD        Pain: FACES Scale, Post-Treatment  2-->hurts little bit  -SD        Pre/Post Treatment Pain Comment  pt.'s dtr. stated that pt. is in a constant state of pain with stenosis & joint pain  -SD           Coping    Observed Emotional State  accepting;calm;cooperative  -SD        Verbalized Emotional State  acceptance  -SD           Plan of Care Review    Plan of Care Reviewed With  patient;daughter  -SD           Clinical Impression (OT)    Date of Referral to OT  02/11/19  -SD        OT Diagnosis  decreased ADL & functional mobility independence  -SD        Patient/Family Goals Statement (OT Eval)  pt.'s dtr. stated that she would like for pt. to be able to continue t/f'-ing herself in & out of w/c, so that she can go on outings with the family  -SD        Criteria for Skilled Therapeutic Interventions Met (OT Eval)  yes;treatment indicated  -SD        Rehab Potential (OT Eval)  good, to achieve stated therapy goals  -SD        Therapy Frequency (OT Eval)  daily  -SD        Care Plan Review (OT)  evaluation/treatment results reviewed;care plan/treatment goals  reviewed;risks/benefits reviewed;current/potential barriers reviewed;patient/other agree to care plan  -SD        Care Plan Review, Other Participant (OT Eval)  daughter  -SD        Anticipated Discharge Disposition (OT)  extended care facility  -SD           Vital Signs    Pre Patient Position  Supine  -SD        Intra Patient Position  Sitting  -SD        Post Patient Position  Sitting  -SD           OT Goals    Bed Mobility Goal Selection (OT)  bed mobility, OT goal 1  -SD        Transfer Goal Selection (OT)  transfer, OT goal 1  -SD        Dressing Goal Selection (OT)  dressing, OT goal 1  -SD           Bed Mobility Goal 1 (OT)    Activity/Assistive Device (Bed Mobility Goal 1, OT)  sit to supine/supine to sit  -SD        [place] Level/Cues Needed (Bed Mobility Goal 1, OT)  minimum assist (75% or more patient effort)  -SD        Time Frame (Bed Mobility Goal 1, OT)  short term goal (STG);by discharge  -SD           Transfer Goal 1 (OT)    Activity/Assistive Device (Transfer Goal 1, OT)  sit-to-stand/stand-to-sit;bed-to-chair/chair-to-bed;toilet;wheelchair transfer  -SD        [place] Level/Cues Needed (Transfer Goal 1, OT)  moderate assist (50-74% patient effort)  -SD        Time Frame (Transfer Goal 1, OT)  short term goal (STG);by discharge  -SD           Dressing Goal 1 (OT)    Activity/Assistive Device (Dressing Goal 1, OT)  upper body dressing  -SD        [place]/Cues Needed (Dressing Goal 1, OT)  minimum assist (75% or more patient effort)  -SD        Time Frame (Dressing Goal 1, OT)  short term goal (STG);by discharge  -SD           Living Environment    Home Accessibility  wheelchair accessible  -SD          User Key  (r) = Recorded By, (t) = Taken By, (c) = Cosigned By    Initials Name Effective Dates    Sara Monteiro, OT 06/08/18 -          Occupational Therapy Education     Title: PT OT SLP Therapies (In Progress)     Topic: Occupational Therapy (In Progress)     Point: ADL  training (Done)     Description: Instruct learner(s) on proper safety adaptation and remediation techniques during self care or transfers.   Instruct in proper use of assistive devices.    Learning Progress Summary           Patient Acceptance, E, VU,NR by SD at 2/11/2019  9:52 AM    Comment:  Pt. & dtr. educated on role of OT. Both were agreeable to OT POC.   Family Acceptance, E, VU,NR by SD at 2/11/2019  9:52 AM    Comment:  Pt. & dtr. educated on role of OT. Both were agreeable to OT POC.                   Point: Home exercise program (Done)     Description: Instruct learner(s) on appropriate technique for monitoring, assisting and/or progressing therapeutic exercises/activities.    Learning Progress Summary           Patient Acceptance, E, VU,NR by SD at 2/11/2019  9:52 AM    Comment:  Pt. & dtr. educated on role of OT. Both were agreeable to OT POC.   Family Acceptance, E, VU,NR by SD at 2/11/2019  9:52 AM    Comment:  Pt. & dtr. educated on role of OT. Both were agreeable to OT POC.                   Point: Precautions (Done)     Description: Instruct learner(s) on prescribed precautions during self-care and functional transfers.    Learning Progress Summary           Patient Acceptance, E, VU,NR by SD at 2/11/2019  9:52 AM    Comment:  Pt. & dtr. educated on role of OT. Both were agreeable to OT POC.   Family Acceptance, E, VU,NR by SD at 2/11/2019  9:52 AM    Comment:  Pt. & dtr. educated on role of OT. Both were agreeable to OT POC.                               User Key     Initials Effective Dates Name Provider Type Discipline    SD 06/08/18 -  Sara Cazares OT Occupational Therapist OT                  OT Recommendation and Plan  Outcome Summary/Treatment Plan (OT)  Anticipated Discharge Disposition (OT): extended care facility  Therapy Frequency (OT Eval): daily  Plan of Care Review  Plan of Care Reviewed With: patient, daughter  Plan of Care Reviewed With: patient, daughter  Outcome Summary:  OT IE completed. Pt. supine to sit on EOB with max A. Pt. stating that this specialty bed is difficult for her to move on. Pt. held her balance sitting EOB with CGA. Pt. t/f'ed EOB to chair with max A x 2 in stand-pivot. Pt. attempted to shift herself back into chair, but required assist to complete. Pt. is appropriate for OT skilled services to address decreased ADL & functional mobility independence. Recommend SNF upon d/c to continue rehab.    Outcome Measures     Row Name 02/11/19 0856             How much help from another is currently needed...    Putting on and taking off regular lower body clothing?  1  -SD      Bathing (including washing, rinsing, and drying)  1  -SD      Toileting (which includes using toilet bed pan or urinal)  2  -SD      Putting on and taking off regular upper body clothing  2  -SD      Taking care of personal grooming (such as brushing teeth)  3  -SD      Eating meals  3  -SD      Score  12  -SD         Functional Assessment    Outcome Measure Options  AM-PAC 6 Clicks Daily Activity (OT)  -SD        User Key  (r) = Recorded By, (t) = Taken By, (c) = Cosigned By    Initials Name Provider Type    Sara Monteiro OT Occupational Therapist          Time Calculation:   Time Calculation- OT     Row Name 02/11/19 0856             Time Calculation- OT    OT Start Time  0856  -SD      OT Stop Time  0953  -SD      OT Time Calculation (min)  57 min  -SD      OT Received On  02/11/19  -SD      OT Goal Re-Cert Due Date  02/21/19  -SD        User Key  (r) = Recorded By, (t) = Taken By, (c) = Cosigned By    Initials Name Provider Type    Sara Monteiro OT Occupational Therapist        Therapy Suggested Charges     Code   Minutes Charges    None           Therapy Charges for Today     Code Description Service Date Service Provider Modifiers Qty    60197353421 HC OT EVAL MOD COMPLEXITY 4 2/11/2019 Sara Cazares OT GO 1               Sara Cazares  OT  2/11/2019    Electronically signed by Sara Cazares, OT at 2/11/2019  9:54 AM

## 2019-02-11 NOTE — PLAN OF CARE
Problem: Patient Care Overview  Goal: Plan of Care Review  Outcome: Ongoing (interventions implemented as appropriate)   02/11/19 6677   OTHER   Outcome Summary Patient with elevated BP around time for nightime meds, BP better after recieving meds. Pure wick in place. VSS. Will continue to monitor.

## 2019-02-11 NOTE — PROGRESS NOTES
Continued Stay Note  [facility]     Patient Name: Antonia Mata  MRN: [id number]  Today's Date: 2/11/2019    Admit Date: 2/6/2019    Discharge Plan     Row Name 02/11/19 1140       Plan    Plan  To [place] care when medically ready     Patient/Family in Agreement with Plan  yes    Plan Comments  Patient is not medically ready to return to [place] care - New orders for PT and OT to see the patient have been placed - CM will follow up tomororw and arrange [facility] when appropriate - Kassandra [phone]         Discharge Codes    No documentation.             Kassandra Caba RN

## 2019-02-11 NOTE — THERAPY EVALUATION
Acute Care - Physical Therapy Initial Evaluation  [facility]     Patient Name: Antonia Mata  : 1931  MRN: [id number]  Today's Date: 2019   Onset of Illness/Injury or Date of Surgery: 19  Date of Referral to PT: 02/10/19  Referring Physician: Loan Ross      Admit Date: 2019    Visit Dx:     ICD-10-CM ICD-9-CM   1. HCAP (healthcare-associated pneumonia) J18.9 486   2. Altered mental status, unspecified altered mental status type R41.82 780.97   3. Sepsis, due to unspecified organism (CMS/HCC) A41.9 038.9     995.91   4. Dysphagia, unspecified type R13.10 787.20   5. Impaired mobility and ADLs Z74.09 799.89     Patient Active Problem List   Diagnosis   • Aspiration pneumonia (CMS/HCC)   • Hypokalemia   • Dementia   • Hypertension   • GERD (gastroesophageal reflux disease)   • Normocytic anemia   • Elevated alkaline phosphatase level   • Vasovagal episode   • Acute hypoxemic respiratory failure (CMS/HCC)   • Constipation   • Aortic heart murmur   • HCAP (healthcare-associated pneumonia)   • Acute respiratory failure with hypoxia (CMS/HCC)   • Spinal stenosis   • Acute pain of left shoulder   • Left hand pain   • Frequent UTI   • Sepsis due to pneumonia (CMS/HCC)   • Generalized weakness     Past Medical History:   Diagnosis Date   • Anxiety    • Arthropathy    • Constipation    • Dementia    • 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 URINE   • Essential hypertension    • GERD (gastroesophageal reflux disease)    • Hyperlipidemia    • Insomnia    • Spinal stenosis    • Syncope    • URI (upper respiratory infection)      Past Surgical History:   Procedure Laterality Date   • BLADDER SUSPENSION     • CHOLECYSTECTOMY     • HIP SURGERY     • HYSTERECTOMY     • TONSILLECTOMY          PT ASSESSMENT (last 12 hours)      Physical Therapy Evaluation     Row Name 19 1346          PT Evaluation Time/Intention    Subjective Information  complains of;pain  -ANDRE     Document Type  evaluation  -ANDRE     Mode of  Treatment  individual therapy  -ANDRE     Patient Effort  excellent  -ANDRE     Symptoms Noted During/After Treatment  fatigue  -ANDRE     Comment  Pt had session with OT earlier in day, very faitigued this session.   -ANDRE     Row Name 02/11/19 1346          General Information    Patient Profile Reviewed?  yes  -ANDRE     Onset of Illness/Injury or Date of Surgery  02/06/19  -ANDRE     Referring Physician  Loan Ross  -ANDRE     Patient Observations  cooperative;agree to therapy  -ANDRE     Patient/Family Observations  Daughter at bedside. Pt UIC.   -ANDRE     General Observations of Patient  Pt UIC, mechanical lift pad underneath.   -ANDRE     Prior Level of Function  mod assist:;transfer;min assist:;bed mobility;dependent:;cooking;cleaning;home management  -ANDRE     Equipment Currently Used at Home  wheelchair;walker, rolling  -ANDRE     Pertinent History of Current Functional Problem  Pt experienced recent functional decline after aquiring PNA. Pt lives at facility, needs help to transfer and walk but dtr states mobility has cont to decline since hospitalization.   -ANDRE     Existing Precautions/Restrictions  fall  -ANDRE     Risks Reviewed  patient and family:;increased discomfort;LOB;dizziness;change in vital signs;lines disloged  -ANDRE     Benefits Reviewed  patient and family:;improve function;increase independence;increase strength;decrease pain;decrease risk of DVT  -ANDRE     Barriers to Rehab  medically complex;previous functional deficit  -ANDRE     Row Name 02/11/19 134          Relationship/Environment    Primary Source of Support/Comfort  child(maría)  -ANDRE     Lives With  — Facility resident.   -ANDRE     Row Name 02/11/19 1346          Resource/Environmental Concerns    Current Living Arrangements  residential facility  -ANDRE     Resource/Environmental Concerns  none  -ANDRE     Row Name 02/11/19 1346          Cognitive Assessment/Interventions    Additional Documentation  Cognitive Assessment/Intervention (Group)  -ANDRE     Row Name 02/11/19 7929           Cognitive Assessment/Intervention- PT/OT    Affect/Mental Status (Cognitive)  WFL  -     Orientation Status (Cognition)  oriented to;person;place;situation  -ANDRE     Follows Commands (Cognition)  follows one step commands;75-90% accuracy  -     Cognitive Function (Cognitive)  safety deficit  -     Safety Deficit (Cognitive)  mild deficit;judgment;safety precautions awareness;awareness of need for assistance  -     Personal Safety Interventions  fall prevention program maintained;gait belt;muscle strengthening facilitated;nonskid shoes/slippers when out of bed  -     Row Name 02/11/19 1346          Bed Mobility Assessment/Treatment    Bed Mobility Assessment/Treatment  scooting/bridging;rolling left;rolling right  -ANDRE     Rolling Left [place] (Bed Mobility)  minimum assist (75% patient effort)  -ANDRE     Rolling Right [place] (Bed Mobility)  minimum assist (75% patient effort)  -ANDRE     Scooting/Bridging [place] (Bed Mobility)  maximum assist (25% patient effort);2 person assist  -     Bed Mobility, Safety Issues  decreased use of legs for bridging/pushing  -     Assistive Device (Bed Mobility)  bed rails;head of bed elevated  -     Comment (Bed Mobility)  Pt able to roll Marine to remove mech lift pad.   -     Row Name 02/11/19 1346          Transfer Assessment/Treatment    Transfer Assessment/Treatment  chair-bed transfer;sit-stand transfer;stand-sit transfer  -     Chair-Bed [place] (Transfers)  2 person assist;dependent (less than 25% patient effort)  -     Sit-Stand [place] (Transfers)  maximum assist (25% patient effort);2 person assist  -     Stand-Sit [place] (Transfers)  maximum assist (25% patient effort);2 person assist  -     Row Name 02/11/19 1346          Chair-Bed Transfer    Assistive Device (Chair-Bed Transfers)  mechanical lift/aid  -     Row Name 02/11/19 1346          Sit-Stand Transfer    Assistive Device (Sit-Stand Transfers)  walker,  front-wheeled  -     Row Name 02/11/19 1346          Stand-Sit Transfer    Assistive Device (Stand-Sit Transfers)  walker, front-wheeled  -     Row Name 02/11/19 1346          Gait/Stairs Assessment/Training    Comment (Gait/Stairs)  Attempted to take step in order to complete stand pivot t/f to bed, pt unable to propel LE forward due to fatigue and weakness.   -     Row Name 02/11/19 1346          General ROM    GENERAL ROM COMMENTS  Knee pain bilaterally with AAROM/PROM  -     Row Name 02/11/19 1346          MMT (Manual Muscle Testing)    General MMT Comments  LEs grossly 3/5  -     Row Name 02/11/19 1346          Motor Assessment/Intervention    Additional Documentation  Balance (Group);Therapeutic Exercise (Group)  -     Row Name 02/11/19 1346          Therapeutic Exercise    Therapeutic Exercise  seated, lower extremities  -     Additional Documentation  Therapeutic Exercise (Row)  -     24427 - PT Therapeutic Exercise Minutes  4  -ANDRE     05091 - PT Therapeutic Activity Minutes  8  -     Row Name 02/11/19 1346          Lower Extremity Seated Therapeutic Exercise    Performed, Seated Lower Extremity (Therapeutic Exercise)  ankle dorsiflexion/plantarflexion heel slides  -     Exercise Type, Seated Lower Extremity (Therapeutic Exercise)  AROM (active range of motion);AAROM (active assistive range of motion)  -     Sets/Reps Detail, Seated Lower Extremity (Therapeutic Exercise)  10  -ANDRE     Comment, Seated Lower Extremity (Therapeutic Exercise)  Pt had minimal range with heel slides, limited by knee pain.   -     Row Name 02/11/19 1346          Balance    Balance  static sitting balance;static standing balance  -     Row Name 02/11/19 1346          Static Sitting Balance    Level of [place] (Unsupported Sitting, Static Balance)  contact guard assist  -     Sitting Position (Unsupported Sitting, Static Balance)  sitting in chair;long sitting on bed  -     Time Able to Maintain  Position (Unsupported Sitting, Static Balance)  1 to 2 minutes  -ANDRE     Comment (Unsupported Sitting, Static Balance)  Fatigues quickly but able to maintain.   -ANDRE     Row Name 02/11/19 1346          Static Standing Balance    Level of [place] (Supported Standing, Static Balance)  maximal assist, 25 to 49% patient effort;2 person assist  -ANDRE     Time Able to Maintain Position (Supported Standing, Static Balance)  less than 15 seconds  -ANDRE     Assistive Device Utilized (Supported Standing, Static Balance)  walker, rolling  -ANDRE     Comment (Supported Standing, Static Balance)  Cues for hand placement on RW, pt unable to completely stand, posterior lean.    -ANDRE     Row Name 02/11/19 1346          Pain Assessment    Additional Documentation  Pain Scale: FACES Pre/Post-Treatment (Group)  -ANDRE     Row Name 02/11/19 1346          Pain Scale: FACES Pre/Post-Treatment    Pain: FACES Scale, Pretreatment  2-->hurts little bit  -ANDRE     Pain: FACES Scale, Post-Treatment  2-->hurts little bit  -ANDRE     Pre/Post Treatment Pain Comment  Bilateral knees with movement, pt states she has had knee problems for years.   -ANDRE     Row Name 02/11/19 1346          Coping    Observed Emotional State  calm;cooperative;pleasant  -ANDRE     Verbalized Emotional State  acceptance  -ANDRE     Row Name 02/11/19 1346          Plan of Care Review    Plan of Care Reviewed With  patient;daughter  -ANDRE     Row Name 02/11/19 1346          Physical Therapy Clinical Impression    Date of Referral to PT  02/10/19  -ANDRE     Patient/Family Goals Statement (PT Clinical Impression)  Return to facility and get rehab services there.   -ANDRE     Criteria for Skilled Interventions Met (PT Clinical Impression)  yes;treatment indicated  -ANDRE     Pathology/Pathophysiology Noted (Describe Specifically for Each System)  musculoskeletal  -ANDRE     Impairments Found (describe specific impairments)  aerobic capacity/endurance;ergonomics and body mechanics;gait, locomotion, and  balance;motor function;muscle performance  -ANDRE     Rehab Potential (PT Clinical Summary)  good, to achieve stated therapy goals  -     Care Plan Review (PT)  evaluation/treatment results reviewed;care plan/treatment goals reviewed;risks/benefits reviewed;current/potential barriers reviewed;patient/other agree to care plan  -     Care Plan Review, Other Participant (PT Clinical Impression)  daughter  -     Patient/Family Concerns, Anticipated Discharge Disposition (PT)  Pt dtr would like for pt to receive PT at facility she resides.   -     Row Name 02/11/19 1346          Vital Signs    Pre Systolic BP Rehab  141  -ANDRE     Pre Treatment Diastolic BP  69  -ANDRE     Post Systolic BP Rehab  156  -ANDRE     Post Treatment Diastolic BP  65  -     Row Name 02/11/19 1346          Physical Therapy Goals    Bed Mobility Goal Selection (PT)  bed mobility, PT goal 1  -     Transfer Goal Selection (PT)  transfer, PT goal 1;transfer, PT goal 2  -     Gait Training Goal Selection (PT)  gait training, PT goal 1  -     Row Name 02/11/19 1346          Bed Mobility Goal 1 (PT)    Activity/Assistive Device (Bed Mobility Goal 1, PT)  sit to supine  -ANDRE     [place] Level/Cues Needed (Bed Mobility Goal 1, PT)  conditional independence  -ANDRE     Time Frame (Bed Mobility Goal 1, PT)  2 weeks  -     Row Name 02/11/19 1346          Transfer Goal 1 (PT)    Activity/Assistive Device (Transfer Goal 1, PT)  sit-to-stand/stand-to-sit  -ANDRE     [place] Level/Cues Needed (Transfer Goal 1, PT)  minimum assist (75% or more patient effort)  -ANDRE     Time Frame (Transfer Goal 1, PT)  2 weeks  -     Row Name 02/11/19 1346          Transfer Goal 2 (PT)    Activity/Assistive Device (Transfer Goal 2, PT)  wheelchair transfer  -ANDRE     [place] Level/Cues Needed (Transfer Goal 2, PT)  minimum assist (75% or more patient effort)  -ANDRE     Time Frame (Transfer Goal 2, PT)  2 weeks  -     Row Name 02/11/19 1346          Gait Training  "Goal 1 (PT)    Activity/Assistive Device (Gait Training Goal 1, PT)  gait (walking locomotion);walker, rolling  -ANDRE     [place] Level (Gait Training Goal 1, PT)  minimum assist (75% or more patient effort)  -ANDRE     Distance (Gait Goal 1, PT)  15  -ANDRE     Time Frame (Gait Training Goal 1, PT)  2 weeks  -ANDRE     Row Name 02/11/19 1346          Positioning and Restraints    Pre-Treatment Position  sitting in chair/recliner  -ANDRE     Post Treatment Position  bed  -ANDRE     In Bed  notified nsg;supine;call light within reach;encouraged to call for assist;exit alarm on;with family/caregiver  -ANDRE     Row Name 02/11/19 1346          Living Environment    Home Accessibility  wheelchair accessible  -ANDRE       User Key  (r) = Recorded By, (t) = Taken By, (c) = Cosigned By    Initials Name Provider Type    Kristen Joel, PT Physical Therapist        Physical Therapy Education     Title: PT OT SLP Therapies (In Progress)     Topic: Physical Therapy (Done)     Point: Mobility training (Done)     Learning Progress Summary           Patient Acceptance, E, VU,NR by ANDRE at 2/11/2019  1:46 PM    Comment:  Pt taught ankle pumps and heel slides in chair, pt agreed to complete throughout day. Pt also educated on safe transfers and to use \"call don't fall\".   Family Acceptance, E, VU,NR by ANDRE at 2/11/2019  1:46 PM    Comment:  Pt taught ankle pumps and heel slides in chair, pt agreed to complete throughout day. Pt also educated on safe transfers and to use \"call don't fall\".                   Point: Home exercise program (Done)     Learning Progress Summary           Patient Acceptance, E, VU,NR by ANDRE at 2/11/2019  1:46 PM    Comment:  Pt taught ankle pumps and heel slides in chair, pt agreed to complete throughout day. Pt also educated on safe transfers and to use \"call don't fall\".   Family Acceptance, E, VU,NR by ANDRE at 2/11/2019  1:46 PM    Comment:  Pt taught ankle pumps and heel slides in chair, pt agreed to complete throughout " "day. Pt also educated on safe transfers and to use \"call don't fall\".                   Point: Body mechanics (Done)     Learning Progress Summary           Patient Acceptance, E, RAMAN,NR by ANDRE at 2/11/2019  1:46 PM    Comment:  Pt taught ankle pumps and heel slides in chair, pt agreed to complete throughout day. Pt also educated on safe transfers and to use \"call don't fall\".   Family Acceptance, E, RAMAN,NR by ANDRE at 2/11/2019  1:46 PM    Comment:  Pt taught ankle pumps and heel slides in chair, pt agreed to complete throughout day. Pt also educated on safe transfers and to use \"call don't fall\".                   Point: Precautions (Done)     Learning Progress Summary           Patient Acceptance, E, RAMAN,NR by ANDRE at 2/11/2019  1:46 PM    Comment:  Pt taught ankle pumps and heel slides in chair, pt agreed to complete throughout day. Pt also educated on safe transfers and to use \"call don't fall\".   Family Acceptance, E, RAMAN,NR by ANDRE at 2/11/2019  1:46 PM    Comment:  Pt taught ankle pumps and heel slides in chair, pt agreed to complete throughout day. Pt also educated on safe transfers and to use \"call don't fall\".                               User Key     Initials Effective Dates Name Provider Type Discipline    ANDRE 08/30/18 -  Kristen Reeves, PT Physical Therapist PT              PT Recommendation and Plan  Anticipated Discharge Disposition (PT): extended care facility  Therapy Frequency (PT Clinical Impression): daily  Outcome Summary/Treatment Plan (PT)  Anticipated Discharge Disposition (PT): extended care facility  Patient/Family Concerns, Anticipated Discharge Disposition (PT): Pt dtr would like for pt to receive PT at facility she resides.   Plan of Care Reviewed With: patient, daughter  Progress: improving  Outcome Summary: PT evaluation completed, pt presents with recent functional decline after bout of PNA. Pt is resident at facility and would like to return there. Attempted stand pivot transfer to bed this " session but pt unable to propel LE forward for step[facility]. Pt required [place] with rolling in bed and depx2 for scooting. Pt is primarily limited by fatigue and weakness. PT appropriate. Recommend SNF upon d/c to continue rehab.   Outcome Measures     Row Name 02/11/19 1346 02/11/19 0856          How much help from another person do you currently need...    Turning from your back to your side while in flat bed without using bedrails?  3  -ANDRE  —     Moving from lying on back to sitting on the side of a flat bed without bedrails?  2  -ANDRE  —     Moving to and from a bed to a chair (including a wheelchair)?  1  -ANDRE  —     Standing up from a chair using your arms (e.g., wheelchair, bedside chair)?  2  -NADRE  —     Climbing 3-5 steps with a railing?  1  -ANDRE  —     To walk in hospital room?  1  -ANDRE  —     AM-PAC 6 Clicks Score  10  -ANDRE  —        How much help from another is currently needed...    Putting on and taking off regular lower body clothing?  —  1  -SD     Bathing (including washing, rinsing, and drying)  —  1  -SD     Toileting (which includes using toilet bed pan or urinal)  —  2  -SD     Putting on and taking off regular upper body clothing  —  2  -SD     Taking care of personal grooming (such as brushing teeth)  —  3  -SD     Eating meals  —  3  -SD     Score  —  12  -SD        Functional Assessment    Outcome Measure Options  AM-PAC 6 Clicks Basic Mobility (PT)  -ANDRE  AM-PAC 6 Clicks Daily Activity (OT)  -SD       User Key  (r) = Recorded By, (t) = Taken By, (c) = Cosigned By    Initials Name Provider Type    Sara Monteiro, OT Occupational Therapist    Kristen Joel, PT Physical Therapist         Time Calculation:   PT Charges     Row Name 02/11/19 1346             Time Calculation    Start Time  1346  -ANDRE      PT Received On  02/11/19  -ANDRE      PT Goal Re-Cert Due Date  02/21/19  -ANDRE         Timed Charges    23795 - PT Therapeutic Exercise Minutes  4  -ANDRE      94977 - PT Therapeutic  Activity Minutes  8  -ANDRE        User Key  (r) = Recorded By, (t) = Taken By, (c) = Cosigned By    Initials Name Provider Type    Kristen Joel, PT Physical Therapist        Therapy Suggested Charges     Code   Minutes Charges    85583 (CPT®) Hc Pt Neuromusc Re Education Ea 15 Min      47791 (CPT®) Hc Pt Ther Proc Ea 15 Min 4     54603 (CPT®) Hc Gait Training Ea 15 Min      96605 (CPT®) Hc Pt Therapeutic Act Ea 15 Min 8 1    76277 (CPT®) Hc Pt Manual Therapy Ea 15 Min      59593 (CPT®) Hc Pt Iontophoresis Ea 15 Min      44321 (CPT®) Hc Pt Elec Stim Ea-Per 15 Min      85063 (CPT®) Hc Pt Ultrasound Ea 15 Min      88951 (CPT®) Hc Pt Self Care/Mgmt/Train Ea 15 Min      90123 (CPT®) Hc Pt Prosthetic (S) Train Initial Encounter, Each 15 Min      66297 (CPT®) Hc Pt Orthotic(S)/Prosthetic(S) Encounter, Each 15 Min      59146 (CPT®) Hc Orthotic(S) Mgmt/Train Initial Encounter, Each 15min      Total  12 1        Therapy Charges for Today     Code Description Service Date Service Provider Modifiers Qty    89400430517 HC PT THERAPEUTIC ACT EA 15 MIN 2/11/2019 Kristen Reeves, PT GP 1    78940223580 HC PT EVAL MOD COMPLEXITY 4 2/11/2019 Kristen Reeves, PT GP 1          PT G-Codes  Outcome Measure Options: AM-PAC 6 Clicks Basic Mobility (PT)  AM-PAC 6 Clicks Score: 10  Score: 12      Kristen Reeves, PT  2/11/2019

## 2019-02-11 NOTE — PROGRESS NOTES
[facility] Medicine Services  PROGRESS NOTE    Patient Name: Antonia Mata  : 1931  MRN: [id number]    Date of Admission: 2019  Length of Stay: 5  Primary Care Physician: Leti Munoz MD    Subjective   Subjective   CC: F/U pneumonia    HPI:  Patient sitting up in a chair in NAD.  Patient very alert and appropriate.  Patient was max assist with OT today waiting on PT evaluation.  Still mildly hypertensive, so will increase hydralazine to us.  Stop all clonidine at discharge.  No adverse events overnight.  Positive BM.  Daughter in the room.    Review of Systems  Gen- No further fevers, chills; +generalized weakness  CV- No chest pain, palpitations  Resp- Improved cough, dyspnea  GI- No N/V/D, abd pain  Otherwise ROS is negative except as mentioned in the HPI.    Objective   Objective   Vital Signs:   Temp:  [97.4 °F (36.3 °C)-98.1 °F (36.7 °C)] 97.9 °F (36.6 °C)  Heart Rate:  [69-90] 75  Resp:  [16-18] 16  BP: (149-188)/(58-93) 158/63  Physical Exam:  Constitutional: No acute distress, awake, alert, sitting up in bed; generalized weakness  Head:  NCAT  ENT: pink, moist mucous membranes  Respiratory: Nonlabored, symmetrical chest expansion; CTAB but diminished in bases bilaterally  Cardiovascular: RRR, no M/R/G; +DP pulses bilaterally  Gastrointestinal: Positive bowel sounds, soft, nontender, nondistended  Musculoskeletal: BAKER; No bilateral ankle edema  Neurologic: Cranial Nerves grossly intact to confrontation, speech clear  Skin: No rashes on exposed skin  Psychiatric: Appropriate affect, cooperative    Results Reviewed:  I have personally reviewed current lab, radiology, and data and agree.    Results from last 7 days   Lab Units 19  0750 19  0508 19  1221   WBC 10*3/mm3 6.12 10.37 11.79*   HEMOGLOBIN g/dL 13.7 11.2* 14.4   HEMATOCRIT % 42.3 35.0 43.2   PLATELETS 10*3/mm3 191 156 199     Results from last 7 days   Lab Units 19  0604  02/08/19  1740 02/08/19  0750  02/07/19  0508 02/06/19  1302  02/06/19  1220   SODIUM mmol/L 142  --  141  --  143 141   < >  --    POTASSIUM mmol/L 3.8 4.1 3.5   < > 2.9* 3.5   < >  --    CHLORIDE mmol/L 110*  --  107  --  109 105   < >  --    CO2 mmol/L 26.0  --  27.0  --  27.0 28.0   < >  --    BUN mg/dL 15  --  8*  --  16 13   < >  --    CREATININE mg/dL 0.65  --  0.62  --  0.88 0.65   < >  --    GLUCOSE mg/dL 101*  --  108*  --  101* 110*   < >  --    CALCIUM mg/dL 8.7  --  8.8  --  7.9* 8.5*   < >  --    ALT (SGPT) U/L  --   --   --   --   --  18  --   --    AST (SGOT) U/L  --   --   --   --   --  28  --   --    TROPONIN I ng/mL  --   --   --   --   --   --   --  0.06    < > = values in this interval not displayed.     Estimated Creatinine Clearance: 44.6 mL/min (by C-G formula based on SCr of 0.65 mg/dL).    No results found for: BNP    Microbiology Results Abnormal     Procedure Component Value - Date/Time    Blood Culture - Blood, Arm, Right [[id number]] Collected:  02/06/19 1210    Lab Status:  Preliminary result Specimen:  Blood from Arm, Right Updated:  02/10/19 1300     Blood Culture No growth at 4 days    Blood Culture - Blood, Arm, Left [[id number]] Collected:  02/06/19 1220    Lab Status:  Preliminary result Specimen:  Blood from Arm, Left Updated:  02/10/19 1300     Blood Culture No growth at 4 days    MRSA Screen, PCR - Swab, Nares [[id number]]  (Abnormal) Collected:  02/06/19 6238    Lab Status:  Final result Specimen:  Swab from Nares Updated:  02/06/19 1709     MRSA, PCR Positive    Influenza A & B, RT PCR - Swab, Nasopharynx [[id number]]  (Normal) Collected:  02/06/19 1458    Lab Status:  Final result Specimen:  Swab from Nasopharynx Updated:  02/06/19 1702     Influenza A PCR Not Detected     Influenza B PCR Not Detected        Imaging Results (last 24 hours)     ** No results found for the last 24 hours. **        Results for orders placed during the hospital encounter of 05/14/18   Adult  Transthoracic Echo Complete W/ Cont if Necessary Per Protocol    Narrative · Mild aortic valve regurgitation is present.  · Calculated right ventricular systolic pressure from tricuspid   regurgitation is 24 mmHg.  · Mild tricuspid valve regurgitation is present.  · Left ventricular systolic function is normal. Estimated EF = 60%.  · Left ventricular diastolic dysfunction (grade I) consistent with   impaired relaxation.  · There is no evidence of pericardial effusion.  · No evidence of pulmonary hypertension is present.  · The aortic valve exhibits sclerosis.  · Normal right ventricular cavity size, wall thickness, systolic function   and septal motion noted.  · Moderate MAC is present.        I have reviewed the medications:    Current Facility-Administered Medications:   •  acetaminophen (TYLENOL) tablet 650 mg, 650 mg, Oral, Q6H PRN, Katy Davenport MD, 650 mg at 02/08/19 0904  •  amLODIPine (NORVASC) tablet 10 mg, 10 mg, Oral, Nightly, Katy Davenport MD, 10 mg at 02/10/19 2104  •  bisacodyl (DULCOLAX) EC tablet 5 mg, 5 mg, Oral, Daily PRN, Katy Davenport MD  •  busPIRone (BUSPAR) tablet 7.5 mg, 7.5 mg, Oral, BID, Katy Davenport MD, 7.5 mg at 02/11/19 0801  •  calcium carbonate (TUMS) chewable tablet 500 mg (200 mg elemental), 2 tablet, Oral, BID PRN, Katy Davenport MD  •  cetirizine (zyrTEC) tablet 10 mg, 10 mg, Oral, Daily, Katy Davenport MD, 10 mg at 02/11/19 0800  •  clopidogrel (PLAVIX) tablet 75 mg, 75 mg, Oral, Daily, Katy Davenport MD, 75 mg at 02/11/19 0803  •  colchicine tablet 0.6 mg, 0.6 mg, Oral, Daily, Katy Davenport MD, 0.6 mg at 02/11/19 0803  •  docusate sodium (COLACE) capsule 100 mg, 100 mg, Oral, BID, Katy Davenport MD, 100 mg at 02/10/19 2104  •  donepezil (ARICEPT) tablet 10 mg, 10 mg, Oral, Nightly, Katy Davenport MD, 10 mg at 02/10/19 2104  •  enoxaparin (LOVENOX) syringe 40 mg, 40 mg, Subcutaneous, Q24H, Katy Davenport MD, 40 mg at 02/10/19 1723  •  famotidine (PEPCID) tablet 20 mg, 20 mg,  Oral, Daily, Karol Og APRN, 20 mg at 02/11/19 0800  •  ferrous sulfate tablet 325 mg, 325 mg, Oral, Daily With Breakfast, Katy Davenport MD, 325 mg at 02/11/19 0803  •  gabapentin (NEURONTIN) capsule 200 mg, 200 mg, Oral, TID, Katy Davenport MD, 200 mg at 02/11/19 0800  •  hydrALAZINE (APRESOLINE) injection 10 mg, 10 mg, Intravenous, Q6H PRN, Katy Davenport MD, 10 mg at 02/08/19 0908  •  hydrALAZINE (APRESOLINE) tablet 50 mg, 50 mg, Oral, Q8H, Loan Ross APRN  •  isosorbide mononitrate (IMDUR) 24 hr tablet 60 mg, 60 mg, Oral, Daily, Katy Davenport MD, 60 mg at 02/11/19 0801  •  Magnesium Sulfate 2 gram Bolus, followed by 8 gram infusion (total Mg dose 10 grams)- Mg less than or equal to 1mg/dL, 2 g, Intravenous, PRN **OR** Magnesium Sulfate 2 gram / 50mL Infusion (GIVE X 3 BAGS TO EQUAL 6GM TOTAL DOSE) - Mg 1.1 - 1.5 mg/dl, 2 g, Intravenous, PRN **OR** Magnesium Sulfate 4 gram infusion- Mg 1.6-1.9 mg/dL, 4 g, Intravenous, PRN, Katy Davenport MD, Last Rate: 25 mL/hr at 02/08/19 0909, 4 g at 02/08/19 0909  •  meloxicam (MOBIC) tablet 7.5 mg, 7.5 mg, Oral, Nightly, Katy Davenport MD, 7.5 mg at 02/10/19 2104  •  memantine (NAMENDA) tablet 10 mg, 10 mg, Oral, Q12H, Katy Davenport MD, 10 mg at 02/11/19 0800  •  miconazole (MICOTIN) 2 % cream, , Topical, Q12H, Katy Davenport MD  •  polyethylene glycol 3350 powder (packet), 17 g, Oral, Daily PRN, Katy Davenport MD  •  potassium chloride (MICRO-K) CR capsule 40 mEq, 40 mEq, Oral, PRN, 40 mEq at 02/08/19 1315 **OR** potassium chloride (KLOR-CON) packet 40 mEq, 40 mEq, Oral, PRN **OR** potassium chloride 10 mEq in 100 mL IVPB, 10 mEq, Intravenous, Q1H PRN, Katy Davenport MD  •  saccharomyces boulardii (FLORASTOR) capsule 250 mg, 250 mg, Oral, BID, Katy Davenport MD, 250 mg at 02/11/19 0803  •  sodium chloride 0.9 % flush 10 mL, 10 mL, Intravenous, PRN, Jordan Whitt MD  •  sodium chloride 0.9 % flush 3 mL, 3 mL, Intravenous, Q12H, Katy Davenport MD, 3 mL at  02/10/19 2105  •  sodium chloride 0.9 % flush 3-10 mL, 3-10 mL, Intravenous, PRN, Katy Davenport MD  •  sucralfate (CARAFATE) tablet 1 g, 1 g, Oral, 4x Daily AC & at Bedtime, Katy Davenport MD, 1 g at 02/11/19 1210  •  terazosin (HYTRIN) capsule 2 mg, 2 mg, Oral, Nightly, Katy Davenport MD, 2 mg at 02/10/19 2104  •  traMADol (ULTRAM) tablet 50 mg, 50 mg, Oral, Q12H, Katy Davenport MD, 50 mg at 02/11/19 0803  •  traZODone (DESYREL) tablet 25 mg, 25 mg, Oral, Nightly, Katy Davenport MD, 25 mg at 02/10/19 2104  •  vitamin C (ASCORBIC ACID) tablet 500 mg, 500 mg, Oral, Daily, Katy Davenport MD, 500 mg at 02/11/19 0803    Assessment/Plan   Assessment / Plan     Active Hospital Problems    Diagnosis Date Noted   • **HCAP (healthcare-associated pneumonia) [J18.9] 06/03/2018   • Generalized weakness [R53.1] 02/11/2019   • Sepsis due to pneumonia (CMS/[facility]) [J18.9, A41.9] 02/07/2019   • Frequent UTI [N39.0] 02/06/2019   • Acute hypoxemic respiratory failure (CMS/[facility]) [J96.01] 05/14/2018   • GERD (gastroesophageal reflux disease) [K21.9] 03/29/2018   • Dementia [F03.90] 03/29/2018   • Hypertension [I10] 03/29/2018   • Hypokalemia [E87.6] 03/29/2018     Brief Hospital Course to date:  Antonia Mata is a 88 y.o. female with history of aspiration pneumonia, dementia, frequent UTI, and hypertension presenting from her nursing home due to altered mental status and found to have RUL pneumonia.    Sepsis due to healthcare associated pneumonia (POA)  Acute hypoxemic respiratory failure (POA)  -Influenza PCR negative  -MRSA PCR positive  -Blood cultures NGTD, sputum culture pending  -Will de-escalate abx to augmentin and doxy today.    -SLP evaluation-on modified diet now     Metabolic encephalopathy  -Resolved  -Likely due to acute illness     Dementia  -Continue home medications     HTN  -Difficult to control; keep around 160/90  -Continue home medications with the exception of metoprolol due to concern for side effects with this  new medication  - IMDUR, increased hydralazine 50mg TID today; continued amlodipine  --Clonidine DC'd; do not use clonidine after discharge     GERD  -will switch PPI to H2 blocker while on antibiotics due to C-diff risk.  Can resume PPI once finished with antibiotic course.       Frequent UTI  -Recently started on prophylactic macrobid-continue at discharge  -UA on admission not suggestive of UTI    Hypokalemia  -Replaced per protocol    Mobility  --Consulted PT/OT  --OT recommends continued rehab at [place]  --PT eval pending  --CM involved    DVT Prophylaxis:  Lovenox    CODE STATUS:   Code Status and Medical Interventions:   Ordered at: 02/06/19 1450     Limited Support to NOT Include:    Intubation     Code Status:    No CPR     Medical Interventions (Level of Support Prior to Arrest):    Limited     Dispo:  Not appropriate for DC; PT/OT ordered d/t increased generalized weakness; CM following.    Electronically signed by UMAIR Dumont, 02/11/19, 12:11 PM.

## 2019-02-11 NOTE — PLAN OF CARE
Problem: Patient Care Overview  Goal: Plan of Care Review  Outcome: Ongoing (interventions implemented as appropriate)   02/11/19 9365   Coping/Psychosocial   Plan of Care Reviewed With patient;daughter   Plan of Care Review   Progress improving   OTHER   Outcome Summary PT evaluation completed, pt presents with recent functional decline after bout of PNA. Pt is resident at facility and would like to return there, daughter agrees. Attempted stand pivot transfer to bed this session but pt unable to propel LE forward for step, req maxAx2 for sit to stand. Mech lift used. Pt required Marine with rolling in bed and depx2 for scooting. Pt is primarily limited by fatigue and weakness. PT appropriate. Recommend SNF upon d/c to continue rehab.

## 2019-02-12 ENCOUNTER — APPOINTMENT (OUTPATIENT)
Dept: GENERAL RADIOLOGY | Facility: HOSPITAL | Age: 84
End: 2019-02-12

## 2019-02-12 LAB
ANION GAP SERPL CALCULATED.3IONS-SCNC: 7 MMOL/L (ref 3–11)
BUN BLD-MCNC: 24 MG/DL (ref 9–23)
BUN/CREAT SERPL: 33.8 (ref 7–25)
CALCIUM SPEC-SCNC: 8.8 MG/DL (ref 8.7–10.4)
CHLORIDE SERPL-SCNC: 107 MMOL/L (ref 99–109)
CO2 SERPL-SCNC: 27 MMOL/L (ref 20–31)
CREAT BLD-MCNC: 0.71 MG/DL (ref 0.6–1.3)
DEPRECATED RDW RBC AUTO: 47.8 FL (ref 37–54)
ERYTHROCYTE [DISTWIDTH] IN BLOOD BY AUTOMATED COUNT: 13.4 % (ref 11.3–14.5)
GFR SERPL CREATININE-BSD FRML MDRD: 78 ML/MIN/1.73
GLUCOSE BLD-MCNC: 99 MG/DL (ref 70–100)
HCT VFR BLD AUTO: 41.5 % (ref 34.5–44)
HGB BLD-MCNC: 13.1 G/DL (ref 11.5–15.5)
MCH RBC QN AUTO: 30.8 PG (ref 27–31)
MCHC RBC AUTO-ENTMCNC: 31.6 G/DL (ref 32–36)
MCV RBC AUTO: 97.4 FL (ref 80–99)
PLATELET # BLD AUTO: 270 10*3/MM3 (ref 150–450)
PMV BLD AUTO: 9.6 FL (ref 6–12)
POTASSIUM BLD-SCNC: 3.6 MMOL/L (ref 3.5–5.5)
POTASSIUM BLD-SCNC: 4.6 MMOL/L (ref 3.5–5.5)
RBC # BLD AUTO: 4.26 10*6/MM3 (ref 3.89–5.14)
SODIUM BLD-SCNC: 141 MMOL/L (ref 132–146)
WBC NRBC COR # BLD: 7.27 10*3/MM3 (ref 3.5–10.8)

## 2019-02-12 PROCEDURE — 99232 SBSQ HOSP IP/OBS MODERATE 35: CPT | Performed by: NURSE PRACTITIONER

## 2019-02-12 PROCEDURE — 97530 THERAPEUTIC ACTIVITIES: CPT

## 2019-02-12 PROCEDURE — 84132 ASSAY OF SERUM POTASSIUM: CPT | Performed by: INTERNAL MEDICINE

## 2019-02-12 PROCEDURE — 25010000002 ENOXAPARIN PER 10 MG: Performed by: INTERNAL MEDICINE

## 2019-02-12 PROCEDURE — 71045 X-RAY EXAM CHEST 1 VIEW: CPT

## 2019-02-12 PROCEDURE — 80048 BASIC METABOLIC PNL TOTAL CA: CPT | Performed by: NURSE PRACTITIONER

## 2019-02-12 PROCEDURE — 97110 THERAPEUTIC EXERCISES: CPT

## 2019-02-12 PROCEDURE — 85027 COMPLETE CBC AUTOMATED: CPT | Performed by: NURSE PRACTITIONER

## 2019-02-12 RX ORDER — AMOXICILLIN AND CLAVULANATE POTASSIUM 875; 125 MG/1; MG/1
1 TABLET, FILM COATED ORAL EVERY 12 HOURS SCHEDULED
Status: DISCONTINUED | OUTPATIENT
Start: 2019-02-12 | End: 2019-02-13 | Stop reason: HOSPADM

## 2019-02-12 RX ORDER — DOXYCYCLINE 100 MG/1
100 CAPSULE ORAL EVERY 12 HOURS SCHEDULED
Status: DISCONTINUED | OUTPATIENT
Start: 2019-02-12 | End: 2019-02-13 | Stop reason: HOSPADM

## 2019-02-12 RX ADMIN — POTASSIUM CHLORIDE 40 MEQ: 1.5 POWDER, FOR SOLUTION ORAL at 09:50

## 2019-02-12 RX ADMIN — MEMANTINE 10 MG: 10 TABLET ORAL at 20:56

## 2019-02-12 RX ADMIN — ENOXAPARIN SODIUM 40 MG: 40 INJECTION SUBCUTANEOUS at 17:29

## 2019-02-12 RX ADMIN — BUSPIRONE HYDROCHLORIDE 7.5 MG: 15 TABLET ORAL at 21:00

## 2019-02-12 RX ADMIN — POTASSIUM CHLORIDE 40 MEQ: 1.5 POWDER, FOR SOLUTION ORAL at 14:03

## 2019-02-12 RX ADMIN — DOCUSATE SODIUM 100 MG: 100 CAPSULE, LIQUID FILLED ORAL at 20:53

## 2019-02-12 RX ADMIN — Medication 250 MG: at 20:59

## 2019-02-12 RX ADMIN — CETIRIZINE HYDROCHLORIDE 10 MG: 10 TABLET, FILM COATED ORAL at 09:49

## 2019-02-12 RX ADMIN — MICONAZOLE NITRATE: 2 CREAM TOPICAL at 09:51

## 2019-02-12 RX ADMIN — DOXYCYCLINE 100 MG: 100 CAPSULE ORAL at 21:01

## 2019-02-12 RX ADMIN — AMLODIPINE BESYLATE 10 MG: 10 TABLET ORAL at 20:56

## 2019-02-12 RX ADMIN — TRAMADOL HYDROCHLORIDE 50 MG: 50 TABLET, FILM COATED ORAL at 20:57

## 2019-02-12 RX ADMIN — GABAPENTIN 200 MG: 100 CAPSULE ORAL at 09:49

## 2019-02-12 RX ADMIN — HYDRALAZINE HYDROCHLORIDE 50 MG: 50 TABLET, FILM COATED ORAL at 20:59

## 2019-02-12 RX ADMIN — CLOPIDOGREL BISULFATE 75 MG: 75 TABLET, FILM COATED ORAL at 09:49

## 2019-02-12 RX ADMIN — DOXYCYCLINE 100 MG: 100 CAPSULE ORAL at 09:59

## 2019-02-12 RX ADMIN — SUCRALFATE 1 G: 1 TABLET ORAL at 17:29

## 2019-02-12 RX ADMIN — Medication 325 MG: at 09:49

## 2019-02-12 RX ADMIN — DOCUSATE SODIUM 100 MG: 100 CAPSULE, LIQUID FILLED ORAL at 09:49

## 2019-02-12 RX ADMIN — MICONAZOLE NITRATE: 2 CREAM TOPICAL at 20:53

## 2019-02-12 RX ADMIN — MEMANTINE 10 MG: 10 TABLET ORAL at 09:49

## 2019-02-12 RX ADMIN — HYDRALAZINE HYDROCHLORIDE 50 MG: 50 TABLET, FILM COATED ORAL at 14:03

## 2019-02-12 RX ADMIN — TRAMADOL HYDROCHLORIDE 50 MG: 50 TABLET, FILM COATED ORAL at 09:49

## 2019-02-12 RX ADMIN — OXYCODONE HYDROCHLORIDE AND ACETAMINOPHEN 500 MG: 500 TABLET ORAL at 09:49

## 2019-02-12 RX ADMIN — AMOXICILLIN AND CLAVULANATE POTASSIUM 1 TABLET: 875; 125 TABLET, FILM COATED ORAL at 20:59

## 2019-02-12 RX ADMIN — SUCRALFATE 1 G: 1 TABLET ORAL at 09:49

## 2019-02-12 RX ADMIN — MELOXICAM 7.5 MG: 7.5 TABLET ORAL at 20:58

## 2019-02-12 RX ADMIN — HYDRALAZINE HYDROCHLORIDE 50 MG: 50 TABLET, FILM COATED ORAL at 04:32

## 2019-02-12 RX ADMIN — GABAPENTIN 200 MG: 100 CAPSULE ORAL at 20:57

## 2019-02-12 RX ADMIN — TRAZODONE HYDROCHLORIDE 25 MG: 50 TABLET ORAL at 20:58

## 2019-02-12 RX ADMIN — BUSPIRONE HYDROCHLORIDE 7.5 MG: 15 TABLET ORAL at 09:50

## 2019-02-12 RX ADMIN — COLCHICINE 0.6 MG: 0.6 TABLET, FILM COATED ORAL at 09:49

## 2019-02-12 RX ADMIN — SUCRALFATE 1 G: 1 TABLET ORAL at 20:53

## 2019-02-12 RX ADMIN — AMOXICILLIN AND CLAVULANATE POTASSIUM 1 TABLET: 875; 125 TABLET, FILM COATED ORAL at 09:59

## 2019-02-12 RX ADMIN — FAMOTIDINE 20 MG: 20 TABLET ORAL at 09:49

## 2019-02-12 RX ADMIN — TERAZOSIN HYDROCHLORIDE 2 MG: 2 CAPSULE ORAL at 20:53

## 2019-02-12 RX ADMIN — SUCRALFATE 1 G: 1 TABLET ORAL at 12:00

## 2019-02-12 RX ADMIN — Medication 250 MG: at 09:49

## 2019-02-12 RX ADMIN — ISOSORBIDE MONONITRATE 60 MG: 60 TABLET, EXTENDED RELEASE ORAL at 09:49

## 2019-02-12 RX ADMIN — GABAPENTIN 200 MG: 100 CAPSULE ORAL at 14:03

## 2019-02-12 RX ADMIN — DONEPEZIL HYDROCHLORIDE 10 MG: 10 TABLET, FILM COATED ORAL at 20:58

## 2019-02-12 NOTE — PLAN OF CARE
Problem: Fall Risk (Adult)  Goal: Absence of Fall  Outcome: Ongoing (interventions implemented as appropriate)      Problem: Patient Care Overview  Goal: Plan of Care Review   02/12/19 3429   OTHER   Outcome Summary PT evaluated. Functional decline after PNA. Pt going to SNF as planned. Continuing to monitor. Continuing POC.        Problem: Pneumonia (Adult)  Goal: Signs and Symptoms of Listed Potential Problems Will be Absent, Minimized or Managed (Pneumonia)  Outcome: Ongoing (interventions implemented as appropriate)      Problem: Skin Injury Risk (Adult)  Goal: Skin Health and Integrity  Outcome: Ongoing (interventions implemented as appropriate)

## 2019-02-12 NOTE — PROGRESS NOTES
[facility] Medicine Services  PROGRESS NOTE    Patient Name: Antonia Mata  : 1931  MRN: [id number]    Date of Admission: 2019  Length of Stay: 6  Primary Care Physician: Leti Munoz MD    Subjective   Subjective   CC: F/U pneumonia    HPI:    Patient sitting up in bed, just finished lunch.  Daughter at bedside with multiple questions.  She is concerned about her urine because she has frequent UTIs.  Patient reports that she is tired today.      Review of Systems  Gen- No further fevers, chills; +generalized weakness  CV- No chest pain, palpitations  Resp- Improved cough, dyspnea  GI- No N/V/D, abd pain, no urinary complaints.  Otherwise ROS is negative except as mentioned in the HPI.    Objective   Objective   Vital Signs:   Temp:  [97.7 °F (36.5 °C)-98.9 °F (37.2 °C)] 98 °F (36.7 °C)  Heart Rate:  [75-93] 83  Resp:  [16-18] 17  BP: (135-165)/(54-67) 151/65  Physical Exam:  Constitutional: No acute distress, awake, alert, sitting up in bed  HENT:  NCAT, moist mucous membranes  Respiratory: Nonlabored, faint crackles in right upper lobe, room air  Cardiovascular: RRR, no M/R/G; +DP pulses bilaterally  Gastrointestinal: Positive bowel sounds, soft, nontender, nondistended  Musculoskeletal: BAKER; No bilateral ankle edema  Neurologic: Cranial Nerves grossly intact to confrontation, speech clear  Skin: No rashes on exposed skin  Psychiatric: Appropriate affect, cooperative    Results Reviewed:  I have personally reviewed current lab, radiology, and data and agree.    Results from last 7 days   Lab Units 19  0501 19  0750 19  0508   WBC 10*3/mm3 7.27 6.12 10.37   HEMOGLOBIN g/dL 13.1 13.7 11.2*   HEMATOCRIT % 41.5 42.3 35.0   PLATELETS 10*3/mm3 270 191 156     Results from last 7 days   Lab Units 19  0501 19  0604 19  1740 19  0750  19  1302 19  1220   SODIUM mmol/L 141 142  --  141   < > 141  --    POTASSIUM mmol/L  3.6 3.8 4.1 3.5   < > 3.5  --    CHLORIDE mmol/L 107 110*  --  107   < > 105  --    CO2 mmol/L 27.0 26.0  --  27.0   < > 28.0  --    BUN mg/dL 24* 15  --  8*   < > 13  --    CREATININE mg/dL 0.71 0.65  --  0.62   < > 0.65  --    GLUCOSE mg/dL 99 101*  --  108*   < > 110*  --    CALCIUM mg/dL 8.8 8.7  --  8.8   < > 8.5*  --    ALT (SGPT) U/L  --   --   --   --   --  18  --    AST (SGOT) U/L  --   --   --   --   --  28  --    TROPONIN I ng/mL  --   --   --   --   --   --  0.06    < > = values in this interval not displayed.     Estimated Creatinine Clearance: 44.6 mL/min (by C-G formula based on SCr of 0.71 mg/dL).    No results found for: BNP    Microbiology Results Abnormal     Procedure Component Value - Date/Time    Blood Culture - Blood, Arm, Right [[id number]] Collected:  02/06/19 1210    Lab Status:  Final result Specimen:  Blood from Arm, Right Updated:  02/11/19 1300     Blood Culture No growth at 5 days    Blood Culture - Blood, Arm, Left [[id number]] Collected:  02/06/19 1220    Lab Status:  Final result Specimen:  Blood from Arm, Left Updated:  02/11/19 1300     Blood Culture No growth at 5 days    MRSA Screen, PCR - Swab, Nares [[id number]]  (Abnormal) Collected:  02/06/19 1458    Lab Status:  Final result Specimen:  Swab from Nares Updated:  02/06/19 1709     MRSA, PCR Positive    Influenza A & B, RT PCR - Swab, Nasopharynx [[id number]]  (Normal) Collected:  02/06/19 1458    Lab Status:  Final result Specimen:  Swab from Nasopharynx Updated:  02/06/19 1702     Influenza A PCR Not Detected     Influenza B PCR Not Detected        Imaging Results (last 24 hours)     Procedure Component Value Units Date/Time    XR Chest 1 View [[id number]] Collected:  02/12/19 1132     Updated:  02/12/19 1203    Narrative:       EXAMINATION: XR CHEST 1 VW- 02/12/2019      INDICATION: questionable pneumonia on previous exam; J18.9-Pneumonia,  unspecified organism; R41.82-Altered mental status, unspecified;  A41.9-Sepsis,  unspecified organism; R13.10-Dysphagia, unspecified;  Z74.09-Other reduced mobility     TECHNIQUE:  Single view frontal chest.     COMPARISONS: 02/06/2019     FINDINGS:  Airspace disease in the right upper lobe is again seen  accentuating the minor fissure. No pleural effusion or pneumothorax.  Atherosclerosis of the aortic knob. Advanced degenerative changes of the  left glenohumeral joint noted.       Impression:       Airspace disease in the right upper lobe again noted.      D:  02/12/2019  E:  02/12/2019     This report was finalized on 2/12/2019 12:01 PM by Vipul Montoya.           Results for orders placed during the hospital encounter of 05/14/18   Adult Transthoracic Echo Complete W/ Cont if Necessary Per Protocol    Narrative · Mild aortic valve regurgitation is present.  · Calculated right ventricular systolic pressure from tricuspid   regurgitation is 24 mmHg.  · Mild tricuspid valve regurgitation is present.  · Left ventricular systolic function is normal. Estimated EF = 60%.  · Left ventricular diastolic dysfunction (grade I) consistent with   impaired relaxation.  · There is no evidence of pericardial effusion.  · No evidence of pulmonary hypertension is present.  · The aortic valve exhibits sclerosis.  · Normal right ventricular cavity size, wall thickness, systolic function   and septal motion noted.  · Moderate MAC is present.        I have reviewed the medications:    Current Facility-Administered Medications:   •  acetaminophen (TYLENOL) tablet 650 mg, 650 mg, Oral, Q6H PRN, Katy Davenport MD, 650 mg at 02/08/19 0904  •  amLODIPine (NORVASC) tablet 10 mg, 10 mg, Oral, Nightly, Katy Davenport MD, 10 mg at 02/11/19 2124  •  amoxicillin-clavulanate (AUGMENTIN) 875-125 MG per tablet 1 tablet, 1 tablet, Oral, Q12H, Zayra Tobias MD, 1 tablet at 02/12/19 0959  •  bisacodyl (DULCOLAX) EC tablet 5 mg, 5 mg, Oral, Daily PRN, Katy Davenport MD  •  busPIRone (BUSPAR) tablet 7.5 mg, 7.5 mg, Oral, BID,  Katy Davenport MD, 7.5 mg at 02/12/19 0950  •  calcium carbonate (TUMS) chewable tablet 500 mg (200 mg elemental), 2 tablet, Oral, BID PRN, Katy Davenport MD  •  cetirizine (zyrTEC) tablet 10 mg, 10 mg, Oral, Daily, Katy Davenport MD, 10 mg at 02/12/19 0949  •  clopidogrel (PLAVIX) tablet 75 mg, 75 mg, Oral, Daily, Katy Davenport MD, 75 mg at 02/12/19 0949  •  colchicine tablet 0.6 mg, 0.6 mg, Oral, Daily, Katy Davenport MD, 0.6 mg at 02/12/19 0949  •  docusate sodium (COLACE) capsule 100 mg, 100 mg, Oral, BID, Katy Davenport MD, 100 mg at 02/12/19 0949  •  donepezil (ARICEPT) tablet 10 mg, 10 mg, Oral, Nightly, Katy Davenport MD, 10 mg at 02/11/19 2124  •  doxycycline (MONODOX) capsule 100 mg, 100 mg, Oral, Q12H, Zayra Tobias MD, 100 mg at 02/12/19 0959  •  enoxaparin (LOVENOX) syringe 40 mg, 40 mg, Subcutaneous, Q24H, Katy Davenport MD, 40 mg at 02/11/19 1701  •  famotidine (PEPCID) tablet 20 mg, 20 mg, Oral, Daily, Karol Og APRN, 20 mg at 02/12/19 0949  •  ferrous sulfate tablet 325 mg, 325 mg, Oral, Daily With Breakfast, Katy Davenport MD, 325 mg at 02/12/19 0949  •  gabapentin (NEURONTIN) capsule 200 mg, 200 mg, Oral, TID, Katy Davenport MD, 200 mg at 02/12/19 1403  •  hydrALAZINE (APRESOLINE) injection 10 mg, 10 mg, Intravenous, Q6H PRN, Katy Davenport MD, 10 mg at 02/08/19 0908  •  hydrALAZINE (APRESOLINE) tablet 50 mg, 50 mg, Oral, Q8H, Loan Ross APRN, 50 mg at 02/12/19 1403  •  isosorbide mononitrate (IMDUR) 24 hr tablet 60 mg, 60 mg, Oral, Daily, Katy Davenport MD, 60 mg at 02/12/19 0932  •  Magnesium Sulfate 2 gram Bolus, followed by 8 gram infusion (total Mg dose 10 grams)- Mg less than or equal to 1mg/dL, 2 g, Intravenous, PRN **OR** Magnesium Sulfate 2 gram / 50mL Infusion (GIVE X 3 BAGS TO EQUAL 6GM TOTAL DOSE) - Mg 1.1 - 1.5 mg/dl, 2 g, Intravenous, PRN **OR** Magnesium Sulfate 4 gram infusion- Mg 1.6-1.9 mg/dL, 4 g, Intravenous, PRN, Katy Davenport MD, Last Rate: 25 mL/hr at  02/08/19 0909, 4 g at 02/08/19 0909  •  meloxicam (MOBIC) tablet 7.5 mg, 7.5 mg, Oral, Nightly, Katy Davenport MD, 7.5 mg at 02/11/19 2124  •  memantine (NAMENDA) tablet 10 mg, 10 mg, Oral, Q12H, Katy Davenport MD, 10 mg at 02/12/19 0949  •  miconazole (MICOTIN) 2 % cream, , Topical, Q12H, Katy Davenport MD  •  polyethylene glycol 3350 powder (packet), 17 g, Oral, Daily PRN, Katy Davenport MD  •  potassium chloride (MICRO-K) CR capsule 40 mEq, 40 mEq, Oral, PRN, 40 mEq at 02/08/19 1315 **OR** potassium chloride (KLOR-CON) packet 40 mEq, 40 mEq, Oral, PRN, 40 mEq at 02/12/19 1403 **OR** potassium chloride 10 mEq in 100 mL IVPB, 10 mEq, Intravenous, Q1H PRN, Katy Davenport MD  •  saccharomyces boulardii (FLORASTOR) capsule 250 mg, 250 mg, Oral, BID, Katy Davenport MD, 250 mg at 02/12/19 0949  •  sodium chloride 0.9 % flush 10 mL, 10 mL, Intravenous, PRN, Jordan Whitt MD  •  sodium chloride 0.9 % flush 3 mL, 3 mL, Intravenous, Q12H, Katy Davenport MD, 3 mL at 02/10/19 2105  •  sodium chloride 0.9 % flush 3-10 mL, 3-10 mL, Intravenous, PRN, Katy Davenport MD  •  sucralfate (CARAFATE) tablet 1 g, 1 g, Oral, 4x Daily AC & at Bedtime, Katy Davenport MD, 1 g at 02/12/19 1200  •  terazosin (HYTRIN) capsule 2 mg, 2 mg, Oral, Nightly, Katy Davenport MD, 2 mg at 02/11/19 2124  •  traMADol (ULTRAM) tablet 50 mg, 50 mg, Oral, Q12H, Katy Davenport MD, 50 mg at 02/12/19 0949  •  traZODone (DESYREL) tablet 25 mg, 25 mg, Oral, Nightly, Katy Davenport MD, 25 mg at 02/11/19 2124  •  vitamin C (ASCORBIC ACID) tablet 500 mg, 500 mg, Oral, Daily, Katy Davenport MD, 500 mg at 02/12/19 0949    Assessment/Plan   Assessment / Plan     Active Hospital Problems    Diagnosis Date Noted   • **HCAP (healthcare-associated pneumonia) [J18.9] 06/03/2018   • Generalized weakness [R53.1] 02/11/2019   • Sepsis due to pneumonia (CMS/HCC) [J18.9, A41.9] 02/07/2019   • Frequent UTI [N39.0] 02/06/2019   • Acute hypoxemic respiratory failure (CMS/HCC)  [J96.01] 05/14/2018   • GERD (gastroesophageal reflux disease) [K21.9] 03/29/2018   • Dementia [F03.90] 03/29/2018   • Hypertension [I10] 03/29/2018   • Hypokalemia [E87.6] 03/29/2018     Brief Hospital Course to date:  Antonia Mata is a 88 y.o. female with history of aspiration pneumonia, dementia, frequent UTI, and hypertension presenting from her nursing home due to altered mental status and found to have RUL pneumonia.    Sepsis due to healthcare associated pneumonia (POA)  Acute hypoxemic respiratory failure (POA)  -Influenza PCR negative  -MRSA PCR positive  -Blood cultures NGTD, sputum culture pending  -Augmentin and doxy x 5 more days  -SLP evaluation-on modified diet now     Metabolic encephalopathy  -Resolved  -Likely due to acute illness     Dementia  -Continue home medications     HTN  -Difficult to control; keep around 160/90  -Continue home medications with the exception of metoprolol due to concern for side effects with this new medication  - IMDUR, increased hydralazine 50mg TID today; continued amlodipine  --Clonidine DC'd; do not use clonidine after discharge     GERD  -will switch PPI to H2 blocker while on antibiotics due to C-diff risk.  Can resume PPI once finished with antibiotic course.       Frequent UTI  -Recently started on prophylactic macrobid-continue at discharge  -UA on admission not suggestive of UTI.  Will hold off on sending another urine (daughter requested).  She is not symptomatic at this time.    -urine is somewhat concentrated- push oral fluid intake this afternoon.      Hypokalemia  -Replaced per protocol    Mobility  --Consulted PT/OT  --OT recommends continued rehab at [place]  --PT eval pending  --CM involved    DVT Prophylaxis:  Lovenox    CODE STATUS:   Code Status and Medical Interventions:   Ordered at: 02/06/19 1450     Limited Support to NOT Include:    Intubation     Code Status:    No CPR     Medical Interventions (Level of Support Prior to Arrest):    Limited      Dispo: Maybe to [facility] tomorrow depending on clinical status in the am.     Electronically signed by UMAIR Mendoza, 02/12/19, 3:14 PM.

## 2019-02-12 NOTE — PLAN OF CARE
Problem: Patient Care Overview  Goal: Plan of Care Review   02/12/19 1430   Coping/Psychosocial   Plan of Care Reviewed With patient   Plan of Care Review   Progress no change   OTHER   Outcome Summary MODA rolling L/R for sling placement. Utilized mechanical lift for bed to chair transfer. Deferred STS d/t pt c/o inc pain in knees this session. Good effort with BLE ther ex in chair. Will continue to progress pt as able per POC.

## 2019-02-12 NOTE — PROGRESS NOTES
Continued Stay Note  [facility]     Patient Name: Antonia Mata  MRN: [id number]  Today's Date: 2/12/2019    Admit Date: 2/6/2019    Discharge Plan     Row Name 02/12/19 1033       Plan    Plan  To [facility] when medically ready - will need [facility]     Patient/Family in Agreement with Plan  yes    Plan Comments  It is unclear at this time when patient will be medically ready for discharge, daughter has multiple requests/concerns as discussed in rounds. The goal remains to return to [facility] [facility] when medically ready, she has a Long term care bed there but will want PT when she returns. CM will need to arrange [facility] at [place], will wait on this because it has been set up and cancelled twice already - CM following - albaro [phone]         Discharge Codes    No documentation.             Albaro Caba RN

## 2019-02-12 NOTE — THERAPY TREATMENT NOTE
Acute Care - Physical Therapy Treatment Note  [facility]     Patient Name: Antonia Mata  : 1931  MRN: [id number]  Today's Date: 2019  Onset of Illness/Injury or Date of Surgery: 19  Date of Referral to PT: 02/10/19  Referring Physician: Loan Ross    Admit Date: 2019    Visit Dx:    ICD-10-CM ICD-9-CM   1. HCAP (healthcare-associated pneumonia) J18.9 486   2. Altered mental status, unspecified altered mental status type R41.82 780.97   3. Sepsis, due to unspecified organism (CMS/HCC) A41.9 038.9     995.91   4. Dysphagia, unspecified type R13.10 787.20   5. Impaired mobility and ADLs Z74.09 799.89     Patient Active Problem List   Diagnosis   • Aspiration pneumonia (CMS/HCC)   • Hypokalemia   • Dementia   • Hypertension   • GERD (gastroesophageal reflux disease)   • Normocytic anemia   • Elevated alkaline phosphatase level   • Vasovagal episode   • Acute hypoxemic respiratory failure (CMS/HCC)   • Constipation   • Aortic heart murmur   • HCAP (healthcare-associated pneumonia)   • Acute respiratory failure with hypoxia (CMS/HCC)   • Spinal stenosis   • Acute pain of left shoulder   • Left hand pain   • Frequent UTI   • Sepsis due to pneumonia (CMS/HCC)   • Generalized weakness       Therapy Treatment    Rehabilitation Treatment Summary     Row Name 19 1430             Treatment Time/Intention    Discipline  physical therapist  -VG      Document Type  therapy note (daily note)  -VG      Subjective Information  complains of;weakness;fatigue  -VG      Mode of Treatment  individual therapy;physical therapy  -VG      Patient/Family Observations  In bed, RA, tele, nsg present; no family present.  -VG      Care Plan Review  patient/other agree to care plan  -VG      Total Minutes, Physical Therapy Treatment  25  -VG      Therapy Frequency (PT Clinical Impression)  daily  -VG      Patient Effort  adequate  -VG      Existing Precautions/Restrictions  fall  -VG      Recorded by [VG]  Aria Reis, PT 02/12/19 1459      Row Name 02/12/19 1430             Cognitive Assessment/Intervention- PT/OT    Affect/Mental Status (Cognitive)  WFL  -VG      Orientation Status (Cognition)  oriented to;person;place;situation  -VG      Follows Commands (Cognition)  follows one step commands;75-90% accuracy  -VG      Cognitive Function (Cognitive)  safety deficit  -VG      Safety Deficit (Cognitive)  mild deficit;insight into deficits/self awareness;judgment;problem solving  -VG      Personal Safety Interventions  fall prevention program maintained;gait belt;nonskid shoes/slippers when out of bed;supervised activity  -VG      Recorded by [VG] Aria Reis, PT 02/12/19 1459      Row Name 02/12/19 1430             Bed Mobility Assessment/Treatment    Bed Mobility Assessment/Treatment  rolling left;rolling right  -VG      Rolling Left [place] (Bed Mobility)  verbal cues;moderate assist (50% patient effort)  -VG      Rolling Right [place] (Bed Mobility)  verbal cues;moderate assist (50% patient effort)  -VG      Comment (Bed Mobility)  Rolling L/R for sling placement.  -VG      Recorded by [VG] Aria Reis, PT 02/12/19 1459      Row Name 02/12/19 1430             Transfer Assessment/Treatment    Transfer Assessment/Treatment  bed-chair transfer  -VG      Comment (Transfers)  Mechanical lift for bed to chair transfer. Deferred STS d/t pt c/o inc pain in B knees this date.  -VG      Recorded by [VG] Aria Reis, PT 02/12/19 1459      Row Name 02/12/19 1430             Bed-Chair Transfer    Bed-Chair [place] (Transfers)  dependent (less than 25% patient effort)  -VG      Assistive Device (Bed-Chair Transfers)  mechanical lift/aid  -VG      Recorded by [VG] Aria Reis, PT 02/12/19 1459      Row Name 02/12/19 1430             Gait/Stairs Assessment/Training    Comment (Gait/Stairs)  Not appropriate at this time.  -VG      Recorded by [VG] Aria Reis, PT 02/12/19 1459      Row  Name 02/12/19 1430             Motor Skills Assessment/Interventions    Additional Documentation  Therapeutic Exercise (Group);Therapeutic Exercise Interventions (Group)  -VG      Recorded by [VG] Aria Reis, PT 02/12/19 1459      Row Name 02/12/19 1430             Therapeutic Exercise    59420 - PT Therapeutic Exercise Minutes  10  -VG      22984 - PT Therapeutic Activity Minutes  15  -VG      Recorded by [VG] Aria Reis, PT 02/12/19 1459      Row Name 02/12/19 1430             Therapeutic Exercise    Lower Extremity (Therapeutic Exercise)  heel slides, bilateral;SLR (straight leg raise), bilateral  -VG      Lower Extremity Range of Motion (Therapeutic Exercise)  hip abduction/adduction, bilateral;ankle dorsiflexion/plantar flexion, bilateral  -VG      Exercise Type (Therapeutic Exercise)  AROM (active range of motion);AAROM (active assistive range of motion)  -VG      Position (Therapeutic Exercise)  seated  -VG      Sets/Reps (Therapeutic Exercise)  10x  -VG      Comment (Therapeutic Exercise)  Cues for technique. JUAN JOSE for HS and SLR.  -VG      Recorded by [VG] Aria Reis, PT 02/12/19 1459      Row Name 02/12/19 1430             Positioning and Restraints    Pre-Treatment Position  in bed  -VG      Post Treatment Position  chair  -VG      In Chair  reclined;call light within reach;encouraged to call for assist;exit alarm on;with nsg;on mechanical lift sling;legs elevated;waffle boot/both  -VG      Recorded by [VG] Aria Reis, PT 02/12/19 1459      Row Name 02/12/19 1430             Pain Scale: FACES Pre/Post-Treatment    Pain: FACES Scale, Pretreatment  2-->hurts little bit  -VG      Pain: FACES Scale, Post-Treatment  4-->hurts little more  -VG      Recorded by [VG] Aria Reis, PT 02/12/19 1459      Row Name 02/12/19 1430             Coping    Observed Emotional State  accepting  -VG      Verbalized Emotional State  acceptance  -VG      Recorded by [VG] Aria Reis, PT 02/12/19  "1459      Row Name 02/12/19 1430             Plan of Care Review    Plan of Care Reviewed With  patient  -VG      Recorded by [VG] Aria Reis, PT 02/12/19 1459      Row Name 02/12/19 1430             Outcome Summary/Treatment Plan (PT)    Daily Summary of Progress (PT)  progress towards functional goals is fair  -VG      Anticipated Discharge Disposition (PT)  extended care facility  -VG      Recorded by [VG] Aria Reis, PT 02/12/19 1459        User Key  (r) = Recorded By, (t) = Taken By, (c) = Cosigned By    Initials Name Effective Dates Discipline    VG Aria Reis, PT 05/29/18 -  PT                   Physical Therapy Education     Title: PT OT SLP Therapies (In Progress)     Topic: Physical Therapy (Done)     Point: Mobility training (Done)     Learning Progress Summary           Patient Acceptance, E, VU by VG at 2/12/2019  2:30 PM    Comment:  Educated on HEP and correct mechanics for safe functional mobility.    Acceptance, E, VU,NR by ANDRE at 2/11/2019  1:46 PM    Comment:  Pt taught ankle pumps and heel slides in chair, pt agreed to complete throughout day. Pt also educated on safe transfers and to use \"call don't fall\".   Family Acceptance, E, VU,NR by ANDRE at 2/11/2019  1:46 PM    Comment:  Pt taught ankle pumps and heel slides in chair, pt agreed to complete throughout day. Pt also educated on safe transfers and to use \"call don't fall\".                   Point: Home exercise program (Done)     Learning Progress Summary           Patient Acceptance, E, VU by VG at 2/12/2019  2:30 PM    Comment:  Educated on HEP and correct mechanics for safe functional mobility.    Acceptance, E, VU,NR by ANDRE at 2/11/2019  1:46 PM    Comment:  Pt taught ankle pumps and heel slides in chair, pt agreed to complete throughout day. Pt also educated on safe transfers and to use \"call don't fall\".   Family Acceptance, E, VU,NR by ANDRE at 2/11/2019  1:46 PM    Comment:  Pt taught ankle pumps and heel slides in chair, " "pt agreed to complete throughout day. Pt also educated on safe transfers and to use \"call don't fall\".                   Point: Body mechanics (Done)     Learning Progress Summary           Patient Acceptance, E, VU by  at 2/12/2019  2:30 PM    Comment:  Educated on HEP and correct mechanics for safe functional mobility.    Acceptance, E, VU,NR by ANDRE at 2/11/2019  1:46 PM    Comment:  Pt taught ankle pumps and heel slides in chair, pt agreed to complete throughout day. Pt also educated on safe transfers and to use \"call don't fall\".   Family Acceptance, E, VU,NR by ANDRE at 2/11/2019  1:46 PM    Comment:  Pt taught ankle pumps and heel slides in chair, pt agreed to complete throughout day. Pt also educated on safe transfers and to use \"call don't fall\".                   Point: Precautions (Done)     Learning Progress Summary           Patient Acceptance, E, VU by STEVEN at 2/12/2019  2:30 PM    Comment:  Educated on HEP and correct mechanics for safe functional mobility.    Acceptance, E, VU,NR by ANDRE at 2/11/2019  1:46 PM    Comment:  Pt taught ankle pumps and heel slides in chair, pt agreed to complete throughout day. Pt also educated on safe transfers and to use \"call don't fall\".   Family Acceptance, E, VU,NR by ANDRE at 2/11/2019  1:46 PM    Comment:  Pt taught ankle pumps and heel slides in chair, pt agreed to complete throughout day. Pt also educated on safe transfers and to use \"call don't fall\".                               User Key     Initials Effective Dates Name Provider Type Discipline     05/29/18 -  Aria Reis, PT Physical Therapist PT    ANDRE 08/30/18 -  Kristen Reeves PT Physical Therapist PT                PT Recommendation and Plan  Anticipated Discharge Disposition (PT): extended care facility  Therapy Frequency (PT Clinical Impression): daily  Outcome Summary/Treatment Plan (PT)  Daily Summary of Progress (PT): progress towards functional goals is fair  Anticipated Discharge Disposition (PT): " extended care facility  Plan of Care Reviewed With: patient  Progress: no change  Outcome Summary: MODA rolling L/R for sling placement. Utilized mechanical lift for bed to chair transfer. Deferred STS d/t pt c/o inc pain in knees this session. Good effort with BLE ther ex in chair. Will continue to progress pt as able per POC.  Outcome Measures     Row Name 02/12/19 1430 02/11/19 1346 02/11/19 0856       How much help from another person do you currently need...    Turning from your back to your side while in flat bed without using bedrails?  3  -VG  3  -ANDRE  --    Moving from lying on back to sitting on the side of a flat bed without bedrails?  2  -VG  2  -ANDRE  --    Moving to and from a bed to a chair (including a wheelchair)?  1  -VG  1  -ANDRE  --    Standing up from a chair using your arms (e.g., wheelchair, bedside chair)?  1  -VG  2  -ANDRE  --    Climbing 3-5 steps with a railing?  1  -VG  1  -ANDRE  --    To walk in hospital room?  1  -VG  1  -ANDRE  --    AM-PAC 6 Clicks Score  9  -VG  10  -ANDRE  --       How much help from another is currently needed...    Putting on and taking off regular lower body clothing?  --  --  1  -SD    Bathing (including washing, rinsing, and drying)  --  --  1  -SD    Toileting (which includes using toilet bed pan or urinal)  --  --  2  -SD    Putting on and taking off regular upper body clothing  --  --  2  -SD    Taking care of personal grooming (such as brushing teeth)  --  --  3  -SD    Eating meals  --  --  3  -SD    Score  --  --  12  -SD       Functional Assessment    Outcome Measure Options  AM-PAC 6 Clicks Basic Mobility (PT)  -VG  AM-PAC 6 Clicks Basic Mobility (PT)  -ANDRE  AM-PAC 6 Clicks Daily Activity (OT)  -SD      User Key  (r) = Recorded By, (t) = Taken By, (c) = Cosigned By    Initials Name Provider Type    Sara Monteiro, OT Occupational Therapist    Aria Thayer, PT Physical Therapist    Kristen Joel, PT Physical Therapist         Time Calculation:    PT Charges     Row Name 02/12/19 1430             Time Calculation    Start Time  1430  -VG      PT Received On  02/12/19  -VG      PT Goal Re-Cert Due Date  02/21/19  -VG         Time Calculation- PT    Total Timed Code Minutes- PT  25 minute(s)  -VG         Timed Charges    61941 - PT Therapeutic Exercise Minutes  10  -VG      09722 - PT Therapeutic Activity Minutes  15  -VG        User Key  (r) = Recorded By, (t) = Taken By, (c) = Cosigned By    Initials Name Provider Type    VG Aria Reis, PT Physical Therapist        Therapy Suggested Charges     Code   Minutes Charges    97801 (CPT®) Hc Pt Neuromusc Re Education Ea 15 Min      40764 (CPT®) Hc Pt Ther Proc Ea 15 Min 10 1    05225 (CPT®) Hc Gait Training Ea 15 Min      77468 (CPT®) Hc Pt Therapeutic Act Ea 15 Min 15 1    86928 (CPT®) Hc Pt Manual Therapy Ea 15 Min      04524 (CPT®) Hc Pt Iontophoresis Ea 15 Min      29101 (CPT®) Hc Pt Elec Stim Ea-Per 15 Min      53423 (CPT®) Hc Pt Ultrasound Ea 15 Min      55751 (CPT®) Hc Pt Self Care/Mgmt/Train Ea 15 Min      42994 (CPT®) Hc Pt Prosthetic (S) Train Initial Encounter, Each 15 Min      08345 (CPT®) Hc Pt Orthotic(S)/Prosthetic(S) Encounter, Each 15 Min      26935 (CPT®) Hc Orthotic(S) Mgmt/Train Initial Encounter, Each 15min      Total  25 2        Therapy Charges for Today     Code Description Service Date Service Provider Modifiers Qty    88608084853 HC PT THER PROC EA 15 MIN 2/12/2019 Aria Reis, PT GP 1    20016915964 HC PT THERAPEUTIC ACT EA 15 MIN 2/12/2019 Aria Reis, PT GP 1          PT G-Codes  Outcome Measure Options: AM-PAC 6 Clicks Basic Mobility (PT)  AM-PAC 6 Clicks Score: 9  Score: 12    Debi Reis PT  2/12/2019

## 2019-02-13 VITALS
TEMPERATURE: 97.6 F | BODY MASS INDEX: 32.05 KG/M2 | HEART RATE: 87 BPM | HEIGHT: 59 IN | WEIGHT: 159 LBS | SYSTOLIC BLOOD PRESSURE: 161 MMHG | OXYGEN SATURATION: 96 % | DIASTOLIC BLOOD PRESSURE: 70 MMHG | RESPIRATION RATE: 16 BRPM

## 2019-02-13 LAB
ANION GAP SERPL CALCULATED.3IONS-SCNC: 6 MMOL/L (ref 3–11)
BASOPHILS # BLD AUTO: 0.04 10*3/MM3 (ref 0–0.2)
BASOPHILS NFR BLD AUTO: 0.5 % (ref 0–1)
BUN BLD-MCNC: 17 MG/DL (ref 9–23)
BUN/CREAT SERPL: 26.2 (ref 7–25)
CALCIUM SPEC-SCNC: 9.1 MG/DL (ref 8.7–10.4)
CHLORIDE SERPL-SCNC: 107 MMOL/L (ref 99–109)
CO2 SERPL-SCNC: 25 MMOL/L (ref 20–31)
CREAT BLD-MCNC: 0.65 MG/DL (ref 0.6–1.3)
DEPRECATED RDW RBC AUTO: 47.6 FL (ref 37–54)
EOSINOPHIL # BLD AUTO: 0.31 10*3/MM3 (ref 0–0.3)
EOSINOPHIL NFR BLD AUTO: 4.1 % (ref 0–3)
ERYTHROCYTE [DISTWIDTH] IN BLOOD BY AUTOMATED COUNT: 13.5 % (ref 11.3–14.5)
GFR SERPL CREATININE-BSD FRML MDRD: 86 ML/MIN/1.73
GLUCOSE BLD-MCNC: 104 MG/DL (ref 70–100)
HCT VFR BLD AUTO: 42.1 % (ref 34.5–44)
HGB BLD-MCNC: 13.5 G/DL (ref 11.5–15.5)
IMM GRANULOCYTES # BLD AUTO: 0.27 10*3/MM3 (ref 0–0.05)
IMM GRANULOCYTES NFR BLD AUTO: 3.5 % (ref 0–0.6)
LYMPHOCYTES # BLD AUTO: 1.96 10*3/MM3 (ref 0.6–4.8)
LYMPHOCYTES NFR BLD AUTO: 25.6 % (ref 24–44)
MCH RBC QN AUTO: 30.9 PG (ref 27–31)
MCHC RBC AUTO-ENTMCNC: 32.1 G/DL (ref 32–36)
MCV RBC AUTO: 96.3 FL (ref 80–99)
MONOCYTES # BLD AUTO: 0.69 10*3/MM3 (ref 0–1)
MONOCYTES NFR BLD AUTO: 9 % (ref 0–12)
NEUTROPHILS # BLD AUTO: 4.65 10*3/MM3 (ref 1.5–8.3)
NEUTROPHILS NFR BLD AUTO: 60.8 % (ref 41–71)
PLATELET # BLD AUTO: 297 10*3/MM3 (ref 150–450)
PMV BLD AUTO: 9.5 FL (ref 6–12)
POTASSIUM BLD-SCNC: 4 MMOL/L (ref 3.5–5.5)
RBC # BLD AUTO: 4.37 10*6/MM3 (ref 3.89–5.14)
SODIUM BLD-SCNC: 138 MMOL/L (ref 132–146)
WBC NRBC COR # BLD: 7.65 10*3/MM3 (ref 3.5–10.8)

## 2019-02-13 PROCEDURE — 97110 THERAPEUTIC EXERCISES: CPT

## 2019-02-13 PROCEDURE — 97530 THERAPEUTIC ACTIVITIES: CPT

## 2019-02-13 PROCEDURE — 85025 COMPLETE CBC W/AUTO DIFF WBC: CPT | Performed by: NURSE PRACTITIONER

## 2019-02-13 PROCEDURE — 99239 HOSP IP/OBS DSCHRG MGMT >30: CPT | Performed by: NURSE PRACTITIONER

## 2019-02-13 PROCEDURE — 80048 BASIC METABOLIC PNL TOTAL CA: CPT | Performed by: NURSE PRACTITIONER

## 2019-02-13 RX ORDER — FAMOTIDINE 20 MG/1
20 TABLET, FILM COATED ORAL DAILY
Qty: 30 TABLET | Refills: 0
Start: 2019-02-14 | End: 2020-02-05

## 2019-02-13 RX ORDER — HYDRALAZINE HYDROCHLORIDE 50 MG/1
50 TABLET, FILM COATED ORAL EVERY 8 HOURS SCHEDULED
Qty: 90 TABLET | Refills: 0
Start: 2019-02-13

## 2019-02-13 RX ORDER — AMOXICILLIN AND CLAVULANATE POTASSIUM 875; 125 MG/1; MG/1
1 TABLET, FILM COATED ORAL EVERY 12 HOURS SCHEDULED
Qty: 8 TABLET | Refills: 0
Start: 2019-02-13 | End: 2019-02-17

## 2019-02-13 RX ORDER — DOXYCYCLINE 100 MG/1
100 CAPSULE ORAL EVERY 12 HOURS SCHEDULED
Qty: 8 CAPSULE | Refills: 0
Start: 2019-02-13 | End: 2019-02-17

## 2019-02-13 RX ADMIN — Medication 250 MG: at 09:20

## 2019-02-13 RX ADMIN — TRAMADOL HYDROCHLORIDE 50 MG: 50 TABLET, FILM COATED ORAL at 09:22

## 2019-02-13 RX ADMIN — FAMOTIDINE 20 MG: 20 TABLET ORAL at 09:22

## 2019-02-13 RX ADMIN — AMOXICILLIN AND CLAVULANATE POTASSIUM 1 TABLET: 875; 125 TABLET, FILM COATED ORAL at 09:20

## 2019-02-13 RX ADMIN — HYDRALAZINE HYDROCHLORIDE 50 MG: 50 TABLET, FILM COATED ORAL at 04:27

## 2019-02-13 RX ADMIN — SUCRALFATE 1 G: 1 TABLET ORAL at 11:56

## 2019-02-13 RX ADMIN — DOXYCYCLINE 100 MG: 100 CAPSULE ORAL at 09:20

## 2019-02-13 RX ADMIN — CLOPIDOGREL BISULFATE 75 MG: 75 TABLET, FILM COATED ORAL at 09:21

## 2019-02-13 RX ADMIN — MICONAZOLE NITRATE: 2 CREAM TOPICAL at 09:32

## 2019-02-13 RX ADMIN — ISOSORBIDE MONONITRATE 60 MG: 60 TABLET, EXTENDED RELEASE ORAL at 09:21

## 2019-02-13 RX ADMIN — BUSPIRONE HYDROCHLORIDE 7.5 MG: 15 TABLET ORAL at 09:21

## 2019-02-13 RX ADMIN — MEMANTINE 10 MG: 10 TABLET ORAL at 09:22

## 2019-02-13 RX ADMIN — COLCHICINE 0.6 MG: 0.6 TABLET, FILM COATED ORAL at 09:20

## 2019-02-13 RX ADMIN — OXYCODONE HYDROCHLORIDE AND ACETAMINOPHEN 500 MG: 500 TABLET ORAL at 09:20

## 2019-02-13 RX ADMIN — Medication 325 MG: at 09:22

## 2019-02-13 RX ADMIN — GABAPENTIN 200 MG: 100 CAPSULE ORAL at 09:20

## 2019-02-13 RX ADMIN — CETIRIZINE HYDROCHLORIDE 10 MG: 10 TABLET, FILM COATED ORAL at 09:22

## 2019-02-13 RX ADMIN — SUCRALFATE 1 G: 1 TABLET ORAL at 09:20

## 2019-02-13 RX ADMIN — DOCUSATE SODIUM 100 MG: 100 CAPSULE, LIQUID FILLED ORAL at 09:22

## 2019-02-13 NOTE — PLAN OF CARE
Problem: Fall Risk (Adult)  Goal: Absence of Fall  Outcome: Outcome(s) achieved Date Met: 02/13/19 02/13/19 0305   Fall Risk (Adult)   Absence of Fall making progress toward outcome       Problem: Patient Care Overview  Goal: Individualization and Mutuality  Outcome: Outcome(s) achieved Date Met: 02/13/19    Goal: Discharge Needs Assessment  Outcome: Outcome(s) achieved Date Met: 02/13/19    Goal: Interprofessional Rounds/Family Conf  Outcome: Outcome(s) achieved Date Met: 02/13/19      Problem: Pneumonia (Adult)  Goal: Signs and Symptoms of Listed Potential Problems Will be Absent, Minimized or Managed (Pneumonia)  Outcome: Outcome(s) achieved Date Met: 02/13/19      Problem: Skin Injury Risk (Adult)  Goal: Skin Health and Integrity  Outcome: Outcome(s) achieved Date Met: 02/13/19

## 2019-02-13 NOTE — PLAN OF CARE
Problem: Fall Risk (Adult)  Goal: Absence of Fall  Outcome: Ongoing (interventions implemented as appropriate)      Problem: Patient Care Overview  Goal: Plan of Care Review   02/13/19 0303   Coping/Psychosocial   Plan of Care Reviewed With patient   Plan of Care Review   Progress no change   OTHER   Outcome Summary Awaiting bed at LT facility. No spikes in temparature overnight. VSS. Pt sleeping on 1L of O2 NC. Room air during the day. Continuing to monitor. Conitinuing plan of care.        Problem: Pneumonia (Adult)  Goal: Signs and Symptoms of Listed Potential Problems Will be Absent, Minimized or Managed (Pneumonia)  Outcome: Ongoing (interventions implemented as appropriate)      Problem: Skin Injury Risk (Adult)  Goal: Skin Health and Integrity  Outcome: Ongoing (interventions implemented as appropriate)

## 2019-02-13 NOTE — PROGRESS NOTES
Case Management Discharge Note    Final Note: Planning for transfer back to [facility] long-term care bed today. Notified Chayo at [facility]. Nurse to call report to P: [phone]. Transfer summary faxed to F: [phone]. Paper scripts will be needed for any narcotics or other controlled medications, if applicable. CM spoke with [facility] ([place]) and they plan to transport patient at 1:00 PM today. PCS placed on chartlet. Daughter Vilma aware of plan. Ene Tobias, RN [phone]    Destination - Selection Complete      Service Provider Request Status Selected Services Address Phone Number Fax Number    [facility] Selected Skilled Nursing [address] [place] [id number] [phone]      Durable Medical Equipment      No service has been selected for the patient.      Dialysis/Infusion      No service has been selected for the patient.      Home Medical Care      No service has been selected for the patient.      Community Resources      No service has been selected for the patient.        Ambulance: Other([facility])    Final Discharge Disposition Code: 04 - intermediate care facility

## 2019-02-13 NOTE — DISCHARGE SUMMARY
[facility] Medicine Services  DISCHARGE SUMMARY    Patient Name: Antonia Mata  : 1931  MRN: [id number]    Date of Admission: 2019  Date of Discharge:  2019  Primary Care Physician: Leti Munoz MD    Consults     No orders found from 2019 to 2019.          Hospital Course     Presenting Problem:   HCAP (healthcare-associated pneumonia) [J18.9]    Active Hospital Problems    Diagnosis Date Noted   • **HCAP (healthcare-associated pneumonia) [J18.9] 2018   • Generalized weakness [R53.1] 2019   • Sepsis due to pneumonia (CMS/HCC) [J18.9, A41.9] 2019   • Frequent UTI [N39.0] 2019   • Acute hypoxemic respiratory failure (CMS/HCC) [J96.01] 2018   • GERD (gastroesophageal reflux disease) [K21.9] 2018   • Dementia [F03.90] 2018   • Hypertension [I10] 2018   • Hypokalemia [E87.6] 2018      Resolved Hospital Problems   No resolved problems to display.          Hospital Course:  Antonia Mata is a 88 y.o. female resident of [facility] with a history of aspiration pneumonia, dementia, frequent UTI, and hypertension who presented from her nursing facility with altered mental status.  She was accompanied by her daughter who assisted with history.  She reported that the patient had a cough for approximately 1 week and was recently hospitalized 1.5 weeks ago at [facility] due to uncontrolled hypertension.  She was started on metoprolol during that hospital stay and has since had dry mouth, a depressed affect and confustion.  The daughter was not aware of any fevers at the nursing facility.  In the ED, she was noted to have a temp of 103.  Chest xray was concerning for a new RUL infiltrate.  She was admitted to hospital medicine for further management and placed on IV vancomycin and zosyn to treat a HCAP.  Her influenza PCR was negative.  She did have a nasal swab that was positive for  MRSA, which is likely colonization.  She was transitioned to oral augmentin and doxycycline and will complete a full course of these antibiotics (she will still need 4 full days to complete course).  The patient has been continue on her home blood pressure medications with the exception of metoprolol due to concerns for side effects.  She has been mildly elevated but in a tolerable range, considering her age.  The patient had a UA on admission that was not suggestive of UTI.  She is now medically stable and ready to transfer back to her nursing facility.  She will continue her home macrobid prophylactic antibiotic for frequent UTIs at discharge and will need to complete 4 more days of augmentin and doxycycline.  We recommend PT and OT while at the facility, as she is much weaker than she had been prior to admission.        Discharge Follow Up Recommendations for labs/diagnostics:   Follow up with PCP at their first available hospital follow up appt.   Recommend PT/OT at nursing facility, as patient is generally much weaker.    Augmentin and doxycycline course should be complete in 4 days.      Day of Discharge     HPI:   Resting in chair.  No new complaints.  Daughter at bedside.  Ate a good breakfast this morning.      Review of Systems  Gen- No fevers, chills  CV- No chest pain, palpitations  Resp- No cough, dyspnea  GI- No N/V/D, abd pain      Otherwise ROS is negative except as mentioned in the HPI.    Vital Signs:   Temp:  [97.5 °F (36.4 °C)-98.8 °F (37.1 °C)] 97.6 °F (36.4 °C)  Heart Rate:  [] 87  Resp:  [16-17] 16  BP: (123-180)/(57-87) 161/70     Physical Exam:    Constitutional: No acute distress, awake, alert, sitting up inchair  HENT:  NCAT, moist mucous membranes  Respiratory: Nonlabored, clear to auscultation, room air  Cardiovascular: RRR, no M/R/G; +DP pulses bilaterally  Gastrointestinal: Positive bowel sounds, soft, nontender, nondistended  Musculoskeletal: BAKER; No bilateral ankle  edema  Neurologic: Cranial Nerves grossly intact to confrontation, speech clear  Skin: No rashes on exposed skin  Psychiatric: Appropriate affect, cooperative        Pertinent  and/or Most Recent Results     Results from last 7 days   Lab Units 02/13/19  0830 02/12/19  1818 02/12/19  0501 02/09/19  0604 02/08/19  1740 02/08/19  0750 02/07/19  2232 02/07/19  0508 02/06/19  1302  02/06/19  1221   WBC 10*3/mm3 7.65  --  7.27  --   --  6.12  --  10.37  --   --  11.79*   HEMOGLOBIN g/dL 13.5  --  13.1  --   --  13.7  --  11.2*  --   --  14.4   HEMATOCRIT % 42.1  --  41.5  --   --  42.3  --  35.0  --   --  43.2   PLATELETS 10*3/mm3 297  --  270  --   --  191  --  156  --   --  199   SODIUM mmol/L 138  --  141 142  --  141  --  143 141  --   --    POTASSIUM mmol/L 4.0 4.6 3.6 3.8 4.1 3.5 3.6 2.9* 3.5   < >  --    CHLORIDE mmol/L 107  --  107 110*  --  107  --  109 105  --   --    CO2 mmol/L 25.0  --  27.0 26.0  --  27.0  --  27.0 28.0  --   --    BUN mg/dL 17  --  24* 15  --  8*  --  16 13  --   --    CREATININE mg/dL 0.65  --  0.71 0.65  --  0.62  --  0.88 0.65  --   --    GLUCOSE mg/dL 104*  --  99 101*  --  108*  --  101* 110*  --   --    CALCIUM mg/dL 9.1  --  8.8 8.7  --  8.8  --  7.9* 8.5*  --   --     < > = values in this interval not displayed.     Results from last 7 days   Lab Units 02/06/19  1302   BILIRUBIN mg/dL 0.8   ALK PHOS U/L 112*   ALT (SGPT) U/L 18   AST (SGOT) U/L 28           Invalid input(s): TG, LDLCALC, LDLREALC  Results from last 7 days   Lab Units 02/06/19  1220   TROPONIN I ng/mL 0.06       Brief Urine Lab Results  (Last result in the past 365 days)      Color   Clarity   Blood   Leuk Est   Nitrite   Protein   CREAT   Urine HCG        02/06/19 1208 Yellow Clear Negative Negative Negative 100 mg/dL (2+)               Microbiology Results Abnormal     Procedure Component Value - Date/Time    Blood Culture - Blood, Arm, Right [[id number]] Collected:  02/06/19 1210    Lab Status:  Final result  Specimen:  Blood from Arm, Right Updated:  02/11/19 1300     Blood Culture No growth at 5 days    Blood Culture - Blood, Arm, Left [[id number]] Collected:  02/06/19 1220    Lab Status:  Final result Specimen:  Blood from Arm, Left Updated:  02/11/19 1300     Blood Culture No growth at 5 days    MRSA Screen, PCR - Swab, Nares [[id number]]  (Abnormal) Collected:  02/06/19 1458    Lab Status:  Final result Specimen:  Swab from Nares Updated:  02/06/19 1709     MRSA, PCR Positive    Influenza A & B, RT PCR - Swab, Nasopharynx [[id number]]  (Normal) Collected:  02/06/19 1458    Lab Status:  Final result Specimen:  Swab from Nasopharynx Updated:  02/06/19 1702     Influenza A PCR Not Detected     Influenza B PCR Not Detected          Imaging Results (all)     Procedure Component Value Units Date/Time    XR Chest 1 View [[id number]] Collected:  02/12/19 1132     Updated:  02/12/19 1203    Narrative:       EXAMINATION: XR CHEST 1 VW- 02/12/2019      INDICATION: questionable pneumonia on previous exam; J18.9-Pneumonia,  unspecified organism; R41.82-Altered mental status, unspecified;  A41.9-Sepsis, unspecified organism; R13.10-Dysphagia, unspecified;  Z74.09-Other reduced mobility     TECHNIQUE:  Single view frontal chest.     COMPARISONS: 02/06/2019     FINDINGS:  Airspace disease in the right upper lobe is again seen  accentuating the minor fissure. No pleural effusion or pneumothorax.  Atherosclerosis of the aortic knob. Advanced degenerative changes of the  left glenohumeral joint noted.       Impression:       Airspace disease in the right upper lobe again noted.      D:  02/12/2019  E:  02/12/2019     This report was finalized on 2/12/2019 12:01 PM by Vipul Montoya.       FL Video Swallow With Speech [[id number]] Collected:  02/09/19 1426     Updated:  02/10/19 1124    Narrative:       EXAMINATION: FL VIDEO SWALLOW W/SPEECH - 2/9/2019     INDICATION: J18.9-Pneumonia, unspecified organism; R41.82-Altered mental  status,  unspecified; A41.9-Sepsis, unspecified organism;  R13.10-Dysphagia, unspecified. Difficulty swallowing.     TECHNIQUE: 1 minute and 18 seconds of fluoroscopy used for modified  barium swallow. 6 images available for evaluation.     COMPARISON: NONE     FINDINGS: Patient referred for modified barium swallow. The patient was  evaluated in the sitting position. Various consistencies of barium were  administered to the patient. No evidence of aspiration was visualized  for the examination. Please see the speech therapy team report for full  details.       Impression:       Fluoroscopy for modified barium swallow. Please see the  speech therapy team report for full details.     DICTATED:   2/9/2019  EDITED/ls :   2/9/2019          This report was finalized on 2/10/2019 11:22 AM by Dr. Jackelin Marinelli MD.       XR Chest 1 View [[id number]] Collected:  02/06/19 1434     Updated:  02/06/19 1442    Narrative:       EXAMINATION: XR CHEST 1 VW- 02/06/2019      INDICATION: Weak/Dizzy/AMS triage protocol      COMPARISON: NONE     FINDINGS: The cardiac silhouette is normal. There is no consolidation,  mass or effusion. There is minimally increased vague patchy airspace  disease in the right upper lobe which is new when compared to previous  examinations.        Impression:       There is a very subtle vague patchy airspace process in the  right upper lobe which appears new when compared to previous  examinations.     D:  02/06/2019  E:  02/06/2019     This report was finalized on 2/6/2019 2:39 PM by Dr. Isaias Mejía MD.                       Results for orders placed during the hospital encounter of 05/14/18   Adult Transthoracic Echo Complete W/ Cont if Necessary Per Protocol    Narrative · Mild aortic valve regurgitation is present.  · Calculated right ventricular systolic pressure from tricuspid   regurgitation is 24 mmHg.  · Mild tricuspid valve regurgitation is present.  · Left ventricular systolic function is normal.  Estimated EF = 60%.  · Left ventricular diastolic dysfunction (grade I) consistent with   impaired relaxation.  · There is no evidence of pericardial effusion.  · No evidence of pulmonary hypertension is present.  · The aortic valve exhibits sclerosis.  · Normal right ventricular cavity size, wall thickness, systolic function   and septal motion noted.  · Moderate MAC is present.            Discharge Details        Discharge Medications      New Medications      Instructions Start Date   amoxicillin-clavulanate 875-125 MG per tablet  Commonly known as:  AUGMENTIN   1 tablet, Oral, Every 12 Hours Scheduled      doxycycline 100 MG capsule  Commonly known as:  MONODOX   100 mg, Oral, Every 12 Hours Scheduled      famotidine 20 MG tablet  Commonly known as:  PEPCID   20 mg, Oral, Daily      hydrALAZINE 50 MG tablet  Commonly known as:  APRESOLINE   50 mg, Oral, Every 8 Hours Scheduled      miconazole 2 % cream  Commonly known as:  MICOTIN   Topical, Every 12 Hours Scheduled         Changes to Medications      Instructions Start Date   bisacodyl 5 MG EC tablet  Commonly known as:  DULCOLAX  What changed:  Another medication with the same name was removed. Continue taking this medication, and follow the directions you see here.   5 mg, Oral, Daily PRN         Continue These Medications      Instructions Start Date   acetaminophen 325 MG tablet  Commonly known as:  TYLENOL   650 mg, Oral, Every 6 Hours PRN      amLODIPine 10 MG tablet  Commonly known as:  NORVASC   10 mg, Oral, Nightly      busPIRone 7.5 MG tablet  Commonly known as:  BUSPAR   7.5 mg, Oral, 2 Times Daily      calcium carbonate 500 MG chewable tablet  Commonly known as:  TUMS   1 tablet, Oral, 2 Times Daily PRN      clopidogrel 75 MG tablet  Commonly known as:  PLAVIX   75 mg, Oral, Daily      colchicine 0.6 MG tablet   0.6 mg, Oral, Daily      docusate sodium 100 MG capsule  Commonly known as:  COLACE   200 mg, Oral, 2 Times Daily      donepezil 10 MG  tablet  Commonly known as:  ARICEPT   10 mg, Oral, Nightly      ferrous sulfate 325 (65 FE) MG tablet   325 mg, Oral, Daily With Breakfast      gabapentin 100 MG capsule  Commonly known as:  NEURONTIN   200 mg, Oral, 3 Times Daily      isosorbide mononitrate 30 MG 24 hr tablet  Commonly known as:  IMDUR   30 mg, Oral, Daily      Loratadine 10 MG capsule   1 capsule, Oral, Daily      meloxicam 7.5 MG tablet  Commonly known as:  MOBIC   7.5 mg, Oral, Nightly      MULTIVITAMIN PO   1 tablet, Oral, Daily      NAMENDA XR 28 MG capsule sustained-release 24 hr extended release capsule  Generic drug:  memantine   28 mg, Oral, Daily      nitrofurantoin 50 MG capsule  Commonly known as:  MACRODANTIN   50 mg, Oral, Nightly, Suppressive dose for UTI       ondansetron 4 MG tablet  Commonly known as:  ZOFRAN   4 mg, Oral, Every 6 Hours PRN      oxybutynin 5 MG tablet  Commonly known as:  DITROPAN   5 mg, Oral, 2 Times Daily      polyethyl glycol-propyl glycol 0.4-0.3 % solution ophthalmic solution  Commonly known as:  SYSTANE   1 drop, Both Eyes, Every Night at Bedtime      polyethylene glycol packet  Commonly known as:  MIRALAX   17 g, Oral, Daily PRN      saccharomyces boulardii 250 MG capsule  Commonly known as:  FLORASTOR   250 mg, Oral, 2 Times Daily      sucralfate 1 g tablet  Commonly known as:  CARAFATE   1 g, Oral, 4 Times Daily Before Meals & Nightly      SYSTANE BALANCE 0.6 % solution  Generic drug:  Propylene Glycol   1 drop, Both Eyes, 3 Times Daily      terazosin 2 MG capsule  Commonly known as:  HYTRIN   2 mg, Oral, Nightly      traMADol 50 MG tablet  Commonly known as:  ULTRAM   50 mg, Oral, 2 Times Daily      traZODone 25 MG half tablet  Commonly known as:  DESYREL   25 mg, Oral, Nightly, Insomnia       vitamin C 500 MG tablet  Commonly known as:  ASCORBIC ACID   500 mg, Oral, Daily         Stop These Medications    CloNIDine 0.1 MG tablet  Commonly known as:  CATAPRES     guaiFENesin 100 MG/5ML syrup  Commonly  "known as:  ROBITUSSIN     lidocaine 5 %  Commonly known as:  LIDODERM     metoprolol succinate XL 50 MG 24 hr tablet  Commonly known as:  TOPROL-XL     pantoprazole 40 MG EC tablet  Commonly known as:  PROTONIX            Allergies   Allergen Reactions   • Sulfa Antibiotics Rash     UNKNOWN     • Cortisone Other (See Comments)     Elevated BP   • Lopressor [Metoprolol] Confusion   • Ativan [Lorazepam] Anxiety     \"opposite effect\"         Discharge Disposition:  Skilled Nursing Facility (DC - External)    Discharge Diet:  Diet Order   Procedures   • Diet Regular         Discharge Activity:   Activity Instructions     Activity as Tolerated              CODE STATUS:    Code Status and Medical Interventions:   Ordered at: 02/06/19 1450     Limited Support to NOT Include:    Intubation     Code Status:    No CPR     Medical Interventions (Level of Support Prior to Arrest):    Limited         No future appointments.    Additional Instructions for the Follow-ups that You Need to Schedule     Discharge Follow-up with PCP   As directed       Currently Documented PCP:    Leti Munoz MD    PCP Phone Number:    [phone]     Follow Up Details:  first available hospital follow up               Time Spent on Discharge:  50 minutes    Electronically signed by UMAIR Mendoza, 02/13/19, 11:36 AM.      "

## 2019-02-13 NOTE — PLAN OF CARE
Problem: Patient Care Overview  Goal: Plan of Care Review   02/13/19 4169   Coping/Psychosocial   Plan of Care Reviewed With daughter;patient   Plan of Care Review   Progress no change   OTHER   Outcome Summary MODA rolling L/R for sling placement. Mechanical lift bed to chair. Sit to partial standing 2x with MAXA x 1 and RW, unable to acheive full upright standing d/t weakness/fatigue and B knee pain. Will continue to progress pt as able per POC.

## 2019-02-13 NOTE — DISCHARGE PLACEMENT REQUEST
"Ene Tobias RN   Case Management  P: [phone]    Transfer summary attached      Antonia Velez (88 y.o. Female)     Date of Birth Social Security Number Address Home Phone MRN    01/01/1931  [id number] [facility] [address] [phone] [id number]    Congregation Marital Status          Taoist        Admission Date Admission Type Admitting Provider Attending Provider Department, Room/Bed    2/6/19 Emergency Anne Cai MD Hunter, Sarah M, MD [facility] 6B, N627/1    Discharge Date Discharge Disposition Discharge Destination         Skilled Nursing Facility (DC - External)              Attending Provider:  Anne Cai MD    Allergies:  Sulfa Antibiotics, Cortisone, Lopressor [Metoprolol], Ativan [Lorazepam]    Isolation:  None   Infection:  MRSA (02/06/19)   Code Status:  No CPR    Ht:  149.9 cm (59\")   Wt:  72.1 kg (159 lb)    Admission Cmt:  None   Principal Problem:  HCAP (healthcare-associated pneumonia) [J18.9]                 Active Insurance as of 2/6/2019     Primary Coverage     Payor Plan Insurance Group Employer/Plan Group    MEDICARE MEDICARE A & B      Payor Plan Address Payor Plan Phone Number Payor Plan Fax Number Effective Dates    [address] [phone]  12/1/1995 - None Entered    [facility] [id number]       Subscriber Name Subscriber Birth Date Member ID       ANTONIA VELEZ 1/1/1931 [id number]           Secondary Coverage     Payor Plan Insurance Group Employer/Plan Group    AARP MED SUPP AARP HEALTH CARE OPTIONS      Payor Plan Address Payor Plan Phone Number Payor Plan Fax Number Effective Dates    [facility] [phone]  1/1/2018 - None Entered    [address]       [facility] [id number]       Subscriber Name Subscriber Birth Date Member ID       ANTONIA VELEZ 1/1/1931 [id number]           Tertiary Coverage     Payor Plan Insurance Group Employer/Plan Group    [place]      Payor Plan Address Payor Plan Phone " Number Payor Plan Fax Number Effective Dates    [address] [phone]  2019 - None Entered    [place] [address]       Subscriber Name Subscriber Birth Date Member ID       HALLIE MATA 1931 [id number]                 Emergency Contacts      (Rel.) Home Phone Work Phone Mobile Phone    Vilma Slade (Power of ) [phone] -- --    Rah Slade -- -- [phone]               Discharge Summary      Karol Og APRN at 2019 11:36 AM              [facility] Medicine Services  DISCHARGE SUMMARY    Patient Name: Hallie Mata  : 1931  MRN: [id number]    Date of Admission: 2019  Date of Discharge:  2019  Primary Care Physician: Leti Munoz MD    Consults     No orders found from 2019 to 2019.          Hospital Course     Presenting Problem:   HCAP (healthcare-associated pneumonia) [J18.9]    Active Hospital Problems    Diagnosis Date Noted   • **HCAP (healthcare-associated pneumonia) [J18.9] 2018   • Generalized weakness [R53.1] 2019   • Sepsis due to pneumonia (CMS/HCC) [J18.9, A41.9] 2019   • Frequent UTI [N39.0] 2019   • Acute hypoxemic respiratory failure (CMS/HCC) [J96.01] 2018   • GERD (gastroesophageal reflux disease) [K21.9] 2018   • Dementia [F03.90] 2018   • Hypertension [I10] 2018   • Hypokalemia [E87.6] 2018      Resolved Hospital Problems   No resolved problems to display.          Hospital Course:  Hallie Mata is a 88 y.o. female resident of [facility] with a history of aspiration pneumonia, dementia, frequent UTI, and hypertension who presented from her nursing facility with altered mental status.  She was accompanied by her daughter who assisted with history.  She reported that the patient had a cough for approximately 1 week and was recently hospitalized 1.5 weeks ago at [facility] due to uncontrolled hypertension.  She  was started on metoprolol during that hospital stay and has since had dry mouth, a depressed affect and confustion.  The daughter was not aware of any fevers at the nursing facility.  In the ED, she was noted to have a temp of 103.  Chest xray was concerning for a new RUL infiltrate.  She was admitted to hospital medicine for further management and placed on IV vancomycin and zosyn to treat a HCAP.  Her influenza PCR was negative.  She did have a nasal swab that was positive for MRSA, which is likely colonization.  She was transitioned to oral augmentin and doxycycline and will complete a full course of these antibiotics (she will still need 4 full days to complete course).  The patient has been continue on her home blood pressure medications with the exception of metoprolol due to concerns for side effects.  She has been mildly elevated but in a tolerable range, considering her age.  The patient had a UA on admission that was not suggestive of UTI.  She is now medically stable and ready to transfer back to her nursing facility.  She will continue her home macrobid prophylactic antibiotic for frequent UTIs at discharge and will need to complete 4 more days of augmentin and doxycycline.  We recommend PT and OT while at the facility, as she is much weaker than she had been prior to admission.        Discharge Follow Up Recommendations for labs/diagnostics:   Follow up with PCP at their first available hospital follow up appt.   Recommend PT/OT at nursing facility, as patient is generally much weaker.    Augmentin and doxycycline course should be complete in 4 days.      Day of Discharge     HPI:   Resting in chair.  No new complaints.  Daughter at bedside.  Ate a good breakfast this morning.      Review of Systems  Gen- No fevers, chills  CV- No chest pain, palpitations  Resp- No cough, dyspnea  GI- No N/V/D, abd pain      Otherwise ROS is negative except as mentioned in the HPI.    Vital Signs:   Temp:  [97.5 °F  (36.4 °C)-98.8 °F (37.1 °C)] 97.6 °F (36.4 °C)  Heart Rate:  [] 87  Resp:  [16-17] 16  BP: (123-180)/(57-87) 161/70     Physical Exam:    Constitutional: No acute distress, awake, alert, sitting up inchair  HENT:  NCAT, moist mucous membranes  Respiratory: Nonlabored, clear to auscultation, room air  Cardiovascular: RRR, no M/R/G; +DP pulses bilaterally  Gastrointestinal: Positive bowel sounds, soft, nontender, nondistended  Musculoskeletal: BAKER; No bilateral ankle edema  Neurologic: Cranial Nerves grossly intact to confrontation, speech clear  Skin: No rashes on exposed skin  Psychiatric: Appropriate affect, cooperative        Pertinent  and/or Most Recent Results     Results from last 7 days   Lab Units 02/13/19  0830 02/12/19  1818 02/12/19  0501 02/09/19  0604 02/08/19  1740 02/08/19  0750 02/07/19  2232 02/07/19  0508 02/06/19  1302  02/06/19  1221   WBC 10*3/mm3 7.65  --  7.27  --   --  6.12  --  10.37  --   --  11.79*   HEMOGLOBIN g/dL 13.5  --  13.1  --   --  13.7  --  11.2*  --   --  14.4   HEMATOCRIT % 42.1  --  41.5  --   --  42.3  --  35.0  --   --  43.2   PLATELETS 10*3/mm3 297  --  270  --   --  191  --  156  --   --  199   SODIUM mmol/L 138  --  141 142  --  141  --  143 141  --   --    POTASSIUM mmol/L 4.0 4.6 3.6 3.8 4.1 3.5 3.6 2.9* 3.5   < >  --    CHLORIDE mmol/L 107  --  107 110*  --  107  --  109 105  --   --    CO2 mmol/L 25.0  --  27.0 26.0  --  27.0  --  27.0 28.0  --   --    BUN mg/dL 17  --  24* 15  --  8*  --  16 13  --   --    CREATININE mg/dL 0.65  --  0.71 0.65  --  0.62  --  0.88 0.65  --   --    GLUCOSE mg/dL 104*  --  99 101*  --  108*  --  101* 110*  --   --    CALCIUM mg/dL 9.1  --  8.8 8.7  --  8.8  --  7.9* 8.5*  --   --     < > = values in this interval not displayed.     Results from last 7 days   Lab Units 02/06/19  1302   BILIRUBIN mg/dL 0.8   ALK PHOS U/L 112*   ALT (SGPT) U/L 18   AST (SGOT) U/L 28           Invalid input(s): TG, LDLCALC, LDLREALC  Results from last  7 days   Lab Units 02/06/19  1220   TROPONIN I ng/mL 0.06       Brief Urine Lab Results  (Last result in the past 365 days)      Color   Clarity   Blood   Leuk Est   Nitrite   Protein   CREAT   Urine HCG        02/06/19 1208 Yellow Clear Negative Negative Negative 100 mg/dL (2+)               Microbiology Results Abnormal     Procedure Component Value - Date/Time    Blood Culture - Blood, Arm, Right [[id number]] Collected:  02/06/19 1210    Lab Status:  Final result Specimen:  Blood from Arm, Right Updated:  02/11/19 1300     Blood Culture No growth at 5 days    Blood Culture - Blood, Arm, Left [[id number]] Collected:  02/06/19 1220    Lab Status:  Final result Specimen:  Blood from Arm, Left Updated:  02/11/19 1300     Blood Culture No growth at 5 days    MRSA Screen, PCR - Swab, Nares [[id number]]  (Abnormal) Collected:  02/06/19 1458    Lab Status:  Final result Specimen:  Swab from Nares Updated:  02/06/19 1709     MRSA, PCR Positive    Influenza A & B, RT PCR - Swab, Nasopharynx [[id number]]  (Normal) Collected:  02/06/19 1458    Lab Status:  Final result Specimen:  Swab from Nasopharynx Updated:  02/06/19 1702     Influenza A PCR Not Detected     Influenza B PCR Not Detected          Imaging Results (all)     Procedure Component Value Units Date/Time    XR Chest 1 View [[id number]] Collected:  02/12/19 1132     Updated:  02/12/19 1203    Narrative:       EXAMINATION: XR CHEST 1 VW- 02/12/2019      INDICATION: questionable pneumonia on previous exam; J18.9-Pneumonia,  unspecified organism; R41.82-Altered mental status, unspecified;  A41.9-Sepsis, unspecified organism; R13.10-Dysphagia, unspecified;  Z74.09-Other reduced mobility     TECHNIQUE:  Single view frontal chest.     COMPARISONS: 02/06/2019     FINDINGS:  Airspace disease in the right upper lobe is again seen  accentuating the minor fissure. No pleural effusion or pneumothorax.  Atherosclerosis of the aortic knob. Advanced degenerative changes of the  left  glenohumeral joint noted.       Impression:       Airspace disease in the right upper lobe again noted.      D:  02/12/2019  E:  02/12/2019     This report was finalized on 2/12/2019 12:01 PM by Vipul Montoya.       FL Video Swallow With Speech [[id number]] Collected:  02/09/19 1426     Updated:  02/10/19 1124    Narrative:       EXAMINATION: FL VIDEO SWALLOW W/SPEECH - 2/9/2019     INDICATION: J18.9-Pneumonia, unspecified organism; R41.82-Altered mental  status, unspecified; A41.9-Sepsis, unspecified organism;  R13.10-Dysphagia, unspecified. Difficulty swallowing.     TECHNIQUE: 1 minute and 18 seconds of fluoroscopy used for modified  barium swallow. 6 images available for evaluation.     COMPARISON: NONE     FINDINGS: Patient referred for modified barium swallow. The patient was  evaluated in the sitting position. Various consistencies of barium were  administered to the patient. No evidence of aspiration was visualized  for the examination. Please see the speech therapy team report for full  details.       Impression:       Fluoroscopy for modified barium swallow. Please see the  speech therapy team report for full details.     DICTATED:   2/9/2019  EDITED/ls :   2/9/2019          This report was finalized on 2/10/2019 11:22 AM by Dr. Jackelin Marinelli MD.       XR Chest 1 View [[id number]] Collected:  02/06/19 1434     Updated:  02/06/19 1442    Narrative:       EXAMINATION: XR CHEST 1 VW- 02/06/2019      INDICATION: Weak/Dizzy/AMS triage protocol      COMPARISON: NONE     FINDINGS: The cardiac silhouette is normal. There is no consolidation,  mass or effusion. There is minimally increased vague patchy airspace  disease in the right upper lobe which is new when compared to previous  examinations.        Impression:       There is a very subtle vague patchy airspace process in the  right upper lobe which appears new when compared to previous  examinations.     D:  02/06/2019  E:  02/06/2019     This report was  finalized on 2/6/2019 2:39 PM by Dr. Isaias Mejía MD.                       Results for orders placed during the hospital encounter of 05/14/18   Adult Transthoracic Echo Complete W/ Cont if Necessary Per Protocol    Narrative · Mild aortic valve regurgitation is present.  · Calculated right ventricular systolic pressure from tricuspid   regurgitation is 24 mmHg.  · Mild tricuspid valve regurgitation is present.  · Left ventricular systolic function is normal. Estimated EF = 60%.  · Left ventricular diastolic dysfunction (grade I) consistent with   impaired relaxation.  · There is no evidence of pericardial effusion.  · No evidence of pulmonary hypertension is present.  · The aortic valve exhibits sclerosis.  · Normal right ventricular cavity size, wall thickness, systolic function   and septal motion noted.  · Moderate MAC is present.            Discharge Details        Discharge Medications      New Medications      Instructions Start Date   amoxicillin-clavulanate 875-125 MG per tablet  Commonly known as:  AUGMENTIN   1 tablet, Oral, Every 12 Hours Scheduled      doxycycline 100 MG capsule  Commonly known as:  MONODOX   100 mg, Oral, Every 12 Hours Scheduled      famotidine 20 MG tablet  Commonly known as:  PEPCID   20 mg, Oral, Daily      hydrALAZINE 50 MG tablet  Commonly known as:  APRESOLINE   50 mg, Oral, Every 8 Hours Scheduled      miconazole 2 % cream  Commonly known as:  MICOTIN   Topical, Every 12 Hours Scheduled         Changes to Medications      Instructions Start Date   bisacodyl 5 MG EC tablet  Commonly known as:  DULCOLAX  What changed:  Another medication with the same name was removed. Continue taking this medication, and follow the directions you see here.   5 mg, Oral, Daily PRN         Continue These Medications      Instructions Start Date   acetaminophen 325 MG tablet  Commonly known as:  TYLENOL   650 mg, Oral, Every 6 Hours PRN      amLODIPine 10 MG tablet  Commonly known as:  NORVASC    10 mg, Oral, Nightly      busPIRone 7.5 MG tablet  Commonly known as:  BUSPAR   7.5 mg, Oral, 2 Times Daily      calcium carbonate 500 MG chewable tablet  Commonly known as:  TUMS   1 tablet, Oral, 2 Times Daily PRN      clopidogrel 75 MG tablet  Commonly known as:  PLAVIX   75 mg, Oral, Daily      colchicine 0.6 MG tablet   0.6 mg, Oral, Daily      docusate sodium 100 MG capsule  Commonly known as:  COLACE   200 mg, Oral, 2 Times Daily      donepezil 10 MG tablet  Commonly known as:  ARICEPT   10 mg, Oral, Nightly      ferrous sulfate 325 (65 FE) MG tablet   325 mg, Oral, Daily With Breakfast      gabapentin 100 MG capsule  Commonly known as:  NEURONTIN   200 mg, Oral, 3 Times Daily      isosorbide mononitrate 30 MG 24 hr tablet  Commonly known as:  IMDUR   30 mg, Oral, Daily      Loratadine 10 MG capsule   1 capsule, Oral, Daily      meloxicam 7.5 MG tablet  Commonly known as:  MOBIC   7.5 mg, Oral, Nightly      MULTIVITAMIN PO   1 tablet, Oral, Daily      NAMENDA XR 28 MG capsule sustained-release 24 hr extended release capsule  Generic drug:  memantine   28 mg, Oral, Daily      nitrofurantoin 50 MG capsule  Commonly known as:  MACRODANTIN   50 mg, Oral, Nightly, Suppressive dose for UTI       ondansetron 4 MG tablet  Commonly known as:  ZOFRAN   4 mg, Oral, Every 6 Hours PRN      oxybutynin 5 MG tablet  Commonly known as:  DITROPAN   5 mg, Oral, 2 Times Daily      polyethyl glycol-propyl glycol 0.4-0.3 % solution ophthalmic solution  Commonly known as:  SYSTANE   1 drop, Both Eyes, Every Night at Bedtime      polyethylene glycol packet  Commonly known as:  MIRALAX   17 g, Oral, Daily PRN      saccharomyces boulardii 250 MG capsule  Commonly known as:  FLORASTOR   250 mg, Oral, 2 Times Daily      sucralfate 1 g tablet  Commonly known as:  CARAFATE   1 g, Oral, 4 Times Daily Before Meals & Nightly      SYSTANE BALANCE 0.6 % solution  Generic drug:  Propylene Glycol   1 drop, Both Eyes, 3 Times Daily      terazosin  "2 MG capsule  Commonly known as:  HYTRIN   2 mg, Oral, Nightly      traMADol 50 MG tablet  Commonly known as:  ULTRAM   50 mg, Oral, 2 Times Daily      traZODone 25 MG half tablet  Commonly known as:  DESYREL   25 mg, Oral, Nightly, Insomnia       vitamin C 500 MG tablet  Commonly known as:  ASCORBIC ACID   500 mg, Oral, Daily         Stop These Medications    CloNIDine 0.1 MG tablet  Commonly known as:  CATAPRES     guaiFENesin 100 MG/5ML syrup  Commonly known as:  ROBITUSSIN     lidocaine 5 %  Commonly known as:  LIDODERM     metoprolol succinate XL 50 MG 24 hr tablet  Commonly known as:  TOPROL-XL     pantoprazole 40 MG EC tablet  Commonly known as:  PROTONIX            Allergies   Allergen Reactions   • Sulfa Antibiotics Rash     UNKNOWN     • Cortisone Other (See Comments)     Elevated BP   • Lopressor [Metoprolol] Confusion   • Ativan [Lorazepam] Anxiety     \"opposite effect\"         Discharge Disposition:  Skilled Nursing Facility (DC - External)    Discharge Diet:  Diet Order   Procedures   • Diet Regular         Discharge Activity:   Activity Instructions     Activity as Tolerated              CODE STATUS:    Code Status and Medical Interventions:   Ordered at: 02/06/19 1450     Limited Support to NOT Include:    Intubation     Code Status:    No CPR     Medical Interventions (Level of Support Prior to Arrest):    Limited         No future appointments.    Additional Instructions for the Follow-ups that You Need to Schedule     Discharge Follow-up with PCP   As directed       Currently Documented PCP:    Leti Munoz MD    PCP Phone Number:    [phone]     Follow Up Details:  first available hospital follow up               Time Spent on Discharge:  50 minutes    Electronically signed by UMAIR Mendoza, 02/13/19, 11:36 AM.        Electronically signed by Karol Og APRN at 2/13/2019 11:46 AM       "

## 2019-02-13 NOTE — THERAPY TREATMENT NOTE
Acute Care - Physical Therapy Treatment Note  [facility]     Patient Name: Antonia Mata  : 1931  MRN: [id number]  Today's Date: 2019  Onset of Illness/Injury or Date of Surgery: 19  Date of Referral to PT: 02/10/19  Referring Physician: Loan Ross    Admit Date: 2019    Visit Dx:    ICD-10-CM ICD-9-CM   1. HCAP (healthcare-associated pneumonia) J18.9 486   2. Altered mental status, unspecified altered mental status type R41.82 780.97   3. Sepsis, due to unspecified organism (CMS/HCC) A41.9 038.9     995.91   4. Dysphagia, unspecified type R13.10 787.20   5. Impaired mobility and ADLs Z74.09 799.89     Patient Active Problem List   Diagnosis   • Aspiration pneumonia (CMS/HCC)   • Hypokalemia   • Dementia   • Hypertension   • GERD (gastroesophageal reflux disease)   • Normocytic anemia   • Elevated alkaline phosphatase level   • Vasovagal episode   • Acute hypoxemic respiratory failure (CMS/HCC)   • Constipation   • Aortic heart murmur   • HCAP (healthcare-associated pneumonia)   • Acute respiratory failure with hypoxia (CMS/HCC)   • Spinal stenosis   • Acute pain of left shoulder   • Left hand pain   • Frequent UTI   • Sepsis due to pneumonia (CMS/HCC)   • Generalized weakness       Therapy Treatment    Rehabilitation Treatment Summary     Row Name 19 0940             Treatment Time/Intention    Discipline  physical therapist  -VG      Document Type  therapy note (daily note)  -VG      Subjective Information  complains of;fatigue  -VG      Mode of Treatment  individual therapy;physical therapy  -VG      Patient/Family Observations  In bed, RA, tele, bed alarm; daughter present.  -VG      Care Plan Review  patient/other agree to care plan  -VG      Care Plan Review, Other Participant(s)  daughter  -VG      Total Minutes, Physical Therapy Treatment  30  -VG      Therapy Frequency (PT Clinical Impression)  daily  -VG      Patient Effort  good  -VG      Existing  Precautions/Restrictions  fall  -VG      Recorded by [VG] Aria Reis, PT 02/13/19 1016      Row Name 02/13/19 0940             Cognitive Assessment/Intervention- PT/OT    Affect/Mental Status (Cognitive)  WFL  -VG      Orientation Status (Cognition)  oriented to;person;place;situation  -VG      Follows Commands (Cognition)  follows one step commands;75-90% accuracy  -VG      Cognitive Function (Cognitive)  safety deficit  -VG      Safety Deficit (Cognitive)  mild deficit;insight into deficits/self awareness;judgment;problem solving  -VG      Personal Safety Interventions  fall prevention program maintained;gait belt;nonskid shoes/slippers when out of bed;supervised activity  -VG      Recorded by [VG] Aria Reis, PT 02/13/19 1016      Row Name 02/13/19 0940             Bed Mobility Assessment/Treatment    Bed Mobility Assessment/Treatment  rolling left;rolling right  -VG      Rolling Left [place] (Bed Mobility)  verbal cues;moderate assist (50% patient effort)  -VG      Rolling Right [place] (Bed Mobility)  verbal cues;moderate assist (50% patient effort)  -VG      Bed Mobility, Safety Issues  decreased use of arms for pushing/pulling;decreased use of legs for bridging/pushing  -VG      Assistive Device (Bed Mobility)  bed rails;head of bed elevated  -VG      Comment (Bed Mobility)  Rolling L/R for sling placement.  -VG      Recorded by [VG] Aria Reis, PT 02/13/19 1016      Row Name 02/13/19 0940             Transfer Assessment/Treatment    Transfer Assessment/Treatment  bed-chair transfer;sit-stand transfer;stand-sit transfer  -VG      Comment (Transfers)  Mechanical lift for bed to chair transfer. Sit to partial stand 2x with MAXA x 1, able to partially clear bottom from chair. Unable to complete full upright standing d/t weakness/fatigue and knee pain.  -VG      Recorded by [VG] Aria Reis, PT 02/13/19 1016      Row Name 02/13/19 0940             Bed-Chair Transfer    Bed-Chair  [place] (Transfers)  dependent (less than 25% patient effort)  -VG      Assistive Device (Bed-Chair Transfers)  mechanical lift/aid  -VG      Recorded by [VG] Aria Reis, PT 02/13/19 1016      Row Name 02/13/19 0940             Sit-Stand Transfer    Sit-Stand [place] (Transfers)  maximum assist (25% patient effort);1 person assist  -VG      Assistive Device (Sit-Stand Transfers)  walker, front-wheeled  -VG      Recorded by [VG] Aria Reis, PT 02/13/19 1016      Row Name 02/13/19 0940             Stand-Sit Transfer    Stand-Sit [place] (Transfers)  maximum assist (25% patient effort);1 person assist  -VG      Assistive Device (Stand-Sit Transfers)  walker, front-wheeled  -VG      Recorded by [VG] Aria Reis, PT 02/13/19 1016      Row Name 02/13/19 0940             Gait/Stairs Assessment/Training    Comment (Gait/Stairs)  Not appropriate at this time.  -VG      Recorded by [VG] Aria Reis, PT 02/13/19 1016      Row Name 02/13/19 0940             Therapeutic Exercise    57044 - PT Therapeutic Exercise Minutes  10  -VG      69847 - PT Therapeutic Activity Minutes  20  -VG      Recorded by [VG] Aria Reis, PT 02/13/19 1016      Row Name 02/13/19 0940             Therapeutic Exercise    Lower Extremity (Therapeutic Exercise)  heel slides, bilateral;SLR (straight leg raise), bilateral  -VG      Lower Extremity Range of Motion (Therapeutic Exercise)  hip abduction/adduction, bilateral;ankle dorsiflexion/plantar flexion, bilateral  -VG      Exercise Type (Therapeutic Exercise)  AROM (active range of motion)  -VG      Position (Therapeutic Exercise)  seated  -VG      Sets/Reps (Therapeutic Exercise)  10x  -VG      Comment (Therapeutic Exercise)  Cues for technique. JUAN JOSE for HS and SLR.  -VG      Recorded by [VG] Aria Reis, PT 02/13/19 1016      Row Name 02/13/19 0940             Positioning and Restraints    Pre-Treatment Position  in bed  -VG      Post Treatment Position   "chair  -VG      In Chair  reclined;call light within reach;encouraged to call for assist;exit alarm on;waffle cushion;on mechanical lift sling;legs elevated;waffle boot/both;heels elevated  -VG      Recorded by [VG] Aria Reis, PT 02/13/19 1016      Row Name 02/13/19 0940             Pain Scale: Numbers Pre/Post-Treatment    Pain Scale: Numbers, Pretreatment  0/10 - no pain  -VG      Pain Scale: Numbers, Post-Treatment  0/10 - no pain  -VG      Recorded by [VG] Aria Reis, PT 02/13/19 1016      Row Name 02/13/19 0940             Coping    Observed Emotional State  accepting  -VG      Verbalized Emotional State  acceptance  -VG      Recorded by [VG] Aria Reis, PT 02/13/19 1016      Row Name 02/13/19 0940             Outcome Summary/Treatment Plan (PT)    Daily Summary of Progress (PT)  progress towards functional goals is fair  -VG      Anticipated Discharge Disposition (PT)  extended care facility  -VG      Recorded by [VG] Aria Reis, PT 02/13/19 1016        User Key  (r) = Recorded By, (t) = Taken By, (c) = Cosigned By    Initials Name Effective Dates Discipline    VG Aria Reis, PT 05/29/18 -  PT                   Physical Therapy Education     Title: PT OT SLP Therapies (In Progress)     Topic: Physical Therapy (Done)     Point: Mobility training (Done)     Learning Progress Summary           Patient Acceptance, E, VU by STEVEN at 2/13/2019  9:40 AM    Comment:  Educated on HEP and correct mechanics for safe functional mobility.    Acceptance, E, VU by STEVEN at 2/12/2019  2:30 PM    Comment:  Educated on HEP and correct mechanics for safe functional mobility.    Acceptance, E, VU,NR by ANDRE at 2/11/2019  1:46 PM    Comment:  Pt taught ankle pumps and heel slides in chair, pt agreed to complete throughout day. Pt also educated on safe transfers and to use \"call don't fall\".   Family Acceptance, E, VU by STEVEN at 2/13/2019  9:40 AM    Comment:  Educated on HEP and correct mechanics for safe " "functional mobility.    Acceptance, E, VU,NR by ANDRE at 2/11/2019  1:46 PM    Comment:  Pt taught ankle pumps and heel slides in chair, pt agreed to complete throughout day. Pt also educated on safe transfers and to use \"call don't fall\".                   Point: Home exercise program (Done)     Learning Progress Summary           Patient Acceptance, E, VU by VG at 2/13/2019  9:40 AM    Comment:  Educated on HEP and correct mechanics for safe functional mobility.    Acceptance, E, VU by VG at 2/12/2019  2:30 PM    Comment:  Educated on HEP and correct mechanics for safe functional mobility.    Acceptance, E, VU,NR by ANDRE at 2/11/2019  1:46 PM    Comment:  Pt taught ankle pumps and heel slides in chair, pt agreed to complete throughout day. Pt also educated on safe transfers and to use \"call don't fall\".   Family Acceptance, E, VU by STEVEN at 2/13/2019  9:40 AM    Comment:  Educated on HEP and correct mechanics for safe functional mobility.    Acceptance, E, VU,NR by ANDRE at 2/11/2019  1:46 PM    Comment:  Pt taught ankle pumps and heel slides in chair, pt agreed to complete throughout day. Pt also educated on safe transfers and to use \"call don't fall\".                   Point: Body mechanics (Done)     Learning Progress Summary           Patient Acceptance, E, VU by STEVEN at 2/13/2019  9:40 AM    Comment:  Educated on HEP and correct mechanics for safe functional mobility.    Acceptance, E, VU by VG at 2/12/2019  2:30 PM    Comment:  Educated on HEP and correct mechanics for safe functional mobility.    Acceptance, E, VU,NR by ANDRE at 2/11/2019  1:46 PM    Comment:  Pt taught ankle pumps and heel slides in chair, pt agreed to complete throughout day. Pt also educated on safe transfers and to use \"call don't fall\".   Family Acceptance, E, VU by STEVEN at 2/13/2019  9:40 AM    Comment:  Educated on HEP and correct mechanics for safe functional mobility.    Acceptance, E, VU,NR by ANDRE at 2/11/2019  1:46 PM    Comment:  Pt taught ankle " "pumps and heel slides in chair, pt agreed to complete throughout day. Pt also educated on safe transfers and to use \"call don't fall\".                   Point: Precautions (Done)     Learning Progress Summary           Patient Acceptance, E, VU by  at 2/13/2019  9:40 AM    Comment:  Educated on HEP and correct mechanics for safe functional mobility.    Acceptance, E, VU by STEVEN at 2/12/2019  2:30 PM    Comment:  Educated on HEP and correct mechanics for safe functional mobility.    Acceptance, E, VU,NR by ANDRE at 2/11/2019  1:46 PM    Comment:  Pt taught ankle pumps and heel slides in chair, pt agreed to complete throughout day. Pt also educated on safe transfers and to use \"call don't fall\".   Family Acceptance, E, VU by  at 2/13/2019  9:40 AM    Comment:  Educated on HEP and correct mechanics for safe functional mobility.    Acceptance, E, VU,NR by ANDRE at 2/11/2019  1:46 PM    Comment:  Pt taught ankle pumps and heel slides in chair, pt agreed to complete throughout day. Pt also educated on safe transfers and to use \"call don't fall\".                               User Key     Initials Effective Dates Name Provider Type Discipline     05/29/18 -  Aria Reis, PT Physical Therapist PT     08/30/18 -  Kristen Reeves PT Physical Therapist PT                PT Recommendation and Plan  Anticipated Discharge Disposition (PT): extended care facility  Therapy Frequency (PT Clinical Impression): daily  Outcome Summary/Treatment Plan (PT)  Daily Summary of Progress (PT): progress towards functional goals is fair  Anticipated Discharge Disposition (PT): extended care facility  Plan of Care Reviewed With: daughter, patient  Progress: no change  Outcome Summary: MODA rolling L/R for sling placement. Mechanical lift bed to chair. Sit to partial standing 2x with MAXA x 1 and RW, unable to acheive full upright standing d/t weakness/fatigue and B knee pain. Will continue to progress pt as able per POC.  Outcome Measures "     Row Name 02/13/19 0940 02/12/19 1430 02/11/19 1346       How much help from another person do you currently need...    Turning from your back to your side while in flat bed without using bedrails?  3  -VG  3  -VG  3  -ANDRE    Moving from lying on back to sitting on the side of a flat bed without bedrails?  2  -VG  2  -VG  2  -ANDRE    Moving to and from a bed to a chair (including a wheelchair)?  1  -VG  1  -VG  1  -ANDRE    Standing up from a chair using your arms (e.g., wheelchair, bedside chair)?  2  -VG  1  -VG  2  -ANDRE    Climbing 3-5 steps with a railing?  1  -VG  1  -VG  1  -ANDRE    To walk in hospital room?  1  -VG  1  -VG  1  -ANDRE    AM-PAC 6 Clicks Score  10  -VG  9  -VG  10  -ANDRE       Functional Assessment    Outcome Measure Options  AM-PAC 6 Clicks Basic Mobility (PT)  -VG  AM-PAC 6 Clicks Basic Mobility (PT)  -VG  AM-PAC 6 Clicks Basic Mobility (PT)  -ANDRE    Row Name 02/11/19 0856             How much help from another is currently needed...    Putting on and taking off regular lower body clothing?  1  -SD      Bathing (including washing, rinsing, and drying)  1  -SD      Toileting (which includes using toilet bed pan or urinal)  2  -SD      Putting on and taking off regular upper body clothing  2  -SD      Taking care of personal grooming (such as brushing teeth)  3  -SD      Eating meals  3  -SD      Score  12  -SD         Functional Assessment    Outcome Measure Options  AM-PAC 6 Clicks Daily Activity (OT)  -SD        User Key  (r) = Recorded By, (t) = Taken By, (c) = Cosigned By    Initials Name Provider Type    Sara Monteiro, OT Occupational Therapist    VG Aria Reis, PT Physical Therapist    ANDRE Kristen Reeves, PT Physical Therapist         Time Calculation:   PT Charges     Row Name 02/13/19 0940             Time Calculation    Start Time  0940  -VG      PT Received On  02/13/19  -VG      PT Goal Re-Cert Due Date  02/21/19  -VG         Time Calculation- PT    Total Timed Code Minutes-  PT  30 minute(s)  -VG         Timed Charges    83765 - PT Therapeutic Exercise Minutes  10  -VG      09174 - PT Therapeutic Activity Minutes  20  -VG        User Key  (r) = Recorded By, (t) = Taken By, (c) = Cosigned By    Initials Name Provider Type    VG Aria Reis, PT Physical Therapist        Therapy Suggested Charges     Code   Minutes Charges    27230 (CPT®) Hc Pt Neuromusc Re Education Ea 15 Min      74334 (CPT®) Hc Pt Ther Proc Ea 15 Min 10 1    12925 (CPT®) Hc Gait Training Ea 15 Min      50724 (CPT®) Hc Pt Therapeutic Act Ea 15 Min 20 1    36322 (CPT®) Hc Pt Manual Therapy Ea 15 Min      90018 (CPT®) Hc Pt Iontophoresis Ea 15 Min      96781 (CPT®) Hc Pt Elec Stim Ea-Per 15 Min      09799 (CPT®) Hc Pt Ultrasound Ea 15 Min      29548 (CPT®) Hc Pt Self Care/Mgmt/Train Ea 15 Min      46854 (CPT®) Hc Pt Prosthetic (S) Train Initial Encounter, Each 15 Min      80424 (CPT®) Hc Pt Orthotic(S)/Prosthetic(S) Encounter, Each 15 Min      56138 (CPT®) Hc Orthotic(S) Mgmt/Train Initial Encounter, Each 15min      Total  30 2        Therapy Charges for Today     Code Description Service Date Service Provider Modifiers Qty    59560352229 HC PT THER PROC EA 15 MIN 2/12/2019 Aria Reis, PT GP 1    60964805942 HC PT THERAPEUTIC ACT EA 15 MIN 2/12/2019 Aria Reis, PT GP 1    56422252816 HC PT THER PROC EA 15 MIN 2/13/2019 Aria Reis, PT GP 1    34541403766 HC PT THERAPEUTIC ACT EA 15 MIN 2/13/2019 Aria Reis, PT GP 1          PT G-Codes  Outcome Measure Options: AM-PAC 6 Clicks Basic Mobility (PT)  AM-PAC 6 Clicks Score: 10  Score: 12    Debi Reis PT  2/13/2019

## 2019-02-13 NOTE — DISCHARGE INSTR - APPOINTMENTS
Leti Munoz MD  PCP - General Internal Medicine [phone] [address] [address]     Next Steps: Follow up      Instructions: first available hospital follow up        APPOINTMENT: Monday, February 18th Dr. Solis will see patient between hours of 8-12

## 2019-06-14 ENCOUNTER — LAB REQUISITION (OUTPATIENT)
Dept: LAB | Facility: HOSPITAL | Age: 84
End: 2019-06-14

## 2019-06-14 DIAGNOSIS — Z00.00 ROUTINE GENERAL MEDICAL EXAMINATION AT A HEALTH CARE FACILITY: ICD-10-CM

## 2019-06-14 LAB
ANION GAP SERPL CALCULATED.3IONS-SCNC: 15 MMOL/L
BUN BLD-MCNC: 14 MG/DL (ref 8–23)
BUN/CREAT SERPL: 19.4 (ref 7–25)
CALCIUM SPEC-SCNC: 9.2 MG/DL (ref 8.6–10.5)
CHLORIDE SERPL-SCNC: 101 MMOL/L (ref 98–107)
CO2 SERPL-SCNC: 26 MMOL/L (ref 22–29)
CREAT BLD-MCNC: 0.72 MG/DL (ref 0.57–1)
GFR SERPL CREATININE-BSD FRML MDRD: 76 ML/MIN/1.73
GLUCOSE BLD-MCNC: 99 MG/DL (ref 65–99)
POTASSIUM BLD-SCNC: 2.7 MMOL/L (ref 3.5–5.2)
SODIUM BLD-SCNC: 142 MMOL/L (ref 136–145)

## 2019-06-14 PROCEDURE — 80048 BASIC METABOLIC PNL TOTAL CA: CPT

## 2020-02-05 ENCOUNTER — APPOINTMENT (OUTPATIENT)
Dept: CT IMAGING | Facility: HOSPITAL | Age: 85
End: 2020-02-05

## 2020-02-05 ENCOUNTER — APPOINTMENT (OUTPATIENT)
Dept: GENERAL RADIOLOGY | Facility: HOSPITAL | Age: 85
End: 2020-02-05

## 2020-02-05 ENCOUNTER — HOSPITAL ENCOUNTER (EMERGENCY)
Facility: HOSPITAL | Age: 85
Discharge: NURSING, REHAB OR LONG-TERM CARE | End: 2020-02-05
Attending: EMERGENCY MEDICINE | Admitting: EMERGENCY MEDICINE

## 2020-02-05 VITALS
SYSTOLIC BLOOD PRESSURE: 158 MMHG | HEIGHT: 59 IN | TEMPERATURE: 98.3 F | WEIGHT: 163 LBS | RESPIRATION RATE: 16 BRPM | BODY MASS INDEX: 32.86 KG/M2 | HEART RATE: 85 BPM | DIASTOLIC BLOOD PRESSURE: 66 MMHG | OXYGEN SATURATION: 98 %

## 2020-02-05 DIAGNOSIS — B34.9 VIRAL SYNDROME: Primary | ICD-10-CM

## 2020-02-05 DIAGNOSIS — R09.02 HYPOXIA: ICD-10-CM

## 2020-02-05 LAB
ALBUMIN SERPL-MCNC: 3.9 G/DL (ref 3.5–5.2)
ALBUMIN/GLOB SERPL: 1.3 G/DL
ALP SERPL-CCNC: 109 U/L (ref 39–117)
ALT SERPL W P-5'-P-CCNC: 19 U/L (ref 1–33)
ANION GAP SERPL CALCULATED.3IONS-SCNC: 12 MMOL/L (ref 5–15)
AST SERPL-CCNC: 28 U/L (ref 1–32)
BASOPHILS # BLD AUTO: 0.04 10*3/MM3 (ref 0–0.2)
BASOPHILS NFR BLD AUTO: 0.7 % (ref 0–1.5)
BILIRUB SERPL-MCNC: 0.3 MG/DL (ref 0.2–1.2)
BILIRUB UR QL STRIP: NEGATIVE
BUN BLD-MCNC: 9 MG/DL (ref 8–23)
BUN/CREAT SERPL: 13.4 (ref 7–25)
CALCIUM SPEC-SCNC: 8.7 MG/DL (ref 8.6–10.5)
CHLORIDE SERPL-SCNC: 100 MMOL/L (ref 98–107)
CLARITY UR: CLEAR
CO2 SERPL-SCNC: 26 MMOL/L (ref 22–29)
COLOR UR: YELLOW
CREAT BLD-MCNC: 0.67 MG/DL (ref 0.57–1)
DEPRECATED RDW RBC AUTO: 44.9 FL (ref 37–54)
EOSINOPHIL # BLD AUTO: 0.11 10*3/MM3 (ref 0–0.4)
EOSINOPHIL NFR BLD AUTO: 1.8 % (ref 0.3–6.2)
ERYTHROCYTE [DISTWIDTH] IN BLOOD BY AUTOMATED COUNT: 12.6 % (ref 12.3–15.4)
FLUAV AG NPH QL: NEGATIVE
FLUBV AG NPH QL IA: NEGATIVE
GFR SERPL CREATININE-BSD FRML MDRD: 83 ML/MIN/1.73
GLOBULIN UR ELPH-MCNC: 2.9 GM/DL
GLUCOSE BLD-MCNC: 102 MG/DL (ref 65–99)
GLUCOSE UR STRIP-MCNC: NEGATIVE MG/DL
HCT VFR BLD AUTO: 40.7 % (ref 34–46.6)
HGB BLD-MCNC: 13.2 G/DL (ref 12–15.9)
HGB UR QL STRIP.AUTO: NEGATIVE
HOLD SPECIMEN: NORMAL
HOLD SPECIMEN: NORMAL
IMM GRANULOCYTES # BLD AUTO: 0.02 10*3/MM3 (ref 0–0.05)
IMM GRANULOCYTES NFR BLD AUTO: 0.3 % (ref 0–0.5)
KETONES UR QL STRIP: NEGATIVE
LEUKOCYTE ESTERASE UR QL STRIP.AUTO: NEGATIVE
LYMPHOCYTES # BLD AUTO: 1.23 10*3/MM3 (ref 0.7–3.1)
LYMPHOCYTES NFR BLD AUTO: 20.1 % (ref 19.6–45.3)
MAGNESIUM SERPL-MCNC: 1.9 MG/DL (ref 1.6–2.4)
MCH RBC QN AUTO: 31.4 PG (ref 26.6–33)
MCHC RBC AUTO-ENTMCNC: 32.4 G/DL (ref 31.5–35.7)
MCV RBC AUTO: 96.7 FL (ref 79–97)
MONOCYTES # BLD AUTO: 0.95 10*3/MM3 (ref 0.1–0.9)
MONOCYTES NFR BLD AUTO: 15.5 % (ref 5–12)
NEUTROPHILS # BLD AUTO: 3.76 10*3/MM3 (ref 1.7–7)
NEUTROPHILS NFR BLD AUTO: 61.6 % (ref 42.7–76)
NITRITE UR QL STRIP: NEGATIVE
NRBC BLD AUTO-RTO: 0 /100 WBC (ref 0–0.2)
PH UR STRIP.AUTO: <=5 [PH] (ref 5–8)
PLATELET # BLD AUTO: 234 10*3/MM3 (ref 140–450)
PMV BLD AUTO: 8.9 FL (ref 6–12)
POTASSIUM BLD-SCNC: 3.7 MMOL/L (ref 3.5–5.2)
PROT SERPL-MCNC: 6.8 G/DL (ref 6–8.5)
PROT UR QL STRIP: NEGATIVE
RBC # BLD AUTO: 4.21 10*6/MM3 (ref 3.77–5.28)
SODIUM BLD-SCNC: 138 MMOL/L (ref 136–145)
SP GR UR STRIP: 1.01 (ref 1–1.03)
TROPONIN T SERPL-MCNC: 0.01 NG/ML (ref 0–0.03)
TROPONIN T SERPL-MCNC: <0.01 NG/ML (ref 0–0.03)
UROBILINOGEN UR QL STRIP: NORMAL
WBC NRBC COR # BLD: 6.11 10*3/MM3 (ref 3.4–10.8)
WHOLE BLOOD HOLD SPECIMEN: NORMAL
WHOLE BLOOD HOLD SPECIMEN: NORMAL

## 2020-02-05 PROCEDURE — 85025 COMPLETE CBC W/AUTO DIFF WBC: CPT

## 2020-02-05 PROCEDURE — 87804 INFLUENZA ASSAY W/OPTIC: CPT | Performed by: PHYSICIAN ASSISTANT

## 2020-02-05 PROCEDURE — 93005 ELECTROCARDIOGRAM TRACING: CPT | Performed by: EMERGENCY MEDICINE

## 2020-02-05 PROCEDURE — 84484 ASSAY OF TROPONIN QUANT: CPT | Performed by: EMERGENCY MEDICINE

## 2020-02-05 PROCEDURE — 80053 COMPREHEN METABOLIC PANEL: CPT

## 2020-02-05 PROCEDURE — 83735 ASSAY OF MAGNESIUM: CPT

## 2020-02-05 PROCEDURE — 71045 X-RAY EXAM CHEST 1 VIEW: CPT

## 2020-02-05 PROCEDURE — 71275 CT ANGIOGRAPHY CHEST: CPT

## 2020-02-05 PROCEDURE — P9612 CATHETERIZE FOR URINE SPEC: HCPCS

## 2020-02-05 PROCEDURE — 0 IOPAMIDOL PER 1 ML: Performed by: EMERGENCY MEDICINE

## 2020-02-05 PROCEDURE — 84484 ASSAY OF TROPONIN QUANT: CPT

## 2020-02-05 PROCEDURE — 99285 EMERGENCY DEPT VISIT HI MDM: CPT

## 2020-02-05 PROCEDURE — 81003 URINALYSIS AUTO W/O SCOPE: CPT

## 2020-02-05 RX ORDER — POTASSIUM CHLORIDE 1.5 G/1.77G
20 POWDER, FOR SOLUTION ORAL 2 TIMES DAILY
COMMUNITY

## 2020-02-05 RX ORDER — PANTOPRAZOLE SODIUM 20 MG/1
20 TABLET, DELAYED RELEASE ORAL DAILY
COMMUNITY

## 2020-02-05 RX ORDER — GUAIFENESIN 600 MG/1
1200 TABLET, EXTENDED RELEASE ORAL 2 TIMES DAILY
COMMUNITY

## 2020-02-05 RX ORDER — NITROFURANTOIN 25; 75 MG/1; MG/1
100 CAPSULE ORAL 2 TIMES DAILY
COMMUNITY

## 2020-02-05 RX ORDER — BUSPIRONE HYDROCHLORIDE 10 MG/1
10 TABLET ORAL 2 TIMES DAILY
COMMUNITY

## 2020-02-05 RX ORDER — FLUTICASONE PROPIONATE 50 MCG
2 SPRAY, SUSPENSION (ML) NASAL DAILY
COMMUNITY

## 2020-02-05 RX ORDER — SODIUM CHLORIDE 0.9 % (FLUSH) 0.9 %
10 SYRINGE (ML) INJECTION AS NEEDED
Status: DISCONTINUED | OUTPATIENT
Start: 2020-02-05 | End: 2020-02-05 | Stop reason: HOSPADM

## 2020-02-05 RX ORDER — CEFUROXIME AXETIL 500 MG/1
500 TABLET ORAL 2 TIMES DAILY
COMMUNITY

## 2020-02-05 RX ADMIN — IOPAMIDOL 85 ML: 755 INJECTION, SOLUTION INTRAVENOUS at 15:31

## 2020-02-05 NOTE — ED PROVIDER NOTES
Subjective   Ms. Antonia Mata is a 89 y.o. female who presents to the ED with c/o cough. Her daughter assisted in presenting the history due to her history of dementia. She currently resides at [facility] and is non-ambulatory. She reports feeling ill over the last 2-3 days, which prompted her to see a doctor yesterday morning, 02/04/20. She was not tested for the flu and her lungs were clear to auscultation. She has received a pneumonia vaccination. She was prescribed an oral antibiotic, but her daughter and care-team decided against taking the medication. She notes fatigue, cough, congestion, rhinorrhea, nausea, decreased appetite, and bilateral shoulder pain. She denies headache, sore throat, abdominal pain, and shortness of breath. Her daughter noted that she has not slept in 2 nights. Her oxygen saturations were measured while she was awake at 87-88, which remained the same both with and without wearing oxygen. Her daughter notes a decline in cognition and increase in confusion over the last week or two. She notes a history of stenosis arthritis and hypertension. She denies a history of CHF, COPD, asthma, blood clots, and heart attacks. She denies a social history of alcohol consumption and smoking tobacco. There are no other acute complaints at this time.      History provided by:  Relative (daughter)  History limited by:  Dementia   used: No    URI   Presenting symptoms: congestion, cough, fatigue and rhinorrhea    Presenting symptoms: no sore throat    Severity:  Moderate  Onset quality:  Sudden  Duration: 2-3 days.  Timing:  Constant  Progression:  Worsening  Chronicity:  New  Relieved by:  Nothing  Worsened by:  Nothing  Ineffective treatments:  Drinking  Associated symptoms: no headaches    Risk factors: being elderly        Review of Systems   Constitutional: Positive for appetite change (decreased) and fatigue.   HENT: Positive for congestion and rhinorrhea. Negative  "for sore throat.    Respiratory: Positive for cough. Negative for shortness of breath.    Gastrointestinal: Positive for nausea. Negative for abdominal pain and vomiting.   Neurological: Negative for headaches.   All other systems reviewed and are negative.      Past Medical History:   Diagnosis Date   • Anxiety    • Arthritis    • Arthropathy    • Constipation    • Dementia (CMS/HCC)    • 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 URINE   • Essential hypertension    • GERD (gastroesophageal reflux disease)    • Hyperlipidemia    • Insomnia    • Spinal stenosis    • Spinal stenosis    • Syncope    • URI (upper respiratory infection)        Allergies   Allergen Reactions   • Sulfa Antibiotics Rash     UNKNOWN     • Cortisone Other (See Comments)     Elevated BP   • Lopressor [Metoprolol] Confusion   • Ativan [Lorazepam] Anxiety     \"opposite effect\"       Past Surgical History:   Procedure Laterality Date   • BLADDER SUSPENSION     • CHOLECYSTECTOMY     • HIP SURGERY     • HYSTERECTOMY     • TONSILLECTOMY         Family History   Family history unknown: Yes       Social History     Socioeconomic History   • Marital status:      Spouse name: Not on file   • Number of children: Not on file   • Years of education: Not on file   • Highest education level: Not on file   Tobacco Use   • Smoking status: Never Smoker   Substance and Sexual Activity   • Alcohol use: No   • Drug use: No   • Sexual activity: Defer   Social History Narrative    Lives in [facility], has not walked for several months         Objective   Physical Exam   Constitutional: She is oriented to person, place, and time. She appears well-developed and well-nourished.   HENT:   Head: Normocephalic and atraumatic.   Nose: Nose normal.   Eyes: Conjunctivae are normal. No scleral icterus.   Neck: Normal range of motion. Neck supple.   Cardiovascular: Normal rate and regular rhythm.   No murmur heard.  Pulmonary/Chest: Effort normal and breath sounds normal. No " stridor. No respiratory distress. She has no wheezes. She has no rales. She exhibits no tenderness.   Abdominal: Soft. There is no tenderness.   Musculoskeletal: Normal range of motion.   Neurological: She is alert and oriented to person, place, and time.   Skin: Skin is warm and dry.   Psychiatric: She has a normal mood and affect. Her behavior is normal.   Nursing note and vitals reviewed.      Procedures         ED Course  ED Course as of Feb 05 2251 Wed Feb 05, 2020 1703 Walter paged the hospitalist.     [KO]   1706 Walter is at bedside reevaluating and updating the patient.    [KO]   1740 Discussed the patient with the hospitalist.  She agrees there is no clinical reason to put her into the hospital.  She is at a nursing home they have oxygen available.  We will have her follow-up with her PMD.    [ANDRE]      ED Course User Index  [ANDRE] Rodriguez Pillai PA  [KO] Kimberlee Cordero           Recent Results (from the past 24 hour(s))   Urinalysis With Culture If Indicated - Urine, Catheter    Collection Time: 02/05/20 11:35 AM   Result Value Ref Range    Color, UA Yellow Yellow, Straw    Appearance, UA Clear Clear    pH, UA <=5.0 5.0 - 8.0    Specific Gravity, UA 1.010 1.001 - 1.030    Glucose, UA Negative Negative    Ketones, UA Negative Negative    Bilirubin, UA Negative Negative    Blood, UA Negative Negative    Protein, UA Negative Negative    Leuk Esterase, UA Negative Negative    Nitrite, UA Negative Negative    Urobilinogen, UA 0.2 E.U./dL 0.2 - 1.0 E.U./dL   Comprehensive Metabolic Panel    Collection Time: 02/05/20 11:54 AM   Result Value Ref Range    Glucose 102 (H) 65 - 99 mg/dL    BUN 9 8 - 23 mg/dL    Creatinine 0.67 0.57 - 1.00 mg/dL    Sodium 138 136 - 145 mmol/L    Potassium 3.7 3.5 - 5.2 mmol/L    Chloride 100 98 - 107 mmol/L    CO2 26.0 22.0 - 29.0 mmol/L    Calcium 8.7 8.6 - 10.5 mg/dL    Total Protein 6.8 6.0 - 8.5 g/dL    Albumin 3.90 3.50 - 5.20 g/dL    ALT (SGPT) 19 1 - 33 U/L    AST  (SGOT) 28 1 - 32 U/L    Alkaline Phosphatase 109 39 - 117 U/L    Total Bilirubin 0.3 0.2 - 1.2 mg/dL    eGFR Non African Amer 83 >60 mL/min/1.73    Globulin 2.9 gm/dL    A/G Ratio 1.3 g/dL    BUN/Creatinine Ratio 13.4 7.0 - 25.0    Anion Gap 12.0 5.0 - 15.0 mmol/L   Troponin    Collection Time: 02/05/20 11:54 AM   Result Value Ref Range    Troponin T 0.012 0.000 - 0.030 ng/mL   Magnesium    Collection Time: 02/05/20 11:54 AM   Result Value Ref Range    Magnesium 1.9 1.6 - 2.4 mg/dL   Light Blue Top    Collection Time: 02/05/20 11:54 AM   Result Value Ref Range    Extra Tube hold for add-on    Green Top (Gel)    Collection Time: 02/05/20 11:54 AM   Result Value Ref Range    Extra Tube Hold for add-ons.    Lavender Top    Collection Time: 02/05/20 11:54 AM   Result Value Ref Range    Extra Tube hold for add-on    Gold Top - SST    Collection Time: 02/05/20 11:54 AM   Result Value Ref Range    Extra Tube Hold for add-ons.    CBC Auto Differential    Collection Time: 02/05/20 11:54 AM   Result Value Ref Range    WBC 6.11 3.40 - 10.80 10*3/mm3    RBC 4.21 3.77 - 5.28 10*6/mm3    Hemoglobin 13.2 12.0 - 15.9 g/dL    Hematocrit 40.7 34.0 - 46.6 %    MCV 96.7 79.0 - 97.0 fL    MCH 31.4 26.6 - 33.0 pg    MCHC 32.4 31.5 - 35.7 g/dL    RDW 12.6 12.3 - 15.4 %    RDW-SD 44.9 37.0 - 54.0 fl    MPV 8.9 6.0 - 12.0 fL    Platelets 234 140 - 450 10*3/mm3    Neutrophil % 61.6 42.7 - 76.0 %    Lymphocyte % 20.1 19.6 - 45.3 %    Monocyte % 15.5 (H) 5.0 - 12.0 %    Eosinophil % 1.8 0.3 - 6.2 %    Basophil % 0.7 0.0 - 1.5 %    Immature Grans % 0.3 0.0 - 0.5 %    Neutrophils, Absolute 3.76 1.70 - 7.00 10*3/mm3    Lymphocytes, Absolute 1.23 0.70 - 3.10 10*3/mm3    Monocytes, Absolute 0.95 (H) 0.10 - 0.90 10*3/mm3    Eosinophils, Absolute 0.11 0.00 - 0.40 10*3/mm3    Basophils, Absolute 0.04 0.00 - 0.20 10*3/mm3    Immature Grans, Absolute 0.02 0.00 - 0.05 10*3/mm3    nRBC 0.0 0.0 - 0.2 /100 WBC   Influenza Antigen, Rapid - Swab, Nasopharynx     Collection Time: 02/05/20  1:20 PM   Result Value Ref Range    Influenza A Ag, EIA Negative Negative    Influenza B Ag, EIA Negative Negative   Troponin    Collection Time: 02/05/20  1:40 PM   Result Value Ref Range    Troponin T <0.010 0.000 - 0.030 ng/mL     Note: In addition to lab results from this visit, the labs listed above may include labs taken at another facility or during a different encounter within the last 24 hours. Please correlate lab times with ED admission and discharge times for further clarification of the services performed during this visit.    CT Angiogram Chest   Preliminary Result   1. No evidence of pulmonary embolus.    2. Mild chronic-appearing interstitial lung changes. No significant   acute disease is suspected.       DICTATED:   02/05/2020   EDITED/ls :   02/05/2020           XR Chest 1 View   Final Result   Mild bilateral lung scarring, not significantly changed from   2019. No clearly new chest pathology is identified.       D:  02/05/2020   E:  02/05/2020            This report was finalized on 2/5/2020 10:44 PM by Dr. Ifeanyi Curry MD.            Vitals:    02/05/20 1746 02/05/20 1800 02/05/20 1801 02/05/20 2024   BP:  153/72  158/66   BP Location:    Left arm   Patient Position:    Lying   Pulse: 92  86 85   Resp:    16   Temp:       TempSrc:       SpO2: 95% 99% 98% 98%   Weight:       Height:         Medications   iopamidol (ISOVUE-370) 76 % injection 100 mL (85 mL Intravenous Given 2/5/20 1531)     ECG/EMG Results (last 24 hours)     Procedure Component Value Units Date/Time    ECG 12 Lead [[id number]] Collected:  02/05/20 1238     Updated:  02/05/20 1255    ECG 12 Lead [[id number]] Collected:  02/05/20 1339     Updated:  02/05/20 1407        ECG 12 Lead         ECG 12 Lead                                                     MDM  Number of Diagnoses or Management Options  Hypoxia: new and requires workup  Viral syndrome: new and requires workup     Amount and/or Complexity of  Data Reviewed  Clinical lab tests: reviewed and ordered  Tests in the radiology section of CPT®: reviewed and ordered  Tests in the medicine section of CPT®: ordered and reviewed    Patient Progress  Patient progress: stable      Final diagnoses:   Viral syndrome   Hypoxia       Documentation assistance provided by heather Cordero.  Information recorded by the heather was done at my direction and has been verified and validated by me.     Kimberlee Cordero  02/05/20 6542       Rodriguez Pillai PA  02/05/20 3906

## 2020-02-05 NOTE — DISCHARGE INSTRUCTIONS
Please use heel protectors while in bed to prevent pressure ulcers on posterior heels.  Oxygen 2 L per nasal cannula continuously until reevaluated by PMD.

## 2020-02-05 NOTE — PROGRESS NOTES
Discharge Planning Assessment  [facility]     Patient Name: Antonia Mata  MRN: [id number]  Today's Date: 2/5/2020    Admit Date: 2/5/2020    Discharge Needs Assessment    No documentation.       Discharge Plan     Row Name 02/05/20 9938       Plan    Plan  discharge    Plan Comments  CM consult for pt discharge needs. Pt lives at [facility] in [facility]. PA, Walter Pillai, wants pt to have heel protectors when she gets back to facility. CM called facility, [phone], spoke with Poonam pts nurse, they have them there and will make sure pt gets them. PA will put reminder in d/c paperwork. Pt will also start on oxygen when she returns to NH    Final Discharge Disposition Code  01 - home or self-care        Destination      Coordination has not been started for this encounter.      Durable Medical Equipment      Coordination has not been started for this encounter.      Dialysis/Infusion      Coordination has not been started for this encounter.      Home Medical Care      Coordination has not been started for this encounter.      Therapy      Coordination has not been started for this encounter.      Community Resources      Coordination has not been started for this encounter.          Demographic Summary    No documentation.       Functional Status    No documentation.       Psychosocial    No documentation.       Abuse/Neglect    No documentation.       Legal    No documentation.       Substance Abuse    No documentation.       Patient Forms    No documentation.           Cassidy Winn RN

## 2022-07-28 NOTE — PLAN OF CARE
Problem: Patient Care Overview  Goal: Plan of Care Review  Outcome: Ongoing (interventions implemented as appropriate)   02/08/19 0709   Coping/Psychosocial   Plan of Care Reviewed With patient   Plan of Care Review   Progress no change   OTHER   Outcome Summary Patient scored a anna of 13 this morning. Patient is very immobile and is incontinent with some MASD noted. Please see skin care orders for care needs. Ordered CHARLEE from [employer] for pressure redistribution and moisture management. Please contact [facility] nurse if further need arise. Thanks           Doxepin Pregnancy And Lactation Text: This medication is Pregnancy Category C and it isn't known if it is safe during pregnancy. It is also excreted in breast milk and breast feeding isn't recommended.

## 2023-06-08 NOTE — PLAN OF CARE
Problem: Fall Risk (Adult)  Goal: Absence of Fall  Outcome: Ongoing (interventions implemented as appropriate)      Problem: Patient Care Overview  Goal: Plan of Care Review  Outcome: Ongoing (interventions implemented as appropriate)   02/07/19 2015 02/08/19 0602   Coping/Psychosocial   Plan of Care Reviewed With patient;daughter --    Plan of Care Review   Progress --  improving   OTHER   Outcome Summary --  Patient on 2L NC. VSS. No acute overnight events. Continue to monitor.       Problem: Pneumonia (Adult)  Goal: Signs and Symptoms of Listed Potential Problems Will be Absent, Minimized or Managed (Pneumonia)  Outcome: Ongoing (interventions implemented as appropriate)      Problem: Skin Injury Risk (Adult)  Goal: Identify Related Risk Factors and Signs and Symptoms  Outcome: Outcome(s) achieved Date Met: 02/08/19    Goal: Skin Health and Integrity  Outcome: Ongoing (interventions implemented as appropriate)         Gabapentin Counseling: I discussed with the patient the risks of gabapentin including but not limited to dizziness, somnolence, fatigue and ataxia.

## 2024-05-29 NOTE — THERAPY TREATMENT NOTE
Acute Care - Occupational Therapy Treatment Note  [facility]     Patient Name: Poonam Mata  : 1931  MRN: [id number]  Today's Date: 2018  Onset of Illness/Injury or Date of Surgery: 18  Date of Referral to OT: 18  Referring Physician: MD Nikko    Admit Date: 6/3/2018       ICD-10-CM ICD-9-CM   1. Acute respiratory failure with hypoxia J96.01 518.81   2. Febrile illness, acute R50.9 780.60   3. Sepsis, due to unspecified organism A41.9 038.9     995.91   4. Encephalopathy acute G93.40 348.30   5. Dysphagia, unspecified type R13.10 787.20   6. Impaired mobility and ADLs Z74.09 799.89   7. Impaired functional mobility, balance, gait, and endurance Z74.09 V49.89     Patient Active Problem List   Diagnosis   • Pneumonia   • Hypokalemia   • Dementia   • Hypertension   • GERD (gastroesophageal reflux disease)   • Normocytic anemia   • Elevated alkaline phosphatase level   • Vasovagal episode   • Constipation   • Aortic heart murmur   • HCAP (healthcare-associated pneumonia)   • Acute respiratory failure with hypoxia   • Spinal stenosis   • Acute pain of left shoulder   • Left hand pain     Past Medical History:   Diagnosis Date   • Anxiety    • Arthropathy    • Constipation    • Dementia    • Essential hypertension    • GERD (gastroesophageal reflux disease)    • Hyperlipidemia    • Insomnia    • Spinal stenosis    • Syncope    • URI (upper respiratory infection)      Past Surgical History:   Procedure Laterality Date   • BLADDER SUSPENSION     • CHOLECYSTECTOMY     • HIP SURGERY     • HYSTERECTOMY     • TONSILLECTOMY         Therapy Treatment          Rehabilitation Treatment Summary     Row Name 18 1416             Treatment Time/Intention    Discipline occupational therapist  -AN      Document Type therapy note (daily note)  -AN      Subjective Information no complaints  -AN      Mode of Treatment occupational therapy  -AN      Patient/Family Observations Pt supine in bed, Magaly present  in beginning of session  -AN      Care Plan Review care plan/treatment goals reviewed  -AN      Patient Effort good  -AN      Comment pt request to do hair groomiing/washing  -AN      Recorded by [AN] Saray Tran, OT 06/06/18 1508      Row Name 06/06/18 1416             Cognitive Assessment/Intervention- PT/OT    Orientation Status (Cognition) oriented to;person;situation;place  -AN      Follows Commands (Cognition) follows one step commands;75-90% accuracy;verbal cues/prompting required  -AN      Recorded by [AN] Saray Tran, OT 06/06/18 1508      Row Name 06/06/18 1416             Transfer Assessment/Treatment    Comment (Transfers) ADL's in bed  -AN      Recorded by [AN] Saray Tran, OT 06/06/18 1508      Row Name 06/06/18 1416             ADL Assessment/Intervention    BADL Assessment/Intervention grooming  -AN      Recorded by [AN] Saray Tran, OT 06/06/18 1508      Row Name 06/06/18 1416             Grooming Assessment/Training    [place] Level (Grooming) hair care, combing/brushing;minimum assist (75% patient effort)   hair washing with shampoo cap  -AN      Grooming Position supported sitting  -AN      Comment (Grooming) pt completed all grooming skills except tucking hair in cap  -AN      Recorded by [AN] Saray Tran, OT 06/06/18 1508      Row Name 06/06/18 1416             Motor Skills Assessment/Interventions    Additional Documentation Therapeutic Exercise (Group)  -AN      Recorded by [AN] Saray Tran OT 06/06/18 1508      Row Name 06/06/18 1416             Therapeutic Exercise    Upper Extremity Range of Motion (Therapeutic Exercise) shoulder abduction/adduction, bilateral;shoulder horizontal abduction/adduction, bilateral;shoulder internal/external rotation, bilateral;elbow flexion/extension, bilateral;forearm supination/pronation, bilateral;wrist flexion/extension, bilateral  -AN      Sets/Reps (Therapeutic Exercise) 2/10  -AN      Comment (Therapeutic Exercise) UE continuous ex  with grooming skills  -AN      Recorded by [AN] Saray Tran, OT 06/06/18 1508      Row Name 06/06/18 1416             Outcome Summary/Treatment Plan (OT)    Daily Summary of Progress (OT) progress toward functional goals as expected  -AN      Recorded by [AN] Saray Tran, OT 06/06/18 1508        User Key  (r) = Recorded By, (t) = Taken By, (c) = Cosigned By    Initials Name Effective Dates Discipline    AN Saray Tran, OT 06/22/15 -  OT               Occupational Therapy Education     Title: PT OT SLP Therapies (Active)     Topic: Occupational Therapy (Active)     Point: ADL training (Done)     Description: Instruct learner(s) on proper safety adaptation and remediation techniques during self care or transfers.   Instruct in proper use of assistive devices.   Learning Progress Summary     Learner Status Readiness Method Response Comment Documented by    Patient Done Acceptance E,LUKE CABRAL NR Educated on UE ex and benefits of daily increased therapeutic ex/activities. AN 06/06/18 1508     Done Acceptance E,TB VU benefits of activity, role of OT  06/04/18 1428    Family Done Acceptance E,TB VU benefits of activity, role of OT  06/04/18 1428          Point: Home exercise program (Done)     Description: Instruct learner(s) on appropriate technique for monitoring, assisting and/or progressing therapeutic exercises/activities.   Learning Progress Summary     Learner Status Readiness Method Response Comment Documented by    Patient Done Acceptance GLORIA HUANG DU, NR Educated on UE ex and benefits of daily increased therapeutic ex/activities. AN 06/06/18 1508                      User Key     Initials Effective Dates Name Provider Type Discipline     06/23/15 -  Nena Grant, OT Occupational Therapist OT    AN 06/22/15 -  Saray Tran, OT Occupational Therapist OT                OT Recommendation and Plan  Outcome Summary/Treatment Plan (OT)  Daily Summary of Progress (OT): progress toward functional goals as  expected  Daily Summary of Progress (OT): progress toward functional goals as expected  Plan of Care Review  Plan of Care Reviewed With: patient  Plan of Care Reviewed With: patient  Outcome Summary: Pt agreeable to all tasks, no c/o pain. Pt with significcant assist needed for txfrs. Contiinue OT.        Outcome Measures     Row Name 06/06/18 1416 06/06/18 0937 06/04/18 1310       How much help from another person do you currently need...    Turning from your back to your side while in flat bed without using bedrails?  -- 2  -EH  --    Moving from lying on back to sitting on the side of a flat bed without bedrails?  -- 2  -EH  --    Moving to and from a bed to a chair (including a wheelchair)?  -- 1  -EH  --    Standing up from a chair using your arms (e.g., wheelchair, bedside chair)?  -- 1  -EH  --    Climbing 3-5 steps with a railing?  -- 1  -EH  --    To walk in hospital room?  -- 1  -EH  --    AM-PAC 6 Clicks Score  -- 8  -EH  --       How much help from another is currently needed...    Putting on and taking off regular lower body clothing? 1  -AN  -- 1  -AC    Bathing (including washing, rinsing, and drying) 1  -AN  -- 1  -AC    Toileting (which includes using toilet bed pan or urinal) 1  -AN  -- 1  -AC    Putting on and taking off regular upper body clothing 2  -AN  -- 2  -AC    Taking care of personal grooming (such as brushing teeth) 3  -AN  -- 2  -AC    Eating meals 3  -AN  -- 2  -AC    Score 11  -AN  -- 9  -AC       Functional Assessment    Outcome Measure Options  -- AM-PAC 6 Clicks Basic Mobility (PT)  - AM-PAC 6 Clicks Daily Activity (OT)  -AC      User Key  (r) = Recorded By, (t) = Taken By, (c) = Cosigned By    Initials Name Provider Type    AMAYA Grant, OT Occupational Therapist     Sushma Rod, PT Physical Therapist    FRITZ Tran, OT Occupational Therapist           Time Calculation:         Time Calculation- OT     Row Name 06/06/18 1510             Time Calculation- OT     Feels better this AM. Abdomen feels softer. Acute blood loss anemia noted and given blood transfusion. On exam, abdominal swelling has improved but still firm. No acute surgical intervention. Continue abdominal binder. Trend hemoglobin. OT Start Time 1416  -AN      Total Timed Code Minutes- OT 24 minute(s)  -AN      OT Received On 06/06/18  -AN      OT Goal Re-Cert Due Date 06/14/18  -AN        User Key  (r) = Recorded By, (t) = Taken By, (c) = Cosigned By    Initials Name Provider Type    FRITZ Tran OT Occupational Therapist           Therapy Charges for Today     Code Description Service Date Service Provider Modifiers Qty    [id number]  OT THERAPEUTIC ACT EA 15 MIN 6/6/2018 Saray Tran OT GO 2    [id number]  OT THER SUPP EA 15 MIN 6/6/2018 Saray Tran OT GO 2               Saray Tran OT  6/6/2018